# Patient Record
Sex: FEMALE | Race: WHITE | NOT HISPANIC OR LATINO | Employment: PART TIME | URBAN - NONMETROPOLITAN AREA
[De-identification: names, ages, dates, MRNs, and addresses within clinical notes are randomized per-mention and may not be internally consistent; named-entity substitution may affect disease eponyms.]

---

## 2017-01-16 DIAGNOSIS — F90.9 ATTENTION DEFICIT HYPERACTIVITY DISORDER (ADHD), UNSPECIFIED ADHD TYPE: Primary | ICD-10-CM

## 2017-01-16 RX ORDER — DEXTROAMPHETAMINE SACCHARATE, AMPHETAMINE ASPARTATE MONOHYDRATE, DEXTROAMPHETAMINE SULFATE AND AMPHETAMINE SULFATE 6.25; 6.25; 6.25; 6.25 MG/1; MG/1; MG/1; MG/1
CAPSULE, EXTENDED RELEASE ORAL
Qty: 60 CAPSULE | Refills: 0 | Status: SHIPPED | OUTPATIENT
Start: 2017-01-16 | End: 2017-02-15

## 2017-01-16 NOTE — TELEPHONE ENCOUNTER
adderall      Last Written Prescription Date: 12/14/16  Last Fill Quantity: 60,  # refills: 0   Last Office Visit with [facility], [facility] or [facility] prescribing provider: 12/12/16

## 2017-02-15 ENCOUNTER — OFFICE VISIT (OUTPATIENT)
Dept: FAMILY MEDICINE | Facility: OTHER | Age: 22
End: 2017-02-15
Attending: FAMILY MEDICINE
Payer: COMMERCIAL

## 2017-02-15 VITALS
TEMPERATURE: 100.2 F | HEIGHT: 67 IN | BODY MASS INDEX: 22.6 KG/M2 | OXYGEN SATURATION: 99 % | WEIGHT: 144 LBS | RESPIRATION RATE: 16 BRPM | HEART RATE: 91 BPM | DIASTOLIC BLOOD PRESSURE: 58 MMHG | SYSTOLIC BLOOD PRESSURE: 100 MMHG

## 2017-02-15 DIAGNOSIS — Z30.011 ENCOUNTER FOR INITIAL PRESCRIPTION OF CONTRACEPTIVE PILLS: ICD-10-CM

## 2017-02-15 DIAGNOSIS — F90.9 ATTENTION DEFICIT HYPERACTIVITY DISORDER (ADHD), UNSPECIFIED ADHD TYPE: ICD-10-CM

## 2017-02-15 DIAGNOSIS — J06.9 VIRAL UPPER RESPIRATORY TRACT INFECTION: Primary | ICD-10-CM

## 2017-02-15 PROCEDURE — 99212 OFFICE O/P EST SF 10 MIN: CPT

## 2017-02-15 PROCEDURE — 99213 OFFICE O/P EST LOW 20 MIN: CPT | Performed by: FAMILY MEDICINE

## 2017-02-15 RX ORDER — NORGESTIMATE AND ETHINYL ESTRADIOL 7DAYSX3 28
1 KIT ORAL DAILY
Qty: 84 TABLET | Refills: 3 | Status: SHIPPED | OUTPATIENT
Start: 2017-02-15 | End: 2018-03-02

## 2017-02-15 RX ORDER — DEXTROAMPHETAMINE SACCHARATE, AMPHETAMINE ASPARTATE MONOHYDRATE, DEXTROAMPHETAMINE SULFATE AND AMPHETAMINE SULFATE 6.25; 6.25; 6.25; 6.25 MG/1; MG/1; MG/1; MG/1
CAPSULE, EXTENDED RELEASE ORAL
Qty: 60 CAPSULE | Refills: 0 | Status: SHIPPED | OUTPATIENT
Start: 2017-02-15 | End: 2017-03-17

## 2017-02-15 ASSESSMENT — PAIN SCALES - GENERAL: PAINLEVEL: SEVERE PAIN (6)

## 2017-02-15 NOTE — MR AVS SNAPSHOT
"              After Visit Summary   2/15/2017    Nury Gilbert    MRN: [id number]           Patient Information     Date Of Birth          1995        Visit Information        Provider Department      2/15/2017 3:30 PM Vinay Baig MD [facility]        Today's Diagnoses     Viral upper respiratory tract infection    -  1    Attention deficit hyperactivity disorder (ADHD), unspecified ADHD type        Encounter for initial prescription of contraceptive pills          Care Instructions    F/u with ongoing concerns.         Follow-ups after your visit        Who to contact     If you have questions or need follow up information about today's clinic visit or your schedule please contact [facility] directly at [phone].  Normal or non-critical lab and imaging results will be communicated to you by MyChart, letter or phone within 4 business days after the clinic has received the results. If you do not hear from us within 7 days, please contact the clinic through MyChart or phone. If you have a critical or abnormal lab result, we will notify you by phone as soon as possible.  Submit refill requests through [employer] or call your pharmacy and they will forward the refill request to us. Please allow 3 business days for your refill to be completed.          Additional Information About Your Visit        MyChart Information     [employer] lets you send messages to your doctor, view your test results, renew your prescriptions, schedule appointments and more. To sign up, go to [email or web address] . Click on \"Log in\" on the left side of the screen, which will take you to the Welcome page. Then click on \"Sign up Now\" on the right side of the page.     You will be asked to enter the access code listed below, as well as some personal information. Please follow the directions to create your username and password.     Your access code is: [email or web address]  Expires: 3/12/2017  4:08 PM     Your " "access code will  in 90 days. If you need help or a new code, please call your [facility] or [phone].        Care EveryWhere ID     This is your Care EveryWhere ID. This could be used by other organizations to access your [facility] medical records  [id number]        Your Vitals Were     Pulse Temperature Respirations Height Pulse Oximetry BMI (Body Mass Index)    91 100.2  F (37.9  C) (Tympanic) 16 5' 7\" (1.702 m) 99% 22.55 kg/m2       Blood Pressure from Last 3 Encounters:   02/15/17 100/58   16 104/62   10/07/16 104/64    Weight from Last 3 Encounters:   02/15/17 144 lb (65.3 kg)   16 154 lb (69.9 kg)   10/07/16 160 lb (72.6 kg)              Today, you had the following     No orders found for display         Today's Medication Changes          These changes are accurate as of: 2/15/17  5:38 PM.  If you have any questions, ask your nurse or doctor.               Stop taking these medicines if you haven't already. Please contact your care team if you have questions.     ibuprofen 800 MG tablet   Commonly known as:  ADVIL/MOTRIN   Stopped by:  Vinay Baig MD           norelgestromin-ethinyl estradiol 150-35 MCG/24HR patch   Commonly known as:  ORTHO EVRA   Stopped by:  Vinay Baig MD                Where to get your medicines      These medications were sent to [facility] [address] [place], [place] - [address]  [address], formerly [facility] [address]     Phone:  [phone]     norgestim-eth estrad triphasic 0.18/0.215/0.25 MG-35 MCG per tablet         Some of these will need a paper prescription and others can be bought over the counter.  Ask your nurse if you have questions.     Bring a paper prescription for each of these medications     amphetamine-dextroamphetamine 25 MG 24 hr capsule                Primary Care Provider Office Phone # Fax #    AZALIA Gonzalez [phone]       [facility] [place] [address] [place] [place] [address]        Thank you!     Thank you for choosing [facility]  for your care. Our goal is always to provide you with excellent care. Hearing back from our patients is one way we can continue to improve our services. Please take a few minutes to complete the written survey that you may receive in the mail after your visit with us. Thank you!             Your Updated Medication List - Protect others around you: Learn how to safely use, store and throw away your medicines at www.disposemymeds.org.          This list is accurate as of: 2/15/17  5:38 PM.  Always use your most recent med list.                   Brand Name Dispense Instructions for use    amphetamine-dextroamphetamine 25 MG 24 hr capsule    ADDERALL XR    60 capsule    1 tab bid       clindamycin 1 % topical gel    CLINDAMAX    30 g    APPLY THIN FILM TOPICALLY TWICE DAILY TO AFFECTED SKIN AREAS       norgestim-eth estrad triphasic 0.18/0.215/0.25 MG-35 MCG per tablet    TRINESSA (28)    84 tablet    Take 1 tablet by mouth daily

## 2017-02-15 NOTE — NURSING NOTE
"Chief Complaint   Patient presents with     URI     Body aches, Chest heaviness, Runny nose, cough, right ear ache. Started about 4 days ago.        Initial /58  Pulse 91  Temp 100.2  F (37.9  C) (Tympanic)  Resp 16  Ht 5' 7\" (1.702 m)  Wt 144 lb (65.3 kg)  SpO2 99%  BMI 22.55 kg/m2 Estimated body mass index is 22.55 kg/(m^2) as calculated from the following:    Height as of this encounter: 5' 7\" (1.702 m).    Weight as of this encounter: 144 lb (65.3 kg).  Medication Reconciliation: complete   Felicita Fletcher      "

## 2017-02-15 NOTE — PROGRESS NOTES
Nury Gilbert    February 15, 2017    Chief Complaint   Patient presents with     URI     Body aches, Chest heaviness, Runny nose, cough, right ear ache. Started about 4 days ago.        SUBJECTIVE:  Here for URI sx, about 4 days, getting fevers, cough, rhinorrhea.  Some body aches as well.  We discussed and reviewed.      Past Medical History   Diagnosis Date     Anxiety state, unspecified 12/10/2002       Past Surgical History   Procedure Laterality Date     Nasal septal reconstruction/septoplasty  11/2009     turbinate hypertrophy; Chrissy Bonilla     Excision of skin lesion, submucosal reduction of inferior turbinates bilaterally  11/18/2009     deviated nasal septum, left nasal ala skin lesion; Chrissy Bonilla     Appendectomy  04/30/2009     Appendicitis; [employer]     Cystoscopy       Orthopedic surgery  3/13/13 & 2/17/14     arthroscopic procedure right knee     Adenoidectomy  11/2009     adnoid hypertrophy; Chrissy Bonilla     Ent surgery  2015     wisdom teeth removed       Current Outpatient Prescriptions   Medication Sig Dispense Refill     amphetamine-dextroamphetamine (ADDERALL XR) 25 MG 24 hr capsule 1 tab bid 60 capsule 0     norgestim-eth estrad triphasic (TRINESSA, 28,) 0.18/0.215/0.25 MG-35 MCG per tablet Take 1 tablet by mouth daily 84 tablet 3     clindamycin (CLINDAMAX) 1 % gel APPLY THIN FILM TOPICALLY TWICE DAILY TO AFFECTED SKIN AREAS 30 g 5       Allergies   Allergen Reactions     Influenza Virus Vaccine H5n1      Arm swelling, pain, sweating.      Penicillins      Phenergan Dm [Promethazine-Dm] Other (See Comments)     Hallucinations and muscle twitching       Family History   Problem Relation Age of Onset     HEART DISEASE Paternal Grandmother      defect; cause of death     Asthma Maternal Grandmother      Hypertension Maternal Grandmother      Other - See Comments Maternal Grandmother      blood disorder/GERD/sleep apnea     Cancer - colorectal Paternal Grandfather       Other - See Comments Maternal Grandfather      GERD     HEART DISEASE Maternal Grandfather      disease     Hypertension Maternal Grandfather      OSTEOPOROSIS Maternal Grandfather      Other - See Comments Mother      blood disorder/GERD, liver problem     Other - See Comments Maternal Aunt      brain tumor       Social History     Social History     Marital status: Single     Spouse name: N/A     Number of children: N/A     Years of education: N/A     Occupational History     Not on file.     Social History Main Topics     Smoking status: Never Smoker     Smokeless tobacco: Never Used      Comment: passive exposure     Alcohol use No     Drug use: No     Sexual activity: Yes     Other Topics Concern     Caffeine Concern Yes     soda every other day     Parent/Sibling W/ Cabg, Mi Or Angioplasty Before 65f 55m? No     Social History Narrative    No concerns with activity level.    The patient lives with mother and stepfather.    [employer] 10th Grade       5 point ROS negative except as noted above in HPI, including Gen., Resp., CV, GI &  system review.     OBJECTIVE:  B/P: 100/58, T: 100.2, P: 91, R: 16    GENERAL APPEARANCE: Alert, no acute distress  HEENT:  Normal.    CV: regular rate and rhythm, no murmur, rub or gallop  RESP: lungs clear to auscultation bilaterally  ABDOMEN: normal bowel sounds, soft, nontender, no hepatosplenomegaly or other masses  SKIN: no suspicious lesions or rashes to visualized skin  NEURO: Alert, oriented x 3, speech and mentation normal    ASSESSMENT and PLAN:  (J06.9,  B97.89) Viral upper respiratory tract infection  (primary encounter diagnosis)  Comment: discussed.   Plan: advise observation.  Clear lungs, normal oxygen stable presentation.  She understands and will f/u with any ongoing concerns.  Possible flu, but outside tx window so we didn't test.      (F90.9) Attention deficit hyperactivity disorder (ADHD), unspecified ADHD type  Comment: stable.   Plan:  amphetamine-dextroamphetamine (ADDERALL XR) 25         MG 24 hr capsule        Refilled at her request.  No side effects.      (Z30.011) Encounter for initial prescription of contraceptive pills  Comment: stable.   Plan: norgestim-eth estrad triphasic (TRINESSA, 28,)         0.18/0.215/0.25 MG-35 MCG per tablet        Sent the pills.

## 2017-03-17 DIAGNOSIS — F90.9 ATTENTION DEFICIT HYPERACTIVITY DISORDER (ADHD), UNSPECIFIED ADHD TYPE: ICD-10-CM

## 2017-03-17 NOTE — TELEPHONE ENCOUNTER
adderall      Last Written Prescription Date: 2/15/17  Last Fill Quantity: 60,  # refills: 0   Last Office Visit with [facility], [facility] or [facility] prescribing provider: 2/15/17

## 2017-03-20 RX ORDER — DEXTROAMPHETAMINE SACCHARATE, AMPHETAMINE ASPARTATE MONOHYDRATE, DEXTROAMPHETAMINE SULFATE AND AMPHETAMINE SULFATE 6.25; 6.25; 6.25; 6.25 MG/1; MG/1; MG/1; MG/1
CAPSULE, EXTENDED RELEASE ORAL
Qty: 60 CAPSULE | Refills: 0 | Status: SHIPPED | OUTPATIENT
Start: 2017-03-20 | End: 2017-04-28

## 2017-03-28 ENCOUNTER — OFFICE VISIT - GICH (OUTPATIENT)
Dept: FAMILY MEDICINE | Facility: OTHER | Age: 22
End: 2017-03-28

## 2017-03-28 ENCOUNTER — HISTORY (OUTPATIENT)
Dept: FAMILY MEDICINE | Facility: OTHER | Age: 22
End: 2017-03-28

## 2017-03-28 DIAGNOSIS — R10.2 PELVIC AND PERINEAL PAIN: ICD-10-CM

## 2017-03-28 LAB — HCG UR QL: NEGATIVE

## 2017-03-29 ENCOUNTER — HOSPITAL ENCOUNTER (OUTPATIENT)
Dept: RADIOLOGY | Facility: OTHER | Age: 22
End: 2017-03-29
Attending: FAMILY MEDICINE

## 2017-03-29 DIAGNOSIS — R10.2 PELVIC AND PERINEAL PAIN: ICD-10-CM

## 2017-03-31 ENCOUNTER — OFFICE VISIT (OUTPATIENT)
Dept: FAMILY MEDICINE | Facility: OTHER | Age: 22
End: 2017-03-31
Attending: PHYSICIAN ASSISTANT
Payer: COMMERCIAL

## 2017-03-31 VITALS
SYSTOLIC BLOOD PRESSURE: 108 MMHG | HEIGHT: 68 IN | TEMPERATURE: 98.2 F | DIASTOLIC BLOOD PRESSURE: 76 MMHG | HEART RATE: 81 BPM | OXYGEN SATURATION: 100 % | BODY MASS INDEX: 21.67 KG/M2 | WEIGHT: 143 LBS | RESPIRATION RATE: 16 BRPM

## 2017-03-31 DIAGNOSIS — F41.8 DEPRESSION WITH ANXIETY: Primary | ICD-10-CM

## 2017-03-31 DIAGNOSIS — Z79.899 ON STIMULANT MEDICATION: ICD-10-CM

## 2017-03-31 PROCEDURE — 99213 OFFICE O/P EST LOW 20 MIN: CPT | Performed by: PHYSICIAN ASSISTANT

## 2017-03-31 PROCEDURE — 99212 OFFICE O/P EST SF 10 MIN: CPT

## 2017-03-31 RX ORDER — CITALOPRAM HYDROBROMIDE 20 MG/1
20 TABLET ORAL DAILY
Qty: 30 TABLET | Refills: 1 | Status: SHIPPED | OUTPATIENT
Start: 2017-03-31 | End: 2017-04-28 | Stop reason: DRUGHIGH

## 2017-03-31 ASSESSMENT — ANXIETY QUESTIONNAIRES
1. FEELING NERVOUS, ANXIOUS, OR ON EDGE: NEARLY EVERY DAY
7. FEELING AFRAID AS IF SOMETHING AWFUL MIGHT HAPPEN: NEARLY EVERY DAY
IF YOU CHECKED OFF ANY PROBLEMS ON THIS QUESTIONNAIRE, HOW DIFFICULT HAVE THESE PROBLEMS MADE IT FOR YOU TO DO YOUR WORK, TAKE CARE OF THINGS AT HOME, OR GET ALONG WITH OTHER PEOPLE: EXTREMELY DIFFICULT
2. NOT BEING ABLE TO STOP OR CONTROL WORRYING: NEARLY EVERY DAY
6. BECOMING EASILY ANNOYED OR IRRITABLE: NEARLY EVERY DAY
5. BEING SO RESTLESS THAT IT IS HARD TO SIT STILL: NEARLY EVERY DAY
GAD7 TOTAL SCORE: 21
3. WORRYING TOO MUCH ABOUT DIFFERENT THINGS: NEARLY EVERY DAY

## 2017-03-31 ASSESSMENT — PATIENT HEALTH QUESTIONNAIRE - PHQ9: 5. POOR APPETITE OR OVEREATING: NEARLY EVERY DAY

## 2017-03-31 ASSESSMENT — PAIN SCALES - GENERAL: PAINLEVEL: NO PAIN (0)

## 2017-03-31 NOTE — MR AVS SNAPSHOT
"              After Visit Summary   3/31/2017    Nury Gilbert    MRN: [id number]           Patient Information     Date Of Birth          1995        Visit Information        Provider Department      3/31/2017 3:30 PM Naye Villeda PA [facility]        Today's Diagnoses     Depression with anxiety    -  1       Follow-ups after your visit        Who to contact     If you have questions or need follow up information about today's clinic visit or your schedule please contact [facility] directly at [phone].  Normal or non-critical lab and imaging results will be communicated to you by [employer]hart, letter or phone within 4 business days after the clinic has received the results. If you do not hear from us within 7 days, please contact the clinic through [employer] or phone. If you have a critical or abnormal lab result, we will notify you by phone as soon as possible.  Submit refill requests through [employer] or call your pharmacy and they will forward the refill request to us. Please allow 3 business days for your refill to be completed.          Additional Information About Your Visit        MyChart Information     [employer] lets you send messages to your doctor, view your test results, renew your prescriptions, schedule appointments and more. To sign up, go to [email or web address] . Click on \"Log in\" on the left side of the screen, which will take you to the Welcome page. Then click on \"Sign up Now\" on the right side of the page.     You will be asked to enter the access code listed below, as well as some personal information. Please follow the directions to create your username and password.     Your access code is: [email or web address]  Expires: 2017  4:27 PM     Your access code will  in 90 days. If you need help or a new code, please call your [facility] or [phone].        Care EveryWhere ID     This is your Care EveryWhere ID. This could be used by other " "organizations to access your [facility] medical records  [id number]        Your Vitals Were     Pulse Temperature Respirations Height Pulse Oximetry BMI (Body Mass Index)    81 98.2  F (36.8  C) (Tympanic) 16 5' 7.75\" (1.721 m) 100% 21.9 kg/m2       Blood Pressure from Last 3 Encounters:   03/31/17 108/76   02/15/17 100/58   12/12/16 104/62    Weight from Last 3 Encounters:   03/31/17 143 lb (64.9 kg)   02/15/17 144 lb (65.3 kg)   12/12/16 154 lb (69.9 kg)              Today, you had the following     No orders found for display         Today's Medication Changes          These changes are accurate as of: 3/31/17  4:27 PM.  If you have any questions, ask your nurse or doctor.               Start taking these medicines.        Dose/Directions    citalopram 20 MG tablet   Commonly known as:  celeXA   Used for:  Depression with anxiety   Started by:  Naye Villeda PA        Dose:  20 mg   Take 1 tablet (20 mg) by mouth daily   Quantity:  30 tablet   Refills:  1            Where to get your medicines      These medications were sent to [id number] - [place], [place] - [address]  [address] [facility] [address]     Phone:  [phone]     citalopram 20 MG tablet                Primary Care Provider Office Phone # Fax #    AZALIA Gonzalez [phone]       [facility] [place] [address] [facility]  [place] [address]        Thank you!     Thank you for choosing [facility]  for your care. Our goal is always to provide you with excellent care. Hearing back from our patients is one way we can continue to improve our services. Please take a few minutes to complete the written survey that you may receive in the mail after your visit with us. Thank you!             Your Updated Medication List - Protect others around you: Learn how to safely use, store and throw away your medicines at www.disposemymeds.org.          This list is " accurate as of: 3/31/17  4:27 PM.  Always use your most recent med list.                   Brand Name Dispense Instructions for use    amphetamine-dextroamphetamine 25 MG 24 hr capsule    ADDERALL XR    60 capsule    1 tab bid       citalopram 20 MG tablet    celeXA    30 tablet    Take 1 tablet (20 mg) by mouth daily       clindamycin 1 % topical gel    CLINDAMAX    30 g    APPLY THIN FILM TOPICALLY TWICE DAILY TO AFFECTED SKIN AREAS       norgestim-eth estrad triphasic 0.18/0.215/0.25 MG-35 MCG per tablet    TRINESSA (28)    84 tablet    Take 1 tablet by mouth daily

## 2017-03-31 NOTE — PROGRESS NOTES
Subjective:  Nury Gilbert is a 22 year old female with a 2 month history of depression and anxiety    Active diagnoses this visit:  Data Unavailable    Review Of Systems  Psych: As above    PMH, PSH, FH reviewed and unchanged    Current Outpatient Prescriptions   Medication     amphetamine-dextroamphetamine (ADDERALL XR) 25 MG 24 hr capsule     norgestim-eth estrad triphasic (TRINESSA, 28,) 0.18/0.215/0.25 MG-35 MCG per tablet     clindamycin (CLINDAMAX) 1 % gel     No current facility-administered medications for this visit.        Allergies   Allergen Reactions     Influenza Virus Vaccine H5n1      Arm swelling, pain, sweating.      Penicillins      Phenergan Dm [Promethazine-Dm] Other (See Comments)     Hallucinations and muscle twitching         Objective:  B/P: 108/76, T: 98.2, P: 81, R: 16    Physical Exam:  Constitutional: Alert, cooperative, orientated to place and situation  Psych: Mood is predominately euthymic with corresponding affect. Normal tone, rate and volume of speech. Goal directed thought process. Normal thought content. Patient shows good insight and judgement. Denies homicidal or suicidal ideation, intent or plan. PHQ-9 score 17  BRANDON 21     Assessment and Plan:  (F41.8) Depression with anxiety  (primary encounter diagnosis)  Comment: Start with 3 week f/u  Plan: citalopram (CELEXA) 20 MG tablet                Follow up 3 weeks, or if symptoms persist or worsen  A total of 15 minutes is spent with patient with greater than 60% spent on above plan. We discussed risks and benefits of medications and usual side effects to watch for.    Naye Villeda

## 2017-03-31 NOTE — NURSING NOTE
"Chief Complaint   Patient presents with     Anxiety     Pt is in for a FU on anxiety.       Initial /76 (BP Location: Right arm, Patient Position: Chair, Cuff Size: Adult Regular)  Pulse 81  Temp 98.2  F (36.8  C) (Tympanic)  Resp 16  Ht 5' 7.75\" (1.721 m)  Wt 143 lb (64.9 kg)  SpO2 100%  BMI 21.9 kg/m2 Estimated body mass index is 21.9 kg/(m^2) as calculated from the following:    Height as of this encounter: 5' 7.75\" (1.721 m).    Weight as of this encounter: 143 lb (64.9 kg).  Medication Reconciliation: complete   Lorri Zuleta    "

## 2017-04-01 ASSESSMENT — PATIENT HEALTH QUESTIONNAIRE - PHQ9: SUM OF ALL RESPONSES TO PHQ QUESTIONS 1-9: 17

## 2017-04-01 ASSESSMENT — ANXIETY QUESTIONNAIRES: GAD7 TOTAL SCORE: 21

## 2017-04-11 ENCOUNTER — AMBULATORY - GICH (OUTPATIENT)
Dept: SCHEDULING | Facility: OTHER | Age: 22
End: 2017-04-11

## 2017-04-28 ENCOUNTER — OFFICE VISIT (OUTPATIENT)
Dept: FAMILY MEDICINE | Facility: OTHER | Age: 22
End: 2017-04-28
Attending: PHYSICIAN ASSISTANT
Payer: COMMERCIAL

## 2017-04-28 VITALS
HEIGHT: 68 IN | RESPIRATION RATE: 16 BRPM | HEART RATE: 73 BPM | BODY MASS INDEX: 21.73 KG/M2 | SYSTOLIC BLOOD PRESSURE: 88 MMHG | DIASTOLIC BLOOD PRESSURE: 56 MMHG | OXYGEN SATURATION: 99 % | TEMPERATURE: 98.5 F | WEIGHT: 143.4 LBS

## 2017-04-28 DIAGNOSIS — F32.0 MILD SINGLE CURRENT EPISODE OF MAJOR DEPRESSIVE DISORDER (H): Primary | ICD-10-CM

## 2017-04-28 DIAGNOSIS — H10.33 ACUTE BACTERIAL CONJUNCTIVITIS OF BOTH EYES: ICD-10-CM

## 2017-04-28 DIAGNOSIS — F90.9 ATTENTION DEFICIT HYPERACTIVITY DISORDER (ADHD), UNSPECIFIED ADHD TYPE: ICD-10-CM

## 2017-04-28 DIAGNOSIS — H65.01 RIGHT ACUTE SEROUS OTITIS MEDIA, RECURRENCE NOT SPECIFIED: ICD-10-CM

## 2017-04-28 DIAGNOSIS — Z71.89 ACP (ADVANCE CARE PLANNING): Chronic | ICD-10-CM

## 2017-04-28 DIAGNOSIS — F41.1 GAD (GENERALIZED ANXIETY DISORDER): ICD-10-CM

## 2017-04-28 PROCEDURE — 99214 OFFICE O/P EST MOD 30 MIN: CPT | Performed by: PHYSICIAN ASSISTANT

## 2017-04-28 PROCEDURE — 99212 OFFICE O/P EST SF 10 MIN: CPT

## 2017-04-28 RX ORDER — AZITHROMYCIN 250 MG/1
TABLET, FILM COATED ORAL
Qty: 6 TABLET | Refills: 0 | Status: SHIPPED | OUTPATIENT
Start: 2017-04-28 | End: 2018-03-02

## 2017-04-28 RX ORDER — TOBRAMYCIN 3 MG/ML
2 SOLUTION/ DROPS OPHTHALMIC 3 TIMES DAILY
Qty: 3 ML | Refills: 0 | Status: SHIPPED | OUTPATIENT
Start: 2017-04-28 | End: 2017-05-05

## 2017-04-28 RX ORDER — DEXTROAMPHETAMINE SACCHARATE, AMPHETAMINE ASPARTATE MONOHYDRATE, DEXTROAMPHETAMINE SULFATE AND AMPHETAMINE SULFATE 6.25; 6.25; 6.25; 6.25 MG/1; MG/1; MG/1; MG/1
CAPSULE, EXTENDED RELEASE ORAL
Qty: 60 CAPSULE | Refills: 0 | Status: SHIPPED | OUTPATIENT
Start: 2017-04-28 | End: 2017-06-28

## 2017-04-28 RX ORDER — CITALOPRAM HYDROBROMIDE 40 MG/1
40 TABLET ORAL DAILY
Qty: 30 TABLET | Refills: 1 | Status: SHIPPED | OUTPATIENT
Start: 2017-04-28 | End: 2018-03-02

## 2017-04-28 ASSESSMENT — ANXIETY QUESTIONNAIRES
7. FEELING AFRAID AS IF SOMETHING AWFUL MIGHT HAPPEN: MORE THAN HALF THE DAYS
GAD7 TOTAL SCORE: 10
IF YOU CHECKED OFF ANY PROBLEMS ON THIS QUESTIONNAIRE, HOW DIFFICULT HAVE THESE PROBLEMS MADE IT FOR YOU TO DO YOUR WORK, TAKE CARE OF THINGS AT HOME, OR GET ALONG WITH OTHER PEOPLE: SOMEWHAT DIFFICULT
3. WORRYING TOO MUCH ABOUT DIFFERENT THINGS: MORE THAN HALF THE DAYS
5. BEING SO RESTLESS THAT IT IS HARD TO SIT STILL: SEVERAL DAYS
6. BECOMING EASILY ANNOYED OR IRRITABLE: MORE THAN HALF THE DAYS
1. FEELING NERVOUS, ANXIOUS, OR ON EDGE: SEVERAL DAYS
2. NOT BEING ABLE TO STOP OR CONTROL WORRYING: SEVERAL DAYS

## 2017-04-28 ASSESSMENT — PAIN SCALES - GENERAL: PAINLEVEL: MODERATE PAIN (5)

## 2017-04-28 ASSESSMENT — PATIENT HEALTH QUESTIONNAIRE - PHQ9: 5. POOR APPETITE OR OVEREATING: SEVERAL DAYS

## 2017-04-28 NOTE — NURSING NOTE
"Chief Complaint   Patient presents with     URI     Pt has a cough, stuffy nose, ST, SOB, SOB with extertion and lying down for 4 days. Pt has nausea and vomiting at onset, but has resolved. No fever noted. Pt has bilateral eye drainage, eye soreness for 1 day.     Depression     Pt is in for a 3 weeks FU on Depression and Anxiety.       Initial BP (!) 88/56 (BP Location: Right arm, Patient Position: Chair, Cuff Size: Adult Regular)  Pulse 73  Temp 98.5  F (36.9  C) (Tympanic)  Resp 16  Ht 5' 7.75\" (1.721 m)  Wt 143 lb 6.4 oz (65 kg)  SpO2 99%  BMI 21.97 kg/m2 Estimated body mass index is 21.97 kg/(m^2) as calculated from the following:    Height as of this encounter: 5' 7.75\" (1.721 m).    Weight as of this encounter: 143 lb 6.4 oz (65 kg).  Medication Reconciliation: complete   Lorri Zuleta    "

## 2017-04-28 NOTE — MR AVS SNAPSHOT
After Visit Summary   4/28/2017    Nury Gilbert    MRN: [id number]           Patient Information     Date Of Birth          1995        Visit Information        Provider Department      4/28/2017 4:00 PM Naye Villeda PA [facility]        Today's Diagnoses     Mild single current episode of major depressive disorder (H)    -  1    ACP (advance care planning)        BRANDON (generalized anxiety disorder)        Acute bacterial conjunctivitis of both eyes        Right acute serous otitis media, recurrence not specified        Attention deficit hyperactivity disorder (ADHD), unspecified ADHD type           Follow-ups after your visit        Your next 10 appointments already scheduled     May 25, 2017  2:15 PM CDT   (Arrive by 2:00 PM)   SHORT with AZALIA Gonzalez   [facility] ([facility])    [address] [facility] [address]   [phone]              Who to contact     If you have questions or need follow up information about today's clinic visit or your schedule please contact [facility] directly at [phone].  Normal or non-critical lab and imaging results will be communicated to you by MyChart, letter or phone within 4 business days after the clinic has received the results. If you do not hear from us within 7 days, please contact the clinic through [employer] or phone. If you have a critical or abnormal lab result, we will notify you by phone as soon as possible.  Submit refill requests through [employer] or call your pharmacy and they will forward the refill request to us. Please allow 3 business days for your refill to be completed.          Additional Information About Your Visit        MyChart Information     [employer] gives you secure access to your electronic health record. If you see a primary care provider, you can also send messages to your care team and make appointments. If you have questions, please call your primary  "care clinic.  If you do not have a primary care provider, please call [phone] and they will assist you.        Care EveryWhere ID     This is your Care EveryWhere ID. This could be used by other organizations to access your [place] medical records  [id number]        Your Vitals Were     Pulse Temperature Respirations Height Pulse Oximetry BMI (Body Mass Index)    73 98.5  F (36.9  C) (Tympanic) 16 5' 7.75\" (1.721 m) 99% 21.97 kg/m2       Blood Pressure from Last 3 Encounters:   04/28/17 (!) 88/56   03/31/17 108/76   02/15/17 100/58    Weight from Last 3 Encounters:   04/28/17 143 lb 6.4 oz (65 kg)   03/31/17 143 lb (64.9 kg)   02/15/17 144 lb (65.3 kg)              Today, you had the following     No orders found for display         Today's Medication Changes          These changes are accurate as of: 4/28/17  4:38 PM.  If you have any questions, ask your nurse or doctor.               Start taking these medicines.        Dose/Directions    azithromycin 250 MG tablet   Commonly known as:  ZITHROMAX   Used for:  Right acute serous otitis media, recurrence not specified   Started by:  Naye Villeda PA        Two tablets first day, then one tablet daily for four days.   Quantity:  6 tablet   Refills:  0       tobramycin 0.3 % ophthalmic solution   Commonly known as:  TOBREX   Used for:  Acute bacterial conjunctivitis of both eyes   Started by:  Naye Villead PA        Dose:  2 drop   Place 2 drops into both eyes 3 times daily for 7 days   Quantity:  3 mL   Refills:  0         These medicines have changed or have updated prescriptions.        Dose/Directions    citalopram 40 MG tablet   Commonly known as:  celeXA   This may have changed:    - medication strength  - how much to take   Used for:  Mild single current episode of major depressive disorder (H), BRANDON (generalized anxiety disorder)   Changed by:  Naye Villeda PA        Dose:  40 mg   Take 1 tablet (40 mg) by mouth daily   Quantity:  30 " tablet   Refills:  1            Where to get your medicines      These medications were sent to [id number] - [place], [place] - [address]  [address], [facility] [address]     Phone:  [phone]     azithromycin 250 MG tablet    citalopram 40 MG tablet    tobramycin 0.3 % ophthalmic solution         Some of these will need a paper prescription and others can be bought over the counter.  Ask your nurse if you have questions.     Bring a paper prescription for each of these medications     amphetamine-dextroamphetamine 25 MG 24 hr capsule                Primary Care Provider Office Phone # Fax #    AZALIA Gonzalez [phone]       [facility] [place] [address]  [place] [address]        Thank you!     Thank you for choosing [facility]  for your care. Our goal is always to provide you with excellent care. Hearing back from our patients is one way we can continue to improve our services. Please take a few minutes to complete the written survey that you may receive in the mail after your visit with us. Thank you!             Your Updated Medication List - Protect others around you: Learn how to safely use, store and throw away your medicines at www.disposemymeds.org.          This list is accurate as of: 4/28/17  4:38 PM.  Always use your most recent med list.                   Brand Name Dispense Instructions for use    amphetamine-dextroamphetamine 25 MG 24 hr capsule    ADDERALL XR    60 capsule    1 tab bid       azithromycin 250 MG tablet    ZITHROMAX    6 tablet    Two tablets first day, then one tablet daily for four days.       citalopram 40 MG tablet    celeXA    30 tablet    Take 1 tablet (40 mg) by mouth daily       clindamycin 1 % topical gel    CLINDAMAX    30 g    APPLY THIN FILM TOPICALLY TWICE DAILY TO AFFECTED SKIN AREAS       norgestim-eth estrad triphasic 0.18/0.215/0.25 MG-35 MCG per tablet     TRINESSA (28)    84 tablet    Take 1 tablet by mouth daily       tobramycin 0.3 % ophthalmic solution    TOBREX    3 mL    Place 2 drops into both eyes 3 times daily for 7 days

## 2017-04-28 NOTE — PROGRESS NOTES
Chief complaint:   Chief Complaint   Patient presents with     URI     Pt has a cough, stuffy nose, ST, SOB, SOB with extertion and lying down for 4 days. Pt has nausea and vomiting at onset, but has resolved. No fever noted. Pt has bilateral eye drainage, eye soreness for 1 day.     Depression     Pt is in for a 3 weeks FU on Depression and Anxiety.       Subjective: Nury Gilbert is a 22 year old female with a 4 dayhistory of nasal congestion, PND, sore throat, right earache, cough, fever and red eyes with drainage. Denies nausea, vomiting, diarrhea. Next f/u depression/anxiety. Feels mildly improved with Citalopram 20 mg. No side efeects    Past Medical History:   Diagnosis Date     Anxiety state, unspecified 12/10/2002     Past Surgical History:   Procedure Laterality Date     ADENOIDECTOMY  11/2009    adnoid hypertrophy; [facility]     APPENDECTOMY  04/30/2009    Appendicitis; [employer]     CYSTOSCOPY       ENT SURGERY  2015    wisdom teeth removed     excision of skin lesion, submucosal reduction of inferior turbinates bilaterally  11/18/2009    deviated nasal septum, left nasal ala skin lesion; [facility]     nasal septal reconstruction/septoplasty  11/2009    turbinate hypertrophy; [facility]     ORTHOPEDIC SURGERY  3/13/13 & 2/17/14    arthroscopic procedure right knee     Current Outpatient Prescriptions   Medication Sig Dispense Refill     citalopram (CELEXA) 40 MG tablet Take 1 tablet (40 mg) by mouth daily 30 tablet 1     azithromycin (ZITHROMAX) 250 MG tablet Two tablets first day, then one tablet daily for four days. 6 tablet 0     tobramycin (TOBREX) 0.3 % ophthalmic solution Place 2 drops into both eyes 3 times daily for 7 days 3 mL 0     amphetamine-dextroamphetamine (ADDERALL XR) 25 MG 24 hr capsule 1 tab bid 60 capsule 0     norgestim-eth estrad triphasic (TRINESSA, 28,) 0.18/0.215/0.25 MG-35 MCG per tablet Take 1 tablet by mouth daily 84 tablet 3     clindamycin  (CLINDAMAX) 1 % gel APPLY THIN FILM TOPICALLY TWICE DAILY TO AFFECTED SKIN AREAS 30 g 5     [DISCONTINUED] citalopram (CELEXA) 20 MG tablet Take 1 tablet (20 mg) by mouth daily 30 tablet 1      Allergies   Allergen Reactions     Influenza Virus Vaccine H5n1      Arm swelling, pain, sweating.      Penicillins      Phenergan Dm [Promethazine-Dm] Other (See Comments)     Hallucinations and muscle twitching       Family and Social History are reviewed.    Review Of Systems  Skin: Denies rash  Eyes: Denies redness or discharge   Ears/Nose/Throat: as above  Respiratory: as above  Cardiovascular: Denies chest pain or palpitations   Gastrointestinal:Denies nausea, vomiting, diarrhea   Psych: as above    Objective:   B/P: 88/56, T: 98.5, P: 73, R: 16    Physical Exam  General: Alert orientated. NAD  Skin is unremarkable.  HEENT:Posterior pharynx erythematous. EAC's clear. TM's intact. Nasal mucosa edematous, erythematous. TTP maxillary sinuses. Neck supple. No lymphadenopathy. Palpebral conjunctiva injected bilateral  Lungs: Clear to auscultation  Cardiac: RRR  Psych: Mood is predominately euthymic with corresponding affect. Normal rate, tone and volume of speech. Goal directed thought process. Normal thought content. Patient shows good insight and judgement. PHQ-9 score 8      Assessment:   (F32.0) Mild single current episode of major depressive disorder (H)  (primary encounter diagnosis)  Comment: 30 mg daily for 4 days, then 40 mg daily with 1 month f/u  Plan: citalopram (CELEXA) 40 MG tablet            (Z71.89) ACP (advance care planning)      (F41.1) BRANDON (generalized anxiety disorder)  Comment: as above  Plan: citalopram (CELEXA) 40 MG tablet            (H10.33) Acute bacterial conjunctivitis of both eyes  Plan: tobramycin (TOBREX) 0.3 % ophthalmic solution            (H65.01) Right acute serous otitis media, recurrence not specified  Plan: azithromycin (ZITHROMAX) 250 MG tablet            (F90.9) Attention deficit  hyperactivity disorder (ADHD), unspecified ADHD type  Comment: refill  Plan: amphetamine-dextroamphetamine (ADDERALL XR) 25         MG 24 hr capsule              Rest. Increase fluids. Tylenol for fever or discomfort. Return if symptoms persist or worsen.    Naye Villeda PA-C

## 2017-05-02 ASSESSMENT — PATIENT HEALTH QUESTIONNAIRE - PHQ9: SUM OF ALL RESPONSES TO PHQ QUESTIONS 1-9: 8

## 2017-05-02 ASSESSMENT — ANXIETY QUESTIONNAIRES: GAD7 TOTAL SCORE: 10

## 2017-05-19 ENCOUNTER — HISTORY (OUTPATIENT)
Dept: EMERGENCY MEDICINE | Facility: OTHER | Age: 22
End: 2017-05-19

## 2017-05-20 ENCOUNTER — COMMUNICATION - GICH (OUTPATIENT)
Dept: EMERGENCY MEDICINE | Facility: OTHER | Age: 22
End: 2017-05-20

## 2017-05-20 DIAGNOSIS — S61.421A: ICD-10-CM

## 2017-05-23 ENCOUNTER — HISTORY (OUTPATIENT)
Dept: SURGERY | Facility: OTHER | Age: 22
End: 2017-05-23

## 2017-05-23 ENCOUNTER — OFFICE VISIT - GICH (OUTPATIENT)
Dept: SURGERY | Facility: OTHER | Age: 22
End: 2017-05-23

## 2017-05-23 DIAGNOSIS — Z48.817 ENCOUNTER FOR SURGICAL AFTERCARE FOLLOWING SURGERY OF SKIN OR SUBCUTANEOUS TISSUE: ICD-10-CM

## 2017-05-23 DIAGNOSIS — S60.459D: ICD-10-CM

## 2017-06-24 ENCOUNTER — TRANSFERRED RECORDS (OUTPATIENT)
Dept: HEALTH INFORMATION MANAGEMENT | Facility: HOSPITAL | Age: 22
End: 2017-06-24

## 2017-06-24 ENCOUNTER — HISTORY (OUTPATIENT)
Dept: EMERGENCY MEDICINE | Facility: OTHER | Age: 22
End: 2017-06-24

## 2017-06-28 DIAGNOSIS — F90.9 ATTENTION DEFICIT HYPERACTIVITY DISORDER (ADHD), UNSPECIFIED ADHD TYPE: ICD-10-CM

## 2017-06-28 RX ORDER — DEXTROAMPHETAMINE SACCHARATE, AMPHETAMINE ASPARTATE MONOHYDRATE, DEXTROAMPHETAMINE SULFATE AND AMPHETAMINE SULFATE 6.25; 6.25; 6.25; 6.25 MG/1; MG/1; MG/1; MG/1
CAPSULE, EXTENDED RELEASE ORAL
Qty: 60 CAPSULE | Refills: 0 | Status: SHIPPED | OUTPATIENT
Start: 2017-06-28 | End: 2018-03-02

## 2017-07-10 ENCOUNTER — HISTORY (OUTPATIENT)
Dept: EMERGENCY MEDICINE | Facility: OTHER | Age: 22
End: 2017-07-10

## 2017-07-11 ENCOUNTER — TRANSFERRED RECORDS (OUTPATIENT)
Dept: HEALTH INFORMATION MANAGEMENT | Facility: HOSPITAL | Age: 22
End: 2017-07-11

## 2017-07-11 ENCOUNTER — HISTORY (OUTPATIENT)
Dept: SURGERY | Facility: OTHER | Age: 22
End: 2017-07-11

## 2017-07-11 ENCOUNTER — AMBULATORY - GICH (OUTPATIENT)
Dept: SURGERY | Facility: OTHER | Age: 22
End: 2017-07-11

## 2017-07-11 DIAGNOSIS — S60.459D: ICD-10-CM

## 2017-08-01 ENCOUNTER — OFFICE VISIT - GICH (OUTPATIENT)
Dept: SURGERY | Facility: OTHER | Age: 22
End: 2017-08-01

## 2017-08-01 ENCOUNTER — HISTORY (OUTPATIENT)
Dept: SURGERY | Facility: OTHER | Age: 22
End: 2017-08-01

## 2017-08-01 ENCOUNTER — COMMUNICATION - GICH (OUTPATIENT)
Dept: SURGERY | Facility: OTHER | Age: 22
End: 2017-08-01

## 2017-08-01 DIAGNOSIS — S61.239S: ICD-10-CM

## 2017-08-18 ENCOUNTER — HISTORY (OUTPATIENT)
Dept: FAMILY MEDICINE | Facility: OTHER | Age: 22
End: 2017-08-18

## 2017-08-18 ENCOUNTER — OFFICE VISIT - GICH (OUTPATIENT)
Dept: FAMILY MEDICINE | Facility: OTHER | Age: 22
End: 2017-08-18

## 2017-08-18 DIAGNOSIS — L03.031 CELLULITIS OF TOE OF RIGHT FOOT: ICD-10-CM

## 2017-08-22 ENCOUNTER — HISTORY (OUTPATIENT)
Dept: EMERGENCY MEDICINE | Facility: OTHER | Age: 22
End: 2017-08-22

## 2017-08-29 ENCOUNTER — COMMUNICATION - GICH (OUTPATIENT)
Dept: OBGYN | Facility: OTHER | Age: 22
End: 2017-08-29

## 2017-08-29 ENCOUNTER — HOSPITAL ENCOUNTER (OUTPATIENT)
Dept: RADIOLOGY | Facility: OTHER | Age: 22
End: 2017-08-29

## 2017-08-29 ENCOUNTER — AMBULATORY - GICH (OUTPATIENT)
Dept: OBGYN | Facility: OTHER | Age: 22
End: 2017-08-29

## 2017-08-29 DIAGNOSIS — Z33.1 PREGNANT STATE, INCIDENTAL: ICD-10-CM

## 2017-08-29 DIAGNOSIS — Z36.9 ENCOUNTER FOR ANTENATAL SCREENING OF MOTHER: ICD-10-CM

## 2017-09-05 ENCOUNTER — TRANSFERRED RECORDS (OUTPATIENT)
Dept: HEALTH INFORMATION MANAGEMENT | Facility: HOSPITAL | Age: 22
End: 2017-09-05

## 2017-09-19 ENCOUNTER — PRENATAL OFFICE VISIT - GICH (OUTPATIENT)
Dept: OBGYN | Facility: OTHER | Age: 22
End: 2017-09-19

## 2017-09-19 ENCOUNTER — HISTORY (OUTPATIENT)
Dept: OBGYN | Facility: OTHER | Age: 22
End: 2017-09-19

## 2017-09-19 ENCOUNTER — AMBULATORY - GICH (OUTPATIENT)
Dept: LAB | Facility: OTHER | Age: 22
End: 2017-09-19

## 2017-09-19 ENCOUNTER — HOSPITAL ENCOUNTER (OUTPATIENT)
Dept: RADIOLOGY | Facility: OTHER | Age: 22
End: 2017-09-19

## 2017-09-19 ENCOUNTER — AMBULATORY - GICH (OUTPATIENT)
Dept: RADIOLOGY | Facility: OTHER | Age: 22
End: 2017-09-19

## 2017-09-19 DIAGNOSIS — Z12.4 ENCOUNTER FOR SCREENING FOR MALIGNANT NEOPLASM OF CERVIX: ICD-10-CM

## 2017-09-19 DIAGNOSIS — Z34.90 ENCOUNTER FOR SUPERVISION OF NORMAL PREGNANCY: ICD-10-CM

## 2017-09-19 DIAGNOSIS — Z36.9 ENCOUNTER FOR ANTENATAL SCREENING OF MOTHER: ICD-10-CM

## 2017-09-19 DIAGNOSIS — O21.9 VOMITING OF PREGNANCY: ICD-10-CM

## 2017-09-19 LAB
ABORH - HISTORICAL: NORMAL
ABSOLUTE BASOPHILS - HISTORICAL: 0 THOU/CU MM
ABSOLUTE EOSINOPHILS - HISTORICAL: 0.1 THOU/CU MM
ABSOLUTE IMMATURE GRANULOCYTES(METAS,MYELOS,PROS) - HISTORICAL: 0 THOU/CU MM
ABSOLUTE LYMPHOCYTES - HISTORICAL: 1.8 THOU/CU MM (ref 0.9–2.9)
ABSOLUTE MONOCYTES - HISTORICAL: 0.6 THOU/CU MM
ABSOLUTE NEUTROPHILS - HISTORICAL: 5 THOU/CU MM (ref 1.7–7)
ANTIBODY SCREEN - HISTORICAL: NEGATIVE
BASOPHILS # BLD AUTO: 0.4 %
EOSINOPHIL NFR BLD AUTO: 1.9 %
ERYTHROCYTE [DISTWIDTH] IN BLOOD BY AUTOMATED COUNT: 12.1 % (ref 11.5–15.5)
HCT VFR BLD AUTO: 41.7 % (ref 33–51)
HEMOGLOBIN: 14.6 G/DL (ref 12–16)
IMMATURE GRANULOCYTES(METAS,MYELOS,PROS) - HISTORICAL: 0.3 %
LYMPHOCYTES NFR BLD AUTO: 23.2 % (ref 20–44)
MCH RBC QN AUTO: 30.5 PG (ref 26–34)
MCHC RBC AUTO-ENTMCNC: 35 G/DL (ref 32–36)
MCV RBC AUTO: 87 FL (ref 80–100)
MONOCYTES NFR BLD AUTO: 8.4 %
NEUTROPHILS NFR BLD AUTO: 65.8 % (ref 42–72)
PLATELET # BLD AUTO: 303 THOU/CU MM (ref 140–440)
PMV BLD: 9.3 FL (ref 6.5–11)
RED BLOOD COUNT - HISTORICAL: 4.79 MIL/CU MM (ref 4–5.2)
RUBELLA COMMENT - HISTORICAL: NORMAL
SPECIMEN EXPIRATION DATE/TIME - HISTORICAL: NORMAL
SPINAL MUSCLE ATROPHY CAR - HISTORICAL: NORMAL
WHITE BLOOD COUNT - HISTORICAL: 7.5 THOU/CU MM (ref 4.5–11)

## 2017-09-20 LAB
HBSAG CATEGORY - HISTORICAL: NONREACTIVE
HIV-1/HIV-2 ANTIBODY CATEGORY - HISTORICAL: NORMAL

## 2017-09-21 LAB
CULTURE - HISTORICAL: NORMAL
TREPONEMA PALLIDUM - HISTORICAL: NEGATIVE

## 2017-09-22 LAB
CF INTERPRETATION - HISTORICAL: NORMAL
CF RESULT - HISTORICAL: NORMAL

## 2017-09-27 ENCOUNTER — COMMUNICATION - GICH (OUTPATIENT)
Dept: OBGYN | Facility: OTHER | Age: 22
End: 2017-09-27

## 2017-10-06 ENCOUNTER — COMMUNICATION - GICH (OUTPATIENT)
Dept: OBGYN | Facility: OTHER | Age: 22
End: 2017-10-06

## 2017-10-14 ENCOUNTER — HISTORY (OUTPATIENT)
Dept: EMERGENCY MEDICINE | Facility: OTHER | Age: 22
End: 2017-10-14

## 2017-10-15 ENCOUNTER — TRANSFERRED RECORDS (OUTPATIENT)
Dept: HEALTH INFORMATION MANAGEMENT | Facility: HOSPITAL | Age: 22
End: 2017-10-15

## 2017-10-17 ENCOUNTER — PRENATAL OFFICE VISIT - GICH (OUTPATIENT)
Dept: OBGYN | Facility: OTHER | Age: 22
End: 2017-10-17

## 2017-10-17 DIAGNOSIS — Z34.01 ENCOUNTER FOR SUPERVISION OF NORMAL FIRST PREGNANCY IN FIRST TRIMESTER: ICD-10-CM

## 2017-10-17 DIAGNOSIS — F41.9 ANXIETY DISORDER: ICD-10-CM

## 2017-10-17 DIAGNOSIS — R21 RASH AND OTHER NONSPECIFIC SKIN ERUPTION: ICD-10-CM

## 2017-10-17 ASSESSMENT — ANXIETY QUESTIONNAIRES
5. BEING SO RESTLESS THAT IT IS HARD TO SIT STILL: NOT AT ALL
7. FEELING AFRAID AS IF SOMETHING AWFUL MIGHT HAPPEN: MORE THAN HALF THE DAYS
1. FEELING NERVOUS, ANXIOUS, OR ON EDGE: NEARLY EVERY DAY
6. BECOMING EASILY ANNOYED OR IRRITABLE: SEVERAL DAYS
3. WORRYING TOO MUCH ABOUT DIFFERENT THINGS: NEARLY EVERY DAY
GAD7 TOTAL SCORE: 13
2. NOT BEING ABLE TO STOP OR CONTROL WORRYING: NEARLY EVERY DAY
4. TROUBLE RELAXING: SEVERAL DAYS

## 2017-10-17 ASSESSMENT — PATIENT HEALTH QUESTIONNAIRE - PHQ9: SUM OF ALL RESPONSES TO PHQ QUESTIONS 1-9: 12

## 2017-10-28 ENCOUNTER — OFFICE VISIT - GICH (OUTPATIENT)
Dept: FAMILY MEDICINE | Facility: OTHER | Age: 22
End: 2017-10-28

## 2017-10-28 ENCOUNTER — HISTORY (OUTPATIENT)
Dept: FAMILY MEDICINE | Facility: OTHER | Age: 22
End: 2017-10-28

## 2017-10-28 DIAGNOSIS — L20.9 ATOPIC DERMATITIS: ICD-10-CM

## 2017-10-28 DIAGNOSIS — L30.2 CUTANEOUS AUTOSENSITIZATION: ICD-10-CM

## 2017-11-14 ENCOUNTER — PRENATAL OFFICE VISIT - GICH (OUTPATIENT)
Dept: OBGYN | Facility: OTHER | Age: 22
End: 2017-11-14

## 2017-11-14 ENCOUNTER — HISTORY (OUTPATIENT)
Dept: OBGYN | Facility: OTHER | Age: 22
End: 2017-11-14

## 2017-11-14 ENCOUNTER — TRANSFERRED RECORDS (OUTPATIENT)
Dept: HEALTH INFORMATION MANAGEMENT | Facility: HOSPITAL | Age: 22
End: 2017-11-14

## 2017-11-14 DIAGNOSIS — Z34.02 ENCOUNTER FOR SUPERVISION OF NORMAL FIRST PREGNANCY IN SECOND TRIMESTER: ICD-10-CM

## 2017-11-14 LAB — GENZYME QUAD SCREEN - HISTORICAL: NORMAL

## 2017-11-26 ENCOUNTER — HEALTH MAINTENANCE LETTER (OUTPATIENT)
Age: 22
End: 2017-11-26

## 2017-12-12 ENCOUNTER — HOSPITAL ENCOUNTER (OUTPATIENT)
Dept: RADIOLOGY | Facility: OTHER | Age: 22
End: 2017-12-12

## 2017-12-12 ENCOUNTER — PRENATAL OFFICE VISIT - GICH (OUTPATIENT)
Dept: OBGYN | Facility: OTHER | Age: 22
End: 2017-12-12

## 2017-12-12 ENCOUNTER — HISTORY (OUTPATIENT)
Dept: OBGYN | Facility: OTHER | Age: 22
End: 2017-12-12

## 2017-12-12 DIAGNOSIS — Z34.02 ENCOUNTER FOR SUPERVISION OF NORMAL FIRST PREGNANCY IN SECOND TRIMESTER: ICD-10-CM

## 2017-12-27 NOTE — PROGRESS NOTES
Patient Information     Patient Name MRN Sex Nury Henry [id number] Female 1995      Progress Notes by Naye Johns MD at 2017 12:45 PM     Author:  Naye Johns MD Service:  (none) Author Type:  Physician     Filed:  2017  2:50 PM Encounter Date:  2017 Status:  Signed     :  Naye Johns MD (Physician)            INITIAL OB VISIT    HPI  Nury Gilbert is a 22 y.o. female  at 8w0d by 8w0d US not c/w LMP who presents for first OB visit. This pregnancy was unplanned, but welcome. They were not using any contraception. She has a history of ADHD- stopped adderall when she found out she was pregnant. She also has a history of anxiety- weaned off her medications about 6 months ago with her PCP. Reports her mood has been good.     SYMPTOMS SINCE LMP  Fatigue: No  Nausea: Yes- daily. 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  Definite:  No  Menses monthly:  Yes  On contraception at conception:  No    PAST PREGNANCIES  OB History                    Para  Term     AB  Living     1                 SAB   TAB  Ectopic  Multiple   Live Births                                         # Outcome Date  GA  Lbr Marky/2nd  Weight Sex  Delivery Anes PTL Lv   1 Current                                     PAST MEDICAL/SURGICAL HISTORY    Past Medical History:     Diagnosis  Date     ADHD (attention deficit hyperactivity disorder)      Anxiety      Foreign body of finger of right hand 2017       Past Surgical History:      Procedure  Laterality Date     ADENOIDECTOMY       APPENDECTOMY  2006     BLADDER SURGERY  1999     KNEE ARTHROSCOPY Right 2012    2nd as well at age 18       RI REMOVE FOREIGN BODY SIMPLE  2017              Current Outpatient Prescriptions       Medication  Sig Dispense Refill     rizatriptan (MAXALT) 10 mg  "tablet Take 1 tablet by mouth 2 times daily if needed for Migraine. Give at minimum 2hrs apart. Max Dose: 30mg per 24hrs. 20 tablet 0     No current facility-administered medications for this visit.      Medications have been reviewed by me and are current to the best of my knowledge and ability.      Allergies: Influenza virus vaccines; Pcn [penicillins]; and Phenergan [promethazine]    SOCIAL HISTORY  Tobacco:  No  Alcohol:  No  Drugs:  No- last smoked THC 3 weeks ago, before +UPT. No issues abstaining since  Single, lives with her boyfriend Chandrakant. They have been dating for about a year, he is present at today's exam. 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    FamH: negative    GENETIC SCREENING:  Maternal pertinent postives: No  Paternal pertinent positives: No    INFECTION HISTORY  Patient or partner with hx HSV:No  Rash or viral illness since LMP:No  Hepatitis B or C: No  STD (GC, Chlamydia, HPV):No  Varicella vaccine or history: Yes    ROS: see HPI, complete ROS otherwise negative    PHYSICAL EXAM  /70  Pulse 72  Ht 1.778 m (5' 10\")  Wt 67.4 kg (148 lb 9.6 oz)  LMP 2017  BMI 21.32 kg/m2   General: Pleasant WN female, A and O x3, NAD  HEENT : Grossly normal  Neck: Thyroid normal size, with no nodules, no adenopathy  Lungs: Clear, good AE, no rales or rhonchi  Heart: RRR no murmur , NL S1 and S2  Abdomen: Soft NT, ND, no masses, normal BS  Ext: No edema    Pelvic:  EGBUS Normal  Cervix Normal  Uterus nontender, see fundal height  Adnexa, neg for tenderness or mass  Cx closed /Long / High    FHT: early gestation    IMPRESSION   IUP at 8w0d weeks by 8w0d US here for new OB    Risk factors identified: none  High risk pregnancy: No  Repeat C/S planned: No      PLAN:  Discussed genetic testing available: Yes- desires quad (order at next visit), SMA and CF testing  1st trimester US ordered/completed: Yes  Anesthesia consult needed: No  OB/Gyn or MFM referral: No    GC/Chlam screening, Pap smear, " routine labs ordered  Prenatal vitamin daily  Routine 1st trimester anticipatory guidance  Return to office in four weeks for follow up or sooner prn.  Questions answered.    Naye Johns MD  OB/GYN  9/19/2017 1:08 PM

## 2017-12-27 NOTE — PROGRESS NOTES
Patient Information     Patient Name MRN Sex Nury Henry [id number] Female 1995      Progress Notes by Kathy Verdin NP at 2017  2:15 PM     Author:  Kathy Verdin NP Service:  (none) Author Type:  PHYS- Nurse Practitioner     Filed:  2017 11:15 AM Encounter Date:  2017 Status:  Signed     :  Kathy Verdin NP (PHYS- Nurse Practitioner)            HPI:    Nury Gilbert is a 22 y.o. female who presents to clinic today for left great toe concerns. She stubbed her toe a couple weeks ago. She has had peeling of skin, wearing bandage lately. She is having some drainage from her toe. She has pain to toe as well.     Past Medical History:     Diagnosis  Date     Foreign body of finger of right hand 2017     Past Surgical History:      Procedure  Laterality Date     ADENOIDECTOMY       APPENDECTOMY  2006     BLADDER SURGERY  1999     KNEE ARTHROSCOPY Right 2012    2nd as well at age 18       ID REMOVE FOREIGN BODY SIMPLE  2017            Social History     Substance Use Topics       Smoking status: Never Smoker     Smokeless tobacco: Never Used     Alcohol use No     Current Outpatient Prescriptions       Medication  Sig Dispense Refill     citalopram (CELEXA) 20 mg tablet Take 20 mg by mouth once daily.       dextroamphetamine-amphetamine (ADDERALL XR) 25 mg capsule Take 2 capsules by mouth every morning.  0     norgestimate-ethinyl estradiol (ORTHO TRI-CYCLEN) 0.18/0.215/0.25 mg-35 mcg (28) tablet Take 1 tablet by mouth.       ondansetron (ZOFRAN ODT) 4 mg disintegrating tablet Place 1 tablet on the tongue every 8 hours if needed for Nausea/Vomiting. 20 tablet 0     rizatriptan (MAXALT) 10 mg tablet Take 1 tablet by mouth 2 times daily if needed for Migraine. Give at minimum 2hrs apart. Max Dose: 30mg per 24hrs. 20 tablet 0     No current facility-administered medications for this visit.      Medications have been reviewed by me and are current to the best of my  knowledge and ability.    Allergies     Allergen  Reactions     Influenza Virus Vaccines Edema     Pcn [Penicillins] Hives and Shortness Of Breath     Phenergan [Promethazine] Hallucinations and Myalgia       ROS:  Pertinent positives and negatives are noted in HPI.    EXAM:  General appearance: well appearing female, in no acute distress  Dermatological: tissue around right great toe with erythema, warmth, swelling. Tender to touch. When palpated scant yellow drainage expressed around nail bed  Psychological: normal affect, alert and pleasant    ASSESSMENT/PLAN:    ICD-10-CM    1. Paronychia of great toe, right L03.031 trimethoprim-sulfamethoxazole, 160-800 mg, (BACTRIM DS, SEPTRA DS) tablet   2. Cellulitis of toe of right foot L03.031 trimethoprim-sulfamethoxazole, 160-800 mg, (BACTRIM DS, SEPTRA DS) tablet   Tx infection to toe with bactrim. Allergy to PCN. Discussed sx management with abx ointment/bandage, epsom salt soaks. Reviewed need to complete all antibiotics. Discussed typical course of illness, symptomatic treatment and when to return to clinic. Patient in agreement with plan and all questions were answered.     Patient Instructions   Bactrim twice daily for 7 days  Epsom salt soaks  Antibiotic ointment and bandage twice daily  Follow up as needed         Index   Skin Infection Around a Fingernail or Toenail (Paronychia)   ________________________________________________________________________  KEY POINTS    Paronychia is an infection of the skin next to a fingernail or toenail.    Your healthcare provider may prescribe antibiotic or antifungal medicine, or you provider may cut the skin to drain the pus.    Ask your healthcare provider how to take care of yourself at home.  ________________________________________________________________________  What is paronychia?  Paronychia is an infection of the skin next to a fingernail or toenail.  What is the cause?  The cutting or tearing of a hangnail or  cuticle, nail biting, a splinter, or a thorn prick can cause a break in the skin near the nail. Bacteria or a fungus can then get into the skin and cause an infection.  A sudden painful infection is usually caused by bacteria commonly found on the skin. Infection that develops slowly is called chronic. It is usually caused by a fungus. You have a higher risk of having a chronic nail infection if:    Your job involves a lot of exposure to water or chemical solvents. Examples of such jobs are housecleaning, nursing, , and dishwashing.    You bite or tear your nails and cuticles.  These infections are more common and may be harder to treat in people who have diabetes or poor circulation, and in people whose immune systems are weakened by HIV, cancer, or other health problems.  What are the symptoms?  Symptoms may include:    Painful, red, swollen skin around the nail    Pus-filled blisters around the nail  If you have the infection for a long time, your nail may become thick and hard.  How is it diagnosed?  Your healthcare provider will ask about your symptoms and medical history and examine you. You may also have blood tests or a swab of the fluid from the sore to see what is causing the infection.  How is it treated?  For an infection caused by bacteria your healthcare provider may prescribe an antibiotic. For a fungal infection, your provider may prescribe an antifungal cream.  If you have pus-filled blisters, your healthcare provider may numb your finger or toe and then cut the pocket open to drain the pus. If the infection is beneath the nail, your provider may remove a section of the nail. Your provider may pack the wound with gauze to allow it to drain and heal.  Usually it takes about a week for a bacterial infection to heal. You may need to treat a fungal infection for several weeks with antifungal medicine before it heals.  How can I take care of myself?  Follow the full course of treatment  prescribed by your healthcare provider. The best time to take care of an infection around your nails is as soon as it starts to develop.    Wash the infected area with antibacterial soap and water and rinse it thoroughly.    Soak your finger or toe in warm water or put a washcloth that has been soaked with hot water on the infected area.    Put an antibiotic ointment on the area and cover it with a bandage.    Take nonprescription pain medicine, such as acetaminophen, ibuprofen, or naproxen. Read the label and take as directed. Unless recommended by our healthcare provider, you should not take these medicines for more than 10 days.    Nonsteroidal anti-inflammatory medicines (NSAIDs), such as ibuprofen, naproxen, and aspirin, may cause stomach bleeding and other problems. These risks increase with age.    Acetaminophen may cause liver damage or other problems. Unless recommended by your provider, don't take more than 3000 milligrams (mg) in 24 hours. To make sure you don t take too much, check other medicines you take to see if they also contain acetaminophen. Ask your provider if you need to avoid drinking alcohol while taking this medicine.  Ask your healthcare provider:    How long it will take to recover    If there are activities you should avoid and when you can return to your normal activities    How to take care of yourself at home    What symptoms or problems you should watch for and what to do if you have them  Make sure you know when you should come back for a checkup.  How can I help prevent paronychia?    Do not pick at your nails or cut the cuticles.    Don t bite your nails.    If you want to push the cuticles of your nails back, use clean instruments and be careful not to push too hard.    Wear gloves if your work or daily activities put your hands at risk for getting scratched, poked, or irritated.    If you have infections around your nails often, talk to your provider.  Developed by  [employer].  Adult Advisor 2016.3 published by [employer].  Last modified: 2016-06-13  Last reviewed: 2014-07-10  This content is reviewed periodically and is subject to change as new health information becomes available. The information is intended to inform and educate and is not a replacement for medical evaluation, advice, diagnosis or treatment by a healthcare professional.  References   Adult Advisor 2016.3 Index    Copyright   2016 [employer], a division of [employer] All rights reserved.

## 2017-12-28 NOTE — TELEPHONE ENCOUNTER
Patient Information     Patient Name MRN Nury Horn [id number] Female 1995      Telephone Encounter by Sarita Riley RN at 10/6/2017 10:39 AM     Author:  Sarita Riley RN Service:  (none) Author Type:  NURS- Registered Nurse     Filed:  10/6/2017 10:47 AM Encounter Date:  10/6/2017 Status:  Signed     :  Sarita Riley RN (NURS- Registered Nurse)            Patient is calling with concerns over dry patches on skin. According to the approved list of OTC medications provided by [facility], the patient is going to try Aveeno baths and moisturizing those areas of dry skin. If this does not work, she will locally try cortaid 1% cream to the dry patches. Patient will follow up in clinic if these do not provide relief. Patient also reports hip pain in right hip. This pain radiates down leg. Patient informed to look up and try Sciatica stretches, warm bathes, and gently massaging the area. If this does not help, patient will follow up in clinic and discuss chiropractic vs medication.   Sarita Riley RN............. 10/6/2017 10:47 AM

## 2017-12-28 NOTE — TELEPHONE ENCOUNTER
Patient Information     Patient Name MRN Sex Nury Henry [id number] Female 1995      Telephone Encounter by Joaquina Marquez MD at 2017  5:15 PM     Author:  Joaquina Marquez MD Service:  (none) Author Type:  Physician     Filed:  2017  5:15 PM Encounter Date:  2017 Status:  Signed     :  Joaquina Marquez MD (Physician)            SAw patient in office. No antibiotics needed. Joaquina Marquez MD ....................  2017   5:15 PM

## 2017-12-28 NOTE — TELEPHONE ENCOUNTER
Patient Information     Patient Name MRN Nury Horn [id number] Female 1995      Telephone Encounter by Rukhsana Dixon at 2017  4:24 PM     Author:  Rukhsana Dixon Service:  (none) Author Type:  (none)     Filed:  2017  4:24 PM Encounter Date:  2017 Status:  Signed     :  Rukhsana Dixon            Patient would like the ABX's sent to [facility] here in town. She states the area is closed up but she can feel it building up in there again.  Rukhsana Dixon LPN.......................... 2017  4:24 PM

## 2017-12-28 NOTE — PROGRESS NOTES
Patient Information     Patient Name MRN Sex Nury Henry [id number] Female 1995      Progress Notes by Joaquina Marquez MD at 2017  4:20 PM     Author:  Joaquina Marquez MD Service:  (none) Author Type:  Physician     Filed:  2017 12:14 PM Encounter Date:  2017 Status:  Signed     :  Joaquina Marquez MD (Physician)            Patient presents for visit after removal of foreign body from her right 5th finger on  and subsequent piece coming out after opening the incision on . Patient has done well. The lateral incision closed up and just over the last day or so it is more swollen and sore. There is some dark coloring to it. She isn't having redness up her arm. No fever. She is concerned it is infected or bleeding inside.    /60  Resp 20  Wt 69.9 kg (154 lb)  LMP 2017  Breastfeeding? No  BMI 23.42 kg/m2    General: NAD, pleasant and cooperative with exam and interview.  Right 5th finger: healed laceration palmar surface. Lateral incision with fluctuance and tenderness. No surrounding erythema or induration. 18 gauge needle used to unroof the area and 0.3 cm piece of wood was expressed. No sign of ongoing infection. Appropriate pain with palpation.   Psychiatry: awake, alert and oriented. Appropriate affect.    Assessment/Plan:  Continue with local wound care. Patient can return to normal activities. Patient will call with questions or concerns.

## 2017-12-28 NOTE — TELEPHONE ENCOUNTER
Patient Information     Patient Name MRN Nury Horn [id number] Female 1995      Telephone Encounter by Sarita Riley RN at 2017 11:34 AM     Author:  Sarita Riley RN Service:  (none) Author Type:  NURS- Registered Nurse     Filed:  2017 11:38 AM Encounter Date:  2017 Status:  Signed     :  Sarita Riley RN (NURS- Registered Nurse)            Provider unable to access encounter due to computer malfunction. Ultrasound order signed per verbal order from Dr Naye Johns MD. Order read back and verified prior to authorizing. Will be co-signed by Dr Naye Johns MD.  Sarita Riley RN............. 2017 11:35 AM

## 2017-12-28 NOTE — TELEPHONE ENCOUNTER
Patient Information     Patient Name MRN Nury Horn [id number] Female 1995      Telephone Encounter by Ros Berry at 2017  9:59 AM     Author:  Ros Berry Service:  (none) Author Type:  (none)     Filed:  2017 10:01 AM Encounter Date:  2017 Status:  Signed     :  Ros Berry            Patient called to schedule her Initial OB Px with NAYLA on 2017. Patient has some concerns and would like a call from the OB nurse. Patient states that she is experiencing cramping that is making her feel uneasy. She stated that she did take 2 home pregnancy tests this past . Please call her when available.

## 2017-12-28 NOTE — PROGRESS NOTES
Patient Information     Patient Name MRN Sex Nury Henry [id number] Female 1995      Progress Notes by Joaquina Marquez MD at 2017  3:45 PM     Author:  Joaquina Marquez MD Service:  (none) Author Type:  Physician     Filed:  2017  5:07 PM Encounter Date:  2017 Status:  Signed     :  Joaquina Marquez MD (Physician)            Patient with feeling of retained foreign body right 5th finger. Increased swelling and tenderness. No drainage.   /62  Pulse 72  Wt 66.3 kg (146 lb 3.2 oz)  LMP 2017  Breastfeeding? No  BMI 22.23 kg/m2  General: no acute distress  Extremities: right 5th finger with healed incisions. Lateral aspect with dark color beneath skin and tenderness.    Discussed risks and benefits of incision and drainage fluid and possible removal of foreign body. Specifically, I explained the risks of infection, bleeding, bruising and recurrence. The patient expressed understanding and wishes to proceed. Informed consent paperwork was completed.     Procedure:  The patient was placed in a supine position. The right hand was prepped with chloraprep and draped. Local anesthetic, lidocaine with epinephrine, was infiltrated in the skin and subcutaneous tissue in the area of planned incision. The area of fluctuance and possible foreign body was palpated. Skin incision was made sharply to the subcutaneous tissue. There was drainage of a small amount of purulent fluid. The wound cavity was explored and no undrained fluid was noted. I was unable to locate any retained foreign material. Sterile gauze was placed in the cavity. Sterile dressing was applied. The patient tolerated the procedure with no immediately apparent complications.     Patient will soak the wound three times daily. She will follow up in 5-7 days if worsening. Patient will call with concerns or questions prior to follow up.

## 2017-12-28 NOTE — TELEPHONE ENCOUNTER
Patient Information     Patient Name MRN Nury Horn [id number] Female 1995      Telephone Encounter by Sarita Riley RN at 2017 11:37 AM     Author:  Sarita Riley RN Service:  (none) Author Type:  NURS- Registered Nurse     Filed:  2017 11:38 AM Encounter Date:  2017 Status:  Signed     :  Sarita Riley RN (NURS- Registered Nurse)            Patient notified of order for ultrasound. Was happy with this plan. Was then transferred to schedulers to set up same day ultrasound.  Sarita Riley RN............. 2017 11:37 AM

## 2017-12-28 NOTE — PROGRESS NOTES
Patient Information     Patient Name MRN Sex Nury Henry [id number] Female 1995      Progress Notes by Genoveva Moore R.T. (ARRT) at 2017  2:36 PM     Author:  Genoveva Moore R.T. (ARRT) Service:  (none) Author Type:  [employer] - Registered Radiologic Technologist     Filed:  2017  2:36 PM Date of Service:  2017  2:36 PM Status:  Signed     :  Genoveva Moore R.T. (ARRT) ([employer] - Registered Radiologic Technologist)            Falls Risk Criteria:    Age 65 and older or under age 4        Sensory deficits    Poor vision    Use of ambulatory aides    Impaired judgment    Unable to walk independently    Meets High Risk criteria for falls:  no

## 2017-12-28 NOTE — PROGRESS NOTES
Patient Information     Patient Name MRN Sex Nury Henry [id number] Female 1995      Progress Notes by Naye Johns MD at 10/17/2017 12:45 PM     Author:  Naye Johns MD Service:  (none) Author Type:  Physician     Filed:  10/17/2017  1:44 PM Encounter Date:  10/17/2017 Status:  Signed     :  Naye Johns MD (Physician)            [facility]  Return OB Visit    S: Patient reports she has been feeling not great. Her nausea has improved, however, her mood has become much worse. She discontinued celexa several months ago because she didn't think it was helping enough and planned to switch medications, however, she then became pregnant. She now feels like the celexa was helping more than she thought. She also stopped her ADHD meds when she found out she was pregnant and feels her ADHD is much worse. She is having a hard time concentrating- not sure how much is due to ADHD vs anxiety. She also has complaints of a pruritic rash on her arms and hands. It started about 3 weeks ago and is getting worse. She was seen in the ED on 10/14, told to take benadryl and follow up in clinic. She denies any new soaps, detergents, foods, outdoor exposures. The rash is now causing cracking between her fingers and she has developed two new lesions- one on each ankle.  She denies cramping, contractions, vaginal bleeding, leaking fluid.    O: LMP 2017  Gen: Well-appearing, NAD  Skin: multiple circular to oval erythematous lesions on bilateral forearms, medial aspect of each ankle. Knuckles all erythematous with fissuring on sides of some fingers.     FHR: 155    A/P:  Nury Gilbert is a 22 y.o.  at 12w0d by 8w0d US not c/w LMP, here for return OB visit.  - Anxiety, ADHD: Lengthy discussion regarding the importance of good mental health during pregnancy. At this time, she would like to resume celexa (category C) and also plans to start Yoga. Discussed that  restarting adderall in pregnancy is not ideal but can be done if this does not improve her mental health.   - Rash: appears like an allergic reaction. Will start a medrol pack. Encouraged using fragrance free lotions and soaps as this can worsen irritation  PNC:   - Prenatal labs reviewed, Rh positive, Rubella immune  - Genetics: negative CF and SMA screening. Will ord'd quad screen at next visit  - Imaging: dating US at 8w0d. Plan for anatomy scan at 20 wks  - Immunizations: not a candidate for influenza vaccine due to allergy.  RTC 4 wks    Naye Johns MD  OB/GYN  10/17/2017 12:53 PM

## 2017-12-28 NOTE — TELEPHONE ENCOUNTER
"Patient Information     Patient Name MRN Nury Horn [id number] Female 1995      Telephone Encounter by Sarita Riley RN at 2017 10:27 AM     Author:  Sarita Riley RN Service:  (none) Author Type:  NURS- Registered Nurse     Filed:  2017 10:35 AM Encounter Date:  2017 Status:  Signed     :  Sarita Riley RN (NURS- Registered Nurse)            Patient is currently pregnant with an unknown LMP. Patient is thinking mid-, approximately 6/15/2017. Patient is scheduled for a new OB appointment with Dr Naye Johns MD on 2017. Patient is calling with reports of cramping without vaginal bleeding. States that the cramping has been present since 2017. Reports current symptoms of pregnancy - nausea, sore/tender breasts, breast enlargement, and bloated feeling.    Patient is wondering if she can have an order for an ultrasound to determine viability and dating scheduled, also to evaluate current cramping. Patient states she is not overly concerned about the cramping as there is no vaginal bleeding however would like reassurance.    Please review triage note and pended order for early OB ultrasound.      Answer Assessment - Initial Assessment Questions  1. LOCATION: \"Where does it hurt?\"       \"where a menstrual cramp would be\"  2. RADIATION: \"Does the pain shoot anywhere else?\" (e.g., chest, back, shoulder)      Towards back  3. ONSET: \"When did the pain begin?\" (e.g., minutes, hours or days ago)       Since Saturday  4. ONSET: \"Gradual or sudden onset?\"      Sudden onset  5. PATTERN \"Does the pain come and go, or has it been constant since it started?\"       Comes and goes  6. SEVERITY: \"How bad is the pain?\" \"What does it keep you from doing?\"  (e.g., Scale 1-10; mild, moderate, or severe)    - MILD (1-3): doesn't interfere with normal activities, abdomen soft and not tender to touch     - MODERATE (4-7): interferes with normal " "activities or awakens from sleep, tender to touch     - SEVERE (8-10): excruciating pain, doubled over, unable to do any normal activities      Rates pain at 6/10, normal menstrual cramping is 3-4/10.  7. RECURRENT SYMPTOM: \"Have you ever had this type of abdominal pain before?\" If so, ask: \"When was the last time?\" and \"What happened that time?\"       Similar to period cramps, more severe per patient  8. CAUSE: \"What do you think is causing the abdominal pain?\"      Unknown  9. RELIEVING/AGGRAVATING FACTORS: \"What makes it better or worse?\" (e.g., antacids, bowel movement, movement)      Resting helps the pain. Nothing aggravates pain  10. OTHER SYMPTOMS: \"Has there been any vaginal bleeding, fever, vomiting, diarrhea, or urine problems?\"        Denies vaginal bleeding. Reports sore and enlarged breasts.  11. OANH: \"What date are you expecting to deliver?\"        Unsure, possibly early April    Protocols used: ADULT PREGNANCY - ABDOMINAL PAIN LESS THAN 20 WEEKS EGA-A-      Sarita Riley RN............. 8/29/2017 10:34 AM         "

## 2017-12-28 NOTE — PROGRESS NOTES
Patient Information     Patient Name MRN Sex Nury Henry [id number] Female 1995      Progress Notes by Syl Ingram at 2017 11:30 AM     Author:  Syl Ingram Service:  (none) Author Type:  Other Clinical Staff     Filed:  2017 11:46 AM Date of Service:  2017 11:30 AM Status:  Signed     :  Syl Ingram (Other Clinical Staff)            Falls Risk Criteria:    Age 65 and older or under age 4        Sensory deficits    Poor vision    Use of ambulatory aides    Impaired judgment    Unable to walk independently    Meets High Risk criteria for falls:  No

## 2017-12-28 NOTE — PROGRESS NOTES
Patient Information     Patient Name MRN Sex Nury Henry [id number] Female 1995      Progress Notes by Cassie Belle NP at 10/28/2017  3:15 PM     Author:  Cassie Belle NP Service:  (none) Author Type:  PHYS- Nurse Practitioner     Filed:  10/28/2017  7:31 PM Encounter Date:  10/28/2017 Status:  Signed     :  Cassie Belle NP (PHYS- Nurse Practitioner)            Nursing Notes:   Noemi Torres  10/28/2017  3:42 PM  Signed  Patient presents to the clinic for hives to abd. First noticed rash on Wednesday and has been getting worse.   Noemi Torres LPN............................ 10/28/2017 3:25 PM    SUBJECTIVE:    Nury Gilbert is a 22 y.o. female who presents for rash that has been off and on. Medrol did help only for it to return     Rash   This is a new problem. Episode onset: 3 days. The affected locations include the left arm, right arm, right hand and left hand. The rash is characterized by redness, scaling, itchiness and burning. She was exposed to nothing. Pertinent negatives include no cough, diarrhea, facial edema, fatigue, fever, joint pain, rhinorrhea, shortness of breath, sore throat or vomiting. (She is 13 4/7 weeks pregnant. She just had the rash and it went away with Medrol. Rash started again Wednesday. ) Past treatments include moisturizer, cold compress and oral steroids. The treatment provided moderate relief. There is no history of allergies, asthma, eczema or varicella.       Current Outpatient Prescriptions on File Prior to Visit       Medication  Sig Dispense Refill     citalopram (CELEXA) 20 mg tablet Take 1 tablet by mouth once daily. 30 tablet 0     doxylamine (UNISOM, DOXYLAMINE,) 25 mg tablet Take 1 tablet by mouth at bedtime if needed for Sleep. 30 tablet 1     methylPREDNISolone (MEDROL, VIRAL,) 4 mg tablet Take by mouth as instructed per packaging. 1 Package 0     pyridoxine, vitamin B6, (VITAMIN B-6) 25 mg tablet Take 1 tablet by  "mouth 3 times daily. 90 tablet 1     rizatriptan (MAXALT) 10 mg tablet Take 1 tablet by mouth 2 times daily if needed for Migraine. Give at minimum 2hrs apart. Max Dose: 30mg per 24hrs. 20 tablet 0     No current facility-administered medications on file prior to visit.        REVIEW OF SYSTEMS:  Review of Systems   Constitutional: Negative for fatigue and fever.   HENT: Negative for rhinorrhea and sore throat.    Respiratory: Negative for cough and shortness of breath.    Gastrointestinal: Negative for diarrhea and vomiting.   Musculoskeletal: Negative for joint pain.   Skin: Positive for rash.       OBJECTIVE:  Pulse 76  Temp 98.3  F (36.8  C) (Tympanic)  Resp 18  Ht 1.727 m (5' 8\")  Wt 70.3 kg (155 lb)  LMP 07/13/2017  Breastfeeding? No  BMI 23.57 kg/m2    EXAM:   Physical Exam   Constitutional: She is well-developed, well-nourished, and in no distress.   HENT:   Head: Normocephalic and atraumatic.   Eyes: Conjunctivae are normal.   Neck: Neck supple.   Cardiovascular: Normal rate.    Pulmonary/Chest: Effort normal. No respiratory distress.   Skin: Skin is warm and dry. Rash noted.   Hand and wrists, have red patches that are slightly raised, dry flaking skin. No drainage, no weeping. All rash patches are dry.   Abd has small pin point red papules.    Psychiatric: Mood and affect normal.   Nursing note and vitals reviewed.      ASSESSMENT/PLAN:    ICD-10-CM    1. Atopic dermatitis, unspecified type L20.9 triamcinolone 0.5% (ARISTOCORT) 0.5 % cream      loratadine (CLARITIN) 10 mg tablet   2. Id reaction L30.2 loratadine (CLARITIN) 10 mg tablet        Plan:  Home cares discussed. Kenalog discussed. She is pregnant, just got off a steroid. Did not feel it was safe to use another one right away. Likely a contact irritant as to why its on her hands and wrist. F/U with PCP if needed.         PEDRO PABLO BUCK NP ....................  10/28/2017   7:31 PM             "

## 2017-12-28 NOTE — PROGRESS NOTES
"Patient Information     Patient Name MRN Nury Horn [id number] Female 1995      Progress Notes by Naye Johns MD at 2017 12:45 PM     Author:  Naye Johns MD Service:  (none) Author Type:  Physician     Filed:  2017  3:05 PM Encounter Date:  2017 Status:  Signed     :  Naye Johns MD (Physician)            [facility]  Return OB Visit    S: Patient reports she has been feeling better. She has more energy. Still has occasional morning emesis. Occasional cramping/stretching sensation. She denies vaginal bleeding, leaking fluid. Mood has been good, doing more meditation and yoga.    O: BP 90/60  Pulse 76  Ht 1.727 m (5' 8\")  Wt 68.8 kg (151 lb 9.6 oz)  LMP 2017  BMI 23.05 kg/m2  Gen: Well-appearing, NAD    FHR: 140    A/P:  Nury Gilbert is a 22 y.o.  at 16w0d by 8w0d US not c/w LMP, here for return OB visit.  - Anxiety, ADHD: stable, on celexa  PNC:   - Prenatal labs reviewed, Rh positive, Rubella immune  - Genetics: negative CF and SMA screening. Quad ord'd today  - Imaging: dating US at 8w0d. Anatomy US ord'd- to be done in 4 weeks  - Immunizations: lengthy discussion regarding influenza vaccine- patient reports her allergy was intense swelling at the injection site several years ago after influenza vaccine. Did not have a rash, respiratory distress, or anaphylaxis. The patient has been reading about the flu vaccine in pregnancy and desires to have this done today. Discussed warning signs and reasons to return for care.  RTC 4 wks    Naye Johns MD  OB/GYN  2017 3:01 PM            "

## 2017-12-28 NOTE — PATIENT INSTRUCTIONS
Patient Information     Patient Name MRN Sex Nury Henry [id number] Female 1995      Patient Instructions by Cassie Belle NP at 10/28/2017  3:15 PM     Author:  Cassie Belle NP Service:  (none) Author Type:  PHYS- Nurse Practitioner     Filed:  10/28/2017  3:55 PM Encounter Date:  10/28/2017 Status:  Signed     :  Cassie Belle NP (PHYS- Nurse Practitioner)            Take medications as described.     Cool compresses to rash     Benadryl 25-50 mg every 6 hours as needed for rash    Claritin 10 mg daily.    Kenalog as prescribed     Lotion to the hands as often as you can.     Keep hydrated, 6-8 glasses of water a day.    Turn shower temp down     Launder bedding, clothes, towels, wash clothes; anything that you might have contacted with the oils or plant parts, or bugs

## 2017-12-29 NOTE — PATIENT INSTRUCTIONS
Patient Information     Patient Name MRN Nury Horn [id number] Female 1995      Patient Instructions by Kathy Verdin NP at 2017  2:44 PM     Author:  Kathy Verdin NP  Service:  (none) Author Type:  PHYS- Nurse Practitioner     Filed:  2017  2:44 PM  Encounter Date:  2017 Status:  Addendum     :  Kathy Verdin NP (PHYS- Nurse Practitioner)        Related Notes: Original Note by Kathy Verdin NP (PHYS- Nurse Practitioner) filed at 2017  2:44 PM            Bactrim twice daily for 7 days  Epsom salt soaks  Antibiotic ointment and bandage twice daily  Follow up as needed         Index   Skin Infection Around a Fingernail or Toenail (Paronychia)   ________________________________________________________________________  KEY POINTS    Paronychia is an infection of the skin next to a fingernail or toenail.    Your healthcare provider may prescribe antibiotic or antifungal medicine, or you provider may cut the skin to drain the pus.    Ask your healthcare provider how to take care of yourself at home.  ________________________________________________________________________  What is paronychia?  Paronychia is an infection of the skin next to a fingernail or toenail.  What is the cause?  The cutting or tearing of a hangnail or cuticle, nail biting, a splinter, or a thorn prick can cause a break in the skin near the nail. Bacteria or a fungus can then get into the skin and cause an infection.  A sudden painful infection is usually caused by bacteria commonly found on the skin. Infection that develops slowly is called chronic. It is usually caused by a fungus. You have a higher risk of having a chronic nail infection if:    Your job involves a lot of exposure to water or chemical solvents. Examples of such jobs are housecleaning, nursing, , and dishwashing.    You bite or tear your nails and cuticles.  These infections are more common and may be harder to treat  in people who have diabetes or poor circulation, and in people whose immune systems are weakened by HIV, cancer, or other health problems.  What are the symptoms?  Symptoms may include:    Painful, red, swollen skin around the nail    Pus-filled blisters around the nail  If you have the infection for a long time, your nail may become thick and hard.  How is it diagnosed?  Your healthcare provider will ask about your symptoms and medical history and examine you. You may also have blood tests or a swab of the fluid from the sore to see what is causing the infection.  How is it treated?  For an infection caused by bacteria your healthcare provider may prescribe an antibiotic. For a fungal infection, your provider may prescribe an antifungal cream.  If you have pus-filled blisters, your healthcare provider may numb your finger or toe and then cut the pocket open to drain the pus. If the infection is beneath the nail, your provider may remove a section of the nail. Your provider may pack the wound with gauze to allow it to drain and heal.  Usually it takes about a week for a bacterial infection to heal. You may need to treat a fungal infection for several weeks with antifungal medicine before it heals.  How can I take care of myself?  Follow the full course of treatment prescribed by your healthcare provider. The best time to take care of an infection around your nails is as soon as it starts to develop.    Wash the infected area with antibacterial soap and water and rinse it thoroughly.    Soak your finger or toe in warm water or put a washcloth that has been soaked with hot water on the infected area.    Put an antibiotic ointment on the area and cover it with a bandage.    Take nonprescription pain medicine, such as acetaminophen, ibuprofen, or naproxen. Read the label and take as directed. Unless recommended by our healthcare provider, you should not take these medicines for more than 10 days.    Nonsteroidal  anti-inflammatory medicines (NSAIDs), such as ibuprofen, naproxen, and aspirin, may cause stomach bleeding and other problems. These risks increase with age.    Acetaminophen may cause liver damage or other problems. Unless recommended by your provider, don't take more than 3000 milligrams (mg) in 24 hours. To make sure you don t take too much, check other medicines you take to see if they also contain acetaminophen. Ask your provider if you need to avoid drinking alcohol while taking this medicine.  Ask your healthcare provider:    How long it will take to recover    If there are activities you should avoid and when you can return to your normal activities    How to take care of yourself at home    What symptoms or problems you should watch for and what to do if you have them  Make sure you know when you should come back for a checkup.  How can I help prevent paronychia?    Do not pick at your nails or cut the cuticles.    Don t bite your nails.    If you want to push the cuticles of your nails back, use clean instruments and be careful not to push too hard.    Wear gloves if your work or daily activities put your hands at risk for getting scratched, poked, or irritated.    If you have infections around your nails often, talk to your provider.  Developed by [employer].  Adult Advisor 2016.3 published by [employer].  Last modified: 2016-06-13  Last reviewed: 2014-07-10  This content is reviewed periodically and is subject to change as new health information becomes available. The information is intended to inform and educate and is not a replacement for medical evaluation, advice, diagnosis or treatment by a healthcare professional.  References   Adult Advisor 2016.3 Index    Copyright   2016 [employer], a division of [employer] All rights reserved.

## 2017-12-30 NOTE — NURSING NOTE
Patient Information     Patient Name MRN Sex Nury Henry [id number] Female 1995      Nursing Note by Merritt Hutchison LPN at 2017 12:45 PM     Author:  Merritt Hutchison LPN Service:  (none) Author Type:  NURS- Licensed Practical Nurse     Filed:  2017 12:53 PM Encounter Date:  2017 Status:  Signed     :  Merritt Hutchison LPN (NURS- Licensed Practical Nurse)            Patient presents to the clinic for her OB visit. She is 16w0d. Two baby steps given.  Merritt Hutchison LPN ..............2017 12:52 PM

## 2017-12-30 NOTE — NURSING NOTE
Patient Information     Patient Name MRN Sex Nury Henry [id number] Female 1995      Nursing Note by Rukhsana Dixon at 2017  3:45 PM     Author:  Rukhsana Dixon Service:  (none) Author Type:  (none)     Filed:  2017  3:48 PM Encounter Date:  2017 Status:  Signed     :  Rukhsana Dixon            Universal Protocol    A. Pre-procedure verification complete yes  1-relevant information / documentation available, reviewed and properly matched to the patient; 2-consent accurate and complete, 3-equipment and supplies available    B. Site marking complete No  Site marked if not in continuous attendance with patient    C. TIME OUT completed yes  Time Out was conducted just prior to starting procedure to verify the eight required elements: 1-patient identity, 2-consent accurate and complete, 3-position, 4-correct side/site marked (if applicable), 5-procedure, 6-relevant images / results properly labeled and displayed (if applicable), 7-antibiotics / irrigation fluids (if applicable), 8-safety precautions.  Rukhsana Dixon LPN.......................... 2017  3:48 PM

## 2017-12-30 NOTE — NURSING NOTE
Patient Information     Patient Name MRN Nury Horn [id number] Female 1995      Nursing Note by Rukhsana Dixon at 2017  3:45 PM     Author:  Rukhsana Dixon Service:  (none) Author Type:  (none)     Filed:  2017  3:16 PM Encounter Date:  2017 Status:  Signed     :  Rukhsana Dixon            Patient is here today for a follow up on her finger. She was seen yesterday and started on ABX's.  Rukhsana Dixon LPN.......................... 2017  3:13 PM

## 2017-12-30 NOTE — NURSING NOTE
Patient Information     Patient Name MRN Sex Nury Henry [id number] Female 1995      Nursing Note by Noemi Torres at 10/28/2017  3:15 PM     Author:  Noemi Torres Service:  (none) Author Type:  NURS- Student Practical Nurse     Filed:  10/28/2017  3:42 PM Encounter Date:  10/28/2017 Status:  Signed     :  Noemi Torres (NURS- Student Practical Nurse)            Patient presents to the clinic for hives to abd. First noticed rash on Wednesday and has been getting worse.   Noemi Torres LPN............................ 10/28/2017 3:25 PM

## 2017-12-30 NOTE — NURSING NOTE
Patient Information     Patient Name MRN Nury Horn [id number] Female 1995      Nursing Note by Sarita Riley RN at 10/17/2017 12:45 PM     Author:  Sarita Riley RN Service:  (none) Author Type:  NURS- Registered Nurse     Filed:  10/17/2017  1:01 PM Encounter Date:  10/17/2017 Status:  Signed     :  Sarita Riley RN (NURS- Registered Nurse)            Patient is here for routine OB care - has questions regarding influenza vaccination and rash on left arm/hand.  Sarita Riley RN............. 10/17/2017 12:52 PM

## 2017-12-30 NOTE — NURSING NOTE
Patient Information     Patient Name MRN Nury Horn [id number] Female 1995      Nursing Note by Rukhsana Dixon at 2017  4:20 PM     Author:  Rukhsana Dixon Service:  (none) Author Type:  (none)     Filed:  2017  4:54 PM Encounter Date:  2017 Status:  Signed     :  Rukhsana Dixon            Patient is concerned about her finger.  Rukhsana Dixon LPN.......................... 2017  4:52 PM

## 2018-01-04 NOTE — NURSING NOTE
Patient Information     Patient Name MRN Sex Nury Henry [id number] Female 1995      Nursing Note by Sola Santoro at 3/28/2017  2:15 PM     Author:  Sola Santoro Service:  (none) Author Type:  (none)     Filed:  3/28/2017  2:27 PM Encounter Date:  3/28/2017 Status:  Signed     :  Sola Santoro            found a lump on her lower Rt abdomen area, is on her period, real heavy  Sola Santoro ....................  3/28/2017   2:22 PM

## 2018-01-04 NOTE — PROGRESS NOTES
Patient Information     Patient Name MRN Sex Nury Henry [id number] Female 1995      [facility] at 3/29/2017  5:38 PM     Author:  Genoveva Moore R.T. ([facility]RT) Service:  (none) Author Type:  RadTech - Registered Radiologic Technologist     Filed:  3/29/2017  5:38 PM Date of Service:  3/29/2017  5:38 PM Status:  Signed     :  Genoveva Moore R.T. (ARRT) ([employer] - Registered Radiologic Technologist)            Falls Risk Criteria:    Age 65 and older or under age 4        Sensory deficits    Poor vision    Use of ambulatory aides    Impaired judgment    Unable to walk independently    Meets High Risk criteria for falls:  no

## 2018-01-04 NOTE — PROGRESS NOTES
Patient Information     Patient Name MRN Sex Nury Henry [id number] Female 1995      Progress Notes by Marcus Morrell MD at 3/28/2017  2:15 PM     Author:  Marcus Morrell MD Service:  (none) Author Type:  Physician     Filed:  3/29/2017  8:13 AM Encounter Date:  3/28/2017 Status:  Signed     :  Marcus Morrell MD (Physician)            SUBJECTIVE:    Nury Gilbert is a 22 y.o. female who presents for right lower quadrant swelling    HPI    The first time she noted this she had spotting and fullness in the right lower quadrant.  This resolved and she started normal menstrual flow.  With this she then went on to have rather heavy menstrual flow.  Currently is at about 1 tampon per hour.  Is not this heavy typically.  Menses monthly, on ORAL CONTRACEPTIVES for the last 5 months.  Severe cramping with Nexplanon as well as an IUD which was take out last August.  Had an ultrasound done in [place] then.  Family history of endometriosis and ovarian cysts.      S/P appendectomy 10 years ago.  Symptoms are just inferior to her scar.    Allergies     Allergen  Reactions     Pcn [Penicillins] Hives and Shortness Of Breath     Phenergan [Promethazine] Hallucinations and Myalgia   ,   Current Outpatient Prescriptions on File Prior to Visit       Medication  Sig Dispense Refill     ASPIRIN/ACETAMINOPHEN/CAFFEINE (EXCEDRIN MIGRAINE ORAL) Take 2 Tabs by mouth. Indications: TENSION-TYPE HEADACHE       dextroamphetamine-amphetamine (ADDERALL XR) 25 mg capsule Take 2 capsules by mouth every morning.  0     No current facility-administered medications on file prior to visit.    , No past medical history on file. and No past surgical history on file.    REVIEW OF SYSTEMS:  Review of Systems   Constitutional: Negative for chills and fever.   Genitourinary: Negative for dysuria, frequency, hematuria and urgency.   Endo/Heme/Allergies: Bruises/bleeds easily.       OBJECTIVE:  /60  Resp 14  Wt 63.7 kg  (140 lb 6.4 oz)    EXAM:   Physical Exam   Constitutional: She is well-developed, well-nourished, and in no distress. No distress.   Abdominal: Soft. She exhibits no distension. There is no tenderness. There is no rebound and no guarding.   No palpable hernia defect nor tenderness over appy scar   Genitourinary: Uterus normal, cervix normal, right adnexa normal and left adnexa normal. Uterus is not deviated, not enlarged, not fixed and not tender.   Cervix is not fixed. Cervix exhibits no motion tenderness, no lesion and no tenderness. Right adnexum displays no deviation, no mass and no tenderness. Left adnexum displays no deviation, no mass and no tenderness.   Genitourinary Comments: Moderate menstrual flow.     Skin: She is not diaphoretic.       ASSESSMENT/PLAN:    ICD-10-CM    1. Pelvic pain in female R10.2 PREGNANCY URINE      US PELVIS COMPLETE TA AND TV        Plan:  This could be simply a heavy period, vs a cyst that has ruptured or endometriosis.  I do not appreciate a surgical hernia.  Will proceed with the pregnancy test to rule out sab or ectopic pregnancy and then pelvic ultrasound.  Records release for notes from [facility] and follow up in 1-2 weeks if not significantly better.    Marcus Morrell MD ....................  3/28/2017   2:55 PM

## 2018-01-05 ENCOUNTER — COMMUNICATION - GICH (OUTPATIENT)
Dept: OBGYN | Facility: OTHER | Age: 23
End: 2018-01-05

## 2018-01-05 NOTE — PROGRESS NOTES
"Patient Information     Patient Name MRN Sex Nury Henry [id number] Female 1995      Progress Notes by Joaquina Marquez MD at 2017  2:30 PM     Author:  Joaquina Marquez MD Service:  (none) Author Type:  Physician     Filed:  2017  3:41 PM Encounter Date:  2017 Status:  Signed     :  Joaquina Marquez MD (Physician)            Patient presents for post procedural visit after removal of foreign body from her right 5th finger on . Patient has done well. No problems with incisions. There has been some yellow clear drainage. She picked another piece of stick out of the lateral incision. She isn't having redness up her arm. No fever. She has been taking some toradol for pain but is getting some epigastric pain so has switched to acetaminophen.     /68  Pulse 68  Ht 1.734 m (5' 8.25\")  Wt 65.9 kg (145 lb 3.2 oz)  LMP 2017  BMI 21.92 kg/m2    General: NAD, pleasant and cooperative with exam and interview.  Right 5th finger: healing laceration/incision. No sign of infection. Appropriate pain with palpation. No fluctuance or erythema.  Psychiatry: awake, alert and oriented. Appropriate affect.    Assessment/Plan:  Discussed surgery. Continue with wound care. Patient can return to normal activities. Patient will call with questions or concerns.          "

## 2018-01-05 NOTE — NURSING NOTE
Patient Information     Patient Name MRN Nury Horn [id number] Female 1995      Nursing Note by Oswaldo Del Rio at 2017  2:30 PM     Author:  Oswaldo Del Rio Service:  (none) Author Type:  (none)     Filed:  2017  3:41 PM Encounter Date:  2017 Status:  Signed     :  Oswaldo Del Rio            Patient is here to follow up related to ER visit for hand injury.  Oswaldo Del Rio LPN............................ 2017 2:43 PM

## 2018-01-09 ENCOUNTER — HISTORY (OUTPATIENT)
Dept: OBGYN | Facility: OTHER | Age: 23
End: 2018-01-09

## 2018-01-09 ENCOUNTER — PRENATAL OFFICE VISIT - GICH (OUTPATIENT)
Dept: OBGYN | Facility: OTHER | Age: 23
End: 2018-01-09

## 2018-01-09 DIAGNOSIS — Z34.02 ENCOUNTER FOR SUPERVISION OF NORMAL FIRST PREGNANCY IN SECOND TRIMESTER: ICD-10-CM

## 2018-01-09 ASSESSMENT — ANXIETY QUESTIONNAIRES
4. TROUBLE RELAXING: MORE THAN HALF THE DAYS
1. FEELING NERVOUS, ANXIOUS, OR ON EDGE: NEARLY EVERY DAY
GAD7 TOTAL SCORE: 13
3. WORRYING TOO MUCH ABOUT DIFFERENT THINGS: SEVERAL DAYS
7. FEELING AFRAID AS IF SOMETHING AWFUL MIGHT HAPPEN: NEARLY EVERY DAY
6. BECOMING EASILY ANNOYED OR IRRITABLE: SEVERAL DAYS
2. NOT BEING ABLE TO STOP OR CONTROL WORRYING: SEVERAL DAYS
5. BEING SO RESTLESS THAT IT IS HARD TO SIT STILL: MORE THAN HALF THE DAYS

## 2018-01-09 ASSESSMENT — PATIENT HEALTH QUESTIONNAIRE - PHQ9: SUM OF ALL RESPONSES TO PHQ QUESTIONS 1-9: 13

## 2018-01-23 ENCOUNTER — HOSPITAL ENCOUNTER (OUTPATIENT)
Dept: OBGYN | Facility: OTHER | Age: 23
Discharge: HOME | End: 2018-01-23
Attending: FAMILY MEDICINE | Admitting: FAMILY MEDICINE

## 2018-01-23 ENCOUNTER — HISTORY (OUTPATIENT)
Dept: OBGYN | Facility: OTHER | Age: 23
End: 2018-01-23

## 2018-01-25 VITALS
HEIGHT: 68 IN | HEART RATE: 68 BPM | WEIGHT: 145.2 LBS | WEIGHT: 151.6 LBS | HEART RATE: 72 BPM | DIASTOLIC BLOOD PRESSURE: 68 MMHG | WEIGHT: 146.2 LBS | WEIGHT: 155 LBS | HEART RATE: 76 BPM | HEIGHT: 68 IN | RESPIRATION RATE: 18 BRPM | HEART RATE: 76 BPM | BODY MASS INDEX: 22.39 KG/M2 | DIASTOLIC BLOOD PRESSURE: 62 MMHG | TEMPERATURE: 98.3 F | HEIGHT: 68 IN | SYSTOLIC BLOOD PRESSURE: 100 MMHG | SYSTOLIC BLOOD PRESSURE: 118 MMHG | BODY MASS INDEX: 22.97 KG/M2 | BODY MASS INDEX: 22.01 KG/M2 | SYSTOLIC BLOOD PRESSURE: 90 MMHG | DIASTOLIC BLOOD PRESSURE: 60 MMHG | BODY MASS INDEX: 23.49 KG/M2

## 2018-01-25 VITALS
WEIGHT: 148.6 LBS | RESPIRATION RATE: 14 BRPM | HEART RATE: 72 BPM | HEIGHT: 68 IN | BODY MASS INDEX: 21.99 KG/M2 | BODY MASS INDEX: 21.27 KG/M2 | SYSTOLIC BLOOD PRESSURE: 108 MMHG | BODY MASS INDEX: 23.4 KG/M2 | HEIGHT: 70 IN | DIASTOLIC BLOOD PRESSURE: 60 MMHG | WEIGHT: 140.4 LBS | DIASTOLIC BLOOD PRESSURE: 70 MMHG | HEART RATE: 76 BPM | WEIGHT: 154.4 LBS | RESPIRATION RATE: 18 BRPM | SYSTOLIC BLOOD PRESSURE: 100 MMHG | SYSTOLIC BLOOD PRESSURE: 106 MMHG | TEMPERATURE: 98.5 F | DIASTOLIC BLOOD PRESSURE: 64 MMHG

## 2018-01-25 VITALS
DIASTOLIC BLOOD PRESSURE: 60 MMHG | RESPIRATION RATE: 20 BRPM | DIASTOLIC BLOOD PRESSURE: 62 MMHG | SYSTOLIC BLOOD PRESSURE: 100 MMHG | SYSTOLIC BLOOD PRESSURE: 118 MMHG | WEIGHT: 154 LBS | HEIGHT: 68 IN | BODY MASS INDEX: 23.59 KG/M2 | WEIGHT: 150.4 LBS | BODY MASS INDEX: 22.79 KG/M2 | HEART RATE: 88 BPM

## 2018-01-27 ASSESSMENT — PATIENT HEALTH QUESTIONNAIRE - PHQ9: SUM OF ALL RESPONSES TO PHQ QUESTIONS 1-9: 12

## 2018-01-27 ASSESSMENT — ANXIETY QUESTIONNAIRES: GAD7 TOTAL SCORE: 13

## 2018-01-29 ENCOUNTER — OFFICE VISIT - GICH (OUTPATIENT)
Dept: FAMILY MEDICINE | Facility: OTHER | Age: 23
End: 2018-01-29

## 2018-01-29 ENCOUNTER — COMMUNICATION - GICH (OUTPATIENT)
Dept: OBGYN | Facility: OTHER | Age: 23
End: 2018-01-29

## 2018-01-29 ENCOUNTER — HISTORY (OUTPATIENT)
Dept: FAMILY MEDICINE | Facility: OTHER | Age: 23
End: 2018-01-29

## 2018-01-29 DIAGNOSIS — M25.551 PAIN IN RIGHT HIP: ICD-10-CM

## 2018-01-29 DIAGNOSIS — M25.552 PAIN IN LEFT HIP: ICD-10-CM

## 2018-01-30 ENCOUNTER — DOCUMENTATION ONLY (OUTPATIENT)
Dept: FAMILY MEDICINE | Facility: OTHER | Age: 23
End: 2018-01-30

## 2018-01-30 RX ORDER — CITALOPRAM HYDROBROMIDE 20 MG/1
20 TABLET ORAL DAILY
COMMUNITY
Start: 2017-10-17 | End: 2018-05-10

## 2018-01-30 RX ORDER — LORATADINE 10 MG/1
10 TABLET ORAL DAILY
COMMUNITY
Start: 2017-10-28 | End: 2018-09-29

## 2018-01-30 RX ORDER — RIZATRIPTAN BENZOATE 10 MG/1
10 TABLET ORAL 2 TIMES DAILY PRN
COMMUNITY
Start: 2017-06-24 | End: 2018-06-13

## 2018-02-01 ENCOUNTER — COMMUNICATION - GICH (OUTPATIENT)
Dept: OBGYN | Facility: OTHER | Age: 23
End: 2018-02-01

## 2018-02-01 DIAGNOSIS — Z34.02 ENCOUNTER FOR SUPERVISION OF NORMAL FIRST PREGNANCY IN SECOND TRIMESTER: ICD-10-CM

## 2018-02-06 ENCOUNTER — HISTORY (OUTPATIENT)
Dept: OBGYN | Facility: OTHER | Age: 23
End: 2018-02-06

## 2018-02-06 ENCOUNTER — AMBULATORY - GICH (OUTPATIENT)
Dept: LAB | Facility: OTHER | Age: 23
End: 2018-02-06

## 2018-02-06 ENCOUNTER — PRENATAL OFFICE VISIT - GICH (OUTPATIENT)
Dept: OBGYN | Facility: OTHER | Age: 23
End: 2018-02-06

## 2018-02-06 DIAGNOSIS — Z34.03 ENCOUNTER FOR SUPERVISION OF NORMAL FIRST PREGNANCY IN THIRD TRIMESTER: ICD-10-CM

## 2018-02-06 DIAGNOSIS — Z34.02 ENCOUNTER FOR SUPERVISION OF NORMAL FIRST PREGNANCY IN SECOND TRIMESTER: ICD-10-CM

## 2018-02-06 LAB
ABSOLUTE BASOPHILS - HISTORICAL: 0 THOU/CU MM
ABSOLUTE EOSINOPHILS - HISTORICAL: 0.1 THOU/CU MM
ABSOLUTE IMMATURE GRANULOCYTES(METAS,MYELOS,PROS) - HISTORICAL: 0.1 THOU/CU MM
ABSOLUTE LYMPHOCYTES - HISTORICAL: 1.6 THOU/CU MM (ref 0.9–2.9)
ABSOLUTE MONOCYTES - HISTORICAL: 0.8 THOU/CU MM
ABSOLUTE NEUTROPHILS - HISTORICAL: 7.7 THOU/CU MM (ref 1.7–7)
BASOPHILS # BLD AUTO: 0.4 %
EOSINOPHIL NFR BLD AUTO: 1.2 %
ERYTHROCYTE [DISTWIDTH] IN BLOOD BY AUTOMATED COUNT: 12.6 % (ref 11.5–15.5)
GLU GEST SCREEN 1HR 50G: 74 MG/DL (ref 65–139)
HCT VFR BLD AUTO: 37.4 % (ref 33–51)
HEMOGLOBIN: 12.7 G/DL (ref 12–16)
IMMATURE GRANULOCYTES(METAS,MYELOS,PROS) - HISTORICAL: 1.2 %
LYMPHOCYTES NFR BLD AUTO: 15.7 % (ref 20–44)
MCH RBC QN AUTO: 31.2 PG (ref 26–34)
MCHC RBC AUTO-ENTMCNC: 34 G/DL (ref 32–36)
MCV RBC AUTO: 92 FL (ref 80–100)
MONOCYTES NFR BLD AUTO: 7.9 %
NEUTROPHILS NFR BLD AUTO: 73.6 % (ref 42–72)
PLATELET # BLD AUTO: 249 THOU/CU MM (ref 140–440)
PMV BLD: 9.7 FL (ref 6.5–11)
RED BLOOD COUNT - HISTORICAL: 4.07 MIL/CU MM (ref 4–5.2)
WHITE BLOOD COUNT - HISTORICAL: 10.4 THOU/CU MM (ref 4.5–11)

## 2018-02-06 ASSESSMENT — PATIENT HEALTH QUESTIONNAIRE - PHQ9: SUM OF ALL RESPONSES TO PHQ QUESTIONS 1-9: 9

## 2018-02-08 LAB — TREPONEMA PALLIDUM - HISTORICAL: NEGATIVE

## 2018-02-09 VITALS
WEIGHT: 157.4 LBS | SYSTOLIC BLOOD PRESSURE: 90 MMHG | HEART RATE: 74 BPM | DIASTOLIC BLOOD PRESSURE: 58 MMHG | BODY MASS INDEX: 23.93 KG/M2

## 2018-02-09 VITALS
WEIGHT: 177 LBS | SYSTOLIC BLOOD PRESSURE: 102 MMHG | DIASTOLIC BLOOD PRESSURE: 64 MMHG | HEART RATE: 76 BPM | BODY MASS INDEX: 26.91 KG/M2

## 2018-02-09 VITALS
WEIGHT: 169.25 LBS | DIASTOLIC BLOOD PRESSURE: 50 MMHG | HEART RATE: 92 BPM | SYSTOLIC BLOOD PRESSURE: 98 MMHG | BODY MASS INDEX: 25.73 KG/M2

## 2018-02-09 VITALS
WEIGHT: 177 LBS | BODY MASS INDEX: 26.91 KG/M2 | TEMPERATURE: 97.8 F | DIASTOLIC BLOOD PRESSURE: 64 MMHG | SYSTOLIC BLOOD PRESSURE: 116 MMHG | HEART RATE: 76 BPM

## 2018-02-11 ASSESSMENT — ANXIETY QUESTIONNAIRES: GAD7 TOTAL SCORE: 13

## 2018-02-11 ASSESSMENT — PATIENT HEALTH QUESTIONNAIRE - PHQ9
SUM OF ALL RESPONSES TO PHQ QUESTIONS 1-9: 9
SUM OF ALL RESPONSES TO PHQ QUESTIONS 1-9: 13

## 2018-02-12 NOTE — PROGRESS NOTES
Patient Information     Patient Name MRN Sex Nury Henry [id number] Female 1995      Progress Notes by Genoveva Moore R.T. ([facility]) at 2017  2:03 PM     Author:  [facility] Service:  (none) Author Type:  RadTech - Registered Radiologic Technologist     Filed:  2017  2:03 PM Date of Service:  2017  2:03 PM Status:  Signed     :  Genoveva Moore R.T. (ARRT) ([employer] - Registered Radiologic Technologist)            Falls Risk Criteria:    Age 65 and older or under age 4        Sensory deficits    Poor vision    Use of ambulatory aides    Impaired judgment    Unable to walk independently    Meets High Risk criteria for falls:  no

## 2018-02-12 NOTE — NURSING NOTE
Patient Information     Patient Name MRN Sex Nury Henry [id number] Female 1995      Nursing Note by Shandra Chaudhry at 2018  1:15 PM     Author:  Shandra Chaudhry Service:  (none) Author Type:  (none)     Filed:  2018  1:39 PM Encounter Date:  2018 Status:  Signed     :  Shandra Chaudhry            1 Babystep coupon given.  Shandra Chaudhry LPN  2018  1:35 PM

## 2018-02-12 NOTE — PROGRESS NOTES
Patient Information     Patient Name MRN Sex Nury Henry [id number] Female 1995      Progress Notes by Naye Johns MD at 2017  1:30 PM     Author:  Naye Johns MD Service:  (none) Author Type:  Physician     Filed:  2017  1:53 PM Encounter Date:  2017 Status:  Signed     :  Naye Johns MD (Physician)            [facility]  Return OB Visit    S: Patient reports she has been feeling well. She denies cramping, contractions, vaginal bleeding, leaking fluid. May be has felt fetal movement 1-2 times.     O: BP 90/58 (Cuff Site: Right Arm, Position: Sitting, Cuff Size: Adult Regular)  Pulse 74  Wt 71.4 kg (157 lb 6.4 oz)  LMP 2017  BMI 23.93 kg/m2  Gen: Well-appearing, NAD    Fundal Height:  U-1  FHR: 150    A/P:  Nury Gilbert is a 22 y.o.  at 20w0d by 8w0d US not c/w LMP, here for return OB visit.  - Anxiety, ADHD: stable, on celexa    PNC:   - Prenatal labs reviewed, Rh positive, Rubella immune  - Genetics: negative CF and SMA screening. Normal quad  - Imaging: dating US at 8w0d. Anatomy US 2017, results pending  - Immunizations: s/p flu 17- was seen in ED for possible reaction that evening, no issues currently.  RTC 4 wks    Naye Jonhs MD  OB/GYN  2017 1:51 PM

## 2018-02-12 NOTE — PROGRESS NOTES
Patient Information     Patient Name MRN Sex Nury Henry [id number] Female 1995      Progress Notes by Naye Johns MD at 2018  1:15 PM     Author:  Naye Johns MD Service:  (none) Author Type:  Physician     Filed:  2018  2:13 PM Encounter Date:  2018 Status:  Signed     :  Naye Johns MD (Physician)            [facility]  Return OB Visit    S: Patient reports she has been feeling pretty good. She has hip pain, especially with activity. Not using a maternity belt. She deneis cramping, contractions, vaginal bleeding, leaking fluid. She reports normal fetal movement. She has no other complaints. Mood has been relatively stable. Has ups and downs but mostly ups.     O: BP 98/50 (Cuff Site: Right Arm, Position: Sitting, Cuff Size: Adult Regular)  Pulse 92  Wt 76.8 kg (169 lb 4 oz)  LMP 2017  BMI 25.73 kg/m2  Gen: Well-appearing, NAD    Fundal Height:  25  FHR: 140    A/P:  Nury Gilbert is a 22 y.o.  at 24w0d by 8w0d US not c/w LMP, here for return OB visit.  - Anxiety, ADHD: stable, on celexa  - Hip pain: maternity belt     PNC:   - Prenatal labs reviewed, Rh positive, Rubella immune  - Genetics: negative CF and SMA screening. Normal quad  - Imaging: dating US at 8w0d. Anatomy US 2017, isolated EIF but otherwise normal  - Immunizations: s/p flu 17  RTC 4 wks    Naye Johns MD  OB/GYN  2018 1:30 PM

## 2018-02-12 NOTE — NURSING NOTE
Patient Information     Patient Name MRN Sex Nury Henry [id number] Female 1995      Nursing Note by Noni Verdin at 2017  1:30 PM     Author:  Noni Verdin Service:  (none) Author Type:  (none)     Filed:  2017  1:53 PM Encounter Date:  2017 Status:  Signed     :  Noni Verdin            Pt presents for prenatal care.  Noni Verdin

## 2018-02-12 NOTE — TELEPHONE ENCOUNTER
"Patient Information     Patient Name MRN Nury Horn [id number] Female 1995      Telephone Encounter by Sarita Riley RN at 2018  8:32 AM     Author:  Sarita Riley RN Service:  (none) Author Type:  NURS- Registered Nurse     Filed:  2018  8:43 AM Encounter Date:  2018 Status:  Signed     :  Sarita Riley RN (NURS- Registered Nurse)            Patient is calling with bilateral hip pain. Patient feels the pain is possibly bone pain vs muscular. Patient has appointment with a prenatal chiropractor today and just wanted to make sure that this was safe. Patient informed that this is perfectly ok and she should follow up in clinic if pain persists and is not relieved by chiropractor. Patient agrees with this.    Reason for Disposition    [1] MILD pain (e.g., does not interfere with normal activities) AND [2] present > 7 days    Answer Assessment - Initial Assessment Questions  1. LOCATION and RADIATION: \"Where is the pain located?\"       Bilateral hip pain  2. QUALITY: \"What does the pain feel like?\"  (e.g., sharp, dull, aching, burning)      Dull and aching  3. SEVERITY: \"How bad is the pain?\" \"What does it keep you from doing?\"   (Scale 1-10; or mild, moderate, severe)    -  MILD (1-3): doesn't interfere with normal activities     -  MODERATE (4-7): interferes with normal activities (e.g., work or school) or awakens from sleep, limping     -  SEVERE (8-10): excruciating pain, unable to do any normal activities, unable to walk      Rates 5/10 at rest, 7/10 with walking  4. ONSET: \"When did the pain start?\" \"Does it come and go, or is it there all the time?\"      Two days ago - comes and goes  5. WORK OR EXERCISE: \"Has there been any recent work or exercise that involved this part of the body?\"       No  6. CAUSE: \"What do you think is causing the hip pain?\"       Unknown  7. AGGRAVATING FACTORS: \"What makes the hip pain worse?\" (e.g., walking, climbing " "stairs, running)      Walking, movement  8. OTHER SYMPTOMS: \"Do you have any other symptoms?\" (e.g., back pain, pain shooting down leg,  fever, rash)      Pain in butt cheek    Protocols used: ADULT HIP PAIN-A-    Sarita Riley RN............. 1/5/2018 8:43 AM         "

## 2018-02-13 NOTE — TELEPHONE ENCOUNTER
Patient Information     Patient Name MRN Nury Horn [id number] Female 1995      Telephone Encounter by Felicita Granados at 2018 11:15 AM     Author:  Felicita Granados Service:  (none) Author Type:  (none)     Filed:  2018 11:16 AM Encounter Date:  2018 Status:  Signed     :  Felicita Granados            Patient has a lab appointment 2018.  Please place order, thank you.

## 2018-02-13 NOTE — PROGRESS NOTES
Patient Information     Patient Name MRN Sex Nury Henry [id number] Female 1995      Progress Notes by Helen Toribio RN at 2018  4:30 PM     Author:  Helen Toribio RN Service:  (none) Author Type:  NURS- Registered Nurse     Filed:  2018  4:34 PM Date of Service:  2018  4:30 PM Status:  Signed     :  Helen Toribio RN (NURS- Registered Nurse)            Denies pain, no vaginal bleeding, no contractions noted, baby moving. Discharged to home. Will follow up with Dr. Johns in 2 weeks. Discharge instructions given to patient she verbalizes understanding.

## 2018-02-13 NOTE — NURSING NOTE
Patient Information     Patient Name MRN Nury Horn [id number] Female 1995      Nursing Note by Tamar Thompson at 2018  2:30 PM     Author:  Tamar Thompson Service:  (none) Author Type:  (none)     Filed:  2018  2:48 PM Encounter Date:  2018 Status:  Signed     :  Tamar Thompson            Patient presents to the clinic for bilateral hip pain following a fall on 18.  Tamar Thompson LPN........................2018  2:39 PM

## 2018-02-13 NOTE — PROGRESS NOTES
Patient Information     Patient Name MRN Sex Nury Henry [id number] Female 1995      Progress Notes by Helen Toribio RN at 2018 12:00 PM     Author:  Helen Toribio RN Service:  (none) Author Type:  NURS- Registered Nurse     Filed:  2018 12:53 PM Date of Service:  2018 12:00 PM Status:  Signed     :  Helen Toribio RN (NURS- Registered Nurse)            22 year old  at 26 weeks to [facility] from the ER.  Patient settled in room 406. EFM/EUM applied. Will notify Dr. Johns and continue to monitor.

## 2018-02-13 NOTE — PROGRESS NOTES
Patient Information     Patient Name MRN Sex Nury Henry [id number] Female 1995      Progress Notes by Naye Johns MD at 2018  2:15 PM     Author:  Naye Johns MD Service:  (none) Author Type:  Physician     Filed:  2018  2:56 PM Encounter Date:  2018 Status:  Signed     :  Naye Johns MD (Physician)            [facility]  Return OB Visit    S: Patient reports she has been feeling well. Has about one BH contraction per day. She denies vaginal bleeding, leaking fluid. She reports normal fetal movement. She has no other complaints.    O: /64 (Cuff Site: Right Arm, Position: Sitting, Cuff Size: Adult Large)  Pulse 76  Wt 80.3 kg (177 lb)  LMP 2017  BMI 26.91 kg/m2  Gen: Well-appearing, NAD    Fundal Height:  28  FHR: 130    A/P:  Nury Gilbert is a 22 y.o.  at 28w0d by 8w0d US not c/w LMP, here for return OB visit.  - Anxiety, ADHD: stable, on celexa  - Hip pain: maternity belt      PNC:   - Prenatal labs reviewed, Rh positive, Rubella immune. GCT 2018   - Genetics: negative CF and SMA screening. Normal quad  - Imaging: dating US at 8w0d. Anatomy US 2017, isolated EIF but otherwise normal  - Immunizations: s/p flu 17. Tdap 2018   RTC 4 wks    Naye Johns MD  OB/GYN  2018 2:27 PM

## 2018-02-13 NOTE — PATIENT INSTRUCTIONS
Patient Information     Patient Name MRN Sex Nury Henry [id number] Female 1995      Patient Instructions by Felicita Calhoun PA-C at 2018  2:30 PM     Author:  Felicita Calhoun PA-C Service:  (none) Author Type:  PHYS- Physician Assistant     Filed:  2018  3:04 PM Encounter Date:  2018 Status:  Signed     :  Felicita Calhoun PA-C (PHYS- Physician Assistant)            Encouraged to take tylenol for relief up to 4 times per day.  Encouraged rest and elevation.  Encouraged to use ice or heat 15 minutes at a time several times per day to decrease pain. Return to clinic in 1-2 weeks as necessary for persistent pain. Return to clinic with any change or worsening of symptoms.  Referred to chiropractor.
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
## 2018-02-13 NOTE — TELEPHONE ENCOUNTER
"Patient Information     Patient Name MRN Nury Horn [id number] Female 1995      Telephone Encounter by Sarita Riley RN at 2018  8:53 AM     Author:  Sarita Riley RN Service:  (none) Author Type:  NURS- Registered Nurse     Filed:  2018  9:01 AM Encounter Date:  2018 Status:  Signed     :  Sarita Riley RN (NURS- Registered Nurse)            Patient is calling as she fell last Tuesday. Was seen in the ER and deemed \"ok\". Has had severe hip pain since fall, progressing over the weekend. Normal baby movement. Denies bleeding or leaking of fluid. Per Dr Colt Gillis MD, FACOG patient should be seen by general family practice to evaluate for possible hip injury. Patient given appointment with Felicita lFoyd today.  Sarita Riley RN............. 2018 9:01 AM         "

## 2018-03-02 DIAGNOSIS — F41.1 GAD (GENERALIZED ANXIETY DISORDER): ICD-10-CM

## 2018-03-02 DIAGNOSIS — F32.0 MILD SINGLE CURRENT EPISODE OF MAJOR DEPRESSIVE DISORDER (H): ICD-10-CM

## 2018-03-02 RX ORDER — PNV NO.95/FERROUS FUM/FOLIC AC 28MG-0.8MG
1 TABLET ORAL DAILY
COMMUNITY
End: 2021-10-21

## 2018-03-02 RX ORDER — CITALOPRAM HYDROBROMIDE 40 MG/1
20 TABLET ORAL DAILY
Qty: 30 TABLET | Refills: 1 | COMMUNITY
Start: 2018-03-02 | End: 2018-05-10

## 2018-03-06 ENCOUNTER — PRENATAL OFFICE VISIT (OUTPATIENT)
Dept: OBGYN | Facility: OTHER | Age: 23
End: 2018-03-06
Attending: OBSTETRICS & GYNECOLOGY
Payer: COMMERCIAL

## 2018-03-06 VITALS
DIASTOLIC BLOOD PRESSURE: 72 MMHG | SYSTOLIC BLOOD PRESSURE: 108 MMHG | BODY MASS INDEX: 27.68 KG/M2 | HEART RATE: 92 BPM | WEIGHT: 182.06 LBS

## 2018-03-06 DIAGNOSIS — G43.719 INTRACTABLE CHRONIC MIGRAINE WITHOUT AURA AND WITHOUT STATUS MIGRAINOSUS: ICD-10-CM

## 2018-03-06 DIAGNOSIS — Z34.03 ENCOUNTER FOR SUPERVISION OF NORMAL FIRST PREGNANCY IN THIRD TRIMESTER: Primary | ICD-10-CM

## 2018-03-06 PROCEDURE — 99207 ZZC OB VISIT-NO CHARGE - GICH ONLY: CPT | Performed by: OBSTETRICS & GYNECOLOGY

## 2018-03-06 PROCEDURE — G0463 HOSPITAL OUTPT CLINIC VISIT: HCPCS

## 2018-03-06 RX ORDER — METOCLOPRAMIDE 10 MG/1
10 TABLET ORAL
Qty: 20 TABLET | Refills: 1 | Status: SHIPPED | OUTPATIENT
Start: 2018-03-06 | End: 2018-06-13

## 2018-03-06 ASSESSMENT — PAIN SCALES - GENERAL: PAINLEVEL: MODERATE PAIN (4)

## 2018-03-06 NOTE — MR AVS SNAPSHOT
After Visit Summary   3/6/2018    Nury Gilbert    MRN: [id number]           Patient Information     Date Of Birth          1995        Visit Information        Provider Department      3/6/2018 2:45 PM Naye Johns MD [facility]        Today's Diagnoses     Encounter for supervision of normal first pregnancy in third trimester    -  1    Intractable chronic migraine without aura and without status migrainosus           Follow-ups after your visit        Your next 10 appointments already scheduled     Mar 20, 2018  2:45 PM CDT   ESTABLISHED PRENATAL with Naye Johns MD   [facility]    [address]  [facility] [address]   [phone]            Apr 03, 2018  2:45 PM CDT   ESTABLISHED PRENATAL with Naye Johns MD   [facility]    [address]  [facility] [address]   [phone]            Apr 11, 2018  2:45 PM CDT   ESTABLISHED PRENATAL with Naye Johns MD   [facility]    [address]  [facility] [address]   [phone]            Apr 17, 2018  2:45 PM CDT   ESTABLISHED PRENATAL with Naye Johns MD   [facility]    [address]  [facility] [address]   [phone]            Apr 24, 2018  2:45 PM CDT   ESTABLISHED PRENATAL with Naye Johns MD   [facility]    [address]  [facility] [address]   [phone]            May 01, 2018  2:45 PM CDT   ESTABLISHED PRENATAL with Naye Johns MD   [facility] ([facility])    [address]  [facility] [address]   [phone]              Who to contact     If you have questions or need  follow up information about today's clinic visit or your schedule please contact [facility] directly at [phone].  Normal or non-critical lab and imaging results will be communicated to you by DocuSign[employer], letter or phone within 4 business days after the clinic has received the results. If you do not hear from us within 7 days, please contact the clinic through Un[employer] or phone. If you have a critical or abnormal lab result, we will notify you by phone as soon as possible.  Submit refill requests through [employer] or call your pharmacy and they will forward the refill request to us. Please allow 3 business days for your refill to be completed.          Additional Information About Your Visit        [employer] Information     [employer] gives you secure access to your electronic health record. If you see a primary care provider, you can also send messages to your care team and make appointments. If you have questions, please call your primary care clinic.  If you do not have a primary care provider, please call [phone] and they will assist you.        Care EveryWhere ID     This is your Care EveryWhere ID. This could be used by other organizations to access your [facility] medical records  [id number]        Your Vitals Were     Pulse BMI (Body Mass Index)                92 27.68 kg/m2           Blood Pressure from Last 3 Encounters:   03/06/18 108/72   02/06/18 102/64   01/29/18 116/64    Weight from Last 3 Encounters:   03/06/18 82.6 kg (182 lb 1 oz)   02/06/18 80.3 kg (177 lb)   01/29/18 80.3 kg (177 lb)              Today, you had the following     No orders found for display         Today's Medication Changes          These changes are accurate as of 3/6/18  3:03 PM.  If you have any questions, ask your nurse or doctor.               Start taking these medicines.        Dose/Directions    metoclopramide 10 MG tablet   Commonly known as:  REGLAN   Used for:  Intractable chronic migraine without  aura and without status migrainosus   Started by:  Naye Johns MD        Dose:  10 mg   Take 1 tablet (10 mg) by mouth once as needed   Quantity:  20 tablet   Refills:  1            Where to get your medicines      These medications were sent to [id number] - [place], [place] - [address]  [address], [place] [address]     Phone:  [phone]     metoclopramide 10 MG tablet                Primary Care Provider Office Phone # Fax #    Naye Villeda, [phone]       [facility] [place] [address]  [place] [address]        Equal Access to Services     [facility] CenterSALUD : Hadii aad ku hadasho Soomaali, waaxda luqadaha, qaybta kaalmada adeegyada, benigno guerrain haygion ralph yusuf . So [facility] [phone].    ATENCIÓN: Si habla español, tiene a lopez disposición servicios gratuitos de asistencia lingüística. Llame al [phone].    We comply with applicable federal civil rights laws and [place] laws. We do not discriminate on the basis of race, color, national origin, age, disability, sex, sexual orientation, or gender identity.            Thank you!     Thank you for choosing [facility]  for your care. Our goal is always to provide you with excellent care. Hearing back from our patients is one way we can continue to improve our services. Please take a few minutes to complete the written survey that you may receive in the mail after your visit with us. Thank you!             Your Updated Medication List - Protect others around you: Learn how to safely use, store and throw away your medicines at www.disposemymeds.org.          This list is accurate as of 3/6/18  3:03 PM.  Always use your most recent med list.                   Brand Name Dispense Instructions for use Diagnosis    * citalopram 20 MG tablet    celeXA     Take 20 mg by mouth daily        * citalopram 40 MG tablet    celeXA    30 tablet     Take 0.5 tablets (20 mg) by mouth daily    Mild single current episode of major depressive disorder (H), BRANDON (generalized anxiety disorder)       doxylamine 25 MG Tabs tablet    UNISOM     Take 25 mg by mouth nightly as needed        loratadine 10 MG tablet    CLARITIN     Take 10 mg by mouth daily        metoclopramide 10 MG tablet    REGLAN    20 tablet    Take 1 tablet (10 mg) by mouth once as needed    Intractable chronic migraine without aura and without status migrainosus       Prenatal Vitamins 28-0.8 MG Tabs           rizatriptan 10 MG tablet    MAXALT     Take 10 mg by mouth 2 times daily as needed        * Notice:  This list has 2 medication(s) that are the same as other medications prescribed for you. Read the directions carefully, and ask your doctor or other care provider to review them with you.

## 2018-03-06 NOTE — PROGRESS NOTES
Return OB Visit    S: Patient has been overall doing okay. Has an off and on headache over the last 1.5 weeks, tylenol doesn't help much. Sometimes starts at work when she can't just lay down. Few random cramps, no regular pattern. No VB, or LOF. +FM    O: /72 (BP Location: Right arm, Patient Position: Sitting, Cuff Size: Adult Regular)  Pulse 92  Wt 82.6 kg (182 lb 1 oz)  BMI 27.68 kg/m2  Gen: Well-appearing, NAD  See OB Flowsheet    A/P:  Nury Gilbert is a 23 year old  at 32w0d by 8w0d US not c/w LMP, here for return OB visit.  - Anxiety, ADHD: stable, on celexa  - Hip pain: maternity belt      PNC:   - Prenatal labs reviewed, Rh positive, Rubella immune. GCT 74  - Genetics: negative CF and SMA screening. Normal quad  - Imaging: dating US at 8w0d. Anatomy US 2017, isolated EIF but otherwise normal  - Immunizations: s/p flu 17. Tdap 2018   RTC 2 wks    Naye Johns MD  OB/GYN  3/6/2018 2:58 PM

## 2018-03-06 NOTE — NURSING NOTE
Patient presents today for her prenatal check-up. She is currently 32w0d.  Shandra Chaudhry LPN  3/6/2018  2:43 PM

## 2018-03-07 ASSESSMENT — PATIENT HEALTH QUESTIONNAIRE - PHQ9: SUM OF ALL RESPONSES TO PHQ QUESTIONS 1-9: 8

## 2018-03-20 ENCOUNTER — PRENATAL OFFICE VISIT (OUTPATIENT)
Dept: OBGYN | Facility: OTHER | Age: 23
End: 2018-03-20
Attending: OBSTETRICS & GYNECOLOGY
Payer: COMMERCIAL

## 2018-03-20 VITALS
DIASTOLIC BLOOD PRESSURE: 74 MMHG | HEART RATE: 92 BPM | WEIGHT: 187 LBS | BODY MASS INDEX: 28.43 KG/M2 | SYSTOLIC BLOOD PRESSURE: 108 MMHG

## 2018-03-20 DIAGNOSIS — Z34.03 ENCOUNTER FOR SUPERVISION OF NORMAL FIRST PREGNANCY IN THIRD TRIMESTER: Primary | ICD-10-CM

## 2018-03-20 PROCEDURE — 99207 ZZC OB VISIT-NO CHARGE - GICH ONLY: CPT | Performed by: OBSTETRICS & GYNECOLOGY

## 2018-03-20 PROCEDURE — G0463 HOSPITAL OUTPT CLINIC VISIT: HCPCS

## 2018-03-20 ASSESSMENT — PAIN SCALES - GENERAL: PAINLEVEL: NO PAIN (0)

## 2018-03-20 NOTE — NURSING NOTE
Patient presents today for her prenatal visit. She is currently 34w0d.  Shandra Chaudhry LPN  3/20/2018  2:46 PM

## 2018-03-20 NOTE — PROGRESS NOTES
Return OB Visit    S: Patient has been feeling okay. Had gastroenteritis one day last week but improved now. No ctx, VB or LOF. +FM    O: /74 (BP Location: Right arm, Patient Position: Sitting, Cuff Size: Adult Regular)  Pulse 92  Wt 84.8 kg (187 lb)  BMI 28.43 kg/m2  Gen: Well-appearing, NAD  See OB Flowsheet    A/P:  Nury Gilbert is a 23 year old  at 34w0d by 8w0d US not c/w LMP, here for return OB visit.  - Anxiety, ADHD: stable, on celexa  - Hip pain: maternity belt      PNC:   - Prenatal labs reviewed, Rh positive, Rubella immune. GCT 74  - Genetics: negative CF and SMA screening. Normal quad  - Imaging: dating US at 8w0d. Anatomy US 2017, isolated EIF but otherwise normal  - Immunizations: s/p flu 17. Tdap 2018   RTC 2 wks    Naye Johns MD  OB/GYN  3/20/2018 2:48 PM

## 2018-03-20 NOTE — MR AVS SNAPSHOT
After Visit Summary   3/20/2018    Nury Gilbert    MRN: [id number]           Patient Information     Date Of Birth          1995        Visit Information        Provider Department      3/20/2018 2:45 PM Naye Johns MD [facility]        Today's Diagnoses     Encounter for supervision of normal first pregnancy in third trimester    -  1       Follow-ups after your visit        Your next 10 appointments already scheduled     Apr 03, 2018  2:45 PM CDT   EST[facility]ATAL with [facility]   [facility]    [address]  [place] [address]   [phone]            Apr 11, 2018  2:45 PM CDT   ESTABLISHED PRENATAL with Naye Johns MD   [facility]    [address]  [place] [address]   [phone]            Apr 17, 2018  2:45 PM CDT   ESTABLISHED PRENATAL with Naye Johns MD   [facility]    [address]  [place] [address]   [phone]            Apr 24, 2018  2:45 PM CDT   ESTABLISHED PRENATAL with Naye Johns MD   [facility]    [address]  [place] [address]   [phone]            May 01, 2018  2:45 PM CDT   ESTABLISHED PRENATAL with Naye Johns MD   [facility]    [address]  [place] [address]   [phone]              Who to contact     If you have questions or need follow up information about today's clinic visit or your schedule please contact [facility] directly at [phone].  Normal or non-critical lab and imaging results will be communicated to you by MyChart, letter or phone within 4 business days after the clinic has received the  results. If you do not hear from us within 7 days, please contact the clinic through [employer] or phone. If you have a critical or abnormal lab result, we will notify you by phone as soon as possible.  Submit refill requests through [employer] or call your pharmacy and they will forward the refill request to us. Please allow 3 business days for your refill to be completed.          Additional Information About Your Visit        [employer] Information     [employer] gives you secure access to your electronic health record. If you see a primary care provider, you can also send messages to your care team and make appointments. If you have questions, please call your primary care clinic.  If you do not have a primary care provider, please call [phone] and they will assist you.        Care EveryWhere ID     This is your Care EveryWhere ID. This could be used by other organizations to access your [facility] medical records  [id number]        Your Vitals Were     Pulse BMI (Body Mass Index)                92 28.43 kg/m2           Blood Pressure from Last 3 Encounters:   03/20/18 108/74   03/06/18 108/72   02/06/18 102/64    Weight from Last 3 Encounters:   03/20/18 84.8 kg (187 lb)   03/06/18 82.6 kg (182 lb 1 oz)   02/06/18 80.3 kg (177 lb)              Today, you had the following     No orders found for display       Primary Care Provider Office Phone # Fax #    NayeAZALIA Sales [phone]       [facility] [place] [address]  [place] [address]        Equal Access to Services     FRED ECHAVARRIA : Hadii aad ku hadasho Soomaali, waaxda luqadaha, qaybta kaalmada adeegyada, waxay idiin haygion ralph yusuf . So [facility] [phone].    ATENCIÓN: Si habla español, tiene a lopez disposición servicios gratuitos de asistencia lingüística. Llame al [phone].    We comply with applicable federal civil rights laws and [place] laws. We do not discriminate on the basis of race, color, national origin,  age, disability, sex, sexual orientation, or gender identity.            Thank you!     Thank you for choosing [facility]  for your care. Our goal is always to provide you with excellent care. Hearing back from our patients is one way we can continue to improve our services. Please take a few minutes to complete the written survey that you may receive in the mail after your visit with us. Thank you!             Your Updated Medication List - Protect others around you: Learn how to safely use, store and throw away your medicines at www.disposemymeds.org.          This list is accurate as of 3/20/18  2:58 PM.  Always use your most recent med list.                   Brand Name Dispense Instructions for use Diagnosis    * citalopram 20 MG tablet    celeXA     Take 20 mg by mouth daily        * citalopram 40 MG tablet    celeXA    30 tablet    Take 0.5 tablets (20 mg) by mouth daily    Mild single current episode of major depressive disorder (H), BRANDON (generalized anxiety disorder)       doxylamine 25 MG Tabs tablet    UNISOM     Take 25 mg by mouth nightly as needed        loratadine 10 MG tablet    CLARITIN     Take 10 mg by mouth daily        metoclopramide 10 MG tablet    REGLAN    20 tablet    Take 1 tablet (10 mg) by mouth once as needed    Intractable chronic migraine without aura and without status migrainosus       Prenatal Vitamins 28-0.8 MG Tabs           rizatriptan 10 MG tablet    MAXALT     Take 10 mg by mouth 2 times daily as needed        * Notice:  This list has 2 medication(s) that are the same as other medications prescribed for you. Read the directions carefully, and ask your doctor or other care provider to review them with you.

## 2018-03-21 ASSESSMENT — PATIENT HEALTH QUESTIONNAIRE - PHQ9: SUM OF ALL RESPONSES TO PHQ QUESTIONS 1-9: 10

## 2018-04-03 ENCOUNTER — PRENATAL OFFICE VISIT (OUTPATIENT)
Dept: OBGYN | Facility: OTHER | Age: 23
End: 2018-04-03
Attending: OBSTETRICS & GYNECOLOGY
Payer: COMMERCIAL

## 2018-04-03 VITALS
HEART RATE: 84 BPM | SYSTOLIC BLOOD PRESSURE: 112 MMHG | DIASTOLIC BLOOD PRESSURE: 80 MMHG | WEIGHT: 187.2 LBS | BODY MASS INDEX: 28.37 KG/M2 | HEIGHT: 68 IN

## 2018-04-03 DIAGNOSIS — Z34.03 ENCOUNTER FOR SUPERVISION OF NORMAL FIRST PREGNANCY IN THIRD TRIMESTER: Primary | ICD-10-CM

## 2018-04-03 PROCEDURE — 99207 ZZC OB VISIT-NO CHARGE - GICH ONLY: CPT | Performed by: OBSTETRICS & GYNECOLOGY

## 2018-04-03 PROCEDURE — 87081 CULTURE SCREEN ONLY: CPT | Performed by: OBSTETRICS & GYNECOLOGY

## 2018-04-03 PROCEDURE — G0463 HOSPITAL OUTPT CLINIC VISIT: HCPCS

## 2018-04-03 ASSESSMENT — PAIN SCALES - GENERAL: PAINLEVEL: NO PAIN (0)

## 2018-04-03 ASSESSMENT — ANXIETY QUESTIONNAIRES
2. NOT BEING ABLE TO STOP OR CONTROL WORRYING: NOT AT ALL
1. FEELING NERVOUS, ANXIOUS, OR ON EDGE: NOT AT ALL

## 2018-04-03 NOTE — PROGRESS NOTES
"Return OB Visit    S: Patient has been feeling pretty well. Thinks she is losing her mucous plus- had some blood streaked discharge last night. No regular ctx. No LOF. +FM    O: /80 (BP Location: Right arm, Patient Position: Sitting, Cuff Size: Adult Large)  Pulse 84  Ht 1.721 m (5' 7.75\")  Wt 84.9 kg (187 lb 3.2 oz)  BMI 28.67 kg/m2  Gen: Well-appearing, NAD  See OB Flowsheet    A/P:  Nury Gilbert is a 23 year old  at 36w0d by 8w0d US not c/w LMP, here for return OB visit.  - Anxiety, ADHD: stable, on celexa  - Hip pain: maternity belt      PNC:   - Prenatal labs reviewed, Rh positive, Rubella immune. GCT 74. GBS 4/3/2018   - Genetics: negative CF and SMA screening. Normal quad  - Imaging: dating US at 8w0d. Anatomy US 2017, isolated EIF but otherwise normal  - Immunizations: s/p flu 17. Tdap 2018   RTC weekly until delivery    Naye Johns MD  OB/GYN  4/3/2018 3:08 PM      "

## 2018-04-03 NOTE — MR AVS SNAPSHOT
After Visit Summary   4/3/2018    Nury Gilbert    MRN: [id number]           Patient Information     Date Of Birth          1995        Visit Information        Provider Department      4/3/2018 2:45 PM Naye Johns MD [facility]        Today's Diagnoses     Encounter for supervision of normal first pregnancy in third trimester    -  1       Follow-ups after your visit        Your next 10 appointments already scheduled     Apr 11, 2018  2:45 PM CDT   [facility]    [address]  [place] [address]   [phone]            Apr 17, 2018  2:45 PM CDT   ESTABL[facility] PR[facility]ATAL [facility]    [address]  [place] [address]   [phone]            Apr 24, 2018  2:45 PM CDT   ESTABL[facility] PR[facility]ATAL [facility]    [address]  [place] [address]   [phone]            May 01, 2018  2:45 PM CDT   ESTABL[facility] PR[facility]ATAL [facility]    [address]  [place] [address]   [phone]              Who to contact     If you have questions or need follow up information about today's clinic visit or your schedule please contact [facility] directly at [phone].  Normal or non-critical lab and imaging results will be communicated to you by MyChart, letter or phone within 4 business days after the clinic has received the results. If you do not hear from us within 7 days, please contact the clinic through MyChart or phone. If you have a critical or abnormal lab result, we will notify you by phone as soon as possible.  Submit refill requests  "through everbill or call your pharmacy and they will forward the refill request to us. Please allow 3 business days for your refill to be completed.          Additional Information About Your Visit        Chattyhart Information     everbill gives you secure access to your electronic health record. If you see a primary care provider, you can also send messages to your care team and make appointments. If you have questions, please call your primary care clinic.  If you do not have a primary care provider, please call [phone] and they will assist you.        Care EveryWhere ID     This is your Care EveryWhere ID. This could be used by other organizations to access your [facility] medical records  [id number]        Your Vitals Were     Pulse Height BMI (Body Mass Index)             84 1.721 m (5' 7.75\") 28.67 kg/m2          Blood Pressure from Last 3 Encounters:   04/03/18 112/80   03/20/18 108/74   03/06/18 108/72    Weight from Last 3 Encounters:   04/03/18 84.9 kg (187 lb 3.2 oz)   03/20/18 84.8 kg (187 lb)   03/06/18 82.6 kg (182 lb 1 oz)              We Performed the Following     Beta strep group B r/o culture        Primary Care Provider Office Phone # Fax #    AZALIA Gonzalez [phone]       [facility] [place] [address]  [place] [address]        Equal Access to Services     [facility]: Hadii aad ku hadasho Soomaali, waaxda luqadaha, qaybta kaalmada adeegyada, benigno yusuf . So [facility] [place] [facility] [phone].    ATENCIÓN: Si habla español, tiene a lopez disposición servicios gratuitos de asistencia lingüística. Llame al [phone].    We comply with applicable federal civil rights laws and [place] laws. We do not discriminate on the basis of race, color, national origin, age, disability, sex, sexual orientation, or gender identity.            Thank you!     Thank you for choosing [facility] AND [facility]  for your care. Our goal is always to provide you " with excellent care. Hearing back from our patients is one way we can continue to improve our services. Please take a few minutes to complete the written survey that you may receive in the mail after your visit with us. Thank you!             Your Updated Medication List - Protect others around you: Learn how to safely use, store and throw away your medicines at www.disposemymeds.org.          This list is accurate as of 4/3/18  3:09 PM.  Always use your most recent med list.                   Brand Name Dispense Instructions for use Diagnosis    * citalopram 20 MG tablet    celeXA     Take 20 mg by mouth daily        * citalopram 40 MG tablet    celeXA    30 tablet    Take 0.5 tablets (20 mg) by mouth daily    Mild single current episode of major depressive disorder (H), BRANDON (generalized anxiety disorder)       doxylamine 25 MG Tabs tablet    UNISOM     Take 25 mg by mouth nightly as needed        loratadine 10 MG tablet    CLARITIN     Take 10 mg by mouth daily        metoclopramide 10 MG tablet    REGLAN    20 tablet    Take 1 tablet (10 mg) by mouth once as needed    Intractable chronic migraine without aura and without status migrainosus       Prenatal Vitamins 28-0.8 MG Tabs           rizatriptan 10 MG tablet    MAXALT     Take 10 mg by mouth 2 times daily as needed        * Notice:  This list has 2 medication(s) that are the same as other medications prescribed for you. Read the directions carefully, and ask your doctor or other care provider to review them with you.

## 2018-04-03 NOTE — NURSING NOTE
Patient presents to the clinic for her OB visit. She is 36w0d.  Merritt Hutchison ..............4/3/2018 2:50 PM

## 2018-04-05 LAB
BACTERIA SPEC CULT: NORMAL
SPECIMEN SOURCE: NORMAL

## 2018-04-11 ENCOUNTER — PRENATAL OFFICE VISIT (OUTPATIENT)
Dept: OBGYN | Facility: OTHER | Age: 23
End: 2018-04-11
Attending: OBSTETRICS & GYNECOLOGY
Payer: COMMERCIAL

## 2018-04-11 VITALS
WEIGHT: 189.56 LBS | SYSTOLIC BLOOD PRESSURE: 106 MMHG | DIASTOLIC BLOOD PRESSURE: 76 MMHG | BODY MASS INDEX: 29.04 KG/M2 | HEART RATE: 100 BPM

## 2018-04-11 DIAGNOSIS — Z34.03 ENCOUNTER FOR SUPERVISION OF NORMAL FIRST PREGNANCY IN THIRD TRIMESTER: Primary | ICD-10-CM

## 2018-04-11 PROCEDURE — 99207 ZZC OB VISIT-NO CHARGE - GICH ONLY: CPT | Performed by: OBSTETRICS & GYNECOLOGY

## 2018-04-11 PROCEDURE — G0463 HOSPITAL OUTPT CLINIC VISIT: HCPCS

## 2018-04-11 RX ORDER — BREAST PUMP
EACH MISCELLANEOUS
Qty: 1 EACH | Refills: 0 | Status: SHIPPED | OUTPATIENT
Start: 2018-04-11 | End: 2018-06-13

## 2018-04-11 ASSESSMENT — ANXIETY QUESTIONNAIRES
2. NOT BEING ABLE TO STOP OR CONTROL WORRYING: NOT AT ALL
1. FEELING NERVOUS, ANXIOUS, OR ON EDGE: SEVERAL DAYS

## 2018-04-11 ASSESSMENT — PAIN SCALES - GENERAL: PAINLEVEL: NO PAIN (0)

## 2018-04-11 NOTE — PROGRESS NOTES
Return OB Visit    S: Patient reports she is doing okay. Couple ctx/day. No VB or LOF. +FM.     O: /76 (BP Location: Right arm, Patient Position: Sitting, Cuff Size: Adult Large)  Pulse 100  Wt 86 kg (189 lb 9 oz)  BMI 29.04 kg/m2  Gen: Well-appearing, NAD  See OB Flowsheet    A/P:  Nury Gilbert is a 23 year old  at 37w1d by 8w0d US not c/w LMP, here for return OB visit.  - Anxiety, ADHD: stable, on celexa  - Hip pain: maternity belt  - Plans breastfeeding, pump Rx 2018       PNC:   - Prenatal labs reviewed, Rh positive, Rubella immune. GCT 74. GBS negative  - Genetics: negative CF and SMA screening. Normal quad  - Imaging: dating US at 8w0d. Anatomy US 2017, isolated EIF but otherwise normal  - Immunizations: s/p flu 17. Tdap 2018   RTC weekly until delivery- need to discuss contraception, pain management at next visit    Naye Johns MD  OB/GYN  2018 3:05 PM

## 2018-04-11 NOTE — MR AVS SNAPSHOT
After Visit Summary   4/11/2018    Nury Gilbert    MRN: [id number]           Patient Information     Date Of Birth          1995        Visit Information        Provider Department      4/11/2018 2:45 PM Naye Johns MD [facility]        Today's Diagnoses     Encounter for supervision of normal first pregnancy in third trimester    -  1       Follow-ups after your visit        Your next 10 appointments already scheduled     Apr 17, 2018  2:45 PM CDT   ESTABLISHED PRENATAL with Naye Johns MD   [facility] ([facility]    [address]  [facility] [address]   [phone]            Apr 24, 2018  2:45 PM CDT   ESTABLISHED PRENATAL with Naye Johns MD   [facility] ([facility])    [address]  [facility] [address]   [phone]            May 01, 2018  2:45 PM CDT   ESTABLISHED PRENATAL with Naye Johns MD   [facility] ([facility])    [address]  [facility] [address]   [phone]              Who to contact     If you have questions or need follow up information about today's clinic visit or your schedule please contact [facility] directly at [phone].  Normal or non-critical lab and imaging results will be communicated to you by MyChart, letter or phone within 4 business days after the clinic has received the results. If you do not hear from us within 7 days, please contact the clinic through MyChart or phone. If you have a critical or abnormal lab result, we will notify you by phone as soon as possible.  Submit refill requests through MobiliBuy or call your pharmacy and they will forward the refill request to us. Please allow 3 business days for your refill to be completed.          Additional Information About Your Visit        MyChart Information      [email or web address] gives you secure access to your electronic health record. If you see a primary care provider, you can also send messages to your care team and make appointments. If you have questions, please call your primary care clinic.  If you do not have a primary care provider, please call [phone] and they will assist you.        Care EveryWhere ID     This is your Care EveryWhere ID. This could be used by other organizations to access your [facility] medical records  [id number]        Your Vitals Were     Pulse BMI (Body Mass Index)                100 29.04 kg/m2           Blood Pressure from Last 3 Encounters:   04/11/18 106/76   04/03/18 112/80   03/20/18 108/74    Weight from Last 3 Encounters:   04/11/18 86 kg (189 lb 9 oz)   04/03/18 84.9 kg (187 lb 3.2 oz)   03/20/18 84.8 kg (187 lb)              Today, you had the following     No orders found for display         Today's Medication Changes          These changes are accurate as of 4/11/18  3:07 PM.  If you have any questions, ask your nurse or doctor.               Start taking these medicines.        Dose/Directions    breast pump Misc   Used for:  Encounter for supervision of normal first pregnancy in third trimester   Started by:  Naye Johns MD        Reason for need: breastfeeding. Length of need: 1 year   Quantity:  1 each   Refills:  0            Where to get your medicines      Some of these will need a paper prescription and others can be bought over the counter.  Ask your nurse if you have questions.     Bring a paper prescription for each of these medications     breast pump Misc                Primary Care Provider Office Phone # Fax #    AZALIA Gonzalez [phone]       [facility] [place] [address]  [place] [address]        Equal Access to Services     KELLIE ECHAVARRIA AH: Marv Bains, wajoseda lumeng, qaybta kaalbenigno deleon. So [facility]  [phone].    ATENCIÓN: Si anne-marie reardon, tiene a lopez disposición servicios gratuitos de asistencia lingüística. Ganesh jones [phone].    We comply with applicable federal civil rights laws and [place] laws. We do not discriminate on the basis of race, color, national origin, age, disability, sex, sexual orientation, or gender identity.            Thank you!     Thank you for choosing [facility] AND [facility]  for your care. Our goal is always to provide you with excellent care. Hearing back from our patients is one way we can continue to improve our services. Please take a few minutes to complete the written survey that you may receive in the mail after your visit with us. Thank you!             Your Updated Medication List - Protect others around you: Learn how to safely use, store and throw away your medicines at www.disposemymeds.org.          This list is accurate as of 4/11/18  3:07 PM.  Always use your most recent med list.                   Brand Name Dispense Instructions for use Diagnosis    breast pump Misc     1 each    Reason for need: breastfeeding. Length of need: 1 year    Encounter for supervision of normal first pregnancy in third trimester       * citalopram 20 MG tablet    celeXA     Take 20 mg by mouth daily        * citalopram 40 MG tablet    celeXA    30 tablet    Take 0.5 tablets (20 mg) by mouth daily    Mild single current episode of major depressive disorder (H), BRANDON (generalized anxiety disorder)       doxylamine 25 MG Tabs tablet    UNISOM     Take 25 mg by mouth nightly as needed        loratadine 10 MG tablet    CLARITIN     Take 10 mg by mouth daily        metoclopramide 10 MG tablet    REGLAN    20 tablet    Take 1 tablet (10 mg) by mouth once as needed    Intractable chronic migraine without aura and without status migrainosus       Prenatal Vitamins 28-0.8 MG Tabs           rizatriptan 10 MG tablet    MAXALT     Take 10 mg by mouth 2 times daily as needed        *  Notice:  This list has 2 medication(s) that are the same as other medications prescribed for you. Read the directions carefully, and ask your doctor or other care provider to review them with you.

## 2018-04-11 NOTE — NURSING NOTE
Patient presents today for her prenatal check-up. She is currently 37w1d.  Shandra Chaudhry LPN  4/11/2018  2:51 PM

## 2018-04-12 ASSESSMENT — PATIENT HEALTH QUESTIONNAIRE - PHQ9: SUM OF ALL RESPONSES TO PHQ QUESTIONS 1-9: 11

## 2018-04-17 ENCOUNTER — PRENATAL OFFICE VISIT (OUTPATIENT)
Dept: OBGYN | Facility: OTHER | Age: 23
End: 2018-04-17
Attending: OBSTETRICS & GYNECOLOGY
Payer: COMMERCIAL

## 2018-04-17 VITALS
DIASTOLIC BLOOD PRESSURE: 68 MMHG | SYSTOLIC BLOOD PRESSURE: 100 MMHG | BODY MASS INDEX: 29.32 KG/M2 | WEIGHT: 191.44 LBS | HEART RATE: 88 BPM

## 2018-04-17 DIAGNOSIS — Z34.03 ENCOUNTER FOR SUPERVISION OF NORMAL FIRST PREGNANCY IN THIRD TRIMESTER: Primary | ICD-10-CM

## 2018-04-17 PROCEDURE — G0463 HOSPITAL OUTPT CLINIC VISIT: HCPCS

## 2018-04-17 PROCEDURE — 99207 ZZC OB VISIT-NO CHARGE - GICH ONLY: CPT | Performed by: OBSTETRICS & GYNECOLOGY

## 2018-04-17 ASSESSMENT — ANXIETY QUESTIONNAIRES
1. FEELING NERVOUS, ANXIOUS, OR ON EDGE: NOT AT ALL
2. NOT BEING ABLE TO STOP OR CONTROL WORRYING: NOT AT ALL

## 2018-04-17 ASSESSMENT — PAIN SCALES - GENERAL: PAINLEVEL: NO PAIN (0)

## 2018-04-17 NOTE — NURSING NOTE
Patient presents today for her prenatal visit. She is currently 38w0d.  Shandra Chaudhry LPN  4/17/2018  2:49 PM

## 2018-04-17 NOTE — PROGRESS NOTES
Return OB Visit    S: Patient reports she is doing okay. More pelvic pressure. Rare ctx. No VB or LOF. +FM    O: /68 (BP Location: Right arm, Patient Position: Sitting, Cuff Size: Adult Large)  Pulse 88  Wt 86.8 kg (191 lb 7 oz)  BMI 29.32 kg/m2  Gen: Well-appearing, NAD  See OB Flowsheet    A/P:  Nury Gilbert is a 23 year old  at 38w0d by 8w0d US not c/w LMP, here for return OB visit.  - Anxiety, ADHD: stable, on celexa  - Hip pain: maternity belt  - Plans breastfeeding, pump Rx 2018   - Plans epidural  - Considering Depo vs Mirena      PNC:   - Prenatal labs reviewed, Rh positive, Rubella immune. GCT 74. GBS negative  - Genetics: negative CF and SMA screening. Normal quad  - Imaging: dating US at 8w0d. Anatomy US 2017, isolated EIF but otherwise normal  - Immunizations: s/p flu 17. Tdap 2018   RTC weekly until delivery    Naye Johns MD  OB/GYN  2018 3:07 PM

## 2018-04-17 NOTE — MR AVS SNAPSHOT
After Visit Summary   4/17/2018    Nury Gilbert    MRN: [id number]           Patient Information     Date Of Birth          1995        Visit Information        Provider Department      4/17/2018 2:45 PM Naye Johns MD [facility]        Today's Diagnoses     Encounter for supervision of normal first pregnancy in third trimester    -  1       Follow-ups after your visit        Your next 10 appointments already scheduled     Apr 24, 2018  2:45 PM CDT   ESTABLISHED PRENATAL with Naye Johns MD   [facility]    [address]  [place] [place] [facility] [address]   [phone]            May 01, 2018  2:45 PM CDT   ESTABLISHED PRENATAL with Naye Johns MD   [facility]    [address]  [facility] [address] [phone]              Who to contact     If you have questions or need follow up information about today's clinic visit or your schedule please contact [facility] directly at [phone].  Normal or non-critical lab and imaging results will be communicated to you by Freed Foodshart, letter or phone within 4 business days after the clinic has received the results. If you do not hear from us within 7 days, please contact the clinic through Cleave Biosciencest or phone. If you have a critical or abnormal lab result, we will notify you by phone as soon as possible.  Submit refill requests through CybEye or call your pharmacy and they will forward the refill request to us. Please allow 3 business days for your refill to be completed.          Additional Information About Your Visit        Freed Foodshart Information     CybEye gives you secure access to your electronic health record. If you see a primary care provider, you can also send messages to your care team and make appointments. If you have questions, please call your primary  [facility] clinic.  If you do not have a primary care provider, please call [phone] and they will assist you.        Care EveryWhere ID     This is your Care EveryWhere ID. This could be used by other organizations to access your [facility] medical records  [id number]        Your Vitals Were     Pulse BMI (Body Mass Index)                88 29.32 kg/m2           Blood Pressure from Last 3 Encounters:   04/17/18 100/68   04/11/18 106/76   04/03/18 112/80    Weight from Last 3 Encounters:   04/17/18 86.8 kg (191 lb 7 oz)   04/11/18 86 kg (189 lb 9 oz)   04/03/18 84.9 kg (187 lb 3.2 oz)              Today, you had the following     No orders found for display       Primary Care Provider Office Phone # Fax #    AZALIA Gonzalez [phone]       [facility] [place] [address]  [place] [address]        Equal Access to Services     [facility]: Hadii aad ku hadasho Soomaali, waaxda luqadaha, qaybta kaalmada adeegyada, waxay idiin hayaan adeeg kharash la'deborah . So [facility] [phone].    ATENCIÓN: Si habla español, tiene a lopez disposición servicios gratuitos de asistencia lingüística. Llame al [phone].    We comply with applicable federal civil rights laws and [place] laws. We do not discriminate on the basis of race, color, national origin, age, disability, sex, sexual orientation, or gender identity.            Thank you!     Thank you for choosing [facility] AND [facility]  for your care. Our goal is always to provide you with excellent care. Hearing back from our patients is one way we can continue to improve our services. Please take a few minutes to complete the written survey that you may receive in the mail after your visit with us. Thank you!             Your Updated Medication List - Protect others around you: Learn how to safely use, store and throw away your medicines at www.disposemymeds.org.          This list is accurate as of 4/17/18  3:09 PM.  Always use your most  recent med list.                   Brand Name Dispense Instructions for use Diagnosis    breast pump Misc     1 each    Reason for need: breastfeeding. Length of need: 1 year    Encounter for supervision of normal first pregnancy in third trimester       * citalopram 20 MG tablet    celeXA     Take 20 mg by mouth daily        * citalopram 40 MG tablet    celeXA    30 tablet    Take 0.5 tablets (20 mg) by mouth daily    Mild single current episode of major depressive disorder (H), BRANDON (generalized anxiety disorder)       doxylamine 25 MG Tabs tablet    UNISOM     Take 25 mg by mouth nightly as needed        loratadine 10 MG tablet    CLARITIN     Take 10 mg by mouth daily        metoclopramide 10 MG tablet    REGLAN    20 tablet    Take 1 tablet (10 mg) by mouth once as needed    Intractable chronic migraine without aura and without status migrainosus       Prenatal Vitamins 28-0.8 MG Tabs           rizatriptan 10 MG tablet    MAXALT     Take 10 mg by mouth 2 times daily as needed        * Notice:  This list has 2 medication(s) that are the same as other medications prescribed for you. Read the directions carefully, and ask your doctor or other care provider to review them with you.

## 2018-04-18 ASSESSMENT — PATIENT HEALTH QUESTIONNAIRE - PHQ9: SUM OF ALL RESPONSES TO PHQ QUESTIONS 1-9: 0

## 2018-04-24 ENCOUNTER — PRENATAL OFFICE VISIT (OUTPATIENT)
Dept: OBGYN | Facility: OTHER | Age: 23
End: 2018-04-24
Attending: OBSTETRICS & GYNECOLOGY
Payer: COMMERCIAL

## 2018-04-24 VITALS — DIASTOLIC BLOOD PRESSURE: 70 MMHG | SYSTOLIC BLOOD PRESSURE: 112 MMHG | HEART RATE: 100 BPM

## 2018-04-24 DIAGNOSIS — Z34.03 ENCOUNTER FOR SUPERVISION OF NORMAL FIRST PREGNANCY IN THIRD TRIMESTER: Primary | ICD-10-CM

## 2018-04-24 PROCEDURE — 99207 ZZC OB VISIT-NO CHARGE - GICH ONLY: CPT | Performed by: OBSTETRICS & GYNECOLOGY

## 2018-04-24 PROCEDURE — G0463 HOSPITAL OUTPT CLINIC VISIT: HCPCS

## 2018-04-24 ASSESSMENT — ANXIETY QUESTIONNAIRES
6. BECOMING EASILY ANNOYED OR IRRITABLE: NEARLY EVERY DAY
2. NOT BEING ABLE TO STOP OR CONTROL WORRYING: SEVERAL DAYS
IF YOU CHECKED OFF ANY PROBLEMS ON THIS QUESTIONNAIRE, HOW DIFFICULT HAVE THESE PROBLEMS MADE IT FOR YOU TO DO YOUR WORK, TAKE CARE OF THINGS AT HOME, OR GET ALONG WITH OTHER PEOPLE: SOMEWHAT DIFFICULT
3. WORRYING TOO MUCH ABOUT DIFFERENT THINGS: SEVERAL DAYS
GAD7 TOTAL SCORE: 10
1. FEELING NERVOUS, ANXIOUS, OR ON EDGE: MORE THAN HALF THE DAYS
7. FEELING AFRAID AS IF SOMETHING AWFUL MIGHT HAPPEN: MORE THAN HALF THE DAYS
5. BEING SO RESTLESS THAT IT IS HARD TO SIT STILL: NOT AT ALL

## 2018-04-24 ASSESSMENT — PAIN SCALES - GENERAL: PAINLEVEL: NO PAIN (0)

## 2018-04-24 ASSESSMENT — PATIENT HEALTH QUESTIONNAIRE - PHQ9: 5. POOR APPETITE OR OVEREATING: SEVERAL DAYS

## 2018-04-24 NOTE — NURSING NOTE
Patient presents today for her prenatal visit. She is currently 39w0d.  Shandra Chaudhry LPN  4/24/2018  2:59 PM

## 2018-04-24 NOTE — PROGRESS NOTES
Return OB Visit    S: Patient has been feeling well. Some cramping. No regular ctx, VB or LOF. +FM.    O: /70 (BP Location: Right arm, Patient Position: Sitting, Cuff Size: Adult Regular)  Pulse 100  Gen: Well-appearing, NAD  Cvx: 1.5/30/-2, membranes stripped  See OB Flowsheet    A/P:  Nury Gilbert is a 23 year old  at 39w0d by 8w0d US not c/w LMP, here for return OB visit.  - Anxiety, ADHD: stable, on celexa  - Hip pain: maternity belt  - Plans breastfeeding, pump Rx 2018   - Plans epidural  - Considering Depo vs Mirena      PNC:   - Prenatal labs reviewed, Rh positive, Rubella immune. GCT 74. GBS negative  - Genetics: negative CF and SMA screening. Normal quad  - Imaging: dating US at 8w0d. Anatomy US 2017, isolated EIF but otherwise normal  - Immunizations: s/p flu 17. Tdap 2018   RTC weekly until delivery    Naye Johns MD  OB/GYN  2018 3:05 PM

## 2018-04-24 NOTE — MR AVS SNAPSHOT
After Visit Summary   4/24/2018    Nury Gilbert    MRN: [id number]           Patient Information     Date Of Birth          1995        Visit Information        Provider Department      4/24/2018 2:45 PM Naye Johns MD [facility]        Today's Diagnoses     Encounter for supervision of normal first pregnancy in third trimester    -  1       Follow-ups after your visit        Your next 10 appointments already scheduled     May 01, 2018  2:45 PM CDT   ESTABLISHED PRENATAL with Naey Johns MD   [facility] ([facility])    [address]  [place] [address] [phone]              Who to contact     If you have questions or need follow up information about today's clinic visit or your schedule please contact [facility] directly at [phone].  Normal or non-critical lab and imaging results will be communicated to you by [employer], letter or phone within 4 business days after the clinic has received the results. If you do not hear from us within 7 days, please contact the clinic through [employer] or phone. If you have a critical or abnormal lab result, we will notify you by phone as soon as possible.  Submit refill requests through Telly or call your pharmacy and they will forward the refill request to us. Please allow 3 business days for your refill to be completed.          Additional Information About Your Visit        MyChart Information     Telly gives you secure access to your electronic health record. If you see a primary care provider, you can also send messages to your care team and make appointments. If you have questions, please call your primary care clinic.  If you do not have a primary care provider, please call [phone] and they will assist you.        Care EveryWhere ID     This is your Care EveryWhere ID. This could be used by other organizations to access  your [facility] medical records  [id number]        Your Vitals Were     Pulse                   100            Blood Pressure from Last 3 Encounters:   04/24/18 112/70   04/17/18 100/68   04/11/18 106/76    Weight from Last 3 Encounters:   04/17/18 86.8 kg (191 lb 7 oz)   04/11/18 86 kg (189 lb 9 oz)   04/03/18 84.9 kg (187 lb 3.2 oz)              Today, you had the following     No orders found for display       Primary Care Provider Office Phone # Fax #    Naye AZALIA Kelly [phone]       [facility] [place] [address]  [place] [address]        Equal Access to Services     KELLIE ECHAVARRIA : Hadii chelsey ayala hadasho Sonorbertoali, waaxda luqadaha, qaybta kaalmada adeegyada, benigno morocho. So [facility] [phone].    ATENCIÓN: Si habla español, tiene a lopez disposición servicios gratuitos de asistencia lingüística. Llame al [phone].    We comply with applicable federal civil rights laws and [place] laws. We do not discriminate on the basis of race, color, national origin, age, disability, sex, sexual orientation, or gender identity.            Thank you!     Thank you for choosing [facility] AND [facility]  for your care. Our goal is always to provide you with excellent care. Hearing back from our patients is one way we can continue to improve our services. Please take a few minutes to complete the written survey that you may receive in the mail after your visit with us. Thank you!             Your Updated Medication List - Protect others around you: Learn how to safely use, store and throw away your medicines at www.disposemymeds.org.          This list is accurate as of 4/24/18  3:16 PM.  Always use your most recent med list.                   Brand Name Dispense Instructions for use Diagnosis    breast pump Misc     1 each    Reason for need: breastfeeding. Length of need: 1 year    Encounter for supervision of normal first pregnancy in third trimester        * citalopram 20 MG tablet    celeXA     Take 20 mg by mouth daily        * citalopram 40 MG tablet    celeXA    30 tablet    Take 0.5 tablets (20 mg) by mouth daily    Mild single current episode of major depressive disorder (H), BRANDON (generalized anxiety disorder)       doxylamine 25 MG Tabs tablet    UNISOM     Take 25 mg by mouth nightly as needed        loratadine 10 MG tablet    CLARITIN     Take 10 mg by mouth daily        metoclopramide 10 MG tablet    REGLAN    20 tablet    Take 1 tablet (10 mg) by mouth once as needed    Intractable chronic migraine without aura and without status migrainosus       Prenatal Vitamins 28-0.8 MG Tabs           rizatriptan 10 MG tablet    MAXALT     Take 10 mg by mouth 2 times daily as needed        * Notice:  This list has 2 medication(s) that are the same as other medications prescribed for you. Read the directions carefully, and ask your doctor or other care provider to review them with you.

## 2018-04-25 ASSESSMENT — ANXIETY QUESTIONNAIRES: GAD7 TOTAL SCORE: 10

## 2018-04-25 ASSESSMENT — PATIENT HEALTH QUESTIONNAIRE - PHQ9: SUM OF ALL RESPONSES TO PHQ QUESTIONS 1-9: 0

## 2018-05-01 ENCOUNTER — PRENATAL OFFICE VISIT (OUTPATIENT)
Dept: OBGYN | Facility: OTHER | Age: 23
End: 2018-05-01
Attending: OBSTETRICS & GYNECOLOGY
Payer: COMMERCIAL

## 2018-05-01 VITALS
BODY MASS INDEX: 30.05 KG/M2 | WEIGHT: 196.19 LBS | HEART RATE: 88 BPM | DIASTOLIC BLOOD PRESSURE: 70 MMHG | SYSTOLIC BLOOD PRESSURE: 124 MMHG

## 2018-05-01 DIAGNOSIS — Z34.03 ENCOUNTER FOR SUPERVISION OF NORMAL FIRST PREGNANCY IN THIRD TRIMESTER: Primary | ICD-10-CM

## 2018-05-01 PROCEDURE — G0463 HOSPITAL OUTPT CLINIC VISIT: HCPCS

## 2018-05-01 PROCEDURE — 99207 ZZC OB VISIT-NO CHARGE - GICH ONLY: CPT | Performed by: OBSTETRICS & GYNECOLOGY

## 2018-05-01 RX ORDER — CITRIC ACID/SODIUM CITRATE 334-500MG
30 SOLUTION, ORAL ORAL
Status: CANCELLED | OUTPATIENT
Start: 2018-05-01

## 2018-05-01 RX ORDER — SODIUM CHLORIDE, SODIUM LACTATE, POTASSIUM CHLORIDE, CALCIUM CHLORIDE 600; 310; 30; 20 MG/100ML; MG/100ML; MG/100ML; MG/100ML
INJECTION, SOLUTION INTRAVENOUS CONTINUOUS
Status: CANCELLED | OUTPATIENT
Start: 2018-05-01

## 2018-05-01 RX ORDER — CLINDAMYCIN PHOSPHATE 900 MG/50ML
900 INJECTION, SOLUTION INTRAVENOUS
Status: CANCELLED | OUTPATIENT
Start: 2018-05-01

## 2018-05-01 ASSESSMENT — PAIN SCALES - GENERAL: PAINLEVEL: NO PAIN (0)

## 2018-05-01 NOTE — PROGRESS NOTES
Return OB Visit    S: Patient reports she is doing okay, just tired. Some ctx, nothing regular. Some mucous discharge, no VB or LOF. +FM.     O: /70 (BP Location: Right arm, Patient Position: Sitting, Cuff Size: Adult Regular)  Pulse 88  Wt 89 kg (196 lb 3 oz)  BMI 30.05 kg/m2  Gen: Well-appearing, NAD  See OB Flowsheet    Bedside US: breech presentation    A/P:  Nury Gilbert is a 23 year old  at 40w0d by 8w0d US not c/w LMP, here for return OB visit.  - Anxiety, ADHD: stable, on celexa  - Hip pain: maternity belt  - Plans breastfeeding, pump Rx 2018   - Plans epidural  - Considering Depo vs Mirena    Breech presentation: Discussed options, including  section or attempted ECV with IOL if successful vs  section if unsuccessful. She would like attempted ECV. Discussed the risks and benefits of both procedures, including risk of fetal intolerance to ECV, risk of abruption, PROM, and unsuccessful procedure. Discussed risk of bleeding, infection, and injury to surrounding organs with  section. She agreed to proceed and surgical consent signed today.       PNC:   - Prenatal labs reviewed, Rh positive, Rubella immune. GCT 74. GBS negative  - Genetics: negative CF and SMA screening. Normal quad  - Imaging: dating US at 8w0d. Anatomy US 2017, isolated EIF but otherwise normal  - Immunizations: s/p flu 17. Tdap 2018   Attempted ECV under spinal anesthesia tomorrow with plan for  section if unsuccessful    Naye Johns MD  OB/GYN  2018 2:58 PM

## 2018-05-01 NOTE — MR AVS SNAPSHOT
After Visit Summary   5/1/2018    Nury Gilbert    MRN: [id number]           Patient Information     Date Of Birth          1995        Visit Information        Provider Department      5/1/2018 2:45 PM Naye Johns MD [facility]        Today's Diagnoses     Encounter for supervision of normal first pregnancy in third trimester    -  1    Breech presentation, single or unspecified fetus           Follow-ups after your visit        Your next 10 appointments already scheduled     May 02, 2018   Procedure with Naye Johns MD   [facility]    [address]  [place] [id number] [phone]              Who to contact     If you have questions or need follow up information about today's clinic visit or your schedule please contact [facility] directly at [phone].  Normal or non-critical lab and imaging results will be communicated to you by [employer], letter or phone within 4 business days after the clinic has received the results. If you do not hear from us within 7 days, please contact the clinic through [employer] or phone. If you have a critical or abnormal lab result, we will notify you by phone as soon as possible.  Submit refill requests through [employer] or call your pharmacy and they will forward the refill request to us. Please allow 3 business days for your refill to be completed.          Additional Information About Your Visit        MyChart Information     [employer] gives you secure access to your electronic health record. If you see a primary care provider, you can also send messages to your care team and make appointments. If you have questions, please call your primary care clinic.  If you do not have a primary care provider, please call [phone] and they will assist you.        Care EveryWhere ID     This is your Care EveryWhere ID. This could be used by  other organizations to access your [place] medical records  [id number]        Your Vitals Were     Pulse BMI (Body Mass Index)                88 30.05 kg/m2           Blood Pressure from Last 3 Encounters:   05/01/18 124/70   04/24/18 112/70   04/17/18 100/68    Weight from Last 3 Encounters:   05/01/18 89 kg (196 lb 3 oz)   04/17/18 86.8 kg (191 lb 7 oz)   04/11/18 86 kg (189 lb 9 oz)              Today, you had the following     No orders found for display       Primary Care Provider Office Phone # Fax #    AZALIA Gonzalez [phone]       [facility] [place] [address]  [place] [address]        Equal Access to Services     KELLIE ECHAVARRIA : Hadii chelsey ayala hadasho Soomaali, waaxda luqadaha, qaybta kaalmada adeegyada, benigno yusuf . So [facility] [phone].    ATENCIÓN: Si habla español, tiene a lopez disposición servicios gratuitos de asistencia lingüística. Llame al [phone].    We comply with applicable federal civil rights laws and [place] laws. We do not discriminate on the basis of race, color, national origin, age, disability, sex, sexual orientation, or gender identity.            Thank you!     Thank you for choosing [facility] AND [facility]  for your care. Our goal is always to provide you with excellent care. Hearing back from our patients is one way we can continue to improve our services. Please take a few minutes to complete the written survey that you may receive in the mail after your visit with us. Thank you!             Your Updated Medication List - Protect others around you: Learn how to safely use, store and throw away your medicines at www.disposemymeds.org.          This list is accurate as of 5/1/18  5:14 PM.  Always use your most recent med list.                   Brand Name Dispense Instructions for use Diagnosis    breast pump Misc     1 each    Reason for need: breastfeeding. Length of need: 1 year    Encounter for  supervision of normal first pregnancy in third trimester       * citalopram 20 MG tablet    celeXA     Take 20 mg by mouth daily        * citalopram 40 MG tablet    celeXA    30 tablet    Take 0.5 tablets (20 mg) by mouth daily    Mild single current episode of major depressive disorder (H), BRANDON (generalized anxiety disorder)       doxylamine 25 MG Tabs tablet    UNISOM     Take 25 mg by mouth nightly as needed        loratadine 10 MG tablet    CLARITIN     Take 10 mg by mouth daily        metoclopramide 10 MG tablet    REGLAN    20 tablet    Take 1 tablet (10 mg) by mouth once as needed    Intractable chronic migraine without aura and without status migrainosus       Prenatal Vitamins 28-0.8 MG Tabs           rizatriptan 10 MG tablet    MAXALT     Take 10 mg by mouth 2 times daily as needed        * Notice:  This list has 2 medication(s) that are the same as other medications prescribed for you. Read the directions carefully, and ask your doctor or other care provider to review them with you.

## 2018-05-01 NOTE — NURSING NOTE
Patient presents today for her prenatal check. She is currently 40w.  Shandra Chaudhry LPN  5/1/2018  2:59 PM

## 2018-05-02 ENCOUNTER — SURGERY (OUTPATIENT)
Age: 23
End: 2018-05-02

## 2018-05-02 ENCOUNTER — ANESTHESIA EVENT (OUTPATIENT)
Dept: SURGERY | Facility: OTHER | Age: 23
End: 2018-05-02
Payer: COMMERCIAL

## 2018-05-02 ENCOUNTER — ANESTHESIA (OUTPATIENT)
Dept: SURGERY | Facility: OTHER | Age: 23
End: 2018-05-02
Payer: COMMERCIAL

## 2018-05-02 ENCOUNTER — HOSPITAL ENCOUNTER (INPATIENT)
Facility: OTHER | Age: 23
LOS: 2 days | Discharge: HOME | End: 2018-05-04
Attending: OBSTETRICS & GYNECOLOGY | Admitting: OBSTETRICS & GYNECOLOGY
Payer: COMMERCIAL

## 2018-05-02 DIAGNOSIS — Z98.891 S/P CESAREAN SECTION: Primary | ICD-10-CM

## 2018-05-02 LAB
ABO + RH BLD: NORMAL
ABO + RH BLD: NORMAL
BLD GP AB SCN SERPL QL: NORMAL
BLOOD BANK CMNT PATIENT-IMP: NORMAL
HGB BLD-MCNC: 12.3 G/DL (ref 11.7–15.7)
SPECIMEN EXP DATE BLD: NORMAL

## 2018-05-02 PROCEDURE — 86900 BLOOD TYPING SEROLOGIC ABO: CPT | Performed by: OBSTETRICS & GYNECOLOGY

## 2018-05-02 PROCEDURE — 86780 TREPONEMA PALLIDUM: CPT | Performed by: OBSTETRICS & GYNECOLOGY

## 2018-05-02 PROCEDURE — 86850 RBC ANTIBODY SCREEN: CPT | Performed by: OBSTETRICS & GYNECOLOGY

## 2018-05-02 PROCEDURE — 59510 CESAREAN DELIVERY: CPT | Performed by: NURSE ANESTHETIST, CERTIFIED REGISTERED

## 2018-05-02 PROCEDURE — 25000128 H RX IP 250 OP 636: Performed by: ANESTHESIOLOGY

## 2018-05-02 PROCEDURE — 36415 COLL VENOUS BLD VENIPUNCTURE: CPT | Performed by: OBSTETRICS & GYNECOLOGY

## 2018-05-02 PROCEDURE — 25000128 H RX IP 250 OP 636: Performed by: OBSTETRICS & GYNECOLOGY

## 2018-05-02 PROCEDURE — 59510 CESAREAN DELIVERY: CPT | Performed by: ANESTHESIOLOGY

## 2018-05-02 PROCEDURE — 71000014 ZZH RECOVERY PHASE 1 LEVEL 2 FIRST HR: Performed by: OBSTETRICS & GYNECOLOGY

## 2018-05-02 PROCEDURE — 25000128 H RX IP 250 OP 636: Performed by: NURSE ANESTHETIST, CERTIFIED REGISTERED

## 2018-05-02 PROCEDURE — 37000008 ZZH ANESTHESIA TECHNICAL FEE, 1ST 30 MIN: Performed by: OBSTETRICS & GYNECOLOGY

## 2018-05-02 PROCEDURE — 86901 BLOOD TYPING SEROLOGIC RH(D): CPT | Performed by: OBSTETRICS & GYNECOLOGY

## 2018-05-02 PROCEDURE — 27210995 ZZH RX 272: Performed by: OBSTETRICS & GYNECOLOGY

## 2018-05-02 PROCEDURE — 99140 ANES COMP EMERGENCY COND: CPT | Performed by: NURSE ANESTHETIST, CERTIFIED REGISTERED

## 2018-05-02 PROCEDURE — 10S0XZZ REPOSITION PRODUCTS OF CONCEPTION, EXTERNAL APPROACH: ICD-10-PCS | Performed by: OBSTETRICS & GYNECOLOGY

## 2018-05-02 PROCEDURE — 12000027 ZZH R&B OB

## 2018-05-02 PROCEDURE — 36000058 ZZH SURGERY LEVEL 3 EA 15 ADDTL MIN: Performed by: OBSTETRICS & GYNECOLOGY

## 2018-05-02 PROCEDURE — 27210794 ZZH OR GENERAL SUPPLY STERILE: Performed by: OBSTETRICS & GYNECOLOGY

## 2018-05-02 PROCEDURE — 59412 ANTEPARTUM MANIPULATION: CPT | Performed by: OBSTETRICS & GYNECOLOGY

## 2018-05-02 PROCEDURE — 85018 HEMOGLOBIN: CPT | Performed by: OBSTETRICS & GYNECOLOGY

## 2018-05-02 PROCEDURE — 59510 CESAREAN DELIVERY: CPT | Performed by: OBSTETRICS & GYNECOLOGY

## 2018-05-02 PROCEDURE — 37000009 ZZH ANESTHESIA TECHNICAL FEE, EACH ADDTL 15 MIN: Performed by: OBSTETRICS & GYNECOLOGY

## 2018-05-02 PROCEDURE — 36000056 ZZH SURGERY LEVEL 3 1ST 30 MIN: Performed by: OBSTETRICS & GYNECOLOGY

## 2018-05-02 PROCEDURE — 25000128 H RX IP 250 OP 636

## 2018-05-02 PROCEDURE — 40000275 ZZH STATISTIC RCP TIME EA 10 MIN

## 2018-05-02 PROCEDURE — 25000132 ZZH RX MED GY IP 250 OP 250 PS 637: Performed by: OBSTETRICS & GYNECOLOGY

## 2018-05-02 PROCEDURE — 25000125 ZZHC RX 250: Performed by: NURSE ANESTHETIST, CERTIFIED REGISTERED

## 2018-05-02 RX ORDER — ONDANSETRON 2 MG/ML
INJECTION INTRAMUSCULAR; INTRAVENOUS PRN
Status: DISCONTINUED | OUTPATIENT
Start: 2018-05-02 | End: 2018-05-02

## 2018-05-02 RX ORDER — DEXAMETHASONE SODIUM PHOSPHATE 4 MG/ML
4 INJECTION, SOLUTION INTRA-ARTICULAR; INTRALESIONAL; INTRAMUSCULAR; INTRAVENOUS; SOFT TISSUE
Status: DISCONTINUED | OUTPATIENT
Start: 2018-05-02 | End: 2018-05-02 | Stop reason: HOSPADM

## 2018-05-02 RX ORDER — OXYTOCIN 10 [USP'U]/ML
INJECTION, SOLUTION INTRAMUSCULAR; INTRAVENOUS PRN
Status: DISCONTINUED | OUTPATIENT
Start: 2018-05-02 | End: 2018-05-02

## 2018-05-02 RX ORDER — ONDANSETRON 2 MG/ML
4 INJECTION INTRAMUSCULAR; INTRAVENOUS EVERY 30 MIN PRN
Status: DISCONTINUED | OUTPATIENT
Start: 2018-05-02 | End: 2018-05-02 | Stop reason: HOSPADM

## 2018-05-02 RX ORDER — SIMETHICONE 80 MG
80 TABLET,CHEWABLE ORAL 4 TIMES DAILY PRN
Status: DISCONTINUED | OUTPATIENT
Start: 2018-05-02 | End: 2018-05-04 | Stop reason: HOSPADM

## 2018-05-02 RX ORDER — MORPHINE SULFATE 1 MG/ML
INJECTION, SOLUTION EPIDURAL; INTRATHECAL; INTRAVENOUS PRN
Status: DISCONTINUED | OUTPATIENT
Start: 2018-05-02 | End: 2018-05-02

## 2018-05-02 RX ORDER — GENTAMICIN SULFATE 100 MG/100ML
INJECTION, SOLUTION INTRAVENOUS PRN
Status: DISCONTINUED | OUTPATIENT
Start: 2018-05-02 | End: 2018-05-02

## 2018-05-02 RX ORDER — MAGNESIUM HYDROXIDE 1200 MG/15ML
LIQUID ORAL PRN
Status: DISCONTINUED | OUTPATIENT
Start: 2018-05-02 | End: 2018-05-02 | Stop reason: HOSPADM

## 2018-05-02 RX ORDER — DEXTROSE, SODIUM CHLORIDE, SODIUM LACTATE, POTASSIUM CHLORIDE, AND CALCIUM CHLORIDE 5; .6; .31; .03; .02 G/100ML; G/100ML; G/100ML; G/100ML; G/100ML
INJECTION, SOLUTION INTRAVENOUS CONTINUOUS
Status: DISCONTINUED | OUTPATIENT
Start: 2018-05-02 | End: 2018-05-03 | Stop reason: CLARIF

## 2018-05-02 RX ORDER — NALOXONE HYDROCHLORIDE 0.4 MG/ML
.1-.4 INJECTION, SOLUTION INTRAMUSCULAR; INTRAVENOUS; SUBCUTANEOUS
Status: DISCONTINUED | OUTPATIENT
Start: 2018-05-02 | End: 2018-05-03 | Stop reason: CLARIF

## 2018-05-02 RX ORDER — SODIUM CHLORIDE, SODIUM LACTATE, POTASSIUM CHLORIDE, CALCIUM CHLORIDE 600; 310; 30; 20 MG/100ML; MG/100ML; MG/100ML; MG/100ML
INJECTION, SOLUTION INTRAVENOUS CONTINUOUS PRN
Status: DISCONTINUED | OUTPATIENT
Start: 2018-05-02 | End: 2018-05-02

## 2018-05-02 RX ORDER — AMOXICILLIN 250 MG
2 CAPSULE ORAL 2 TIMES DAILY PRN
Status: DISCONTINUED | OUTPATIENT
Start: 2018-05-02 | End: 2018-05-04 | Stop reason: HOSPADM

## 2018-05-02 RX ORDER — CLINDAMYCIN PHOSPHATE 900 MG/50ML
INJECTION, SOLUTION INTRAVENOUS PRN
Status: DISCONTINUED | OUTPATIENT
Start: 2018-05-02 | End: 2018-05-02

## 2018-05-02 RX ORDER — DIPHENHYDRAMINE HYDROCHLORIDE 50 MG/ML
25 INJECTION INTRAMUSCULAR; INTRAVENOUS EVERY 6 HOURS PRN
Status: DISCONTINUED | OUTPATIENT
Start: 2018-05-02 | End: 2018-05-03 | Stop reason: CLARIF

## 2018-05-02 RX ORDER — PROPOFOL 10 MG/ML
INJECTION, EMULSION INTRAVENOUS PRN
Status: DISCONTINUED | OUTPATIENT
Start: 2018-05-02 | End: 2018-05-02

## 2018-05-02 RX ORDER — OXYCODONE HYDROCHLORIDE 5 MG/1
5 TABLET ORAL EVERY 4 HOURS PRN
Status: DISCONTINUED | OUTPATIENT
Start: 2018-05-02 | End: 2018-05-04 | Stop reason: HOSPADM

## 2018-05-02 RX ORDER — HYDROMORPHONE HYDROCHLORIDE 1 MG/ML
.3-.5 INJECTION, SOLUTION INTRAMUSCULAR; INTRAVENOUS; SUBCUTANEOUS EVERY 5 MIN PRN
Status: DISCONTINUED | OUTPATIENT
Start: 2018-05-02 | End: 2018-05-02 | Stop reason: HOSPADM

## 2018-05-02 RX ORDER — BISACODYL 10 MG
10 SUPPOSITORY, RECTAL RECTAL DAILY PRN
Status: DISCONTINUED | OUTPATIENT
Start: 2018-05-04 | End: 2018-05-04 | Stop reason: HOSPADM

## 2018-05-02 RX ORDER — LIDOCAINE 40 MG/G
CREAM TOPICAL
Status: DISCONTINUED | OUTPATIENT
Start: 2018-05-02 | End: 2018-05-04 | Stop reason: HOSPADM

## 2018-05-02 RX ORDER — OXYTOCIN 10 [USP'U]/ML
10 INJECTION, SOLUTION INTRAMUSCULAR; INTRAVENOUS
Status: DISCONTINUED | OUTPATIENT
Start: 2018-05-02 | End: 2018-05-04 | Stop reason: HOSPADM

## 2018-05-02 RX ORDER — CITRIC ACID/SODIUM CITRATE 334-500MG
30 SOLUTION, ORAL ORAL
Status: DISCONTINUED | OUTPATIENT
Start: 2018-05-02 | End: 2018-05-02 | Stop reason: HOSPADM

## 2018-05-02 RX ORDER — NALBUPHINE HYDROCHLORIDE 20 MG/ML
2.5-5 INJECTION, SOLUTION INTRAMUSCULAR; INTRAVENOUS; SUBCUTANEOUS EVERY 6 HOURS PRN
Status: DISCONTINUED | OUTPATIENT
Start: 2018-05-02 | End: 2018-05-02 | Stop reason: HOSPADM

## 2018-05-02 RX ORDER — FENTANYL CITRATE 50 UG/ML
INJECTION, SOLUTION INTRAMUSCULAR; INTRAVENOUS PRN
Status: DISCONTINUED | OUTPATIENT
Start: 2018-05-02 | End: 2018-05-02

## 2018-05-02 RX ORDER — CLINDAMYCIN PHOSPHATE 900 MG/50ML
900 INJECTION, SOLUTION INTRAVENOUS
Status: DISCONTINUED | OUTPATIENT
Start: 2018-05-02 | End: 2018-05-02 | Stop reason: HOSPADM

## 2018-05-02 RX ORDER — NALOXONE HYDROCHLORIDE 0.4 MG/ML
.1-.4 INJECTION, SOLUTION INTRAMUSCULAR; INTRAVENOUS; SUBCUTANEOUS
Status: DISCONTINUED | OUTPATIENT
Start: 2018-05-02 | End: 2018-05-02 | Stop reason: HOSPADM

## 2018-05-02 RX ORDER — OXYCODONE AND ACETAMINOPHEN 5; 325 MG/1; MG/1
1-2 TABLET ORAL EVERY 4 HOURS PRN
Status: DISCONTINUED | OUTPATIENT
Start: 2018-05-02 | End: 2018-05-04 | Stop reason: HOSPADM

## 2018-05-02 RX ORDER — PHENYLEPHRINE HCL IN 0.9% NACL 1 MG/10 ML
100 SYRINGE (ML) INTRAVENOUS EVERY 5 MIN PRN
Status: DISCONTINUED | OUTPATIENT
Start: 2018-05-02 | End: 2018-05-02 | Stop reason: HOSPADM

## 2018-05-02 RX ORDER — ONDANSETRON 4 MG/1
4 TABLET, ORALLY DISINTEGRATING ORAL EVERY 30 MIN PRN
Status: DISCONTINUED | OUTPATIENT
Start: 2018-05-02 | End: 2018-05-02 | Stop reason: HOSPADM

## 2018-05-02 RX ORDER — HYDROCORTISONE 2.5 %
CREAM (GRAM) TOPICAL 3 TIMES DAILY PRN
Status: DISCONTINUED | OUTPATIENT
Start: 2018-05-02 | End: 2018-05-04 | Stop reason: HOSPADM

## 2018-05-02 RX ORDER — MISOPROSTOL 100 UG/1
400 TABLET ORAL
Status: DISCONTINUED | OUTPATIENT
Start: 2018-05-02 | End: 2018-05-04 | Stop reason: HOSPADM

## 2018-05-02 RX ORDER — FENTANYL CITRATE 50 UG/ML
25-50 INJECTION, SOLUTION INTRAMUSCULAR; INTRAVENOUS EVERY 5 MIN PRN
Status: DISCONTINUED | OUTPATIENT
Start: 2018-05-02 | End: 2018-05-02 | Stop reason: HOSPADM

## 2018-05-02 RX ORDER — ALBUTEROL SULFATE 0.83 MG/ML
2.5 SOLUTION RESPIRATORY (INHALATION) EVERY 4 HOURS PRN
Status: DISCONTINUED | OUTPATIENT
Start: 2018-05-02 | End: 2018-05-02 | Stop reason: HOSPADM

## 2018-05-02 RX ORDER — ONDANSETRON 2 MG/ML
4 INJECTION INTRAMUSCULAR; INTRAVENOUS EVERY 6 HOURS PRN
Status: DISCONTINUED | OUTPATIENT
Start: 2018-05-02 | End: 2018-05-03 | Stop reason: CLARIF

## 2018-05-02 RX ORDER — LIDOCAINE 40 MG/G
CREAM TOPICAL
Status: DISCONTINUED | OUTPATIENT
Start: 2018-05-02 | End: 2018-05-02 | Stop reason: HOSPADM

## 2018-05-02 RX ORDER — SODIUM CHLORIDE, SODIUM LACTATE, POTASSIUM CHLORIDE, CALCIUM CHLORIDE 600; 310; 30; 20 MG/100ML; MG/100ML; MG/100ML; MG/100ML
INJECTION, SOLUTION INTRAVENOUS CONTINUOUS
Status: DISCONTINUED | OUTPATIENT
Start: 2018-05-02 | End: 2018-05-02 | Stop reason: HOSPADM

## 2018-05-02 RX ORDER — LANOLIN 100 %
OINTMENT (GRAM) TOPICAL
Status: DISCONTINUED | OUTPATIENT
Start: 2018-05-02 | End: 2018-05-04 | Stop reason: HOSPADM

## 2018-05-02 RX ORDER — KETOROLAC TROMETHAMINE 30 MG/ML
30 INJECTION, SOLUTION INTRAMUSCULAR; INTRAVENOUS EVERY 6 HOURS
Status: COMPLETED | OUTPATIENT
Start: 2018-05-02 | End: 2018-05-03

## 2018-05-02 RX ORDER — AMOXICILLIN 250 MG
1 CAPSULE ORAL 2 TIMES DAILY PRN
Status: DISCONTINUED | OUTPATIENT
Start: 2018-05-02 | End: 2018-05-04 | Stop reason: HOSPADM

## 2018-05-02 RX ORDER — KETOROLAC TROMETHAMINE 30 MG/ML
INJECTION, SOLUTION INTRAMUSCULAR; INTRAVENOUS PRN
Status: DISCONTINUED | OUTPATIENT
Start: 2018-05-02 | End: 2018-05-02

## 2018-05-02 RX ORDER — BUPIVACAINE HYDROCHLORIDE 7.5 MG/ML
INJECTION, SOLUTION INTRASPINAL PRN
Status: DISCONTINUED | OUTPATIENT
Start: 2018-05-02 | End: 2018-05-02

## 2018-05-02 RX ORDER — DIPHENHYDRAMINE HCL 25 MG
25 CAPSULE ORAL EVERY 6 HOURS PRN
Status: DISCONTINUED | OUTPATIENT
Start: 2018-05-02 | End: 2018-05-04 | Stop reason: HOSPADM

## 2018-05-02 RX ADMIN — SODIUM CHLORIDE, POTASSIUM CHLORIDE, SODIUM LACTATE AND CALCIUM CHLORIDE: 600; 310; 30; 20 INJECTION, SOLUTION INTRAVENOUS at 07:47

## 2018-05-02 RX ADMIN — SODIUM CHLORIDE, POTASSIUM CHLORIDE, SODIUM LACTATE AND CALCIUM CHLORIDE: 600; 310; 30; 20 INJECTION, SOLUTION INTRAVENOUS at 07:51

## 2018-05-02 RX ADMIN — SODIUM CHLORIDE, POTASSIUM CHLORIDE, SODIUM LACTATE AND CALCIUM CHLORIDE: 600; 310; 30; 20 INJECTION, SOLUTION INTRAVENOUS at 08:48

## 2018-05-02 RX ADMIN — KETOROLAC TROMETHAMINE 30 MG: 30 INJECTION, SOLUTION INTRAMUSCULAR at 15:24

## 2018-05-02 RX ADMIN — SODIUM CHLORIDE, SODIUM LACTATE, POTASSIUM CHLORIDE, AND CALCIUM CHLORIDE 1000 ML: 600; 310; 30; 20 INJECTION, SOLUTION INTRAVENOUS at 07:16

## 2018-05-02 RX ADMIN — SODIUM CHLORIDE 700 ML: 900 IRRIGANT IRRIGATION at 08:34

## 2018-05-02 RX ADMIN — PROPOFOL 50 MG: 10 INJECTION, EMULSION INTRAVENOUS at 08:35

## 2018-05-02 RX ADMIN — KETOROLAC TROMETHAMINE 30 MG: 30 INJECTION, SOLUTION INTRAMUSCULAR at 21:04

## 2018-05-02 RX ADMIN — OXYCODONE HYDROCHLORIDE AND ACETAMINOPHEN 1 TABLET: 5; 325 TABLET ORAL at 17:59

## 2018-05-02 RX ADMIN — HYDROMORPHONE HYDROCHLORIDE 2 MG: 1 INJECTION, SOLUTION INTRAMUSCULAR; INTRAVENOUS; SUBCUTANEOUS at 08:28

## 2018-05-02 RX ADMIN — FENTANYL CITRATE 50 MCG: 50 INJECTION, SOLUTION INTRAMUSCULAR; INTRAVENOUS at 09:41

## 2018-05-02 RX ADMIN — BUPIVACAINE HYDROCHLORIDE IN DEXTROSE 1.7 ML: 7.5 INJECTION, SOLUTION SUBARACHNOID at 07:56

## 2018-05-02 RX ADMIN — KETOROLAC TROMETHAMINE 30 MG: 30 INJECTION, SOLUTION INTRAMUSCULAR at 08:59

## 2018-05-02 RX ADMIN — MORPHINE SULFATE 0.1 MG: 1 INJECTION, SOLUTION EPIDURAL; INTRATHECAL; INTRAVENOUS at 07:56

## 2018-05-02 RX ADMIN — OXYTOCIN 3 UNITS: 10 INJECTION, SOLUTION INTRAMUSCULAR; INTRAVENOUS at 08:25

## 2018-05-02 RX ADMIN — GENTAMICIN SULFATE 140 MG: 100 INJECTION, SOLUTION INTRAVENOUS at 08:11

## 2018-05-02 RX ADMIN — CLINDAMYCIN PHOSPHATE 900 MG: 18 INJECTION, SOLUTION INTRAVENOUS at 07:55

## 2018-05-02 RX ADMIN — PHENYLEPHRINE HYDROCHLORIDE 1 MCG/KG/MIN: 10 INJECTION, SOLUTION INTRAMUSCULAR; INTRAVENOUS; SUBCUTANEOUS at 07:55

## 2018-05-02 RX ADMIN — OXYCODONE HYDROCHLORIDE AND ACETAMINOPHEN 2 TABLET: 5; 325 TABLET ORAL at 11:32

## 2018-05-02 RX ADMIN — SODIUM CHLORIDE, SODIUM LACTATE, POTASSIUM CHLORIDE, CALCIUM CHLORIDE, AND DEXTROSE MONOHYDRATE 125 ML/HR: 600; 310; 30; 20; 5 INJECTION, SOLUTION INTRAVENOUS at 15:24

## 2018-05-02 RX ADMIN — ONDANSETRON 4 MG: 2 INJECTION INTRAMUSCULAR; INTRAVENOUS at 07:56

## 2018-05-02 RX ADMIN — FENTANYL CITRATE 100 MCG: 50 INJECTION, SOLUTION INTRAMUSCULAR; INTRAVENOUS at 08:25

## 2018-05-02 ASSESSMENT — ACTIVITIES OF DAILY LIVING (ADL)
TOILETING: 0-->INDEPENDENT
SWALLOWING: 0 - SWALLOWS FOODS/LIQUIDS WITHOUT DIFFICULTY
COMMUNICATION: 0 - UNDERSTANDS/COMMUNICATES WITHOUT DIFFICULTY
BATHING: 0-->INDEPENDENT
AMBULATION: 0-->INDEPENDENT
FALL_HISTORY_WITHIN_LAST_SIX_MONTHS: NO
TOILETING: 0 - INDEPENDENT
AMBULATION: 0 - INDEPENDENT
SWALLOWING: 0-->SWALLOWS FOODS/LIQUIDS WITHOUT DIFFICULTY
DRESS: 0-->INDEPENDENT
EATING: 0 - INDEPENDENT
TRANSFERRING: 0 - INDEPENDENT
RETIRED_EATING: 0-->INDEPENDENT
BATHING: 0 - INDEPENDENT
RETIRED_COMMUNICATION: 0-->UNDERSTANDS/COMMUNICATES WITHOUT DIFFICULTY
DRESS: 0 - INDEPENDENT
COGNITION: 0 - NO COGNITION ISSUES REPORTED
TRANSFERRING: 0-->INDEPENDENT

## 2018-05-02 NOTE — ANESTHESIA PREPROCEDURE EVALUATION
Anesthesia Evaluation     . Pt has had prior anesthetic. Type: General           ROS/MED HX    ENT/Pulmonary:  - neg pulmonary ROS     Neurologic:  - neg neurologic ROS     Cardiovascular:  - neg cardiovascular ROS       METS/Exercise Tolerance:     Hematologic:  - neg hematologic  ROS       Musculoskeletal:  - neg musculoskeletal ROS       GI/Hepatic:  - neg GI/hepatic ROS       Renal/Genitourinary:  - ROS Renal section negative       Endo:  - neg endo ROS       Psychiatric:  - neg psychiatric ROS       Infectious Disease:  - neg infectious disease ROS       Malignancy:      - no malignancy   Other:    (+) No chance of pregnancy C-spine cleared: N/A, no H/O Chronic Pain,no other significant disability                    Physical Exam  Normal systems: cardiovascular, pulmonary and dental    Airway   Mallampati: II  TM distance: >3 FB  Neck ROM: full    Dental     Cardiovascular       Pulmonary                     Anesthesia Plan      History & Physical Review      ASA Status:  2 emergent.    NPO Status:  > 6 hours    Plan for Spinal   PONV prophylaxis:  Ondansetron (or other 5HT-3)       Postoperative Care  Postoperative pain management:  IV analgesics and Neuraxial analgesia.      Consents  Anesthetic plan, risks, benefits and alternatives discussed with: .  Use of blood products discussed: No .   .                         .

## 2018-05-02 NOTE — PROGRESS NOTES
Patient is awake, alert and oriented. SO Chandrakant at bedside and supportive. Baby in mothers arms and parents very happy with baby girl. Skin to skin and breast feeding have been established.

## 2018-05-02 NOTE — BRIEF OP NOTE
[facility] [place]   Brief Operative Note   Name: Nury Gilbert  MRN:[id number]  : 1995  Date of Surgery: 2018    Pre-operative Diagnosis:   1. IUP at 40w1d  2. Complete breech presentation   Post-operative Diagnosis: Same s/p attempted ECV,  section  Procedure(s): External cephalic version, primary low transverse  section via Pfannenstiel incision with double layer uterine closure    Surgeon: Naye Johns MD     Anesthesia: spinal  EBL: 800 mL     Specimens: none  Findings:   female infant, complete breech, Apgars 8/9. Tight triple nuchal cord.  Normal uterus, bilateral fallopian tubes and ovaries. .  Complications: None.    Naye Johns MD  2018, 8:55 AM

## 2018-05-02 NOTE — PROGRESS NOTES
23 yr old female admitted to Labor and Delivery at 0532 for External Version with potential for a . If successful she will be induced as she is 41 . NST done and reactive. IV placed and admission completed. Patient was clipped and saged wiped. LR bolus began. ROSA Stallings is here and supportive. Consents have been signed. Dr. Johns here at 0700. See admission charting for further information.

## 2018-05-02 NOTE — H&P
[facility] Labor and Delivery History and Physical    Nury Gilbert MRN# [id number]   Age: 23 year old YOB: 1995     Date of Admission:  2018    Primary care provider: Naye Villeda           Chief Complaint:   Nury Gilbert is a 23 year old  at 40w1d by 8w0d US admitted for attempted external cephalic version under spinal with possible  section for breech presentation. She has no complaints today. Didn't feel any big movements overnight          Pregnancy history:     OBSTETRIC HISTORY:    Obstetric History       T0      L0     SAB0   TAB0   Ectopic0   Multiple0   Live Births0       # Outcome Date GA Lbr Marky/2nd Weight Sex Delivery Anes PTL Lv   1 Current                   EDC: Estimated Date of Delivery: May 1, 2018    Prenatal Labs: Lab Results   Component Value Date    CHPCRT  10/03/2016     Negative   Negative for C. trachomatis rRNA by transcription mediated amplification.   A negative result by transcription mediated amplification does not preclude the   presence of C. trachomatis infection because results are dependent on proper   and adequate collection, absence of inhibitors, and sufficient rRNA to be   detected.      GCPCRT  10/03/2016     Negative   Negative for N. gonorrhoeae rRNA by transcription mediated amplification.   A negative result by transcription mediated amplification does not preclude the   presence of N. gonorrhoeae infection because results are dependent on proper   and adequate collection, absence of inhibitors, and sufficient rRNA to be   detected.      HGB 12.3 2018       GBS Status:   No results found for: GBS    Active Problem List  Patient Active Problem List   Diagnosis     ACP (advance care planning)     Attention deficit disorder     On stimulant medication - contract signed 3/31/17     Migraines     Difficulty sleeping       Medication Prior to Admission  Prescriptions Prior to  Admission   Medication Sig Dispense Refill Last Dose     citalopram (CELEXA) 20 MG tablet Take 20 mg by mouth daily   Past Week at Unknown time     doxylamine (UNISOM) 25 MG TABS tablet Take 25 mg by mouth nightly as needed   5/1/2018 at Unknown time     Prenatal Vit-Fe Fumarate-FA (PRENATAL VITAMINS) 28-0.8 MG TABS    5/1/2018 at Unknown time     citalopram (CELEXA) 40 MG tablet Take 0.5 tablets (20 mg) by mouth daily 30 tablet 1      loratadine (CLARITIN) 10 MG tablet Take 10 mg by mouth daily   More than a month at Unknown time     metoclopramide (REGLAN) 10 MG tablet Take 1 tablet (10 mg) by mouth once as needed 20 tablet 1 More than a month at Unknown time     Misc. Devices (BREAST PUMP) MISC Reason for need: breastfeeding. Length of need: 1 year 1 each 0      rizatriptan (MAXALT) 10 MG tablet Take 10 mg by mouth 2 times daily as needed   More than a month at Unknown time   .        Maternal Past Medical History:     Past Medical History:   Diagnosis Date     Anxiety disorder     No Comments Provided     Attention-deficit hyperactivity disorder     No Comments Provided     Superficial foreign body of finger     5/19/2017     Past Surgical History:   Procedure Laterality Date     ADENOIDECTOMY      No Comments Provided     APPENDECTOMY OPEN      2006     ARTHROSCOPY KNEE      03/2012,2nd as well at age 18     OTHER SURGICAL HISTORY      01/1999,GYE271,BLADDER SURGERY     OTHER SURGICAL HISTORY      5/19/2017,66833.0,CO REMOVE FOREIGN BODY SIMPLE                       Family History:   History reviewed. No pertinent family history.           Social History:     Social History   Substance Use Topics     Smoking status: Never Smoker     Smokeless tobacco: Never Used     Alcohol use No            Review of Systems:   The Review of Systems is negative other than noted in the HPI          Physical Exam:   Patient Vitals for the past 8 hrs:   BP Temp Temp src Pulse Resp Height Weight   05/02/18 0545 124/70 98.5  F  "(36.9  C) Oral 86 18 1.727 m (5' 8\") 88.5 kg (195 lb)     Gen: resting in bed, NAD  CV: RRR  Resp: CTAB  Abd: gravid, soft, nontender  Ext: nontender    Cervix: deferred  Membranes: intact  EFW: 8 lbs  Presentation:complete breech- head maternal LUQ, spine maternal right    Fetal Heart Rate Tracin, mod variability, + accels, no decels  Tocometer: 2 ctx in 10 min        Assessment:   Nury Gilbert is a 23 year old  at 40w1d admitted with breech presentation. Planning for attempted ECV, proceed with  section if unsuccessful vs induction of labor if successful. Reviewed the risks and benefits, patient agrees to proceed.          Plan:   Admit for delivery  Prepare for  section- NPO, preop abx  Spinal anesthesia  Rh positive, RI, GBS neg    Naye Johns MD        "

## 2018-05-02 NOTE — OR NURSING
PACU Transfer Note    Nury Gilbert was transferred to [facility] via bed.  Equipment used for transport:  None.  Care transferred from PACU nurse Cat Kraus RN to Lupe Jessica RN at this time.  Hand off report given      PACU Respiratory Event Documentation     1) Episodes of Apnea greater than or equal to 10 seconds: no    2) Bradypnea - less than 8 breaths per minute: no    3) Pain score on 0 to 10 scale: 4    4) Pain-sedation mismatch (yes or no): no    5) Repeated 02 desaturation less than 90% (yes or no): no    Anesthesia notified? (yes or no): no    Any of the above events occuring repeatedly in separate 30 minute intervals may be considered recurrent PACU respiratory events.    Patient stable and meets phase 1 discharge criteria for transport from PACU.

## 2018-05-02 NOTE — PROGRESS NOTES
Incentive Spirometry education completed.  Pt goal 3000 mls.  Pt achieved 2250 mls.  Pt instructed to perform 10/hr while awake with at least one deep breath and cough per hour until able to perform baseline activity.  RT will follow and re-assess as need.      Radha Pina, RT on 5/2/2018 at 5:36 PM

## 2018-05-02 NOTE — ANESTHESIA POSTPROCEDURE EVALUATION
Patient: Nury Gilbert    Procedure(s):  External Cephalic Version, Possible  Section - Wound Class: II-Clean Contaminated   - Wound Class: II-Clean Contaminated    Diagnosis:breech presentation  Diagnosis Additional Information: No value filed.    Anesthesia Type:  Spinal    Note:  Anesthesia Post Evaluation    Patient location during evaluation: PACU  Patient participation: Able to fully participate in evaluation  Level of consciousness: awake and alert  Pain management: adequate  Airway patency: patent  Cardiovascular status: acceptable  Respiratory status: acceptable  Hydration status: acceptable  PONV: none     Anesthetic complications: None          Last vitals:  Vitals:    18 0945 18 0950 18 0955   BP: 105/65 114/65    Pulse:      Resp: 11 15 16   Temp:  97.5  F (36.4  C)    SpO2: 91% 93% 94%         Electronically Signed By: Santo Ragsdale DO  May 2, 2018  11:41 AM

## 2018-05-02 NOTE — OP NOTE
[facility]  Obstetrics Service Operative Report    Surgery Date:  May 2, 2018  Surgeon(s): Naye Johns     Preoperative Diagnoses:  1.  Intrauterine pregnancy at 40w1d  2.  Breech presentation    Postoperative diagnoses: s/p below procedure    Procedure performed:  External cephalic version, unsuccessful. Primary low segment transverse  section via Pfannenstiel incision with double layer uterine closure    Anesthesia:  Spinal   Est Blood Loss (mL): 800 mL  Specimens: None  Complications: None      Operative findings: Single viable female infant at 0824 hours on 2018. Apgars of 8 and 9 at one and five minutes.  Birth weight (GM): pending.  Fetal presentation: complete breech.  Position: right sacrum posterior  Amniotic fluid: clear.  Placenta intact with 3 vessel cord.   Normal appearing uterus, fallopian tubes, ovaries.  No intraabdominal adhesions.  No abdominal wall adhesions      Indication: Nury Gilbert is a 23 year old, , who was admitted at 40w1d by 8w0d ultrasound for breech presentation.  She desired attempted external cephalic version, and if unsuccessful, plan for  section. The risks, benefits, and alternatives of both procedures were explained and the patient agreed to proceed.     Procedure details:  After obtaining informed consent, the patient was taken to the operating room. Spinal anesthesia was placed without difficulty. She was placed in the dorsal supine position with a leftward tilt. Ultrasound was performed confirming complete breech presentation with spine to maternal right, head in maternal LUQ. Two attempts were made to perform a clockwise (forward) roll of the fetus with elevation of the breech. The head did not move past the level of the umbilicus in the LUQ. The patient requested no further attempts at the procedure and desired  section.    Due to failed version, decision was made to proceed with  section and she was prepped and draped  in the usual sterile fashion. She received gentamicin and clindamycin prior to the skin incision.  Following test of adequate spinal anesthesia, the abdomen was entered through a transverse skin incision. The skin incision was made sharply and carried through the subcutaneous tissue to the fascia.  Fascia was incised in the midline and extended laterally with the Morris scissors.  The superior margin of the fascial incision was grasped with Kocher clamps and dissected from the underlying muscle sharp and blunt dissecton, which was then repeated at the lower margin of the fascial incision.  The muscle was  in the midline.  The peritoneum was entered bluntly and the opening extended by digital dissection with care to avoid the bladder. An Ho-O retractor was placed.  The lower segment of the uterus was opened sharply in a transverse fashion and extended with digital pressure. The infant's feet were at the level of the hysterotomy and delivered. The infant was then rotated to sacrum anterior and delivered to the level of the scapula. The arms were delivered bilaterally. At this time, a triple nuchal cord was noted. Attempts to deliver the fetal head were unsuccessful due to the tight cord acting as a tether. The cord was double clamped and cut and one loop of cord reduced, thus allowing for delivery of the infant's head.  The infant was then taken to Dr Frey for evaluation. The placenta wasmanually extracted.  The uterus was exteriorized from the abdomen and cleared of all clots and debris.  The uterus was massaged and was noted to be firm.  Oxytocin was given through the running IV.  Uterine tone was firm. The hysterotomy was repaired with 0-vicryl suture in a running locked fashion. A 2nd layer of 0-monocryl was used to imbricate the incision and good hemostasis was achieved.  The posterior cul-de-sac was suctioned and the uterus was returned to the abdomen.  The bilateral pericolic gutters were suctioned.   The hysterotomy was again inspected and found to have no active sites of bleeding. The krupa-o retractor was removed.   The abdominal wall was examined and found to have no active sites of bleeding.  The fascia was closed with a running suture of 0-vicryl.  Subcutaneous tissue was irrigated. Areas that were oozing were controlled with cautery. The subcutaneous tissue was less than 3cm in depth but was reapproximated with interrupted sutures of 3-0 plain gut. The skin was closed with 4-0 vicryl. The patient tolerated the procedure well and was taken to the recovery room in stable condition. All sponge, needle and instrument counts were correct x2.     Dr Shruthi Frey was requested to be present for the procedure for potential need for  resuscitation with a breech infant and attempted external cephalic version.    Naye Johns  Ob/Gyn   2018 8:57 AM

## 2018-05-02 NOTE — IP AVS SNAPSHOT
[facility]    [address]    [place] [address]    Phone:  [phone]    Fax:  [phone]                                       After Visit Summary   5/2/2018    Nury Gilbert    MRN: [id number]           After Visit Summary Signature Page     I have received my discharge instructions, and my questions have been answered. I have discussed any challenges I see with this plan with the nurse or doctor.    ..........................................................................................................................................  Patient/Patient Representative Signature      ..........................................................................................................................................  Patient Representative Print Name and Relationship to Patient    ..................................................               ................................................  Date                                            Time    ..........................................................................................................................................  Reviewed by Signature/Title    ...................................................              ..............................................  Date                                                            Time

## 2018-05-02 NOTE — ANESTHESIA CARE TRANSFER NOTE
Patient: Nury Gilbert    Procedure(s):  External Cephalic Version, Possible  Section - Wound Class: II-Clean Contaminated   - Wound Class: II-Clean Contaminated    Diagnosis: breech presentation  Diagnosis Additional Information: No value filed.    Anesthesia Type:   Spinal     Note:    Patient transferred to:PACU  Handoff Report: Identifed the Patient, Identified the Reponsible Provider, Reviewed the pertinent medical history, Discussed the surgical course, Reviewed Intra-OP anesthesia mangement and issues during anesthesia, Set expectations for post-procedure period and Allowed opportunity for questions and acknowledgement of understanding      Vitals: (Last set prior to Anesthesia Care Transfer)    CRNA VITALS  2018 0829 - 2018 0909      2018             NIBP: 98/54    Pulse: 75    SpO2: 98 %    EKG: NSR                Electronically Signed By: SALLY Brasher CRNA  May 2, 2018  9:09 AM

## 2018-05-03 LAB
HGB BLD-MCNC: 11.2 G/DL (ref 11.7–15.7)
T PALLIDUM AB SER QL: NONREACTIVE

## 2018-05-03 PROCEDURE — 36415 COLL VENOUS BLD VENIPUNCTURE: CPT | Performed by: OBSTETRICS & GYNECOLOGY

## 2018-05-03 PROCEDURE — 25000132 ZZH RX MED GY IP 250 OP 250 PS 637: Performed by: ANESTHESIOLOGY

## 2018-05-03 PROCEDURE — 12000027 ZZH R&B OB

## 2018-05-03 PROCEDURE — 85018 HEMOGLOBIN: CPT | Performed by: OBSTETRICS & GYNECOLOGY

## 2018-05-03 PROCEDURE — 25000132 ZZH RX MED GY IP 250 OP 250 PS 637: Performed by: OBSTETRICS & GYNECOLOGY

## 2018-05-03 PROCEDURE — 25000128 H RX IP 250 OP 636: Performed by: OBSTETRICS & GYNECOLOGY

## 2018-05-03 PROCEDURE — 40000275 ZZH STATISTIC RCP TIME EA 10 MIN

## 2018-05-03 PROCEDURE — 99024 POSTOP FOLLOW-UP VISIT: CPT | Performed by: OBSTETRICS & GYNECOLOGY

## 2018-05-03 RX ORDER — IBUPROFEN 600 MG/1
600 TABLET, FILM COATED ORAL EVERY 6 HOURS PRN
Status: DISCONTINUED | OUTPATIENT
Start: 2018-05-03 | End: 2018-05-04 | Stop reason: HOSPADM

## 2018-05-03 RX ADMIN — OXYCODONE HYDROCHLORIDE 5 MG: 5 TABLET ORAL at 04:31

## 2018-05-03 RX ADMIN — OXYCODONE HYDROCHLORIDE AND ACETAMINOPHEN 1 TABLET: 5; 325 TABLET ORAL at 18:33

## 2018-05-03 RX ADMIN — OXYCODONE HYDROCHLORIDE AND ACETAMINOPHEN 2 TABLET: 5; 325 TABLET ORAL at 00:50

## 2018-05-03 RX ADMIN — OXYCODONE HYDROCHLORIDE AND ACETAMINOPHEN 1 TABLET: 5; 325 TABLET ORAL at 12:30

## 2018-05-03 RX ADMIN — KETOROLAC TROMETHAMINE 30 MG: 30 INJECTION, SOLUTION INTRAMUSCULAR at 09:10

## 2018-05-03 RX ADMIN — Medication 2 TABLET: at 09:10

## 2018-05-03 RX ADMIN — OXYCODONE HYDROCHLORIDE 5 MG: 5 TABLET ORAL at 20:46

## 2018-05-03 RX ADMIN — IBUPROFEN 600 MG: 600 TABLET, FILM COATED ORAL at 21:54

## 2018-05-03 RX ADMIN — OXYCODONE HYDROCHLORIDE AND ACETAMINOPHEN 2 TABLET: 5; 325 TABLET ORAL at 07:17

## 2018-05-03 RX ADMIN — KETOROLAC TROMETHAMINE 30 MG: 30 INJECTION, SOLUTION INTRAMUSCULAR at 02:47

## 2018-05-03 RX ADMIN — SIMETHICONE CHEW TAB 80 MG 80 MG: 80 TABLET ORAL at 09:10

## 2018-05-03 RX ADMIN — IBUPROFEN 600 MG: 600 TABLET, FILM COATED ORAL at 15:42

## 2018-05-03 RX ADMIN — SIMETHICONE CHEW TAB 80 MG 80 MG: 80 TABLET ORAL at 12:30

## 2018-05-03 ASSESSMENT — PAIN DESCRIPTION - DESCRIPTORS
DESCRIPTORS: CRAMPING
DESCRIPTORS: CRAMPING

## 2018-05-03 NOTE — PROGRESS NOTES
Patient ambulatory without difficulty, denies paraesthesia. Pain adequately controlled with ketoralac, oxycodone, and acetaminophen. Pichardo out, patient DTV. Patient receptive to education, attentive toward infant. VSS.

## 2018-05-03 NOTE — PROGRESS NOTES
OB/GYN Postpartum Note      S:  Patient is feeling okay this morning. Just sore around incision, pain meds help. Lochia light. Tolerating regular diet. Voided since catheter removal.     O:   Vitals:    18 1524 18 2104 18 0050 18 0431   BP: 129/61 116/81 105/64 108/74   Pulse:       Resp:  14 14   Temp: 98  F (36.7  C) 98.6  F (37  C) 98  F (36.7  C) 98.2  F (36.8  C)   TempSrc: Temporal Oral Oral Oral   SpO2:  98% 97% 98%   Weight:       Height:         General: resting in bed, in NAD  Resp: nonlabored  Abdomen: soft, nontender, nondistended  Fundus firm at umbilicus-2  Incision: covered in dressing without shadowing  Extremities: 1+ edema in BLE    Hemoglobin   Date Value Ref Range Status   2018 11.2 (L) 11.7 - 15.7 g/dL Final   ]    A: Ms. Nury Gilbert is a 23 year old  POD #1 s/p PLTCS for breech, failed ECV    P:  Heme 12.3 >  > 11.2  GI: tolerating regular diet  Feeding: breast  Contraception: Depo vs Mirena  Rubella Immune  Rh positive  Disposition: anticipate d/c in 1-2 days    Naye Johns MD  OB/GYN  5/3/2018 7:33 AM

## 2018-05-03 NOTE — PLAN OF CARE
Problem: Pain, Acute (Adult)  Goal: Acceptable Pain Control/Comfort Level  Patient will demonstrate the desired outcomes by discharge/transition of care.   Outcome: Improving  Pt using rest, Percocet, and Ibuprofen/Toradol to manage pain. Pt able to ambulate in room and care for infant. Pain levels 3-7/10.     Problem: Breastfeeding (Adult,Obstetrics,Pediatric)  Intervention: Promote Positive Maternal Experience  Pt reports infant continuing to struggle at the breast. Pt had just finished pumping and was going to feed infant colostrum. Encouraged pt to call the next time she . Encouraged pt staff was here to help her and she was not alone. Pt agreed to call.

## 2018-05-04 VITALS
TEMPERATURE: 98 F | HEART RATE: 90 BPM | OXYGEN SATURATION: 98 % | DIASTOLIC BLOOD PRESSURE: 70 MMHG | WEIGHT: 195 LBS | BODY MASS INDEX: 29.55 KG/M2 | RESPIRATION RATE: 14 BRPM | SYSTOLIC BLOOD PRESSURE: 115 MMHG | HEIGHT: 68 IN

## 2018-05-04 PROCEDURE — 99024 POSTOP FOLLOW-UP VISIT: CPT | Performed by: OBSTETRICS & GYNECOLOGY

## 2018-05-04 PROCEDURE — 40000275 ZZH STATISTIC RCP TIME EA 10 MIN

## 2018-05-04 PROCEDURE — 25000132 ZZH RX MED GY IP 250 OP 250 PS 637: Performed by: OBSTETRICS & GYNECOLOGY

## 2018-05-04 RX ORDER — IBUPROFEN 600 MG/1
600 TABLET, FILM COATED ORAL EVERY 6 HOURS PRN
Qty: 40 TABLET | Refills: 0 | Status: SHIPPED | OUTPATIENT
Start: 2018-05-04 | End: 2019-05-17

## 2018-05-04 RX ORDER — POLYETHYLENE GLYCOL 3350 17 G/17G
1 POWDER, FOR SOLUTION ORAL DAILY
Qty: 510 G | Refills: 1 | Status: SHIPPED | OUTPATIENT
Start: 2018-05-04 | End: 2018-06-13

## 2018-05-04 RX ORDER — OXYCODONE AND ACETAMINOPHEN 5; 325 MG/1; MG/1
1-2 TABLET ORAL EVERY 4 HOURS PRN
Qty: 30 TABLET | Refills: 0 | Status: SHIPPED | OUTPATIENT
Start: 2018-05-04 | End: 2018-06-13

## 2018-05-04 RX ADMIN — Medication 1 TABLET: at 01:18

## 2018-05-04 RX ADMIN — SIMETHICONE CHEW TAB 80 MG 80 MG: 80 TABLET ORAL at 00:36

## 2018-05-04 RX ADMIN — Medication: at 06:20

## 2018-05-04 RX ADMIN — OXYCODONE HYDROCHLORIDE AND ACETAMINOPHEN 2 TABLET: 5; 325 TABLET ORAL at 06:11

## 2018-05-04 RX ADMIN — OXYCODONE HYDROCHLORIDE AND ACETAMINOPHEN 1 TABLET: 5; 325 TABLET ORAL at 12:39

## 2018-05-04 RX ADMIN — IBUPROFEN 600 MG: 600 TABLET, FILM COATED ORAL at 09:48

## 2018-05-04 RX ADMIN — OXYCODONE HYDROCHLORIDE AND ACETAMINOPHEN 2 TABLET: 5; 325 TABLET ORAL at 00:36

## 2018-05-04 RX ADMIN — IBUPROFEN 600 MG: 600 TABLET, FILM COATED ORAL at 03:43

## 2018-05-04 NOTE — PLAN OF CARE
Problem: Patient Care Overview  Goal: Plan of Care/Patient Progress Review  Outcome: Adequate for Discharge Date Met: 05/04/18  [facility] DISCHARGE NOTE    Patient discharged to home at 3:35 PM via ambulation. Accompanied by spouse and staff. Discharge instructions reviewed with patient and spouse, opportunity offered to ask questions. Prescriptions sent with patient to fill. All belongings sent with patient.

## 2018-05-04 NOTE — PLAN OF CARE
Problem: Pain, Acute (Adult)  Goal: Acceptable Pain Control/Comfort Level  Patient will demonstrate the desired outcomes by discharge/transition of care.   Pt has been resting, Percocet, Ibuprofen ice and heat packs to manage pain. Pain levels 6/10 and decrease to 4/10 with prn medication and ice/heat.  Pt able to ambulate in room,  in murphy x 1 tonight and care for infant thru night.        Problem: Breastfeeding (Adult,Obstetrics,Pediatric)  Goal: Signs and Symptoms of Listed Potential Problems Will be Absent, Minimized or Managed (Breastfeeding)  Signs and symptoms of listed potential problems will be absent, minimized or managed by discharge/transition of care (reference Breastfeeding (Adult,Obstetrics,Pediatric) CPG).   Mom has been able to breast feed several times thru out this shift.  Staff has been helping baby to latch on at time times.  Mom did struggle a little with positioning baby in the beginning of this shift, but then was able to get baby to latch with out staff help.  Encouraged mom to call for help as staff is here to help her out, mom verbalized understanding.

## 2018-05-04 NOTE — DISCHARGE SUMMARY
DELIVERY DISCHARGE SUMMARY    Admit date: 2018  Discharge date: 2018     Admit Dx:   - 23 year old y/o  at 40w1d   - breech presentation    Discharge Dx:  - Same as above, s/p Primary low transverse  section      Procedures:  - Primary low transverse  section with dobule layer closure via Pfannenstiel incision  - Spinal analgesia  - Attempted external cephalic version    Admit HPI:  Ms. Nury Gilbert is a 23 year old  at 40w1d who was admitted on 2018 for breech presentation.  Please see her admit H&P for full details of her PMH, PSH, Meds, Allergies and exam on admit.    Hospital course:  After discussion of risk and and benefits and signing informed consent the patient was taken to the operating room for attempted ECV with plan for  section if unsuccessful.    EBL from the delivery was 800. Please see her  Section Operative Note for full details regarding her delivery.    Her postoperative course was uncomplicated. On POD#2, she was meeting all of her postpartum goals and deemed stable for discharge. She was voiding without difficulty, tolerating a regular diet without nausea and vomiting, her pain was well controlled on oral pain medicines and her lochia was appropriate. Her hemoglobin prior to delivery was 12.3 and after delivery was 11.2. Her Rh status was positive and Rhogam was not indicated.     Discharge Medications:  Current Discharge Medication List      START taking these medications    Details   ibuprofen (ADVIL/MOTRIN) 600 MG tablet Take 1 tablet (600 mg) by mouth every 6 hours as needed for moderate pain  Qty: 40 tablet, Refills: 0    Associated Diagnoses: S/P  section      oxyCODONE-acetaminophen (PERCOCET) 5-325 MG per tablet Take 1-2 tablets by mouth every 4 hours as needed for other (pain control or improvement in physical function. Hold dose for analgesic side effects.)  Qty: 30 tablet, Refills: 0    Associated Diagnoses:  S/P  section      polyethylene glycol (MIRALAX) powder Take 17 g (1 capful) by mouth daily  Qty: 510 g, Refills: 1    Associated Diagnoses: S/P  section         CONTINUE these medications which have NOT CHANGED    Details   !! citalopram (CELEXA) 20 MG tablet Take 20 mg by mouth daily      doxylamine (UNISOM) 25 MG TABS tablet Take 25 mg by mouth nightly as needed      Prenatal Vit-Fe Fumarate-FA (PRENATAL VITAMINS) 28-0.8 MG TABS       !! citalopram (CELEXA) 40 MG tablet Take 0.5 tablets (20 mg) by mouth daily  Qty: 30 tablet, Refills: 1    Associated Diagnoses: Mild single current episode of major depressive disorder (H); BRANDON (generalized anxiety disorder)      loratadine (CLARITIN) 10 MG tablet Take 10 mg by mouth daily      metoclopramide (REGLAN) 10 MG tablet Take 1 tablet (10 mg) by mouth once as needed  Qty: 20 tablet, Refills: 1    Associated Diagnoses: Intractable chronic migraine without aura and without status migrainosus      Misc. Devices (BREAST PUMP) MISC Reason for need: breastfeeding. Length of need: 1 year  Qty: 1 each, Refills: 0    Associated Diagnoses: Encounter for supervision of normal first pregnancy in third trimester      rizatriptan (MAXALT) 10 MG tablet Take 10 mg by mouth 2 times daily as needed       !! - Potential duplicate medications found. Please discuss with provider.            Discharge/Disposition:  Nury Gilbert was discharged to home in stable condition with the following instructions/medications:  1) Call for temperature > 100.4, bright red vaginal bleeding >1 pad an hour x 2 hours, foul smelling vaginal discharge, pain not controlled by usual oral pain meds, persistent nausea and vomiting not controlled on medications, drainage or redness from incision site  2) She desired Mirena or Depo for contraception.  3) For feeding she decided to breasetfeed.  4) She was instructed to follow-up with Dr Johns in 6 weeks for a routine postpartum visit and in 2 weeks  for an incision check.  5) Discharge activity:  No heavy lifting >15 lbs or strenuous activity for 6 weeks, pelvic rest for 6 weeks, no driving or operating machinery while on narcotics.      Naye Johns MD  OBGYN   5/4/2018 8:01 AM

## 2018-05-04 NOTE — PROGRESS NOTES
OB/GYN Postpartum Note      S:  Patient is feeling well this morning.  Lochia light.  Eating and drinking without nausea.  Ambulating without difficulty.  Pain is well controlled.  Breastfeeding without questions or concerns.  Would like to go home today    O:   Vitals:    18 0431 18 0910 18 1530 18 0030   BP: 108/74 108/68 113/70 117/67   BP Location:   Right arm    Cuff Size:    Adult Regular   Pulse:  90     Resp: 14 16 16 16   Temp: 98.2  F (36.8  C) 98.1  F (36.7  C) 98  F (36.7  C) 98  F (36.7  C)   TempSrc: Oral Oral Oral Oral   SpO2: 98% 98% 96% 98%   Weight:       Height:         General: resting in bed, in NAD  Resp: nonlabored  Abdomen: soft, nontender, nondistended  Fundus firm at umbilicus-2  Incision: c/d/i  Extremities: 1+ edema in BLE    Hemoglobin   Date Value Ref Range Status   2018 11.2 (L) 11.7 - 15.7 g/dL Final   ]    A: Ms. Nury Gilbert is a 23 year old  PPD #2 s/p PLTCS for breech, failed ECV    P:  Heme 12.3 >  > 11.2  GI: tolerating regular diet  Feeding: breast  Contraception: Depo vs Mirena  Rubella Immune  Rh positive  Disposition: d/c today    Naye Johns MD  OB/GYN  2018 8:01 AM

## 2018-05-07 ENCOUNTER — HOSPITAL ENCOUNTER (OUTPATIENT)
Dept: OBGYN | Facility: OTHER | Age: 23
End: 2018-05-07
Attending: OBSTETRICS & GYNECOLOGY
Payer: COMMERCIAL

## 2018-05-07 ENCOUNTER — LACTATION ENCOUNTER (OUTPATIENT)
Age: 23
End: 2018-05-07

## 2018-05-07 PROCEDURE — S9443 LACTATION CLASS: HCPCS | Performed by: REGISTERED NURSE

## 2018-05-07 NOTE — LACTATION NOTE
This note was copied from a baby's chart.  Outpatient Lactation Visit    Kristal Gilbert  [id number]    Consultation Date: May 7, 2018     Reason for Lactation Referral: Initial Lactation Consult    Baby's : 2018    Baby's Current Age: 5 day old  Baby's Gestational Age: Gestational Age: 40w1d    Primary Care Provider: Shruthi Frey    Presenting Problem (concerns as stated by parent): none    MATERNAL HISTORY   History of Breast Surgery: no  Breast Changes During Pregnancy: no  Breast Feeding History: primigravida  Maternal Meds: daily prenatal vitamin  Pregnancy Complications: none  Anesthesia during labor: spinal    MATERNAL ASSESSMENT    Breast Size: average, symmetrical, soft after feeding and filling prior to feeding  Nipple Appearance - Left: slightly cracked, with signs of healing, education on further healing techniques provided  Nipple Appearance - Right: slightly cracked, with signs of healing, education on further healing techniques provided  Nipple Erectility - Left: erect with stimulation  Nipple Erectility - Right: erect with stimulation  Areolas Compressibility: soft  Nipple Size: average  Special Equipment Used: none  Day mother reports milk came in:  Day 3    INFANT ASSESSMENT    Oral Anatomy  Mouth: normal  Palate: normal  Jaw: normal  Tongue: normal  Frenulum: normal   Digital Suck Exam: root    FEEDING   Feeding Time: aggressively for 25 minutes  Position:  cradle  Effort to Latch: awake and alert, latched easily  Duration of Breast Feeding: Right Breast: 0; Left Breast: 25 minutes  Results: excellent breast feed    Volume of Intake:    Birth Weight: 8 lb 4.5 oz    Hospital discharge weight: 7 lb 11.9 oz    Today's Weight 8 lb 1.2 oz    Total Intake: 1.6 oz  Output: 3-4 soil diapers in last 24 hours, 3-4 wet diapers in last 24 hours    LATCH Score:   Latch: 2 - Good Latch  Audible Swallowin - Spontaneous & frequent  Type of Nipple: (Breast/Nipple) 2 - Everted  Comfort: 2 - Soft,  Nontender  Hold: 2 - No Assist   Total LATCH Score:  10    FEEDING PLAN    Home Feeding Plan: Continue to feed on demand when  elicits feeding cues with deep latch.  Babe should be eating 8-12 times in a 24 hour period.  Exclusivity explained and encouraged in the early weeks to establish breastfeeding and order in milk supply.  Rooming-in encouraged with explanation of the benefits.  Continue to apply expressed breast milk and Lanolin cream to nipples after feedings for healing and comfort.  Postpartum breastfeeding assessment completed and education provided.  Items included in the education are:     proper positioning and latch    effectiveness of feeding    manual expression    handling and storing breastmilk    maintenance of breastfeeding for the first 6 months    sign/symptoms of infant feeding issues requiring referral to qualified health care provider    LACTATION COMMENTS   Deep latch explained for proper positioning of breast in infant's mouth, maximizing milk transfer and comfort.  Reassurance and encouragement provided in regard to mom's concerns about milk supply.  Follow-up support information provided.  Parents plan to keep [place] Well-Child Check with Dr. Frey as scheduled for 2 week well child check.      Face-to-face Time: 60 minutes with assessment and education.    Joaquina Lopez  2018  10:48 AM

## 2018-05-08 DIAGNOSIS — F32.0 MILD SINGLE CURRENT EPISODE OF MAJOR DEPRESSIVE DISORDER (H): ICD-10-CM

## 2018-05-08 DIAGNOSIS — F41.1 GAD (GENERALIZED ANXIETY DISORDER): ICD-10-CM

## 2018-05-10 RX ORDER — CITALOPRAM HYDROBROMIDE 20 MG/1
20 TABLET ORAL DAILY
Qty: 90 TABLET | Refills: 0 | Status: SHIPPED | OUTPATIENT
Start: 2018-05-10 | End: 2019-04-18

## 2018-05-10 RX ORDER — CITALOPRAM HYDROBROMIDE 20 MG/1
TABLET ORAL
Qty: 30 TABLET | Refills: 0 | OUTPATIENT
Start: 2018-05-10

## 2018-05-10 NOTE — TELEPHONE ENCOUNTER
Limited refill of citalopram as patient is now postpartum and will soon resume care with her primary physician.    Michelle Ovalle RN...................5/10/2018 8:07 AM

## 2018-05-23 ENCOUNTER — OFFICE VISIT (OUTPATIENT)
Dept: OBGYN | Facility: OTHER | Age: 23
End: 2018-05-23
Attending: OBSTETRICS & GYNECOLOGY
Payer: COMMERCIAL

## 2018-05-23 VITALS
HEART RATE: 72 BPM | SYSTOLIC BLOOD PRESSURE: 108 MMHG | WEIGHT: 174.38 LBS | DIASTOLIC BLOOD PRESSURE: 68 MMHG | TEMPERATURE: 98.5 F | BODY MASS INDEX: 26.51 KG/M2

## 2018-05-23 DIAGNOSIS — Z98.891 S/P CESAREAN SECTION: Primary | ICD-10-CM

## 2018-05-23 DIAGNOSIS — G47.9 DIFFICULTY SLEEPING: ICD-10-CM

## 2018-05-23 PROCEDURE — 99024 POSTOP FOLLOW-UP VISIT: CPT | Performed by: OBSTETRICS & GYNECOLOGY

## 2018-05-23 PROCEDURE — G0463 HOSPITAL OUTPT CLINIC VISIT: HCPCS

## 2018-05-23 ASSESSMENT — PAIN SCALES - GENERAL: PAINLEVEL: MILD PAIN (3)

## 2018-05-23 NOTE — LETTER
2018       RE: Nury Gilbert  [address]  [facility] [address]     Dear Colleague,    Thank you for referring your patient, Nury Gilbert, to the [facility] at [facility]. Please see a copy of my visit note below.    Postoperative Visit  2018    S: Nury Gilbert is a 23 year old  here for post-operative visit following PLTCS on 18 for breech. She reports feeling well overall. Pain has improved. Bowels back to normal. Urinating without difficulty. Sleeping as much as she can. Mood has been improving- was tough for the first week but better since. Breastfeeding is going well.     O:   /68 (BP Location: Right arm, Patient Position: Sitting, Cuff Size: Adult Large)  Pulse 72  Temp 98.5  F (36.9  C) (Oral)  Wt 79.1 kg (174 lb 6 oz)  Breastfeeding? Yes  BMI 26.51 kg/m2  Gen:  Well-appearing, NAD  Abd: soft, nondistended, nontender. Incision healing well, no signs of infection. Induration superior to right corner of incision but nontender    A/P:   Ms. Nury Gilbert is a 23 year old  three weeks s/p PLTCS. Doing well, no concerns  RTC 3 weeks for PP visit    Naye Johns MD  OB/GYN  2018 4:10 PM

## 2018-05-23 NOTE — NURSING NOTE
Patient presents today as a 2 week post-op following a .  Shandra hCaudhry LPN  2018  4:08 PM

## 2018-05-23 NOTE — PROGRESS NOTES
Postoperative Visit  2018    S: Nury Gilbert is a 23 year old  here for post-operative visit following PLTCS on 18 for breech. She reports feeling well overall. Pain has improved. Bowels back to normal. Urinating without difficulty. Sleeping as much as she can. Mood has been improving- was tough for the first week but better since. Breastfeeding is going well.     O:   /68 (BP Location: Right arm, Patient Position: Sitting, Cuff Size: Adult Large)  Pulse 72  Temp 98.5  F (36.9  C) (Oral)  Wt 79.1 kg (174 lb 6 oz)  Breastfeeding? Yes  BMI 26.51 kg/m2  Gen:  Well-appearing, NAD  Abd: soft, nondistended, nontender. Incision healing well, no signs of infection. Induration superior to right corner of incision but nontender    A/P:   Ms. Nury Gilbert is a 23 year old  three weeks s/p PLTCS. Doing well, no concerns  RTC 3 weeks for PP visit    Naye Johns MD  OB/GYN  2018 4:10 PM

## 2018-05-23 NOTE — MR AVS SNAPSHOT
After Visit Summary   2018    Nury Gilbert    MRN: [id number]           Patient Information     Date Of Birth          1995        Visit Information        Provider Department      2018 4:00 PM Naye Johns MD [facility]        Today's Diagnoses     S/P  section    -  1    Difficulty sleeping           Follow-ups after your visit        Your next 10 appointments already scheduled     2018 11:15 AM CDT   Post Partum with Naye Johns MD   [facility] ([facility])    [address]  [place] [address] [phone]              Who to contact     If you have questions or need follow up information about today's clinic visit or your schedule please contact [facility] directly at [phone].  Normal or non-critical lab and imaging results will be communicated to you by Ribbithart, letter or phone within 4 business days after the clinic has received the results. If you do not hear from us within 7 days, please contact the clinic through Ribbithart or phone. If you have a critical or abnormal lab result, we will notify you by phone as soon as possible.  Submit refill requests through Conferensum or call your pharmacy and they will forward the refill request to us. Please allow 3 business days for your refill to be completed.          Additional Information About Your Visit        MyChart Information     Conferensum gives you secure access to your electronic health record. If you see a primary care provider, you can also send messages to your care team and make appointments. If you have questions, please call your primary care clinic.  If you do not have a primary care provider, please call [phone] and they will assist you.        Care EveryWhere ID     This is your Care EveryWhere ID. This could be used by other organizations to access your [facility]  records  [id number]        Your Vitals Were     Pulse Temperature Breastfeeding? BMI (Body Mass Index)          72 98.5  F (36.9  C) (Oral) Yes 26.51 kg/m2         Blood Pressure from Last 3 Encounters:   05/23/18 108/68   05/04/18 115/70   05/01/18 124/70    Weight from Last 3 Encounters:   05/23/18 79.1 kg (174 lb 6 oz)   05/02/18 88.5 kg (195 lb)   05/01/18 89 kg (196 lb 3 oz)              Today, you had the following     No orders found for display       Primary Care Provider Office Phone # Fax #    Naye GAEL Villeda, [phone]       [facility] [place] [address]  [place] [address]        Equal Access to Services     KELLIE ECHAVARRIA : Hadii chelsey ayala hadasho Soomaali, waaxda luqadaha, qaybta kaalmada adeegyada, benigno caceres haydeborah yusuf . So [facility] [phone].    ATENCIÓN: Si habla español, tiene a lopez disposición servicios gratuitos de asistencia lingüística. Llame al [phone].    We comply with applicable federal civil rights laws and [place] laws. We do not discriminate on the basis of race, color, national origin, age, disability, sex, sexual orientation, or gender identity.            Thank you!     Thank you for choosing [facility]  for your care. Our goal is always to provide you with excellent care. Hearing back from our patients is one way we can continue to improve our services. Please take a few minutes to complete the written survey that you may receive in the mail after your visit with us. Thank you!             Your Updated Medication List - Protect others around you: Learn how to safely use, store and throw away your medicines at www.disposemymeds.org.          This list is accurate as of 5/23/18  4:46 PM.  Always use your most recent med list.                   Brand Name Dispense Instructions for use Diagnosis    breast pump Misc     1 each    Reason for need: breastfeeding. Length of need: 1 year    Encounter for supervision of  normal first pregnancy in third trimester       citalopram 20 MG tablet    celeXA    90 tablet    Take 1 tablet (20 mg) by mouth daily    Mild single current episode of major depressive disorder (H), BRANDON (generalized anxiety disorder)       doxylamine 25 MG Tabs tablet    UNISOM     Take 25 mg by mouth nightly as needed        ibuprofen 600 MG tablet    ADVIL/MOTRIN    40 tablet    Take 1 tablet (600 mg) by mouth every 6 hours as needed for moderate pain    S/P  section       loratadine 10 MG tablet    CLARITIN     Take 10 mg by mouth daily        metoclopramide 10 MG tablet    REGLAN    20 tablet    Take 1 tablet (10 mg) by mouth once as needed    Intractable chronic migraine without aura and without status migrainosus       oxyCODONE-acetaminophen 5-325 MG per tablet    PERCOCET    30 tablet    Take 1-2 tablets by mouth every 4 hours as needed for other (pain control or improvement in physical function. Hold dose for analgesic side effects.)    S/P  section       polyethylene glycol powder    MIRALAX    510 g    Take 17 g (1 capful) by mouth daily    S/P  section       Prenatal Vitamins 28-0.8 MG Tabs           rizatriptan 10 MG tablet    MAXALT     Take 10 mg by mouth 2 times daily as needed

## 2018-06-13 ENCOUNTER — PRENATAL OFFICE VISIT (OUTPATIENT)
Dept: OBGYN | Facility: OTHER | Age: 23
End: 2018-06-13
Attending: OBSTETRICS & GYNECOLOGY
Payer: COMMERCIAL

## 2018-06-13 VITALS
BODY MASS INDEX: 26.43 KG/M2 | HEIGHT: 69 IN | WEIGHT: 178.44 LBS | DIASTOLIC BLOOD PRESSURE: 68 MMHG | SYSTOLIC BLOOD PRESSURE: 100 MMHG | HEART RATE: 60 BPM

## 2018-06-13 DIAGNOSIS — Z30.013 ENCOUNTER FOR INITIAL PRESCRIPTION OF INJECTABLE CONTRACEPTIVE: ICD-10-CM

## 2018-06-13 LAB — HCG UR QL: NEGATIVE

## 2018-06-13 PROCEDURE — 99207 ZZC POST PARTUM EXAM: CPT | Performed by: OBSTETRICS & GYNECOLOGY

## 2018-06-13 PROCEDURE — 25000128 H RX IP 250 OP 636: Performed by: OBSTETRICS & GYNECOLOGY

## 2018-06-13 PROCEDURE — 96372 THER/PROPH/DIAG INJ SC/IM: CPT

## 2018-06-13 PROCEDURE — 81025 URINE PREGNANCY TEST: CPT | Performed by: OBSTETRICS & GYNECOLOGY

## 2018-06-13 PROCEDURE — G0463 HOSPITAL OUTPT CLINIC VISIT: HCPCS

## 2018-06-13 RX ORDER — MEDROXYPROGESTERONE ACETATE 150 MG/ML
150 INJECTION, SUSPENSION INTRAMUSCULAR
Status: DISCONTINUED | OUTPATIENT
Start: 2018-06-13 | End: 2019-03-18

## 2018-06-13 RX ADMIN — MEDROXYPROGESTERONE ACETATE 150 MG: 150 INJECTION, SUSPENSION INTRAMUSCULAR at 11:51

## 2018-06-13 ASSESSMENT — ANXIETY QUESTIONNAIRES
1. FEELING NERVOUS, ANXIOUS, OR ON EDGE: SEVERAL DAYS
2. NOT BEING ABLE TO STOP OR CONTROL WORRYING: SEVERAL DAYS
7. FEELING AFRAID AS IF SOMETHING AWFUL MIGHT HAPPEN: MORE THAN HALF THE DAYS
5. BEING SO RESTLESS THAT IT IS HARD TO SIT STILL: NEARLY EVERY DAY
3. WORRYING TOO MUCH ABOUT DIFFERENT THINGS: MORE THAN HALF THE DAYS
IF YOU CHECKED OFF ANY PROBLEMS ON THIS QUESTIONNAIRE, HOW DIFFICULT HAVE THESE PROBLEMS MADE IT FOR YOU TO DO YOUR WORK, TAKE CARE OF THINGS AT HOME, OR GET ALONG WITH OTHER PEOPLE: VERY DIFFICULT
GAD7 TOTAL SCORE: 11
6. BECOMING EASILY ANNOYED OR IRRITABLE: SEVERAL DAYS

## 2018-06-13 ASSESSMENT — PAIN SCALES - GENERAL: PAINLEVEL: MILD PAIN (2)

## 2018-06-13 ASSESSMENT — PATIENT HEALTH QUESTIONNAIRE - PHQ9: 5. POOR APPETITE OR OVEREATING: SEVERAL DAYS

## 2018-06-13 NOTE — NURSING NOTE
Patient presents today for her 6 week postpartum care.  Shandra Chaudhry LPN  6/13/2018  11:23 AM

## 2018-06-13 NOTE — LETTER
[facility]  [address]  [place] [address]          June 13, 2018    RE:  Nury Gilbert                                                                                                                                                       [address]  [facility] [address]            To whom it may concern:    Nury Gilbert is able to return to work without restrictions.       Sincerely,        Naye Johns MD

## 2018-06-13 NOTE — PROGRESS NOTES
"6 week Postpartum Visit Note    S:  Ms. Nury Gilbert is a 23 year old  here for her 6-week postpartum checkup.   - Had a PLTCS on 18 for breech.  - Infant gender:  girl, weight 8 pounds 4.5 oz.  - Feeding Method:  .  Complications reported with feeding:  none, infant thriving .    - Bleeding:  None.  Duration:  4-5 weeks.  Menses resumed:  No  - Bowel/Urinary problems:  No   - Mood: good  - Sleep: good    - Contraception Planned:  Depo-Provera  - She  has not had intercourse since delivery..    - Current tobacco use:  No  - Hx of Abuse:  No  ================================================================  ROS: 10 point ROS neg other than the symptoms noted above in the HPI.     O:  /68 (BP Location: Right arm, Patient Position: Sitting, Cuff Size: Adult Large)  Pulse 60  Ht 1.753 m (5' 9\")  Wt 80.9 kg (178 lb 7 oz)  Breastfeeding? Yes  BMI 26.35 kg/m2  Gen: Well-appearing, NAD  Psych:  Appropriate mood and affect  Breast: Declined, no concerns  Abd:  Benign, Soft, flat, non-tender, No masses, organomegaly and Diastasis less than 1-2 FB  Inc:   well healed   Pelvic: deferred    A/P:  Ms. Nury Gilbert is a 23 year old  here for 6 week postpartum visit after PLTCS for breech. Doing well.  - Contraception: Depo  - Feeding: breast  - Follow-up: annually    Naye Johns MD  OB/GYN  2018 12:33 PM     "

## 2018-06-14 ASSESSMENT — ANXIETY QUESTIONNAIRES: GAD7 TOTAL SCORE: 11

## 2018-07-23 NOTE — PROGRESS NOTES
Patient Information     Patient Name  Nury Gilbert MRN  [id number] Sex  Female   1995      Letter by Naye Johns MD at      Author:  Naye Johns MD Service:  (none) Author Type:  (none)    Filed:   Encounter Date:  2017 Status:  (Other)           Nury Gilbert  [address] [place] [address]          2017    Dear Ms. Gilbert:    Following are the tests completed during your last clinic visit.  The results of these tests are normal and require no further attention. Next pap smear due in 3 years.    Cystic Fibrosis Screen  Pap smear    If you have any further questions or problems contact my office at  [phone]    Thank you,    Sarita CHAPA, RN  Registered Nurse for the OB/Gyn providers of [facility]    Dr Naye Johns MD

## 2018-09-06 ENCOUNTER — ALLIED HEALTH/NURSE VISIT (OUTPATIENT)
Dept: FAMILY MEDICINE | Facility: OTHER | Age: 23
End: 2018-09-06
Attending: PHYSICIAN ASSISTANT
Payer: COMMERCIAL

## 2018-09-06 DIAGNOSIS — Z30.42 ENCOUNTER FOR DEPO-PROVERA CONTRACEPTION: Primary | ICD-10-CM

## 2018-09-06 PROCEDURE — 25000128 H RX IP 250 OP 636: Performed by: OBSTETRICS & GYNECOLOGY

## 2018-09-06 PROCEDURE — 96372 THER/PROPH/DIAG INJ SC/IM: CPT

## 2018-09-06 RX ORDER — MEDROXYPROGESTERONE ACETATE 150 MG/ML
150 INJECTION, SUSPENSION INTRAMUSCULAR CONTINUOUS PRN
Status: ACTIVE | OUTPATIENT
Start: 2018-09-06 | End: 2019-06-13

## 2018-09-06 RX ADMIN — MEDROXYPROGESTERONE ACETATE 150 MG: 150 INJECTION, SUSPENSION INTRAMUSCULAR at 16:25

## 2018-09-06 NOTE — MR AVS SNAPSHOT
After Visit Summary   9/6/2018    Nury Gilbert    MRN: [id number]           Patient Information     Date Of Birth          1995        Visit Information        Provider Department      9/6/2018 4:15 PM  INJECTION NURSE [facility]        Today's Diagnoses     Encounter for Depo-Provera contraception    -  1       Follow-ups after your visit        Your next 10 appointments already scheduled     Nov 27, 2018  1:00 PM CST   Nurse Only with  INJECTION NURSE   [facility] ([facility])    [address]  [place] [address] [phone]              Who to contact     If you have questions or need follow up information about today's clinic visit or your schedule please contact [facility] directly at [phone].  Normal or non-critical lab and imaging results will be communicated to you by Leondra musichart, letter or phone within 4 business days after the clinic has received the results. If you do not hear from us within 7 days, please contact the clinic through Leondra musichart or phone. If you have a critical or abnormal lab result, we will notify you by phone as soon as possible.  Submit refill requests through [employer] or call your pharmacy and they will forward the refill request to us. Please allow 3 business days for your refill to be completed.          Additional Information About Your Visit        MyChart Information     [employer] gives you secure access to your electronic health record. If you see a primary care provider, you can also send messages to your care team and make appointments. If you have questions, please call your primary care clinic.  If you do not have a primary care provider, please call [phone] and they will assist you.        Care EveryWhere ID     This is your Care EveryWhere ID. This could be used by other organizations to access your [place] medical records  [id number]          Blood Pressure from Last 3 Encounters:   06/13/18 100/68   05/23/18 108/68   05/04/18 115/70    Weight from Last 3 Encounters:   06/13/18 178 lb 7 oz (80.9 kg)   05/23/18 174 lb 6 oz (79.1 kg)   05/02/18 195 lb (88.5 kg)              Today, you had the following     No orders found for display       Primary Care Provider Office Phone # Fax #    Naye AZALIA Kelly [phone]       [facility] [place] [address]  [place] [address]        Equal Access to Services     [facility]: Hadii aad ku hadasho Soomaali, waaxda luqadaha, qaybta kaalmada adeegyada, benigno guerrain hayaan adeeg stephani yusuf . So [facility] [phone].    ATENCIÓN: Si habla español, tiene a lopez disposición servicios gratuitos de asistencia lingüística. Llame al [phone].    We comply with applicable federal civil rights laws and [place] laws. We do not discriminate on the basis of race, color, national origin, age, disability, sex, sexual orientation, or gender identity.            Thank you!     Thank you for choosing [facility]  for your care. Our goal is always to provide you with excellent care. Hearing back from our patients is one way we can continue to improve our services. Please take a few minutes to complete the written survey that you may receive in the mail after your visit with us. Thank you!             Your Updated Medication List - Protect others around you: Learn how to safely use, store and throw away your medicines at www.disposemymeds.org.          This list is accurate as of 9/6/18  4:26 PM.  Always use your most recent med list.                   Brand Name Dispense Instructions for use Diagnosis    citalopram 20 MG tablet    celeXA    90 tablet    Take 1 tablet (20 mg) by mouth daily    Mild single current episode of major depressive disorder (H), BRANDON (generalized anxiety disorder)       doxylamine 25 MG Tabs tablet    UNISOM     Take 25 mg by mouth nightly as needed         ibuprofen 600 MG tablet    ADVIL/MOTRIN    40 tablet    Take 1 tablet (600 mg) by mouth every 6 hours as needed for moderate pain    S/P  section       loratadine 10 MG tablet    CLARITIN     Take 10 mg by mouth daily        Prenatal Vitamins 28-0.8 MG Tabs

## 2018-09-06 NOTE — PROGRESS NOTES
Patientrequests ongoing injections of Depo-Provera  Date of last DMPA:    Adverse reaction to last shot?  Spotting   Heavy bleeding or more than 14 days bleeding sincelast shot?  no  Wants to continue contraception for another 3 months, knowing that fertility may not return for up to 18 months?  yes  Recent migraine headaches?  Yes history of but are increasing on the shot   Breast problems since last shot?  no  Problems with excessive hair loss, acne or facial hair?  No     Mariann Gray ....................  9/6/2018   4:17 PM

## 2018-09-29 ENCOUNTER — HOSPITAL ENCOUNTER (EMERGENCY)
Facility: OTHER | Age: 23
Discharge: HOME | End: 2018-09-30
Attending: FAMILY MEDICINE | Admitting: FAMILY MEDICINE
Payer: COMMERCIAL

## 2018-09-29 VITALS
SYSTOLIC BLOOD PRESSURE: 115 MMHG | BODY MASS INDEX: 25.01 KG/M2 | RESPIRATION RATE: 16 BRPM | OXYGEN SATURATION: 97 % | DIASTOLIC BLOOD PRESSURE: 67 MMHG | WEIGHT: 165 LBS | HEIGHT: 68 IN

## 2018-09-29 DIAGNOSIS — J02.9 ACUTE VIRAL PHARYNGITIS: ICD-10-CM

## 2018-09-29 LAB
DEPRECATED S PYO AG THROAT QL EIA: NORMAL
SPECIMEN SOURCE: NORMAL

## 2018-09-29 PROCEDURE — 99283 EMERGENCY DEPT VISIT LOW MDM: CPT | Mod: Z6 | Performed by: FAMILY MEDICINE

## 2018-09-29 PROCEDURE — 87880 STREP A ASSAY W/OPTIC: CPT | Performed by: FAMILY MEDICINE

## 2018-09-29 PROCEDURE — 99283 EMERGENCY DEPT VISIT LOW MDM: CPT | Performed by: FAMILY MEDICINE

## 2018-09-29 NOTE — ED AVS SNAPSHOT
[facility]    [address]    [place] [address]    Phone:  [phone]    Fax:  [phone]                                       Nury Gilbert   MRN: [id number]    Department:  [facility]   Date of Visit:  9/29/2018           After Visit Summary Signature Page     I have received my discharge instructions, and my questions have been answered. I have discussed any challenges I see with this plan with the nurse or doctor.    ..........................................................................................................................................  Patient/Patient Representative Signature      ..........................................................................................................................................  Patient Representative Print Name and Relationship to Patient    ..................................................               ................................................  Date                                   Time    ..........................................................................................................................................  Reviewed by Signature/Title    ...................................................              ..............................................  Date                                               Time          22EPIC Rev 08/18

## 2018-09-29 NOTE — ED AVS SNAPSHOT
[facility]    [address]    [place] [address]    Phone:  [phone]    Fax:  [phone]                                       Nury Gilbert   MRN: [id number]    Department:  [facility]   Date of Visit:  9/29/2018           Patient Information     Date Of Birth          1995        Your diagnoses for this visit were:     Acute viral pharyngitis        You were seen by Krzysztof Verdin MD.      Follow-up Information     Schedule an appointment as soon as possible for a visit with Naye Villeda PA.    Specialty:  Family Practice    Why:  As needed, If symptoms worsen    Contact information:    [facility]  [address]  [place] [address] [phone]          Discharge Instructions       Dear Ms. Gilbert,  It was nice to meet you and Kristal.  Your strep screen was negative and unfortunately this means you likely have a viral illness causing your symptoms (it sounds like you can blame your :)  We do a culture of negative screening tests to make sure we aren't missing strep and will call you if it is positive but your other symptoms are most typical with viral illnesses.    Keep drinking plenty of fluids to avoid dehydration.  Use 650mg of tylenol and 400mg of ibuprofen 4 times a day as needed for aches/pains and fever.    Use Honey and plain robitussin or robitussin DM (or generic equivalent) for a cough if it develops.    You will likely be sick but trending better over the next 2-4 weeks.  Follow up as needed for worsening symptoms.    Dr. Andrew Verdin        Self-Care for Sore Throats    Sore throats happen for many reasons, such as colds, allergies, and infections caused by viruses or bacteria. In any case, your throat becomes red and sore. Your goal for self-care is to reduce your discomfort while giving your throat a chance to heal.  Moisten and soothe your throat  Tips include the following:    Try a sip  of water first thing after waking up.    Keep your throat moist by drinking 6 or more glasses of clear liquids every day.    Run a cool-air humidifier in your room overnight.    Avoid cigarette smoke.     Suck on throat lozenges, cough drops, hard candy, ice chips, or frozen fruit-juice bars. Use the sugar-free versions if your diet or medical condition requires them.  Gargle to ease irritation  Gargling every hour or 2 can ease irritation. Try gargling with 1 of these solutions:    1/4 teaspoon of salt in 1/2 cup of warm water    An over-the-counter anesthetic gargle  Use medicine for more relief  Over-the-counter medicine can reduce sore throat symptoms. Ask your pharmacist if you have questions about which medicine to use:    Ease pain with anesthetic sprays. Aspirin or an aspirin substitute also helps. Remember, never give aspirin to anyone 18 or younger, or if you are already taking blood thinners.     For sore throats caused by allergies, try antihistamines to block the allergic reaction.    Remember: unless a sore throat is caused by a bacterial infection, antibiotics won t help you.  Prevent future sore throats  Prevention tips include the following:    Stop smoking or reduce contact with secondhand smoke. Smoke irritates the tender throat lining.    Limit contact with pets and with allergy-causing substances, such as pollen and mold.    When you re around someone with a sore throat or cold, wash your hands often to keep viruses or bacteria from spreading.    Don t strain your vocal cords.  Call your healthcare provider  Contact your healthcare provider if you have:    A temperature over 101 F (38.3 C)    White spots on the throat    Great difficulty swallowing    Trouble breathing    A skin rash    Recent exposure to someone else with strep bacteria    Severe hoarseness and swollen glands in the neck or jaw   Date Last Reviewed: 8/1/2016 2000-2017 [employer]. [address] [facility],  [place] [address]. All rights reserved. This information is not intended as a substitute for professional medical care. Always follow your healthcare professional's instructions.              Viral Pharyngitis (Sore Throat)    You or your child have pharyngitis (sore throat). This infection is caused by a virus. It can cause throat pain that is worse when swallowing, aching all over, headache, and fever. The infection may be spread by coughing, kissing, or touching others after touching your mouth or nose. Antibiotic medicines do not work against viruses. They are not used for treating this illness.  Home care    If symptoms are severe, you or your child should rest at home. Return to work or school when you or your child feel well enough.     You or your child should drink plenty of fluids to prevent dehydration.    Use throat lozenges or numbing throat sprays to help reduce pain. Gargling with warm salt water will also help reduce throat pain. Dissolve 1/2 teaspoon of salt in 1 glass of warm water. Children can sip on juice or a popsicle. Children 5 years and older can also suck on a lollipop or hard candy.    Don t eat salty or spicy foods or give them to your child. These can be irritating to the throat.  Medicines for a child: You can give your child acetaminophen for fever, fussiness, or discomfort. In babies over 6 months of age, you may use ibuprofen instead of acetaminophen. If your child has chronic liver or kidney disease or ever had a stomach ulcer or GI bleeding, talk with your child s healthcare provider before giving these medicines. Aspirin should never be used by any child under 18 years of age who has a fever. It may cause severe liver damage.  Medicines for an adult: You may use acetaminophen or ibuprofen to control pain or fever, unless another medicine was prescribed for this. If you have chronic liver or kidney disease or ever had a stomach ulcer or GI bleeding, talk with your healthcare  provider before using these medicines.  Follow-up care  Follow up with a healthcare provider or our staff if you or your child are not getting better over the next week.  When to seek medical advice  Call your healthcare provider right away if any of these occur:    Fever as directed by your healthcare provider.  For children, seek care if:  ? Your child is of any age and has repeated fevers above 104 F (40 C).  ? Your child is younger than 2 years of age and has a fever of 100.4 F (38 C) for more than 1 day.  ? Your child is 2 years old or older and has a fever of 100.4 F (38 C) for more than 3 days.    New or worsening ear pain, sinus pain, or headache    Painful lumps in the back of neck    Stiff neck    Lymph nodes are getting larger    Can t swallow liquids, a lot of drooling, or can t open mouth wide due to throat pain    Signs of dehydration, such as very dark urine or no urine, sunken eyes, dizziness    Trouble breathing or noisy breathing    Muffled voice    New rash    Other symptoms are getting worse  Date Last Reviewed: 10/1/2017    4622-9675 [employer]. [address] [place] [address]. All rights reserved. This information is not intended as a substitute for professional medical care. Always follow your healthcare professional's instructions.          Your next 10 appointments already scheduled     Nov 27, 2018  1:00 PM CST   Nurse Only with [facility] INJECTION NURSE   [facility] ([facility])    [address]  [place] [id number] [phone]              24 Hour Appointment Hotline     To schedule an appointment at [facility], please call [phone]. If you don't have a family doctor or clinic, we will help you find one. [facility] are conveniently located to serve the needs of you and your family.           Review of your medicines      Our records show that you are taking the medicines listed below. If these are  incorrect, please call your family doctor or clinic.        Dose / Directions Last dose taken    citalopram 20 MG tablet   Commonly known as:  celeXA   Dose:  20 mg   Quantity:  90 tablet        Take 1 tablet (20 mg) by mouth daily   Refills:  0        ibuprofen 600 MG tablet   Commonly known as:  ADVIL/MOTRIN   Dose:  600 mg   Quantity:  40 tablet        Take 1 tablet (600 mg) by mouth every 6 hours as needed for moderate pain   Refills:  0        Prenatal Vitamins 28-0.8 MG Tabs        Refills:  0                Procedures and tests performed during your visit     Rapid strep screen      Orders Needing Specimen Collection     None      Pending Results     No orders found from 9/27/2018 to 9/30/2018.            Pending Culture Results     No orders found from 9/27/2018 to 9/30/2018.            Pending Results Instructions     If you had any lab results that were not finalized at the time of your Discharge, you can call the ED Lab Result RN at [phone]. You will be contacted by this team for any positive Lab results or changes in treatment. The nurses are available 7 days a week from 10A to 6:30P.  You can leave a message 24 hours per day and they will return your call.        Thank you for choosing [facility]       Thank you for choosing [facility] for your care. Our goal is always to provide you with excellent care. Hearing back from our patients is one way we can continue to improve our services. Please take a few minutes to complete the written survey that you may receive in the mail after you visit with us. Thank you!        [employer] Information     [employer] gives you secure access to your electronic health record. If you see a primary care provider, you can also send messages to your care team and make appointments. If you have questions, please call your primary care clinic.  If you do not have a primary care provider, please call [phone] and they will assist you.        Care EveryWhere ID     This is your  Care EveryWhere ID. This could be used by other organizations to access your [facility] medical records  [id number]        Equal Access to Services     KELLIE ECHAVARRIA : Marv Bains, tina matias, kerri curran, benigno morocho. So [facility] [phone].    ATENCIÓN: Si habla español, tiene a lopez disposición servicios gratuitos de asistencia lingüística. Llame al [phone].    We comply with applicable federal civil rights laws and [place] laws. We do not discriminate on the basis of race, color, national origin, age, disability, sex, sexual orientation, or gender identity.            After Visit Summary       This is your record. Keep this with you and show to your community pharmacist(s) and doctor(s) at your next visit.

## 2018-09-30 ASSESSMENT — ENCOUNTER SYMPTOMS
ABDOMINAL PAIN: 0
EYES NEGATIVE: 1
GASTROINTESTINAL NEGATIVE: 1
CONSTIPATION: 0
SORE THROAT: 1
MUSCULOSKELETAL NEGATIVE: 1
RESPIRATORY NEGATIVE: 1
COUGH: 0
NAUSEA: 0
FATIGUE: 1
DYSURIA: 0
FEVER: 0
VOMITING: 0
CARDIOVASCULAR NEGATIVE: 1

## 2018-09-30 NOTE — ED PROVIDER NOTES
History     Chief Complaint   Patient presents with     Pharyngitis     The history is provided by the patient.     Nury Gilbert is a 23 year old female who is 4 months status post  delivery with onset of sore throat and mild congestion this past Wednesday.  Her  was sick with similar illness 1-1/2-2 weeks ago.  Her 4-month-old daughter became sick with similar illness the day after her.  No fever.  Here to rule out strep.      Problem List:    Patient Active Problem List    Diagnosis Date Noted     S/P  section 2018     Priority: Medium     On stimulant medication - contract signed 3/31/17 2017     Priority: Medium     Scheduled Medication Use Agreement Signed 3/31/17 with Naye Villeda, [facility]armacy -  [facility], [place]  Ph.  [phone]  Needs to be seen every 3 months for refills per Agreement.         ACP (advance care planning) 2016     Priority: Medium     Advance Care Planning 2016: ACP Review of Chart / Resources Provided:  Reviewed chart for advance care plan.  Nury Gilbert has no plan or code status on file. Discussed available resources and provided with information. Confirmed code status reflects current choices pending further ACP discussions.  Confirmed/documented legally designated decision makers.  Added by Amy Mullen    Advance Care Planning 2017: ACP Review of Chart / Resources Provided:  Reviewed chart for advance care plan.  Nury Gilbert has no plan or code status on file. Discussed available resources and provided with information.   Added by Lorri Zuleta               Attention deficit disorder 2011     Priority: Medium     Migraines 2011     Priority: Medium     Overview:   IMO Update 10/11  Overview:   IMO Update 10/11       Difficulty sleeping 2011     Priority: Medium        Past Medical History:    Past Medical History:   Diagnosis Date     Anxiety disorder      Attention-deficit  "hyperactivity disorder      Superficial foreign body of finger        Past Surgical History:    Past Surgical History:   Procedure Laterality Date     ADENOIDECTOMY      No Comments Provided     APPENDECTOMY OPEN      2006     ARTHROSCOPY KNEE      2012,2nd as well at age 18      SECTION N/A 2018    Procedure:  SECTION;  External version attemted prior to Section;  Surgeon: Naye Johns MD;  Location: [facility] OR     OTHER SURGICAL HISTORY      1999,WGV977,BLADDER SURGERY     OTHER SURGICAL HISTORY      2017,13463.0,MD REMOVE FOREIGN BODY SIMPLE       Family History:    No family history on file.    Social History:  Marital Status:  Single [1]  Social History   Substance Use Topics     Smoking status: Never Smoker     Smokeless tobacco: Never Used     Alcohol use 0.0 oz/week      Comment: occasionally        Medications:      citalopram (CELEXA) 20 MG tablet   Prenatal Vit-Fe Fumarate-FA (PRENATAL VITAMINS) 28-0.8 MG TABS   ibuprofen (ADVIL/MOTRIN) 600 MG tablet         Review of Systems   Constitutional: Positive for fatigue. Negative for fever.   HENT: Positive for congestion and sore throat.    Eyes: Negative.    Respiratory: Negative.  Negative for cough.    Cardiovascular: Negative.    Gastrointestinal: Negative.  Negative for abdominal pain, constipation, nausea and vomiting.   Genitourinary: Negative.  Negative for dysuria.   Musculoskeletal: Negative.        Physical Exam   BP: 115/67  Heart Rate: 90  Resp: 16  Height: 172.7 cm (5' 8\")  Weight: 74.8 kg (165 lb)  SpO2: 97 %      Physical Exam   Constitutional: She appears well-developed and well-nourished. She appears distressed.   Pleasant 23-year-old female with a sore throat but no acute distress.  Speaking and swallowing normally.  Mild nasal/sinus congestion.   HENT:   Head: Normocephalic and atraumatic.   Right Ear: External ear normal.   Left Ear: External ear normal.   Thin nasal discharge and very mild redness of " pharynx and tonsillar fossas without any exudates.   Eyes: Conjunctivae and EOM are normal. Pupils are equal, round, and reactive to light. Right eye exhibits no discharge. Left eye exhibits no discharge. No scleral icterus.   Neck: Normal range of motion. Neck supple. No tracheal deviation present. No thyromegaly present.   Cardiovascular: Normal rate, regular rhythm and normal heart sounds.    No murmur heard.  Pulmonary/Chest: Effort normal and breath sounds normal. No respiratory distress.   Abdominal: Soft. Bowel sounds are normal. She exhibits no distension. There is no tenderness.   Musculoskeletal: Normal range of motion. She exhibits no edema.   Lymphadenopathy:     She has no cervical adenopathy.   Neurological: She is alert. She exhibits normal muscle tone.   Skin: Skin is warm and dry. No rash noted. She is not diaphoretic.   Psychiatric: She has a normal mood and affect.   Nursing note and vitals reviewed.      ED Course     ED Course     Procedures               Critical Care time:  none               Results for orders placed or performed during the hospital encounter of 09/29/18 (from the past 24 hour(s))   Rapid strep screen   Result Value Ref Range    Specimen Description Throat     Rapid Strep A Screen       Negative presumptive for Group A Beta Streptococcus       Medications - No data to display    Assessments & Plan (with Medical Decision Making)   23-year-old female with 4 days of sore throat and mild nasal congestion consistent with viral pharyngitis and URI.  Discussed symptom cares and signs and symptoms for follow-up.  Rectus good handwashing to prevent spread.    I have reviewed the nursing notes.    I have reviewed the findings, diagnosis, plan and need for follow up with the patient.       Discharge Medication List as of 9/29/2018 11:54 PM          Final diagnoses:   Acute viral pharyngitis       9/29/2018   [facility]     Krzysztof Verdin MD  09/30/18  0025

## 2018-09-30 NOTE — DISCHARGE INSTRUCTIONS
Dear Ms. Gilbert,  It was nice to meet you and Kristal.  Your strep screen was negative and unfortunately this means you likely have a viral illness causing your symptoms (it sounds like you can blame your :)  We do a culture of negative screening tests to make sure we aren't missing strep and will call you if it is positive but your other symptoms are most typical with viral illnesses.    Keep drinking plenty of fluids to avoid dehydration.  Use 650mg of tylenol and 400mg of ibuprofen 4 times a day as needed for aches/pains and fever.    Use Honey and plain robitussin or robitussin DM (or generic equivalent) for a cough if it develops.    You will likely be sick but trending better over the next 2-4 weeks.  Follow up as needed for worsening symptoms.    Dr. Andrew Verdin        Self-Care for Sore Throats    Sore throats happen for many reasons, such as colds, allergies, and infections caused by viruses or bacteria. In any case, your throat becomes red and sore. Your goal for self-care is to reduce your discomfort while giving your throat a chance to heal.  Moisten and soothe your throat  Tips include the following:    Try a sip of water first thing after waking up.    Keep your throat moist by drinking 6 or more glasses of clear liquids every day.    Run a cool-air humidifier in your room overnight.    Avoid cigarette smoke.     Suck on throat lozenges, cough drops, hard candy, ice chips, or frozen fruit-juice bars. Use the sugar-free versions if your diet or medical condition requires them.  Gargle to ease irritation  Gargling every hour or 2 can ease irritation. Try gargling with 1 of these solutions:    1/4 teaspoon of salt in 1/2 cup of warm water    An over-the-counter anesthetic gargle  Use medicine for more relief  Over-the-counter medicine can reduce sore throat symptoms. Ask your pharmacist if you have questions about which medicine to use:    Ease pain with anesthetic sprays. Aspirin or an aspirin  substitute also helps. Remember, never give aspirin to anyone 18 or younger, or if you are already taking blood thinners.     For sore throats caused by allergies, try antihistamines to block the allergic reaction.    Remember: unless a sore throat is caused by a bacterial infection, antibiotics won t help you.  Prevent future sore throats  Prevention tips include the following:    Stop smoking or reduce contact with secondhand smoke. Smoke irritates the tender throat lining.    Limit contact with pets and with allergy-causing substances, such as pollen and mold.    When you re around someone with a sore throat or cold, wash your hands often to keep viruses or bacteria from spreading.    Don t strain your vocal cords.  Call your healthcare provider  Contact your healthcare provider if you have:    A temperature over 101 F (38.3 C)    White spots on the throat    Great difficulty swallowing    Trouble breathing    A skin rash    Recent exposure to someone else with strep bacteria    Severe hoarseness and swollen glands in the neck or jaw   Date Last Reviewed: 8/1/2016 2000-2017 [employer]. [address] [place] [address]. All rights reserved. This information is not intended as a substitute for professional medical care. Always follow your healthcare professional's instructions.              Viral Pharyngitis (Sore Throat)    You or your child have pharyngitis (sore throat). This infection is caused by a virus. It can cause throat pain that is worse when swallowing, aching all over, headache, and fever. The infection may be spread by coughing, kissing, or touching others after touching your mouth or nose. Antibiotic medicines do not work against viruses. They are not used for treating this illness.  Home care    If symptoms are severe, you or your child should rest at home. Return to work or school when you or your child feel well enough.     You or your child should drink plenty of fluids  to prevent dehydration.    Use throat lozenges or numbing throat sprays to help reduce pain. Gargling with warm salt water will also help reduce throat pain. Dissolve 1/2 teaspoon of salt in 1 glass of warm water. Children can sip on juice or a popsicle. Children 5 years and older can also suck on a lollipop or hard candy.    Don t eat salty or spicy foods or give them to your child. These can be irritating to the throat.  Medicines for a child: You can give your child acetaminophen for fever, fussiness, or discomfort. In babies over 6 months of age, you may use ibuprofen instead of acetaminophen. If your child has chronic liver or kidney disease or ever had a stomach ulcer or GI bleeding, talk with your child s healthcare provider before giving these medicines. Aspirin should never be used by any child under 18 years of age who has a fever. It may cause severe liver damage.  Medicines for an adult: You may use acetaminophen or ibuprofen to control pain or fever, unless another medicine was prescribed for this. If you have chronic liver or kidney disease or ever had a stomach ulcer or GI bleeding, talk with your healthcare provider before using these medicines.  Follow-up care  Follow up with a healthcare provider or our staff if you or your child are not getting better over the next week.  When to seek medical advice  Call your healthcare provider right away if any of these occur:    Fever as directed by your healthcare provider.  For children, seek care if:  ? Your child is of any age and has repeated fevers above 104 F (40 C).  ? Your child is younger than 2 years of age and has a fever of 100.4 F (38 C) for more than 1 day.  ? Your child is 2 years old or older and has a fever of 100.4 F (38 C) for more than 3 days.    New or worsening ear pain, sinus pain, or headache    Painful lumps in the back of neck    Stiff neck    Lymph nodes are getting larger    Can t swallow liquids, a lot of drooling, or can t open  mouth wide due to throat pain    Signs of dehydration, such as very dark urine or no urine, sunken eyes, dizziness    Trouble breathing or noisy breathing    Muffled voice    New rash    Other symptoms are getting worse  Date Last Reviewed: 10/1/2017    [id number] [employer]. [address] [place] [address]. All rights reserved. This information is not intended as a substitute for professional medical care. Always follow your healthcare professional's instructions.

## 2018-09-30 NOTE — ED TRIAGE NOTES
"ED Nursing Triage Note (General)   ________________________________    Nury Gilbert is a 23 year old Female that presents to triage private car  With history of  Sore throat now painful enough that she is having difficulty swallowing water reported by patient.  Some head congestion yesterday.   Significant symptoms had onset 4 day(s) ago.  /67  Resp 16  Ht 1.727 m (5' 8\")  Wt 74.8 kg (165 lb)  LMP 07/29/2018  SpO2 97%  Breastfeeding? Yes  BMI 25.09 kg/m2t  Patient appears alert , in moderate distress., and cooperative behavior.    GCS Total = 15  Airway: intact  Breathing noted as Normal.  Circulation Normal  Skin normal  Action taken:  To room 908        PRE HOSPITAL PRIOR LIVING SITUATION Spouse and Children  "

## 2018-10-04 ENCOUNTER — OFFICE VISIT (OUTPATIENT)
Dept: FAMILY MEDICINE | Facility: OTHER | Age: 23
End: 2018-10-04
Attending: NURSE PRACTITIONER
Payer: COMMERCIAL

## 2018-10-04 VITALS
HEART RATE: 68 BPM | SYSTOLIC BLOOD PRESSURE: 104 MMHG | WEIGHT: 175.31 LBS | BODY MASS INDEX: 26.66 KG/M2 | TEMPERATURE: 99 F | DIASTOLIC BLOOD PRESSURE: 62 MMHG

## 2018-10-04 DIAGNOSIS — L30.9 ECZEMA, UNSPECIFIED TYPE: Primary | ICD-10-CM

## 2018-10-04 PROCEDURE — 99213 OFFICE O/P EST LOW 20 MIN: CPT | Performed by: NURSE PRACTITIONER

## 2018-10-04 PROCEDURE — G0463 HOSPITAL OUTPT CLINIC VISIT: HCPCS

## 2018-10-04 RX ORDER — DEXTROAMPHETAMINE SACCHARATE, AMPHETAMINE ASPARTATE, DEXTROAMPHETAMINE SULFATE AND AMPHETAMINE SULFATE 5; 5; 5; 5 MG/1; MG/1; MG/1; MG/1
TABLET ORAL
Refills: 0 | COMMUNITY
Start: 2018-09-21 | End: 2020-03-31

## 2018-10-04 RX ORDER — FLUOCINONIDE 0.5 MG/G
OINTMENT TOPICAL
Qty: 30 G | Refills: 0 | Status: SHIPPED | OUTPATIENT
Start: 2018-10-04 | End: 2019-01-28

## 2018-10-04 NOTE — PROGRESS NOTES
Nursing Notes:   Gabby Mcnally CMA  10/4/2018 12:05 PM  Unsigned  Patient presents to the clinic for itchy rash on right foot that started 10 days ago. She has used anti fungal cream for treatment.  Gabby FORDE CMA...10/4/2018 12:04 PM      SUBJECTIVE:   Nury Gilbert is a 23 year old female who presents to clinic today for the following health issues:    Rash      Duration: 10+ days    Description  Location: RT foot  Itching: moderate    Intensity:  moderate    Accompanying signs and symptoms: No fevers, chills, it is not painful    History (similar episodes/previous evaluation): no HX of rashes    Precipitating or alleviating factors:  New exposures:  None  Recent travel: no      Therapies tried and outcome: hydrocortisone cream -  not effective      Problem list and histories reviewed & adjusted, as indicated.  Additional history: as documented    Current Outpatient Prescriptions   Medication Sig Dispense Refill     amphetamine-dextroamphetamine (ADDERALL) 20 MG per tablet TK ONE T PO BID  0     citalopram (CELEXA) 20 MG tablet Take 1 tablet (20 mg) by mouth daily 90 tablet 0     ibuprofen (ADVIL/MOTRIN) 600 MG tablet Take 1 tablet (600 mg) by mouth every 6 hours as needed for moderate pain 40 tablet 0     Prenatal Vit-Fe Fumarate-FA (PRENATAL VITAMINS) 28-0.8 MG TABS        Allergies   Allergen Reactions     Influenza Vaccines Swelling     Influenza Virus Vaccine H5n1      Arm swelling, pain, sweating.      Penicillins      Phenergan Dm [Promethazine-Dm] Other (See Comments)     Hallucinations and muscle twitching         ROS:  Notable findings in the HPI.       OBJECTIVE:     /62 (BP Location: Right arm, Patient Position: Sitting, Cuff Size: Adult Regular)  Pulse 68  Temp 99  F (37.2  C) (Tympanic)  Wt 175 lb 5 oz (79.5 kg)  Breastfeeding? Yes  BMI 26.66 kg/m2  Body mass index is 26.66 kg/(m^2).  GENERAL: healthy, alert and no distress  EYES: Eyes grossly normal to inspection  RESP: without  increased work of breathing.   SKIN: Examination of the rash reveals: Eczema: Several (3) dry, slightly raised, red patches with mild flaking on the anterior surface of the foot.       Diagnostic Test Results:  none     ASSESSMENT/PLAN:     1. Eczema, unspecified type  - fluocinonide (LIDEX) 0.05 % ointment; Apply sparingly to affected area twice daily for up to14 days.  Do not apply to face.  Dispense: 30 g; Refill: 0      PLAN:    Rash:  moisturizer  Reassurance was given to the patient  Rx    Followup:    If not improving or if condition worsens, follow up with your Primary Care Provider    I explained my diagnostic considerations and recommendations to the patient, who voiced understanding and agreement with the treatment plan. All questions were answered. We discussed potential side effects of any prescribed or recommended therapies, as well as expectations for response to treatments. She was advised to contact our office if there is no improvement or worsening of conditions or symptoms.  If s/s worsen or persist, patient will either come back or follow up with PCP.    Disclaimer:  This note consists of words and symbols derived from keyboarding, dictation, or using voice recognition software. As a result, there may be errors in the script that have gone undetected. Please consider this when interpreting information found in this note.      Cassie Belle NP, 10/4/2018 12:31 PM

## 2018-10-04 NOTE — MR AVS SNAPSHOT
After Visit Summary   10/4/2018    Nury Gilbert    MRN: [id number]           Patient Information     Date Of Birth          1995        Visit Information        Provider Department      10/4/2018 12:30 PM Cassie Belle NP [facility]        Today's Diagnoses     Eczema, unspecified type    -  1       Follow-ups after your visit        Follow-up notes from your care team     Return if symptoms worsen or fail to improve.      Your next 10 appointments already scheduled     Nov 27, 2018  1:00 PM CST   Nurse Only with [facility] INJECTION NURSE   [facility] ([facility]    [address]  [place] [address] [phone]              Who to contact     If you have questions or need follow up information about today's clinic visit or your schedule please contact [facility] directly at [phone].  Normal or non-critical lab and imaging results will be communicated to you by [employer], letter or phone within 4 business days after the clinic has received the results. If you do not hear from us within 7 days, please contact the clinic through [employer] or phone. If you have a critical or abnormal lab result, we will notify you by phone as soon as possible.  Submit refill requests through [employer] or call your pharmacy and they will forward the refill request to us. Please allow 3 business days for your refill to be completed.          Additional Information About Your Visit        MyChart Information     [employer] gives you secure access to your electronic health record. If you see a primary care provider, you can also send messages to your care team and make appointments. If you have questions, please call your primary care clinic.  If you do not have a primary care provider, please call [phone] and they will assist you.        Care EveryWhere ID     This is your Care EveryWhere ID. This could be used by  other organizations to access your [place] medical records  [id number]        Your Vitals Were     Pulse Temperature Breastfeeding? BMI (Body Mass Index)          68 99  F (37.2  C) (Tympanic) Yes 26.66 kg/m2         Blood Pressure from Last 3 Encounters:   10/04/18 104/62   09/29/18 115/67   06/13/18 100/68    Weight from Last 3 Encounters:   10/04/18 175 lb 5 oz (79.5 kg)   09/29/18 165 lb (74.8 kg)   06/13/18 178 lb 7 oz (80.9 kg)              Today, you had the following     No orders found for display         Today's Medication Changes          These changes are accurate as of 10/4/18  2:41 PM.  If you have any questions, ask your nurse or doctor.               Start taking these medicines.        Dose/Directions    fluocinonide 0.05 % ointment   Commonly known as:  LIDEX   Used for:  Eczema, unspecified type   Started by:  Cassie Belle NP        Apply sparingly to affected area twice daily for up to14 days.  Do not apply to face.   Quantity:  30 g   Refills:  0            Where to get your medicines      These medications were sent to [id number] - [place], [place] - [address]  [address], [facility] [address]     Phone:  [phone]     fluocinonide 0.05 % ointment                Primary Care Provider Office Phone # Fax #    Naye AZALIA Kelly [phone]       [facility] [place] [address]  [place] [address]        Equal Access to Services     FRED ECHAVARRIA AH: Hadii chelsey ku hadasho Soomaali, waaxda luqadaha, qaybta kaalmada adeegyada, benigno morocho. So [facility] [phone].    ATENCIÓN: Si habla español, tiene a lopez disposición servicios gratuitos de asistencia lingüística. Llame al [phone].    We comply with applicable federal civil rights laws and [place] laws. We do not discriminate on the basis of race, color, national origin, age, disability, sex, sexual orientation, or gender  identity.            Thank you!     Thank you for choosing [facility] AND [facility]  for your care. Our goal is always to provide you with excellent care. Hearing back from our patients is one way we can continue to improve our services. Please take a few minutes to complete the written survey that you may receive in the mail after your visit with us. Thank you!             Your Updated Medication List - Protect others around you: Learn how to safely use, store and throw away your medicines at www.disposemymeds.org.          This list is accurate as of 10/4/18  2:41 PM.  Always use your most recent med list.                   Brand Name Dispense Instructions for use Diagnosis    amphetamine-dextroamphetamine 20 MG per tablet    ADDERALL     TK ONE T PO BID        citalopram 20 MG tablet    celeXA    90 tablet    Take 1 tablet (20 mg) by mouth daily    Mild single current episode of major depressive disorder (H), BRANDON (generalized anxiety disorder)       fluocinonide 0.05 % ointment    LIDEX    30 g    Apply sparingly to affected area twice daily for up to14 days.  Do not apply to face.    Eczema, unspecified type       ibuprofen 600 MG tablet    ADVIL/MOTRIN    40 tablet    Take 1 tablet (600 mg) by mouth every 6 hours as needed for moderate pain    S/P  section       Prenatal Vitamins 28-0.8 MG Tabs

## 2018-10-04 NOTE — NURSING NOTE
Patient presents to the clinic for itchy rash on right foot that started 10 days ago. She has used anti fungal cream for treatment.  Gabby FORDE CMA...10/4/2018 12:04 PM

## 2018-10-26 ENCOUNTER — HOSPITAL ENCOUNTER (EMERGENCY)
Facility: OTHER | Age: 23
Discharge: HOME | End: 2018-10-27
Attending: STUDENT IN AN ORGANIZED HEALTH CARE EDUCATION/TRAINING PROGRAM | Admitting: STUDENT IN AN ORGANIZED HEALTH CARE EDUCATION/TRAINING PROGRAM
Payer: COMMERCIAL

## 2018-10-26 VITALS
DIASTOLIC BLOOD PRESSURE: 76 MMHG | OXYGEN SATURATION: 96 % | HEART RATE: 89 BPM | SYSTOLIC BLOOD PRESSURE: 109 MMHG | HEIGHT: 69 IN | TEMPERATURE: 97.9 F | BODY MASS INDEX: 25.18 KG/M2 | WEIGHT: 170 LBS

## 2018-10-26 DIAGNOSIS — N64.4 BREAST PAIN, RIGHT: ICD-10-CM

## 2018-10-26 DIAGNOSIS — N64.89 OBSTRUCTED MAMMARY DUCT: Primary | ICD-10-CM

## 2018-10-26 PROCEDURE — 99283 EMERGENCY DEPT VISIT LOW MDM: CPT | Mod: Z6 | Performed by: STUDENT IN AN ORGANIZED HEALTH CARE EDUCATION/TRAINING PROGRAM

## 2018-10-26 PROCEDURE — 25000132 ZZH RX MED GY IP 250 OP 250 PS 637: Performed by: STUDENT IN AN ORGANIZED HEALTH CARE EDUCATION/TRAINING PROGRAM

## 2018-10-26 PROCEDURE — 99283 EMERGENCY DEPT VISIT LOW MDM: CPT | Performed by: STUDENT IN AN ORGANIZED HEALTH CARE EDUCATION/TRAINING PROGRAM

## 2018-10-26 RX ORDER — CEPHALEXIN 500 MG/1
500 CAPSULE ORAL 2 TIMES DAILY
Qty: 14 CAPSULE | Refills: 0 | Status: SHIPPED | OUTPATIENT
Start: 2018-10-26 | End: 2019-03-18

## 2018-10-26 RX ADMIN — IBUPROFEN 600 MG: 200 TABLET, FILM COATED ORAL at 23:43

## 2018-10-26 ASSESSMENT — ENCOUNTER SYMPTOMS
FEVER: 0
CHILLS: 0

## 2018-10-26 NOTE — ED AVS SNAPSHOT
[facility]    [address]    [place] [address]    Phone:  [phone]    Fax:  [phone]                                       Nury Gilbert   MRN: [id number]    Department:  [facility]   Date of Visit:  10/26/2018           After Visit Summary Signature Page     I have received my discharge instructions, and my questions have been answered. I have discussed any challenges I see with this plan with the nurse or doctor.    ..........................................................................................................................................  Patient/Patient Representative Signature      ..........................................................................................................................................  Patient Representative Print Name and Relationship to Patient    ..................................................               ................................................  Date                                   Time    ..........................................................................................................................................  Reviewed by Signature/Title    ...................................................              ..............................................  Date                                               Time          22EPIC Rev 08/18

## 2018-10-26 NOTE — ED AVS SNAPSHOT
[facility]    [address]    [facility] [place] [address]    Phone:  [phone]    Fax:  [phone]                                       Nury Gilbert   MRN: [id number]    Department:  [facility]   Date of Visit:  10/26/2018           Patient Information     Date Of Birth          1995        Your diagnoses for this visit were:     Obstructed mammary duct     Breast pain, right        You were seen by Meme Maki MD.      Follow-up Information     Follow up with Naye Villeda PA In 3 days.    Specialty:  Family Practice    Contact information:    [facility] [place]  [address]  [facility] [address] [phone]          Follow up with [facility].    Specialty:  EMERGENCY MEDICINE    Why:  If symptoms worsen    Contact information:    [address]  [place] [address] [phone]        Discharge Instructions         You where seen today for right breast pain which is due to obstruction of your milk duct. Please follow the below instructions:     Optimize feeding technique - The most effective manner to open the blockage and drain the area of concern is by emptying of the breast with frequent feeds. The properly position your baby when feeding.  Change positions to ensure complete drainage of the entire affected breast. Pumping or hand expression after the feeding may improve the drainage.        Do not stop breastfeeding since this could lead to engorgement and worsen the problem.       Other measures that may facilitate drainage include warm compresses or showers, and manual massage. In the shower, the mother can massage the breast from the affected area toward the nipple to try to remove the blockage. I recommend that the massage begins beyond the mass and toward the nipple at first to clear the duct and then to progress toward the mass.        Use pain medicine such as ibuprofen and  acetaminophen.      Fill and start taking prescribed antibiotics if you develop fever, redness of the skin of the breast or worsening pain.  Then return to the emergency room for evaluation.      Step-by-Step  Breastfeeding Holds    Date Last Reviewed: 12/1/2017 2000-2017 [employer] [address] [place] [address]. All rights reserved. This information is not intended as a substitute for professional medical care. Always follow your healthcare professional's instructions.          Your next 10 appointments already scheduled     Nov 27, 2018  1:00 PM CST   Nurse Only with  INJECTION NURSE   [facility] ([facility])    [address]  [place] [address] [phone]              24 Hour Appointment Hotline     To schedule an appointment at [facility], please call [phone]. If you don't have a family doctor or clinic, we will help you find one. [place] clinics are conveniently located to serve the needs of you and your family.           Review of your medicines      START taking        Dose / Directions Last dose taken    cephALEXin 500 MG capsule   Commonly known as:  KEFLEX   Dose:  500 mg   Quantity:  14 capsule        Take 1 capsule (500 mg) by mouth 2 times daily for 7 days   Refills:  0          Our records show that you are taking the medicines listed below. If these are incorrect, please call your family doctor or clinic.        Dose / Directions Last dose taken    amphetamine-dextroamphetamine 20 MG per tablet   Commonly known as:  ADDERALL        TK ONE T PO BID   Refills:  0        citalopram 20 MG tablet   Commonly known as:  celeXA   Dose:  20 mg   Quantity:  90 tablet        Take 1 tablet (20 mg) by mouth daily   Refills:  0        fluocinonide 0.05 % ointment   Commonly known as:  LIDEX   Quantity:  30 g        Apply sparingly to affected area twice daily for up to14 days.  Do not apply to face.   Refills:  0         ibuprofen 600 MG tablet   Commonly known as:  ADVIL/MOTRIN   Dose:  600 mg   Quantity:  40 tablet        Take 1 tablet (600 mg) by mouth every 6 hours as needed for moderate pain   Refills:  0        Prenatal Vitamins 28-0.8 MG Tabs        Refills:  0                Prescriptions were sent or printed at these locations (1 Prescription)                   Other Prescriptions                Printed at Department/Unit printer (1 of 1)         cephALEXin (KEFLEX) 500 MG capsule                Orders Needing Specimen Collection     None      Pending Results     No orders found for last 3 day(s).            Pending Culture Results     No orders found for last 3 day(s).            Pending Results Instructions     If you had any lab results that were not finalized at the time of your Discharge, you can call the ED Lab Result RN at [phone]. You will be contacted by this team for any positive Lab results or changes in treatment. The nurses are available 7 days a week from 10A to 6:30P.  You can leave a message 24 hours per day and they will return your call.        Thank you for choosing [facility]       Thank you for choosing [facility] for your care. Our goal is always to provide you with excellent care. Hearing back from our patients is one way we can continue to improve our services. Please take a few minutes to complete the written survey that you may receive in the mail after you visit with us. Thank you!        [employer] Information     [employer] gives you secure access to your electronic health record. If you see a primary care provider, you can also send messages to your care team and make appointments. If you have questions, please call your primary care clinic.  If you do not have a primary care provider, please call [phone] and they will assist you.        Care EveryWhere ID     This is your Care EveryWhere ID. This could be used by other organizations to access your [place] medical  records  [id number]        Equal Access to Services     KELLIE ECHAVARRIA : Marv Bains, tina matias, benigno conner. So [facility] [phone].    ATENCIÓN: Si habla español, tiene a lopez disposición servicios gratuitos de asistencia lingüística. Llame al [phone].    We comply with applicable federal civil rights laws and [place] laws. We do not discriminate on the basis of race, color, national origin, age, disability, sex, sexual orientation, or gender identity.            After Visit Summary       This is your record. Keep this with you and show to your community pharmacist(s) and doctor(s) at your next visit.

## 2018-10-27 NOTE — ED PROVIDER NOTES
History     Chief Complaint   Patient presents with     Breast Pain     HPI  Nury Gilbert is a 23 year old female who who is almost 6 months postpartum presenting with's right breast pain with associated decreased milk outflow.  She denies any fever.  Her symptoms started yesterday and has progressively gotten worse.  She has tried putting baby to breast however decreased milk production from the right breast.  Also tried taking a warm bath but this did not help.  She did not try taking any pain medication before presenting to the emergency room.  Reports that she is allergic to penicillin and react with hives but has never tried Keflex.    Problem List:    Patient Active Problem List    Diagnosis Date Noted     S/P  section 2018     Priority: Medium     On stimulant medication - contract signed 3/31/17 2017     Priority: Medium     Scheduled Medication Use Agreement Signed 3/31/17 with Naye Villeda, [facility]  Pharmacy -  [facility], [place]  Ph.  [phone]  Needs to be seen every 3 months for refills per Agreement.         ACP (advance care planning) 2016     Priority: Medium     Advance Care Planning 2016: ACP Review of Chart / Resources Provided:  Reviewed chart for advance care plan.  Nury Gilbert has no plan or code status on file. Discussed available resources and provided with information. Confirmed code status reflects current choices pending further ACP discussions.  Confirmed/documented legally designated decision makers.  Added by Amy Mullen    Advance Care Planning 2017: ACP Review of Chart / Resources Provided:  Reviewed chart for advance care plan.  Nury Gilbert has no plan or code status on file. Discussed available resources and provided with information.   Added by Lorri Zuleta               Attention deficit disorder 2011     Priority: Medium     Migraines 2011     Priority: Medium     Overview:   IMO Update  "10/11  Overview:   IMO Update 10/11       Difficulty sleeping 2011     Priority: Medium        Past Medical History:    Past Medical History:   Diagnosis Date     Anxiety disorder      Attention-deficit hyperactivity disorder      Superficial foreign body of finger        Past Surgical History:    Past Surgical History:   Procedure Laterality Date     ADENOIDECTOMY      No Comments Provided     APPENDECTOMY OPEN      2006     ARTHROSCOPY KNEE      2012,2nd as well at age 18      SECTION N/A 2018    Procedure:  SECTION;  External version attemted prior to Section;  Surgeon: Naye Johns MD;  Location: [facility] OR     OTHER SURGICAL HISTORY      1999,RSC342,BLADDER SURGERY     OTHER SURGICAL HISTORY      2017,71297.0,ME REMOVE FOREIGN BODY SIMPLE       Family History:    No family history on file.    Social History:  Marital Status:  Single [1]  Social History   Substance Use Topics     Smoking status: Never Smoker     Smokeless tobacco: Never Used     Alcohol use 0.0 oz/week      Comment: occasionally        Medications:      amphetamine-dextroamphetamine (ADDERALL) 20 MG per tablet   citalopram (CELEXA) 20 MG tablet   fluocinonide (LIDEX) 0.05 % ointment   ibuprofen (ADVIL/MOTRIN) 600 MG tablet   Prenatal Vit-Fe Fumarate-FA (PRENATAL VITAMINS) 28-0.8 MG TABS         Review of Systems   Constitutional: Negative for chills and fever.   Endocrine:        Right breast pain.       Physical Exam   BP: 109/76  Pulse: 89  Temp: 97.9  F (36.6  C)  Height: 175.3 cm (5' 9\")  Weight: 77.1 kg (170 lb)  SpO2: 99 %      Physical Exam   Constitutional: She appears well-developed and well-nourished. She appears distressed.   HENT:   Head: Normocephalic and atraumatic.   Pulmonary/Chest:   The right breast appears engorged compared to the left.  There is tenderness to palpation of the upper medial aspect of the right breast.  There is no differential warmth.  There is no erythema of the skin of " the breasts.  Evidence of limited veins in both breasts.  There is no axillary lymphadenopathy.  Expression of milk with minimal outflow.       ED Course     ED Course     Procedures    Physical examination is consistent with engorged right breast with possible obstruction of 1 of the mammary ducts located in the right upper medial breasts.  Heating pad has been applied and patient will be giving a dose of ibuprofen 600 mg.  An attempt to express milk from the breasts produced minimal secretion.    I called and spoke to the obstetrician on call Dr. Sutton who recommends that patient should continue to put her baby to breast and when doing so should gently milk the breasts particularly the upper medial portion that appears to be obstructed.  She should also repeat this maneuver when she pumps her breast.  She may take an NSAID for pain and inflammation.  She should be given a prescription for Keflex with instructions to fill prescription if she notices any signs of infection or development of fever.    I have also directed patient to continue to apply warm compress to the breast before breast-feeding and between breast-feeding she may apply cold compress as that is will most times help release the obstruction of the mammary duct that is currently obstructed.  I have educated patient on the signs of underlying infection and when to start taking antibiotics.    At this time I have a low index of suspicion for any infective process given that the breasts appears only engorged but not warm, no erythematous skin changes and patient has no fever.    Critical Care time:  none    No results found for this or any previous visit (from the past 24 hour(s)).    Medications   ibuprofen (ADVIL/MOTRIN) tablet 600 mg (600 mg Oral Given 10/26/18 2964)       Assessments & Plan (with Medical Decision Making)   Patient is a 23-year-old female who is 6 months postpartum presenting with right breast pain and engorgement consistent  with's mammary duct obstruction.  She has been instructed on techniques on how to dislodge the obstructive mammary ducts.  She may use ibuprofen as needed for pain.  She may alternate between warm and cold compress while she continues to put baby to breast and milk the breasts to enhance dislodgment of the obstructed ducts.  You are advised to not stop breast-feeding as this may make your condition worse.  A prescription for Keflex will be given to patient's to feel and starts if she notices any signs of infection.    I have reviewed the nursing notes.    I have reviewed the findings, diagnosis, plan and need for follow up with the patient.    New Prescriptions    CEPHALEXIN (KEFLEX) 500 MG CAPSULE    Take 1 capsule (500 mg) by mouth 2 times daily for 7 days       Final diagnoses:   Obstructed mammary duct   Breast pain, right     Meme Maki MD  Emergency Medicine Physician  10/27/2018  12:13 AM    10/26/2018   [facility]     Meme Maki MD  10/27/18 0013

## 2018-10-27 NOTE — ED TRIAGE NOTES
"Pt presents ambulatory to ED with reports of right breast paint rated at 8/10 that started yesterday in the am. Pt currently breastfeeding/pumping. Pt states her right breast feels very full and she says she is getting less milk out, than the left side. Pt not having fevers at home. /76  Pulse 89  Temp 97.9  F (36.6  C) (Tympanic)  Ht 1.753 m (5' 9\")  Wt 77.1 kg (170 lb)  SpO2 99%  Breastfeeding? Yes  BMI 25.1 kg/m2    "

## 2018-10-27 NOTE — ED NOTES
Pt reports that her pain has gone down but her right breast feels more full. Pt encouraged to use both sides, pt states she does try.

## 2018-10-27 NOTE — ED NOTES
Discharge instructions and prescriptions given to pt. Pt verbalized understanding of directions and need to follow up if no improvement.

## 2018-10-27 NOTE — DISCHARGE INSTRUCTIONS
You where seen today for right breast pain which is due to obstruction of your milk duct. Please follow the below instructions:     Optimize feeding technique - The most effective manner to open the blockage and drain the area of concern is by emptying of the breast with frequent feeds. The properly position your baby when feeding.  Change positions to ensure complete drainage of the entire affected breast. Pumping or hand expression after the feeding may improve the drainage.        Do not stop breastfeeding since this could lead to engorgement and worsen the problem.       Other measures that may facilitate drainage include warm compresses or showers, and manual massage. In the shower, the mother can massage the breast from the affected area toward the nipple to try to remove the blockage. I recommend that the massage begins beyond the mass and toward the nipple at first to clear the duct and then to progress toward the mass.        Use pain medicine such as ibuprofen and acetaminophen.      Fill and start taking prescribed antibiotics if you develop fever, redness of the skin of the breast or worsening pain.  Then return to the emergency room for evaluation.      Step-by-Step  Breastfeeding Holds    Date Last Reviewed: 12/1/2017 2000-2017 [employer]. [address] [place] [address], [place] [address]. All rights reserved. This information is not intended as a substitute for professional medical care. Always follow your healthcare professional's instructions.

## 2018-11-19 ENCOUNTER — ALLIED HEALTH/NURSE VISIT (OUTPATIENT)
Dept: FAMILY MEDICINE | Facility: OTHER | Age: 23
End: 2018-11-19
Payer: COMMERCIAL

## 2018-11-19 DIAGNOSIS — Z23 NEED FOR PROPHYLACTIC VACCINATION AND INOCULATION AGAINST INFLUENZA: Primary | ICD-10-CM

## 2018-11-19 PROCEDURE — 90686 IIV4 VACC NO PRSV 0.5 ML IM: CPT

## 2018-11-19 PROCEDURE — 90471 IMMUNIZATION ADMIN: CPT

## 2018-11-19 NOTE — PROGRESS NOTES
Injectable Influenza Immunization Documentation    1.  Is the person to be vaccinated sick today?   No    2. Does the person to be vaccinated have an allergy to a component   of the vaccine?  Yes  Egg Allergy Algorithm Link    3. Has the person to be vaccinated ever had a serious reaction   to influenza vaccine in the past?  Yes    4. Has the person to be vaccinated ever had Guillain-Barré syndrome?   No    Form completed by Adela Duarte LPN on 11/19/2018 at 2:36 PM     Spoke with Shruthi Frey MD and she verified with pharmacy that this was ok to give.

## 2018-11-19 NOTE — MR AVS SNAPSHOT
After Visit Summary   11/19/2018    Nury Gilbert    MRN: [id number]           Patient Information     Date Of Birth          1995        Visit Information        Provider Department      11/19/2018 2:30 PM  [facility]        Today's Diagnoses     Need for prophylactic vaccination and inoculation against influenza    -  1       Follow-ups after your visit        Your next 10 appointments already scheduled     Nov 27, 2018  1:00 PM CST   Nurse Only with  INJECTION NURSE   [facility]    [address]  [place] [address] [phone]              Who to contact     If you have questions or need follow up information about today's clinic visit or your schedule please contact [facility] directly at [phone].  Normal or non-critical lab and imaging results will be communicated to you by Appterahart, letter or phone within 4 business days after the clinic has received the results. If you do not hear from us within 7 days, please contact the clinic through Appterahart or phone. If you have a critical or abnormal lab result, we will notify you by phone as soon as possible.  Submit refill requests through Goodwall or call your pharmacy and they will forward the refill request to us. Please allow 3 business days for your refill to be completed.          Additional Information About Your Visit        MyChart Information     Goodwall gives you secure access to your electronic health record. If you see a primary care provider, you can also send messages to your care team and make appointments. If you have questions, please call your primary care clinic.  If you do not have a primary care provider, please call [phone] and they will assist you.        Care EveryWhere ID     This is your Care EveryWhere ID. This could be used by other organizations to access your [facility]  records  [id number]         Blood Pressure from Last 3 Encounters:   10/26/18 109/76   10/04/18 104/62   09/29/18 115/67    Weight from Last 3 Encounters:   10/26/18 170 lb (77.1 kg)   10/04/18 175 lb 5 oz (79.5 kg)   09/29/18 165 lb (74.8 kg)              We Performed the Following     HC FLU VAC PRESRV FREE QUAD SPLIT VIR 3+YRS IM     Vaccine Administration, Initial [[id number]]        Primary Care Provider Office Phone # Fax #    Naye MAYO Ronal, [phone]       [facility] [place] [address]  [place] [address]        Equal Access to Services     FRED ECHAVARRIA : Hadii aad ku hadasho Soomaali, waaxda luqadaha, qaybta kaalmada adeegyada, benigno morocho. So [facility] [phone].    ATENCIÓN: Si habla español, tiene a lopez disposición servicios gratuitos de asistencia lingüística. Llame al [phone].    We comply with applicable federal civil rights laws and [place] laws. We do not discriminate on the basis of race, color, national origin, age, disability, sex, sexual orientation, or gender identity.            Thank you!     Thank you for choosing [facility]  for your care. Our goal is always to provide you with excellent care. Hearing back from our patients is one way we can continue to improve our services. Please take a few minutes to complete the written survey that you may receive in the mail after your visit with us. Thank you!             Your Updated Medication List - Protect others around you: Learn how to safely use, store and throw away your medicines at www.disposemymeds.org.          This list is accurate as of 11/19/18  2:40 PM.  Always use your most recent med list.                   Brand Name Dispense Instructions for use Diagnosis    amphetamine-dextroamphetamine 20 MG per tablet    ADDERALL     TK ONE T PO BID        citalopram 20 MG tablet    celeXA    90 tablet    Take 1 tablet (20 mg) by mouth daily    Mild single  current episode of major depressive disorder (H), BRANDON (generalized anxiety disorder)       fluocinonide 0.05 % ointment    LIDEX    30 g    Apply sparingly to affected area twice daily for up to14 days.  Do not apply to face.    Eczema, unspecified type       ibuprofen 600 MG tablet    ADVIL/MOTRIN    40 tablet    Take 1 tablet (600 mg) by mouth every 6 hours as needed for moderate pain    S/P  section       Prenatal Vitamins 28-0.8 MG Tabs

## 2018-11-27 ENCOUNTER — ALLIED HEALTH/NURSE VISIT (OUTPATIENT)
Dept: FAMILY MEDICINE | Facility: OTHER | Age: 23
End: 2018-11-27
Attending: FAMILY MEDICINE
Payer: COMMERCIAL

## 2018-11-27 DIAGNOSIS — Z30.42 DEPO-PROVERA CONTRACEPTIVE STATUS: Primary | ICD-10-CM

## 2018-11-27 PROCEDURE — 96372 THER/PROPH/DIAG INJ SC/IM: CPT

## 2018-11-27 PROCEDURE — 25000128 H RX IP 250 OP 636: Performed by: OBSTETRICS & GYNECOLOGY

## 2018-11-27 RX ADMIN — MEDROXYPROGESTERONE ACETATE 150 MG: 150 INJECTION, SUSPENSION INTRAMUSCULAR at 13:12

## 2018-11-27 NOTE — MR AVS SNAPSHOT
After Visit Summary   11/27/2018    Nury Gilbert    MRN: [id number]           Patient Information     Date Of Birth          1995        Visit Information        Provider Department      11/27/2018 1:00 PM  INJECTION NURSE [facility]        Today's Diagnoses     Depo-Provera contraceptive status    -  1       Follow-ups after your visit        Who to contact     If you have questions or need follow up information about today's clinic visit or your schedule please contact [facility] directly at [phone].  Normal or non-critical lab and imaging results will be communicated to you by Xercise4lesshart, letter or phone within 4 business days after the clinic has received the results. If you do not hear from us within 7 days, please contact the clinic through THREAT STREAM or phone. If you have a critical or abnormal lab result, we will notify you by phone as soon as possible.  Submit refill requests through THREAT STREAM or call your pharmacy and they will forward the refill request to us. Please allow 3 business days for your refill to be completed.          Additional Information About Your Visit        MyChart Information     THREAT STREAM gives you secure access to your electronic health record. If you see a primary care provider, you can also send messages to your care team and make appointments. If you have questions, please call your primary care clinic.  If you do not have a primary care provider, please call [phone] and they will assist you.        Care EveryWhere ID     This is your Care EveryWhere ID. This could be used by other organizations to access your [facility] medical records  [id number]         Blood Pressure from Last 3 Encounters:   10/26/18 109/76   10/04/18 104/62   09/29/18 115/67    Weight from Last 3 Encounters:   10/26/18 170 lb (77.1 kg)   10/04/18 175 lb 5 oz (79.5 kg)   09/29/18 165 lb (74.8 kg)              Today, you had the following      No orders found for display       Primary Care Provider Office Phone # Fax #    Naye MAYO AZALIA Villeda [phone]       [facility] [place] [address]  [place] [address]        Equal Access to Services     KELLIE ECHAVARRIA : Hadii aad ku hadmargaritoo Soomaali, waaxda luqadaha, qaybta kaalmada adeegyada, waxcesar guerrain felipan adetracy styles laoliverkiet morocho. So [facility] [phone].    ATENCIÓN: Si habla español, tiene a lopez disposición servicios gratuitos de asistencia lingüística. Llame al [phone].    We comply with applicable federal civil rights laws and [place] laws. We do not discriminate on the basis of race, color, national origin, age, disability, sex, sexual orientation, or gender identity.            Thank you!     Thank you for choosing [facility]  for your care. Our goal is always to provide you with excellent care. Hearing back from our patients is one way we can continue to improve our services. Please take a few minutes to complete the written survey that you may receive in the mail after your visit with us. Thank you!             Your Updated Medication List - Protect others around you: Learn how to safely use, store and throw away your medicines at www.disposemymeds.org.          This list is accurate as of 11/27/18  1:21 PM.  Always use your most recent med list.                   Brand Name Dispense Instructions for use Diagnosis    amphetamine-dextroamphetamine 20 MG tablet    ADDERALL     TK ONE T PO BID        citalopram 20 MG tablet    celeXA    90 tablet    Take 1 tablet (20 mg) by mouth daily    Mild single current episode of major depressive disorder (H), BRANDON (generalized anxiety disorder)       fluocinonide 0.05 % ointment    LIDEX    30 g    Apply sparingly to affected area twice daily for up to14 days.  Do not apply to face.    Eczema, unspecified type       ibuprofen 600 MG tablet    ADVIL/MOTRIN    40 tablet    Take 1 tablet (600 mg) by mouth every 6 hours  as needed for moderate pain    S/P  section       Prenatal Vitamins 28-0.8 MG Tabs

## 2018-11-27 NOTE — PROGRESS NOTES
Patientrequests ongoing injections of Depo-Provera  Date of last DMPA:    Adverse reaction to last shot?  no  Heavy bleeding or more than 14 days bleeding sincelast shot?  no  Wants to continue contraception for another 3 months, knowing that fertility may not return for up to 18 months?  yes  Recent migraine headaches?  no  Breast problems since last shot?  no  Problems with excessive hair loss, acne or facial hair?  No     Mariann Gray ....................  11/27/2018   1:11 PM

## 2018-11-28 RX ORDER — HYDROCODONE BITARTRATE AND ACETAMINOPHEN 7.5; 325 MG/1; MG/1
1 TABLET ORAL
Refills: 0 | COMMUNITY
Start: 2018-11-12 | End: 2019-01-21

## 2019-01-21 ENCOUNTER — OFFICE VISIT (OUTPATIENT)
Dept: FAMILY MEDICINE | Facility: OTHER | Age: 24
End: 2019-01-21
Attending: NURSE PRACTITIONER
Payer: COMMERCIAL

## 2019-01-21 VITALS
SYSTOLIC BLOOD PRESSURE: 104 MMHG | TEMPERATURE: 98.5 F | HEART RATE: 60 BPM | RESPIRATION RATE: 14 BRPM | HEIGHT: 69 IN | BODY MASS INDEX: 26.35 KG/M2 | DIASTOLIC BLOOD PRESSURE: 52 MMHG | WEIGHT: 177.9 LBS

## 2019-01-21 DIAGNOSIS — L30.9 ECZEMA, UNSPECIFIED TYPE: Primary | ICD-10-CM

## 2019-01-21 PROCEDURE — 99213 OFFICE O/P EST LOW 20 MIN: CPT | Performed by: NURSE PRACTITIONER

## 2019-01-21 PROCEDURE — G0463 HOSPITAL OUTPT CLINIC VISIT: HCPCS

## 2019-01-21 RX ORDER — TRIAMCINOLONE ACETONIDE 1 MG/G
OINTMENT TOPICAL 2 TIMES DAILY PRN
Qty: 80 G | Refills: 0 | Status: SHIPPED | OUTPATIENT
Start: 2019-01-21 | End: 2019-03-26

## 2019-01-21 ASSESSMENT — PAIN SCALES - GENERAL: PAINLEVEL: SEVERE PAIN (7)

## 2019-01-21 ASSESSMENT — MIFFLIN-ST. JEOR: SCORE: 1631.33

## 2019-01-21 NOTE — NURSING NOTE
Patient in clinic for rash x 2 week that started on top of R foot, spread to back of calf, now on arm/hand.  Tx with hydrocortisone, antibiotic ointment, and tylenol.  Shebly Johnson LPN....................  1/21/2019   6:03 PM    Chief Complaint   Patient presents with     Derm Problem       Medication Reconciliation: complete    Shelby Johnson LPN

## 2019-01-22 NOTE — PATIENT INSTRUCTIONS
Triamcinolone ointment twice daily as needed to affected areas    Use Aquaphor, Vaseoline, or Coconut oil twice daily for moisturizer     Triple antibiotic ointment twice daily as needed for antibiotic coverage    Follow up if worsening        Patient Education     Managing Atopic Dermatitis (Eczema)     After bathing, gently pat your skin dry (don t rub). Apply moisturizer while your skin is still damp.   To manage your symptoms and help reduce the severity and frequency, try these self-care tips:  Caring for your skin    Use a gentle, fragrance-free cleanser (or nonsoap cleanser) for bathing. Rinse well. Pat skin dry.    Take warm, not hot, baths or showers. Try to limit them to no more that 10 to 15 minutes.     Use moisturizer liberally right after you bathe, while your skin is still damp.    Avoid scratching because it will cause more damage to your skin.     Topical, over-the-counter hydrocortisone cream may help control mild symptoms.   Controlling your environment    Avoid extreme heat or cold.    Avoid very humid or very dry air.    If your home or office air is very dry, use a humidifier.    Avoid allergens, such as dust, that may be present in bedding, carpets, plush toys, or rugs.    Know that pet hair and dander can cause flare-ups.  Seeking medical treatment  Another way to keep symptoms under control is to seek medical treatment. Talk with your healthcare provider about the type of treatment that may work best for you. Your provider may prescribe treatments such as the following:    Topical treatments to put on the skin daily    Medicines taken by mouth (oral medicines), such as antihistamines, antibiotics, or corticosteroids    In severe cases shots (injections) may be needed to control the symptoms. You may even need antibiotics if skin infections occur.  Treatments don t work the same way for every person. So if your symptoms continue or get worse, ask your healthcare provider about other  treatments.  Making lifestyle choices    Manage the stress in your life.    Wear loose-fitting cotton clothing that does not bind or rub your skin.    Avoid contact with wool or other scratchy fabrics.    Use fragrance-free products.  Getting good results  Now that you know more about atopic dermatitis, the next step is up to you. Follow your healthcare provider s treatment plan and your self-care routine. This will help bring atopic dermatitis under control. If your symptoms persist, be sure to let your health care provider know.   Date Last Reviewed: 2/1/2017 2000-2018 [employer]. [address] [place] [address]. All rights reserved. This information is not intended as a substitute for professional medical care. Always follow your healthcare professional's instructions.

## 2019-01-22 NOTE — PROGRESS NOTES
HPI:    Nury Gilbert is a 23 year old female  who presents to clinic today for rash.    Rash on right foot for the past 2 to 3 weeks.  Redness, dryness and cracking on right fingers and hand for the past month.  Works as a  and is constantly getting her hands wet in bleach water.  States she had eczema on her right foot during pregnancy.  No fevers.  No new soaps or detergents.      Tried treating rash with hydrocortisone cream, antibiotic cream and tylenol.  Tried unscented lotion, aloe, coconut oil, vaseoline, and eczema lotion.      Past Medical History:   Diagnosis Date     Anxiety disorder     No Comments Provided     Attention-deficit hyperactivity disorder     No Comments Provided     Superficial foreign body of finger     2017     Past Surgical History:   Procedure Laterality Date     ADENOIDECTOMY      No Comments Provided     APPENDECTOMY OPEN           ARTHROSCOPY KNEE      2012,2nd as well at age 18      SECTION N/A 2018    Procedure:  SECTION;  External version attemted prior to Section;  Surgeon: Naye Johns MD;  Location:  OR     OTHER SURGICAL HISTORY      1999,CZS895,BLADDER SURGERY     OTHER SURGICAL HISTORY      2017,73521.0,CO REMOVE FOREIGN BODY SIMPLE     Social History     Tobacco Use     Smoking status: Never Smoker     Smokeless tobacco: Never Used   Substance Use Topics     Alcohol use: Yes     Alcohol/week: 0.0 oz     Comment: occasionally     Current Outpatient Medications   Medication Sig Dispense Refill     amphetamine-dextroamphetamine (ADDERALL) 20 MG per tablet TK ONE T PO BID  0     citalopram (CELEXA) 20 MG tablet Take 1 tablet (20 mg) by mouth daily 90 tablet 0     fluocinonide (LIDEX) 0.05 % ointment Apply sparingly to affected area twice daily for up to14 days.  Do not apply to face. 30 g 0     ibuprofen (ADVIL/MOTRIN) 600 MG tablet Take 1 tablet (600 mg) by mouth every 6 hours as needed for moderate pain 40 tablet 0  "    Prenatal Vit-Fe Fumarate-FA (PRENATAL VITAMINS) 28-0.8 MG TABS        Allergies   Allergen Reactions     Influenza Vaccines Swelling     Influenza Virus Vaccine H5n1      Arm swelling, pain, sweating.      Penicillins      Phenergan Dm [Promethazine-Dm] Other (See Comments)     Hallucinations and muscle twitching         Past medical history, past surgical history, current medications and allergies reviewed and accurate to the best of my knowledge.        ROS:  Refer to HPI    /52 (BP Location: Right arm, Patient Position: Sitting, Cuff Size: Adult Regular)   Pulse 60   Temp 98.5  F (36.9  C) (Tympanic)   Resp 14   Ht 1.753 m (5' 9\")   Wt 80.7 kg (177 lb 14.4 oz)   Breastfeeding? Yes   BMI 26.27 kg/m      EXAM:  General Appearance: Well appearing adult female, appropriate appearance for age. No acute distress  Respiratory:  Normal effort.  No cough appreciated.  Musculoskeletal:  Normal gait.  Equal movement of bilateral upper extremities.  Equal movement of bilateral lower extremities.    Dermatological: Right dorsal foot with mild erythematous excoriated plaque.  Right fingers with dry excoriated skin with fissures and cracking.  Right dorsal hand with dry excoriated skin.  No papules, no pustules or vesicles, no drainage or crusting, no bleeding.  Psychological: normal affect, alert and pleasant        ASSESSMENT/PLAN:  1. Eczema, unspecified type    - triamcinolone (KENALOG) 0.1 % external ointment; Apply topically 2 times daily as needed for irritation  Dispense: 80 g; Refill: 0    Skin moisturizer BID to TID - Vasoline, Aquaphor or Coconut oil, etc    OTC antibiotic cream BID PRN    Discussed warning signs/symptoms indicative of need to f/u    Follow up if symptoms persist or worsen or concerns        "

## 2019-01-28 ENCOUNTER — OFFICE VISIT (OUTPATIENT)
Dept: FAMILY MEDICINE | Facility: OTHER | Age: 24
End: 2019-01-28
Attending: PHYSICIAN ASSISTANT
Payer: COMMERCIAL

## 2019-01-28 VITALS
TEMPERATURE: 98.7 F | HEART RATE: 82 BPM | WEIGHT: 174 LBS | SYSTOLIC BLOOD PRESSURE: 100 MMHG | RESPIRATION RATE: 18 BRPM | BODY MASS INDEX: 25.7 KG/M2 | DIASTOLIC BLOOD PRESSURE: 60 MMHG

## 2019-01-28 DIAGNOSIS — L60.0 INGROWN TOENAIL OF LEFT FOOT: Primary | ICD-10-CM

## 2019-01-28 DIAGNOSIS — L60.0 INGROWING TOENAIL OF RIGHT FOOT: ICD-10-CM

## 2019-01-28 PROCEDURE — 99213 OFFICE O/P EST LOW 20 MIN: CPT | Performed by: PHYSICIAN ASSISTANT

## 2019-01-28 PROCEDURE — G0463 HOSPITAL OUTPT CLINIC VISIT: HCPCS

## 2019-01-28 ASSESSMENT — PATIENT HEALTH QUESTIONNAIRE - PHQ9: SUM OF ALL RESPONSES TO PHQ QUESTIONS 1-9: 0

## 2019-01-28 NOTE — PROGRESS NOTES
Nursing Notes:   Archana Elena LPN  2019  2:35 PM  Signed  Chief Complaint   Patient presents with     Ingrown Toenail         Medication Reconciliation: complete    Archana Elena LPN      HPI:    Nury Gilbert is a 23 year old female who presents for ingrown toenail.  She has had concerns with her great toenails in the past.  At a younger age she had an ingrown toenail and had to have part of the toenail removed.  She then got something heavy dropped on her left great toenail.  It did not grow back correctly and a podiatrist ended up having to kill the root a year later for treatment.  She has now noticed recently that part of the nail is growing back.  Over the last week it is starting to grow into the skin on the distal part of the toe.  Wondering about getting the toenail removed.    Right great toenail growing down and in.  Mildly tender.  No pus or drainage.  No fevers or chills.    Past Medical History:   Diagnosis Date     Anxiety disorder     No Comments Provided     Attention-deficit hyperactivity disorder     No Comments Provided     Superficial foreign body of finger     2017       Past Surgical History:   Procedure Laterality Date     ADENOIDECTOMY      No Comments Provided     APPENDECTOMY OPEN           ARTHROSCOPY KNEE      2012,2nd as well at age 18      SECTION N/A 2018    Procedure:  SECTION;  External version attemted prior to Section;  Surgeon: Naye Johns MD;  Location:  OR     OTHER SURGICAL HISTORY      1999,[id number],BLADDER SURGERY     OTHER SURGICAL HISTORY      2017,55125.0,[place] REMOVE FOREIGN BODY SIMPLE       History reviewed. No pertinent family history.    Social History     Socioeconomic History     Marital status: Single     Spouse name: Not on file     Number of children: Not on file     Years of education: Not on file     Highest education level: Not on file   Social Needs     Financial resource strain:  Not on file     Food insecurity - worry: Not on file     Food insecurity - inability: Not on file     Transportation needs - medical: Not on file     Transportation needs - non-medical: Not on file   Occupational History     Not on file   Tobacco Use     Smoking status: Never Smoker     Smokeless tobacco: Never Used   Substance and Sexual Activity     Alcohol use: Yes     Alcohol/week: 0.0 oz     Comment: occasionally     Drug use: No     Comment: Drug use: No     Sexual activity: Yes     Partners: Male     Birth control/protection: Injection   Other Topics Concern     Not on file   Social History Narrative     Not on file       Current Outpatient Medications   Medication Sig Dispense Refill     amphetamine-dextroamphetamine (ADDERALL) 20 MG per tablet TK ONE T PO BID  0     citalopram (CELEXA) 20 MG tablet Take 1 tablet (20 mg) by mouth daily 90 tablet 0     ibuprofen (ADVIL/MOTRIN) 600 MG tablet Take 1 tablet (600 mg) by mouth every 6 hours as needed for moderate pain 40 tablet 0     Prenatal Vit-Fe Fumarate-FA (PRENATAL VITAMINS) 28-0.8 MG TABS        triamcinolone (KENALOG) 0.1 % external ointment Apply topically 2 times daily as needed for irritation 80 g 0       Allergies   Allergen Reactions     Influenza Vaccines Swelling     Influenza Virus Vaccine H5n1      Arm swelling, pain, sweating.      Penicillins      Phenergan Dm [Promethazine-Dm] Other (See Comments)     Hallucinations and muscle twitching       REVIEW OF SYSTEMS:  Refer to HPI.    EXAM:   Vitals:    /60 (BP Location: Right arm, Patient Position: Sitting, Cuff Size: Adult Regular)   Pulse 82   Temp 98.7  F (37.1  C)   Resp 18   Wt 78.9 kg (174 lb)   Breastfeeding? Yes   BMI 25.70 kg/m      General Appearance: Pleasant, alert, appropriate appearance for age. No acute distress  Chest/Respiratory Exam: Normal chest wall and respirations. Clear to auscultation.  Cardiovascular Exam: Regular rate and rhythm. S1, S2, no murmur, click,  gallop, or rubs.  Skin: Patient has a small piece of toenail growing on the left large toe that is starting to push into the distal toe.  Tender along the distal portion of the toe.  No pus, drainage, warmth, erythema.  Patient has mild pain with palpation of the right medial and lateral distal toenail borders.  No surrounding swelling, erythema, pus, drainage, warmth.  Psychiatric Exam: Alert and oriented - appropriate affect.      PHQ Depression Screen  PHQ-9 SCORE 4/17/2018 4/24/2018 1/28/2019   PHQ-9 Total Score - - -   PHQ-9 Total Score 0 0 0       ASSESSMENT AND PLAN:    1. Ingrown toenail of left foot    2. Ingrowing toenail of right foot          Discussed options at length.  At this time patient would like referral to podiatry to discuss permanent left great toenail removal.  Patient also discussed the right ingrown toenail without infection.    Encouraged to soak toe in warm water with antibacterial soap 10-15 minutes at a time a few times per day.  Please return to clinic if symptoms change/worsen. Can take tylenol as needed for pain. Encouraged to not cut toenails too short and to cut them straight across.    Patient Instructions   Encouraged to soak toe in warm water with antibacterial soap 10-15 minutes at a time a few times per day.  . Please return to clinic if symptoms change/worsen. Can take tylenol as needed for pain. Encouraged to not cut toenails too short and to cut them straight across.      Patient Education     Ingrown Toenail, Not Infected (Home Treatment)  An ingrown toenail occurs when the nail grows sideways into the skin next to the nail. This can cause pain, especially when wearing tight shoes. It can also lead to an infection with redness, swelling, and pus drainage. Most people respond to the treatments described here. But sometimes surgery is needed. The big toe is most often affected.   The most common cause of an ingrown toenail is trimming your nails wrong. Most people trim the  nails too close to the skin and try to round the nail too tightly around the shape of the toe. When you do this, the nail can grow into the skin of your toe. It is safer to trim the nail ending in a straight line rather than a curve.  Home care  The following guidelines will help you care for your toenail at home:    Soak the painful toe in warm water 3 to 4 times each day, for 10 to 20 minutes each time. Adding Epsom salt may be recommended by your healthcare provider. Wash the entire foot with an antibacterial soap. Then keep it dry.    If there is redness or swelling around the toenail, apply an antibiotic ointment 3 times a day.    Insert a small piece of rolled-up cotton under the corner of the nail. This helps the nail to grow outward, away from the cuticle.    Wear shoes that don t put pressure on the toes, such as a sandal or open shoe. Closed shoes should be big enough in the toes so that there is no pressure on the painful toe.    You may use acetaminophen or ibuprofen for pain, unless another pain medicine was prescribed. Talk with your healthcare provider before using these medicines if you have chronic liver or kidney disease. Also tell your provider if you have ever had a stomach ulcer or GI (gastrointestinal) bleeding.  Prevention  The following tips will help you prevent ingrown toenails:    Avoid pointed, tight, or narrow shoes.    Trim toenails once a month so they don t grow too long. Cut the nail straight across.  Follow-up care  Follow up with your healthcare provider, or as advised.  When to seek medical advice  Call your healthcare provider right away if any of these occur:    Increasing redness, pain, or swelling of the toe    Tender red streaks in the skin leading toward the ankle    Pus or fluid drainage from the toe    Fever of 100.4 F (38 C) or higher, or as directed by your provider  Date Last Reviewed: 4/1/2017 2000-2018 [employer]. [address], [place]  [address]. All rights reserved. This information is not intended as a substitute for professional medical care. Always follow your healthcare professional's instructions.              Felicita Calhoun PA-C..................1/28/2019 2:35 PM

## 2019-01-28 NOTE — NURSING NOTE
Chief Complaint   Patient presents with     Ingrown Toenail         Medication Reconciliation: complete    Archana Elena, LPN

## 2019-01-28 NOTE — PATIENT INSTRUCTIONS
Encouraged to soak toe in warm water with antibacterial soap 10-15 minutes at a time a few times per day.  . Please return to clinic if symptoms change/worsen. Can take tylenol as needed for pain. Encouraged to not cut toenails too short and to cut them straight across.      Patient Education     Ingrown Toenail, Not Infected (Home Treatment)  An ingrown toenail occurs when the nail grows sideways into the skin next to the nail. This can cause pain, especially when wearing tight shoes. It can also lead to an infection with redness, swelling, and pus drainage. Most people respond to the treatments described here. But sometimes surgery is needed. The big toe is most often affected.   The most common cause of an ingrown toenail is trimming your nails wrong. Most people trim the nails too close to the skin and try to round the nail too tightly around the shape of the toe. When you do this, the nail can grow into the skin of your toe. It is safer to trim the nail ending in a straight line rather than a curve.  Home care  The following guidelines will help you care for your toenail at home:    Soak the painful toe in warm water 3 to 4 times each day, for 10 to 20 minutes each time. Adding Epsom salt may be recommended by your healthcare provider. Wash the entire foot with an antibacterial soap. Then keep it dry.    If there is redness or swelling around the toenail, apply an antibiotic ointment 3 times a day.    Insert a small piece of rolled-up cotton under the corner of the nail. This helps the nail to grow outward, away from the cuticle.    Wear shoes that don t put pressure on the toes, such as a sandal or open shoe. Closed shoes should be big enough in the toes so that there is no pressure on the painful toe.    You may use acetaminophen or ibuprofen for pain, unless another pain medicine was prescribed. Talk with your healthcare provider before using these medicines if you have chronic liver or kidney disease. Also  tell your provider if you have ever had a stomach ulcer or GI (gastrointestinal) bleeding.  Prevention  The following tips will help you prevent ingrown toenails:    Avoid pointed, tight, or narrow shoes.    Trim toenails once a month so they don t grow too long. Cut the nail straight across.  Follow-up care  Follow up with your healthcare provider, or as advised.  When to seek medical advice  Call your healthcare provider right away if any of these occur:    Increasing redness, pain, or swelling of the toe    Tender red streaks in the skin leading toward the ankle    Pus or fluid drainage from the toe    Fever of 100.4 F (38 C) or higher, or as directed by your provider  Date Last Reviewed: 4/1/2017 2000-2018 [employer]. [address] [place] [address], [place] [address]. All rights reserved. This information is not intended as a substitute for professional medical care. Always follow your healthcare professional's instructions.

## 2019-02-04 ENCOUNTER — TELEPHONE (OUTPATIENT)
Dept: FAMILY MEDICINE | Facility: OTHER | Age: 24
End: 2019-02-04

## 2019-02-04 DIAGNOSIS — L60.0 INGROWN TOENAIL: Primary | ICD-10-CM

## 2019-02-04 NOTE — TELEPHONE ENCOUNTER
Isabella from [facility] called and would like patient to be seen by Dr. Cabrera at [facility] in [place]. [facility] is out of network.  Ros Paulson on 2/4/2019 at 11:08 AM

## 2019-02-18 ENCOUNTER — ALLIED HEALTH/NURSE VISIT (OUTPATIENT)
Dept: FAMILY MEDICINE | Facility: OTHER | Age: 24
End: 2019-02-18
Payer: COMMERCIAL

## 2019-02-18 DIAGNOSIS — Z30.42 ENCOUNTER FOR DEPO-PROVERA CONTRACEPTION: Primary | ICD-10-CM

## 2019-02-18 PROCEDURE — 96372 THER/PROPH/DIAG INJ SC/IM: CPT

## 2019-02-18 PROCEDURE — 25000128 H RX IP 250 OP 636: Performed by: OBSTETRICS & GYNECOLOGY

## 2019-02-18 RX ADMIN — MEDROXYPROGESTERONE ACETATE 150 MG: 150 INJECTION, SUSPENSION INTRAMUSCULAR at 13:10

## 2019-02-18 NOTE — PROGRESS NOTES
Patientrequests ongoing injections of Depo-Provera  Date of last DMPA:  No period since child birth but still cramping alternates 3 x week or every other week. Told by provider to continue on and let body adjust per pt report.   Adverse reaction to last shot?  Yes see above  Heavy bleeding or more than 14 days bleeding sincelast shot?  no  Wants to continue contraception for another 3 months, knowing that fertility may not return for up to 18 months?  yes  Recent migraine headaches?  no  Breast problems since last shot?  no  Problems with excessive hair loss, acne or facial hair?  No  Verified name and date of birth . New injections instructed to wait 15 min post injection in the lobby and to report any symptoms of a reaction to nursing .      Mariann Gray ....................  2/18/2019   1:10 PM

## 2019-02-19 ENCOUNTER — OFFICE VISIT (OUTPATIENT)
Dept: PODIATRY | Facility: OTHER | Age: 24
End: 2019-02-19
Attending: PODIATRIST
Payer: COMMERCIAL

## 2019-02-19 VITALS
TEMPERATURE: 98 F | WEIGHT: 170 LBS | BODY MASS INDEX: 24.34 KG/M2 | DIASTOLIC BLOOD PRESSURE: 66 MMHG | HEART RATE: 69 BPM | SYSTOLIC BLOOD PRESSURE: 114 MMHG | HEIGHT: 70 IN

## 2019-02-19 DIAGNOSIS — L30.1 VESICULAR ECZEMA OF HANDS AND FEET: ICD-10-CM

## 2019-02-19 DIAGNOSIS — L60.0 INGROWN TOENAIL: Primary | ICD-10-CM

## 2019-02-19 PROCEDURE — G0463 HOSPITAL OUTPT CLINIC VISIT: HCPCS

## 2019-02-19 PROCEDURE — 11750 EXCISION NAIL&NAIL MATRIX: CPT | Mod: T5 | Performed by: PODIATRIST

## 2019-02-19 PROCEDURE — 11750 EXCISION NAIL&NAIL MATRIX: CPT | Mod: TA | Performed by: PODIATRIST

## 2019-02-19 PROCEDURE — G0463 HOSPITAL OUTPT CLINIC VISIT: HCPCS | Mod: 25

## 2019-02-19 PROCEDURE — 99203 OFFICE O/P NEW LOW 30 MIN: CPT | Mod: 25 | Performed by: PODIATRIST

## 2019-02-19 PROCEDURE — 11750 EXCISION NAIL&NAIL MATRIX: CPT | Mod: T5

## 2019-02-19 RX ORDER — LORAZEPAM 0.5 MG/1
TABLET ORAL
Refills: 2 | COMMUNITY
Start: 2019-02-08 | End: 2020-03-31

## 2019-02-19 ASSESSMENT — PAIN SCALES - GENERAL: PAINLEVEL: NO PAIN (0)

## 2019-02-19 ASSESSMENT — MIFFLIN-ST. JEOR: SCORE: 1606.36

## 2019-02-19 NOTE — PROGRESS NOTES
"Chief complaint: Patient presents with:  Ingrown Toenail: left great toe        History of Present Illness: This 24 year old female is seen at the request of Felicita Calhoun for evaluation and suggestions of management of an ingrown toenail of the LEFT foot. When she was a Senior in high school, the patient dropped a heavy object on her LEFT big toe. This caused a deformity of her toenail and she had to have the nail removed. The nail has been removed 3 or 4 times and it has been permanent removed twice already. The nail spicule remnant is painful and she is wondering how she can keep it from growing back.    Secondly, the patient's RIGHT hallux medial border is painful and it has been infected before. The nail border has also been removed twice, but it continues to grow back ingrown. She is looking for treatment options for this toe as well.     Last, patient has an itchy rash over her RIGHT dorsal foot. She has a triamcinolone cream that she has been using for a long time, but she still has the itchy rash. She has a similar rash on her RIGHT hand. No further pedal complaints today.     /66 (BP Location: Left arm, Patient Position: Sitting, Cuff Size: Adult Regular)   Pulse 69   Temp 98  F (36.7  C) (Tympanic)   Ht 1.778 m (5' 10\")   Wt 77.1 kg (170 lb)   BMI 24.39 kg/m      Patient Active Problem List   Diagnosis     ACP (advance care planning)     Attention deficit disorder     On stimulant medication - contract signed 3/31/17     Migraines     Difficulty sleeping     S/P  section       Past Surgical History:   Procedure Laterality Date     ADENOIDECTOMY      No Comments Provided     APPENDECTOMY OPEN           ARTHROSCOPY KNEE      2012,2nd as well at age 18      SECTION N/A 2018    Procedure:  SECTION;  External version attemted prior to Section;  Surgeon: Naye Johns MD;  Location:  OR     OTHER SURGICAL HISTORY      1999,[id number],BLADDER SURGERY     OTHER " SURGICAL HISTORY      5/19/2017,33016.0,[place] REMOVE FOREIGN BODY SIMPLE       Current Outpatient Medications   Medication     amphetamine-dextroamphetamine (ADDERALL) 20 MG per tablet     ibuprofen (ADVIL/MOTRIN) 600 MG tablet     Prenatal Vit-Fe Fumarate-FA (PRENATAL VITAMINS) 28-0.8 MG TABS     triamcinolone (KENALOG) 0.1 % external ointment     citalopram (CELEXA) 20 MG tablet     LORazepam (ATIVAN) 0.5 MG tablet     Current Facility-Administered Medications   Medication     medroxyPROGESTERone (DEPO-PROVERA) injection 150 mg     medroxyPROGESTERone (DEPO-PROVERA) injection 150 mg          Allergies   Allergen Reactions     Influenza Vaccines Swelling     Influenza Virus Vaccine H5n1      Arm swelling, pain, sweating.      Penicillins      Phenergan Dm [Promethazine-Dm] Other (See Comments)     Hallucinations and muscle twitching       History reviewed. No pertinent family history.    Social History     Socioeconomic History     Marital status: Single     Spouse name: Not on file     Number of children: Not on file     Years of education: Not on file     Highest education level: Not on file   Social Needs     Financial resource strain: Not on file     Food insecurity - worry: Not on file     Food insecurity - inability: Not on file     Transportation needs - medical: Not on file     Transportation needs - non-medical: Not on file   Occupational History     Not on file   Tobacco Use     Smoking status: Never Smoker     Smokeless tobacco: Never Used   Substance and Sexual Activity     Alcohol use: Yes     Alcohol/week: 0.0 oz     Comment: occasionally     Drug use: No     Comment: Drug use: No     Sexual activity: Yes     Partners: Male     Birth control/protection: Injection   Other Topics Concern     Not on file   Social History Narrative     Not on file       ROS: 10 point ROS neg other than the symptoms noted above in the HPI.  EXAM  Constitutional: healthy, alert and no distress    Psychiatric: mentation appears  normal and affect normal/bright    VASCULAR:  -Dorsalis pedis pulse +2/4 b/l  -Posterior tibial pulse +2/4 b/l  -Capillary refill time < 3 seconds to b/l hallux  -Hair growth Present to b/l anterior legs and ankles  NEURO:  -Light touch sensation intact to b/l plantar forefoot  DERM:  -Incurvation to the medial border of the RIGHT hallux  ---Mild erythema and minimal  edema to the nail border  ---No openings of the skin or serous drainage  ---No severe erythema, no ascending erythema, no calor, no purulence, no malodor, no other SOI.  -Incurvation to the distal nail border of the LEFT hallux nail spicule centrally located on the nail bed  ---Distal skin of LEFT hallux curves over the nail  ---Mild erythema and minimal  edema to the distal nail border  ---No openings of the skin or serous drainage  ---No severe erythema, no ascending erythema, no calor, no purulence, no malodor, no other SOI.    -RIGHT dorsal foot with diffuse, circular, raised, erythematous, scaly rash that extends across the dorsal midfoot  -Skin temperature, texture and turgor WNL b/l with the exception of the above rash  MSK:  -Pain on palpation to LEFT distal hallux and RIGHT medial hallux nail border  -Muscle strength of ankles +5/5 for dorsiflexion, plantarflexion, ABDUction and ADDuction b/l    ============================================================    ASSESSMENT:  (L60.0) Ingrown toenail  (primary encounter diagnosis) x 2  ---LEFT hallux toenail spicule  ---RIGHT hallux medial border    (L30.1) Vesicular eczema of hands and feet        PLAN:  -Patient evaluated and examined. Treatment options discussed with no educational barriers noted.  --Matrixectomy x 2: Rright hallux medial border and LEFT hallux toenail spicule: Written and verbal consent obtained after reviewing risks and benefits of the procedure including post-procedure infection, loss of limb and loss of life if a toenail infection is not promptly treated. Patient in agreement  "with this plan and she wishes to proceed with the procedure today. A time-out was performed to identify the correct patient, limb, digit and procedure.  An alcohol prep pad was applied to  to the base of the both hallux digits. Each hallux was injected with 9 mL of 1:1 of 2% Lidocaine plain and 0.5% marcaine plain. Adequate local anesthesia was obtained. A ring tournicot was applied to the LEFT  hallux and a chloroprep was applied to the hallux. A freer was used to loosen the nail from the underlying nail bed. An English Anvil and a hemostat were then used to remove the nail spicule. A total of three applications of phenol were applied for 30 seconds per application. The hallux was rinsed thoroughly with alcohol. The tournicot was removed from the toe and there was a prompt hyperemic response to the hallux. The wound was then dressed with an Silvadene, gauze and 1\" coban. The exact same procedure was then performed on the medial border of the RIGHT hallux.    The patient was educated on after procedure care including daily epsom salt soaks starting tomorrow followed by dressing of the toe with an antibiotic cream and a bandaid until the wound site on the toe stops draining (2-3 weeks). Provided education on how to look for signs of infection and the patient was instructed to return to the clinic or hospital immediately if there are any signs of infection since a serious infection can lead to loss of limb or loss of life. Patient is in agreement with this plan.  -Advised patient to apply Eucerin cream to her foot daily over the eczema locations of her RIGHT dorsal foot. She should avoid Triamcinolone cream more than 14 consecutive days in a row.  -Patient in agreement with the above treatment plan and all of patient's questions were answered.      RTC two weeks toe evaluate matrixectomy site        Soraida Redmond DPM  "

## 2019-02-19 NOTE — NURSING NOTE
"Chief Complaint   Patient presents with     Ingrown Toenail     left great toe       Initial There were no vitals taken for this visit. Estimated body mass index is 25.7 kg/m  as calculated from the following:    Height as of 1/21/19: 1.753 m (5' 9\").    Weight as of 1/28/19: 78.9 kg (174 lb).  Medication Reconciliation: complete    Ana Cristina Monroy LPN  "

## 2019-02-19 NOTE — PATIENT INSTRUCTIONS
-Apply Eucerin cream to Eczema of the foot    ------------------------------------------------------------------    Nail procedure care:  -Start epsom salt soaks tomorrow. Soak the foot 1-2 times a day for 20 minutes.  -Apply an antibiotic cream, gauze and a bandaid over the toe. Keep the toe covered at all times until it is completely healed.  -You may develop a black scab over the nail bed--let this fall off on its own and don't pick at it.  -The toe may drain for 2-3 weeks. It is normal for it to have a clear drainage.    Watching for signs of infection:  If the toe has a thick, white pus coming from the procedure site or if the the toe becomes red, swollen, painful, or you begin to feel sick (fever/chills/nausea/vomitting), return to the podiatry clinic immediately or to the emergency room is after hours.

## 2019-03-05 ENCOUNTER — OFFICE VISIT (OUTPATIENT)
Dept: PODIATRY | Facility: OTHER | Age: 24
End: 2019-03-05
Attending: PODIATRIST
Payer: COMMERCIAL

## 2019-03-05 VITALS
OXYGEN SATURATION: 98 % | DIASTOLIC BLOOD PRESSURE: 60 MMHG | SYSTOLIC BLOOD PRESSURE: 90 MMHG | TEMPERATURE: 97.9 F | HEART RATE: 69 BPM

## 2019-03-05 DIAGNOSIS — L30.1 VESICULAR ECZEMA OF HANDS AND FEET: ICD-10-CM

## 2019-03-05 DIAGNOSIS — L60.0 INGROWN TOENAIL: Primary | ICD-10-CM

## 2019-03-05 DIAGNOSIS — B07.0 PLANTAR VERRUCA: ICD-10-CM

## 2019-03-05 PROCEDURE — G0463 HOSPITAL OUTPT CLINIC VISIT: HCPCS | Mod: 25

## 2019-03-05 PROCEDURE — G0463 HOSPITAL OUTPT CLINIC VISIT: HCPCS

## 2019-03-05 PROCEDURE — 99213 OFFICE O/P EST LOW 20 MIN: CPT | Mod: 25 | Performed by: PODIATRIST

## 2019-03-05 PROCEDURE — 17110 DESTRUCTION B9 LES UP TO 14: CPT | Performed by: PODIATRIST

## 2019-03-05 ASSESSMENT — PAIN SCALES - GENERAL: PAINLEVEL: NO PAIN (0)

## 2019-03-05 NOTE — NURSING NOTE
"Chief Complaint   Patient presents with     Toenail     recheck of toenail removal       Initial There were no vitals taken for this visit. Estimated body mass index is 24.39 kg/m  as calculated from the following:    Height as of 2/19/19: 1.778 m (5' 10\").    Weight as of 2/19/19: 77.1 kg (170 lb).  Medication Reconciliation: complete    Ana Cristina Monroy LPN  "

## 2019-03-05 NOTE — PROGRESS NOTES
Chief complaint: Patient presents with:  Toenail: recheck of toenail removal      History of Present Illness: This 24 year old female is seen for follow-up evaluation of RIGHT medial hallux and LEFT toenail spicule matrixectomy. She has been doing daily epsom salt soaks and dressing the toe with antibiotic cream, gauze and a bandage without issues. She has minimal pain along the bilateral toes.     Secondly, patient has been using Eucerin cream over there RIGHT dorsal foot rash and she has noticed improvement with less itchiness of the lestions. She had previously used triamcinolone cream for long-term use.     Lastly, patient has a painful callus on the bottom of her LEFT foot. She is wondering what is causing it and if it can be treated. The lesion has been present for about a year. No further pedal complaints today.     History of ingrown nail:  When patient was a Senior in high school, she dropped a heavy object on her LEFT big toe. This caused a deformity of her toenail and she had to have the nail removed. The nail has been removed 3 or 4 times and it has been permanent removed twice already.   The RIGHT hallux medial border had also been painful and it has been infected before. The RIGHT nail border has also been removed twice prior to having it done on 2019, but it continued to grow back ingrown. She was looking for treatment options for this toe as well.        BP 90/60 (BP Location: Left arm, Patient Position: Sitting, Cuff Size: Adult Regular)   Pulse 69   Temp 97.9  F (36.6  C) (Tympanic)   SpO2 98%     Patient Active Problem List   Diagnosis     ACP (advance care planning)     Attention deficit disorder     On stimulant medication - contract signed 3/31/17     Migraines     Difficulty sleeping     S/P  section       Past Surgical History:   Procedure Laterality Date     ADENOIDECTOMY      No Comments Provided     APPENDECTOMY OPEN      2006     ARTHROSCOPY KNEE      2012,2nd as well  at age 18      SECTION N/A 2018    Procedure:  SECTION;  External version attemted prior to Section;  Surgeon: Naye Johns MD;  Location: [facility] OR     OTHER SURGICAL HISTORY      1999,CWN325,BLADDER SURGERY     OTHER SURGICAL HISTORY      2017,60509.0,PA REMOVE FOREIGN BODY SIMPLE       Current Outpatient Medications   Medication     amphetamine-dextroamphetamine (ADDERALL) 20 MG per tablet     citalopram (CELEXA) 20 MG tablet     ibuprofen (ADVIL/MOTRIN) 600 MG tablet     LORazepam (ATIVAN) 0.5 MG tablet     Prenatal Vit-Fe Fumarate-FA (PRENATAL VITAMINS) 28-0.8 MG TABS     triamcinolone (KENALOG) 0.1 % external ointment     Current Facility-Administered Medications   Medication     medroxyPROGESTERone (DEPO-PROVERA) injection 150 mg     medroxyPROGESTERone (DEPO-PROVERA) injection 150 mg          Allergies   Allergen Reactions     Influenza Vaccines Swelling     Influenza Virus Vaccine H5n1      Arm swelling, pain, sweating.      Penicillins      Phenergan Dm [Promethazine-Dm] Other (See Comments)     Hallucinations and muscle twitching       History reviewed. No pertinent family history.    Social History     Socioeconomic History     Marital status: Single     Spouse name: Not on file     Number of children: Not on file     Years of education: Not on file     Highest education level: Not on file   Occupational History     Not on file   Social Needs     Financial resource strain: Not on file     Food insecurity:     Worry: Not on file     Inability: Not on file     Transportation needs:     Medical: Not on file     Non-medical: Not on file   Tobacco Use     Smoking status: Never Smoker     Smokeless tobacco: Never Used   Substance and Sexual Activity     Alcohol use: Yes     Alcohol/week: 0.0 oz     Comment: occasionally     Drug use: No     Comment: Drug use: No     Sexual activity: Yes     Partners: Male     Birth control/protection: Injection   Lifestyle     Physical activity:      Days per week: Not on file     Minutes per session: Not on file     Stress: Not on file   Relationships     Social connections:     Talks on phone: Not on file     Gets together: Not on file     Attends Anabaptism service: Not on file     Active member of club or organization: Not on file     Attends meetings of clubs or organizations: Not on file     Relationship status: Not on file     Intimate partner violence:     Fear of current or ex partner: Not on file     Emotionally abused: Not on file     Physically abused: Not on file     Forced sexual activity: Not on file   Other Topics Concern     Parent/sibling w/ CABG, MI or angioplasty before 65F 55M? Not Asked   Social History Narrative     Not on file       ROS: 10 point ROS neg other than the symptoms noted above in the HPI.  EXAM  Constitutional: healthy, alert and no distress     Psychiatric: mentation appears normal and affect normal/bright     VASCULAR:  -Dorsalis pedis pulse +2/4 b/l  -Posterior tibial pulse +2/4 b/l  -Capillary refill time < 3 seconds to b/l hallux  -Hair growth Present to b/l anterior legs and ankles    NEURO:  -Light touch sensation intact to b/l plantar forefoot    DERM:  -RIGHT hallux medial border and LEFT toenail spicule removed  ---Minimal serous drainage  ---Minimal erythema and edema to nail removal sites    -Small hyperkeratotic lesion to plantar LEFT foot with small block spots within the hyperkeratotic lesion (lesion estimated to meausre 1.0cm x 0.5cm x +0.1cm).  ---Punctate bleeding upon debridement of lesion      -RIGHT dorsal foot with diffuse, circular, raised, erythematous, scaly rash that extends across the dorsal midfoot  -Skin temperature, texture and turgor WNL b/l with the exception of the above rash    MSK:  -No pain on palpation to LEFT distal hallux and RIGHT medial hallux nail border  -Muscle strength of ankles +5/5 for dorsiflexion, plantarflexion, ABDUction and ADDuction  b/l     ============================================================     ASSESSMENT:  (L60.0) Ingrown toenail  (primary encounter diagnosis) x 2  ---LEFT hallux toenail spicule  ---RIGHT hallux medial border     (L30.1) Vesicular eczema of hands and feet    (B07.0) Plantar verruca      PLAN:  -Patient evaluated and examined. Treatment options discussed with no educational barriers noted.  -Continue to apply Eucerin cream to her foot daily over the eczema locations of her RIGHT dorsal foot. She should avoid Triamcinolone cream more than 14 consecutive days in a row.  -Nail procedure sites are healing uneventfully. Patient to follow-up if she has a regrowth of the ingrown toenail. Will consider surgical removal of the matrix if ingrown borders regrow.   -Destruction of benign lesion to LEFT plantar foot x 1  ---Debrided lesion to punctate bleeding  ---Applied silver nitrate x 3 applications to tolerance  -Will consider ordering Imiquimod next week if lesion has not improved.  -Patient in agreement with the above treatment plan and all of patient's questions were answered.        RTC one week for liquid nitrogen application        Soraida Redmond DPM

## 2019-03-17 ENCOUNTER — HOSPITAL ENCOUNTER (EMERGENCY)
Facility: OTHER | Age: 24
Discharge: HOME | End: 2019-03-18
Attending: FAMILY MEDICINE | Admitting: FAMILY MEDICINE
Payer: COMMERCIAL

## 2019-03-17 ENCOUNTER — APPOINTMENT (OUTPATIENT)
Dept: CT IMAGING | Facility: OTHER | Age: 24
End: 2019-03-17
Attending: FAMILY MEDICINE
Payer: COMMERCIAL

## 2019-03-17 DIAGNOSIS — R10.11 ABDOMINAL PAIN, RIGHT UPPER QUADRANT: ICD-10-CM

## 2019-03-17 LAB
ALBUMIN SERPL-MCNC: 4.8 G/DL (ref 3.5–5.7)
ALBUMIN UR-MCNC: ABNORMAL MG/DL
ALP SERPL-CCNC: 62 U/L (ref 34–104)
ALT SERPL W P-5'-P-CCNC: 5 U/L (ref 7–52)
AMYLASE SERPL-CCNC: 29 U/L (ref 29–103)
ANION GAP SERPL CALCULATED.3IONS-SCNC: 9 MMOL/L (ref 3–14)
APPEARANCE UR: CLEAR
APTT PPP: 33 SEC (ref 26–39)
AST SERPL W P-5'-P-CCNC: 14 U/L (ref 13–39)
BASOPHILS # BLD AUTO: 0.1 10E9/L (ref 0–0.2)
BASOPHILS NFR BLD AUTO: 0.8 %
BILIRUB SERPL-MCNC: 0.4 MG/DL (ref 0.3–1)
BILIRUB UR QL STRIP: NEGATIVE
BUN SERPL-MCNC: 15 MG/DL (ref 7–25)
CALCIUM SERPL-MCNC: 9.5 MG/DL (ref 8.6–10.3)
CHLORIDE SERPL-SCNC: 105 MMOL/L (ref 98–107)
CO2 SERPL-SCNC: 23 MMOL/L (ref 21–31)
COLOR UR AUTO: YELLOW
CREAT SERPL-MCNC: 0.79 MG/DL (ref 0.6–1.2)
DIFFERENTIAL METHOD BLD: NORMAL
EOSINOPHIL # BLD AUTO: 0.3 10E9/L (ref 0–0.7)
EOSINOPHIL NFR BLD AUTO: 3.2 %
ERYTHROCYTE [DISTWIDTH] IN BLOOD BY AUTOMATED COUNT: 12 % (ref 10–15)
GFR SERPL CREATININE-BSD FRML MDRD: >90 ML/MIN/{1.73_M2}
GLUCOSE SERPL-MCNC: 90 MG/DL (ref 70–105)
GLUCOSE UR STRIP-MCNC: NEGATIVE MG/DL
HCG UR QL: NEGATIVE
HCT VFR BLD AUTO: 40.2 % (ref 35–47)
HGB BLD-MCNC: 13.1 G/DL (ref 11.7–15.7)
HGB UR QL STRIP: NEGATIVE
IMM GRANULOCYTES # BLD: 0 10E9/L (ref 0–0.4)
IMM GRANULOCYTES NFR BLD: 0.3 %
INR PPP: 1.08 (ref 0–1.3)
KETONES UR STRIP-MCNC: 15 MG/DL
LACTATE SERPL-SCNC: 0.6 MMOL/L (ref 0.5–2.2)
LEUKOCYTE ESTERASE UR QL STRIP: NEGATIVE
LIPASE SERPL-CCNC: 30 U/L (ref 11–82)
LYMPHOCYTES # BLD AUTO: 2.9 10E9/L (ref 0.8–5.3)
LYMPHOCYTES NFR BLD AUTO: 37 %
MCH RBC QN AUTO: 29.3 PG (ref 26.5–33)
MCHC RBC AUTO-ENTMCNC: 32.6 G/DL (ref 31.5–36.5)
MCV RBC AUTO: 90 FL (ref 78–100)
MONOCYTES # BLD AUTO: 0.5 10E9/L (ref 0–1.3)
MONOCYTES NFR BLD AUTO: 6.3 %
NEUTROPHILS # BLD AUTO: 4.1 10E9/L (ref 1.6–8.3)
NEUTROPHILS NFR BLD AUTO: 52.4 %
NITRATE UR QL: NEGATIVE
NON-SQ EPI CELLS #/AREA URNS LPF: NORMAL /LPF
PH UR STRIP: 6.5 PH (ref 5–9)
PLATELET # BLD AUTO: 315 10E9/L (ref 150–450)
POTASSIUM SERPL-SCNC: 3.5 MMOL/L (ref 3.5–5.1)
PROT SERPL-MCNC: 8.2 G/DL (ref 6.4–8.9)
RBC # BLD AUTO: 4.47 10E12/L (ref 3.8–5.2)
RBC #/AREA URNS AUTO: NORMAL /HPF
SODIUM SERPL-SCNC: 137 MMOL/L (ref 134–144)
SOURCE: ABNORMAL
SP GR UR STRIP: 1.02 (ref 1–1.03)
UROBILINOGEN UR STRIP-ACNC: 1 EU/DL (ref 0.2–1)
WBC # BLD AUTO: 7.8 10E9/L (ref 4–11)
WBC #/AREA URNS AUTO: NORMAL /HPF

## 2019-03-17 PROCEDURE — 83690 ASSAY OF LIPASE: CPT | Performed by: FAMILY MEDICINE

## 2019-03-17 PROCEDURE — 81025 URINE PREGNANCY TEST: CPT | Performed by: FAMILY MEDICINE

## 2019-03-17 PROCEDURE — 74177 CT ABD & PELVIS W/CONTRAST: CPT | Mod: TC

## 2019-03-17 PROCEDURE — 96374 THER/PROPH/DIAG INJ IV PUSH: CPT | Performed by: FAMILY MEDICINE

## 2019-03-17 PROCEDURE — 82150 ASSAY OF AMYLASE: CPT | Performed by: FAMILY MEDICINE

## 2019-03-17 PROCEDURE — 85610 PROTHROMBIN TIME: CPT | Performed by: FAMILY MEDICINE

## 2019-03-17 PROCEDURE — 99284 EMERGENCY DEPT VISIT MOD MDM: CPT | Mod: Z6 | Performed by: FAMILY MEDICINE

## 2019-03-17 PROCEDURE — 81001 URINALYSIS AUTO W/SCOPE: CPT | Performed by: FAMILY MEDICINE

## 2019-03-17 PROCEDURE — 80053 COMPREHEN METABOLIC PANEL: CPT | Performed by: FAMILY MEDICINE

## 2019-03-17 PROCEDURE — 83605 ASSAY OF LACTIC ACID: CPT | Performed by: FAMILY MEDICINE

## 2019-03-17 PROCEDURE — 25000128 H RX IP 250 OP 636: Performed by: FAMILY MEDICINE

## 2019-03-17 PROCEDURE — 99285 EMERGENCY DEPT VISIT HI MDM: CPT | Mod: 25 | Performed by: FAMILY MEDICINE

## 2019-03-17 PROCEDURE — 96375 TX/PRO/DX INJ NEW DRUG ADDON: CPT | Performed by: FAMILY MEDICINE

## 2019-03-17 PROCEDURE — 85025 COMPLETE CBC W/AUTO DIFF WBC: CPT | Performed by: FAMILY MEDICINE

## 2019-03-17 PROCEDURE — 85730 THROMBOPLASTIN TIME PARTIAL: CPT | Performed by: FAMILY MEDICINE

## 2019-03-17 PROCEDURE — 99284 EMERGENCY DEPT VISIT MOD MDM: CPT | Mod: 25 | Performed by: FAMILY MEDICINE

## 2019-03-17 RX ORDER — ONDANSETRON 2 MG/ML
4 INJECTION INTRAMUSCULAR; INTRAVENOUS EVERY 30 MIN PRN
Status: DISCONTINUED | OUTPATIENT
Start: 2019-03-17 | End: 2019-03-18 | Stop reason: HOSPADM

## 2019-03-17 RX ORDER — LORAZEPAM 2 MG/ML
1 INJECTION INTRAMUSCULAR ONCE
Status: COMPLETED | OUTPATIENT
Start: 2019-03-17 | End: 2019-03-18

## 2019-03-17 RX ORDER — KETOROLAC TROMETHAMINE 15 MG/ML
15 INJECTION, SOLUTION INTRAMUSCULAR; INTRAVENOUS ONCE
Status: COMPLETED | OUTPATIENT
Start: 2019-03-17 | End: 2019-03-17

## 2019-03-17 RX ORDER — MORPHINE SULFATE 4 MG/ML
4 INJECTION, SOLUTION INTRAMUSCULAR; INTRAVENOUS
Status: COMPLETED | OUTPATIENT
Start: 2019-03-17 | End: 2019-03-18

## 2019-03-17 RX ORDER — ACETAMINOPHEN 500 MG
500-1000 TABLET ORAL EVERY 6 HOURS PRN
COMMUNITY

## 2019-03-17 RX ADMIN — SODIUM CHLORIDE 1000 ML: 9 INJECTION, SOLUTION INTRAVENOUS at 22:12

## 2019-03-17 RX ADMIN — ONDANSETRON 4 MG: 2 INJECTION INTRAMUSCULAR; INTRAVENOUS at 22:15

## 2019-03-17 RX ADMIN — KETOROLAC TROMETHAMINE 15 MG: 15 INJECTION, SOLUTION INTRAMUSCULAR; INTRAVENOUS at 22:15

## 2019-03-17 RX ADMIN — ONDANSETRON 4 MG: 2 INJECTION INTRAMUSCULAR; INTRAVENOUS at 22:55

## 2019-03-17 SDOH — HEALTH STABILITY: MENTAL HEALTH: HOW OFTEN DO YOU HAVE A DRINK CONTAINING ALCOHOL?: NEVER

## 2019-03-17 ASSESSMENT — ENCOUNTER SYMPTOMS
COUGH: 0
BACK PAIN: 0
FLANK PAIN: 0
FEVER: 0
PALPITATIONS: 0
DYSURIA: 0
DIARRHEA: 0
VOMITING: 0
ABDOMINAL PAIN: 1
NAUSEA: 1
CHILLS: 0
ABDOMINAL DISTENTION: 0
FATIGUE: 0
SHORTNESS OF BREATH: 0
SORE THROAT: 0
DIAPHORESIS: 0
LIGHT-HEADEDNESS: 0

## 2019-03-17 ASSESSMENT — MIFFLIN-ST. JEOR: SCORE: 1629.04

## 2019-03-17 NOTE — ED AVS SNAPSHOT
[facility]  [address]  [place] [address]  Phone:  [phone]  Fax:  [phone]                                    Nury Gilbert   MRN: [id number]    Department:  [facility]   Date of Visit:  3/17/2019           After Visit Summary Signature Page    I have received my discharge instructions, and my questions have been answered. I have discussed any challenges I see with this plan with the nurse or doctor.    ..........................................................................................................................................  Patient/Patient Representative Signature      ..........................................................................................................................................  Patient Representative Print Name and Relationship to Patient    ..................................................               ................................................  Date                                   Time    ..........................................................................................................................................  Reviewed by Signature/Title    ...................................................              ..............................................  Date                                               Time          22EPIC Rev 08/18

## 2019-03-18 ENCOUNTER — OFFICE VISIT (OUTPATIENT)
Dept: OBGYN | Facility: OTHER | Age: 24
End: 2019-03-18
Attending: OBSTETRICS & GYNECOLOGY
Payer: COMMERCIAL

## 2019-03-18 VITALS
OXYGEN SATURATION: 99 % | HEIGHT: 70 IN | BODY MASS INDEX: 25.05 KG/M2 | RESPIRATION RATE: 18 BRPM | HEART RATE: 70 BPM | DIASTOLIC BLOOD PRESSURE: 76 MMHG | TEMPERATURE: 98.9 F | WEIGHT: 175 LBS | SYSTOLIC BLOOD PRESSURE: 103 MMHG

## 2019-03-18 VITALS
WEIGHT: 177.3 LBS | BODY MASS INDEX: 25.44 KG/M2 | HEART RATE: 84 BPM | TEMPERATURE: 97.8 F | DIASTOLIC BLOOD PRESSURE: 58 MMHG | SYSTOLIC BLOOD PRESSURE: 106 MMHG | RESPIRATION RATE: 18 BRPM

## 2019-03-18 DIAGNOSIS — R10.2 PELVIC PAIN IN FEMALE: Primary | ICD-10-CM

## 2019-03-18 PROCEDURE — G0463 HOSPITAL OUTPT CLINIC VISIT: HCPCS | Mod: 25,27

## 2019-03-18 PROCEDURE — 99213 OFFICE O/P EST LOW 20 MIN: CPT | Performed by: OBSTETRICS & GYNECOLOGY

## 2019-03-18 PROCEDURE — 25500064 ZZH RX 255 OP 636: Performed by: FAMILY MEDICINE

## 2019-03-18 PROCEDURE — 25000128 H RX IP 250 OP 636: Performed by: FAMILY MEDICINE

## 2019-03-18 PROCEDURE — G0463 HOSPITAL OUTPT CLINIC VISIT: HCPCS

## 2019-03-18 RX ORDER — HYDROCODONE BITARTRATE AND ACETAMINOPHEN 5; 325 MG/1; MG/1
2 TABLET ORAL EVERY 6 HOURS PRN
Qty: 10 TABLET | Refills: 0 | Status: SHIPPED | OUTPATIENT
Start: 2019-03-18 | End: 2019-04-18

## 2019-03-18 RX ADMIN — MORPHINE SULFATE 4 MG: 4 INJECTION INTRAVENOUS at 00:23

## 2019-03-18 RX ADMIN — LORAZEPAM 1 MG: 2 INJECTION INTRAMUSCULAR; INTRAVENOUS at 00:01

## 2019-03-18 RX ADMIN — IOHEXOL 100 ML: 350 INJECTION, SOLUTION INTRAVENOUS at 00:42

## 2019-03-18 ASSESSMENT — ANXIETY QUESTIONNAIRES
IF YOU CHECKED OFF ANY PROBLEMS ON THIS QUESTIONNAIRE, HOW DIFFICULT HAVE THESE PROBLEMS MADE IT FOR YOU TO DO YOUR WORK, TAKE CARE OF THINGS AT HOME, OR GET ALONG WITH OTHER PEOPLE: SOMEWHAT DIFFICULT
5. BEING SO RESTLESS THAT IT IS HARD TO SIT STILL: NOT AT ALL
2. NOT BEING ABLE TO STOP OR CONTROL WORRYING: SEVERAL DAYS
1. FEELING NERVOUS, ANXIOUS, OR ON EDGE: SEVERAL DAYS
6. BECOMING EASILY ANNOYED OR IRRITABLE: NEARLY EVERY DAY
3. WORRYING TOO MUCH ABOUT DIFFERENT THINGS: NOT AT ALL
7. FEELING AFRAID AS IF SOMETHING AWFUL MIGHT HAPPEN: NOT AT ALL
GAD7 TOTAL SCORE: 5

## 2019-03-18 ASSESSMENT — PAIN SCALES - GENERAL: PAINLEVEL: MODERATE PAIN (5)

## 2019-03-18 ASSESSMENT — PATIENT HEALTH QUESTIONNAIRE - PHQ9
SUM OF ALL RESPONSES TO PHQ QUESTIONS 1-9: 6
5. POOR APPETITE OR OVEREATING: NOT AT ALL

## 2019-03-18 NOTE — ED PROVIDER NOTES
History     Chief Complaint   Patient presents with     Abdominal Pain     HPI  Nury Gilbert is a 24 year old female who presents to ED with abdominal pain. Patient reports the pain began about 2 months ago and has been occurring intermittently.  The pain started up again this afternoon and progressively worsened over the evening.. The pain is located in the right lower quadrant and described as aching and sharp, 8 out of 10 for severity. The pain is exacerbated by movement, walking and relieved by nothing. The patient complains of associated nausea. Denies associated fever, sweats, chills, chest pain, shortness of breath, vomiting, diarrhea, dysuria, back or flank pain, vaginal discharge or other vaginal symptoms.  She denies any history of similar pain.  She states that her problems started when she started on Depo a few months ago.  She is currently breast-feeding.   Patient denies other complaints    Allergies:  Allergies   Allergen Reactions     Influenza Vaccines Swelling     Influenza Virus Vaccine H5n1      Arm swelling, pain, sweating.      Penicillins      Phenergan Dm [Promethazine-Dm] Other (See Comments)     Hallucinations and muscle twitching       Problem List:    Patient Active Problem List    Diagnosis Date Noted     S/P  section 2018     Priority: Medium     On stimulant medication - contract signed 3/31/17 2017     Priority: Medium     Scheduled Medication Use Agreement Signed 3/31/17 with Naye Villeda, [facility]  Pharmacy -  [facility], [place]  Ph.  [phone]  Needs to be seen every 3 months for refills per Agreement.         ACP (advance care planning) 2016     Priority: Medium     Advance Care Planning 2016: ACP Review of Chart / Resources Provided:  Reviewed chart for advance care plan.  Nury Gilbert has no plan or code status on file. Discussed available resources and provided with information. Confirmed code status reflects current choices pending  further ACP discussions.  Confirmed/documented legally designated decision makers.  Added by Amy Mullen    Advance Care Planning 2017: ACP Review of Chart / Resources Provided:  Reviewed chart for advance care plan.  Nury Gilbert has no plan or code status on file. Discussed available resources and provided with information.   Added by Lorri Zuleta               Attention deficit disorder 2011     Priority: Medium     Migraines 2011     Priority: Medium     Overview:   IMO Update 10/11  Overview:   IMO Update 10/11       Difficulty sleeping 2011     Priority: Medium        Past Medical History:    Past Medical History:   Diagnosis Date     Anxiety disorder      Attention-deficit hyperactivity disorder      Superficial foreign body of finger        Past Surgical History:    Past Surgical History:   Procedure Laterality Date     ADENOIDECTOMY      No Comments Provided     APPENDECTOMY OPEN      2006     ARTHROSCOPY KNEE      2012,2nd as well at age 18      SECTION N/A 2018    Procedure:  SECTION;  External version attemted prior to Section;  Surgeon: Naye Johns MD;  Location: [facility] OR     OTHER SURGICAL HISTORY      1999,HYB782,BLADDER SURGERY     OTHER SURGICAL HISTORY      2017,75158.0,PA REMOVE FOREIGN BODY SIMPLE       Family History:    History reviewed. No pertinent family history.    Social History:  Marital Status:  Single [1]  Social History     Tobacco Use     Smoking status: Never Smoker     Smokeless tobacco: Never Used   Substance Use Topics     Alcohol use: No     Alcohol/week: 0.0 oz     Frequency: Never     Drug use: No     Comment: Drug use: No        Medications:      acetaminophen (TYLENOL) 500 MG tablet   amphetamine-dextroamphetamine (ADDERALL) 20 MG per tablet   citalopram (CELEXA) 20 MG tablet   HYDROcodone-acetaminophen (NORCO) 5-325 MG tablet   ibuprofen (ADVIL/MOTRIN) 600 MG tablet   Prenatal Vit-Fe Fumarate-FA  "(PRENATAL VITAMINS) 28-0.8 MG TABS   triamcinolone (KENALOG) 0.1 % external ointment   LORazepam (ATIVAN) 0.5 MG tablet         Review of Systems   Constitutional: Negative for chills, diaphoresis, fatigue and fever.   HENT: Negative for congestion and sore throat.    Respiratory: Negative for cough and shortness of breath.    Cardiovascular: Negative for chest pain, palpitations and leg swelling.   Gastrointestinal: Positive for abdominal pain and nausea. Negative for abdominal distention, diarrhea and vomiting.   Genitourinary: Negative for dysuria, flank pain, vaginal bleeding and vaginal discharge.   Musculoskeletal: Negative for back pain.   Skin: Negative for rash.   Neurological: Negative for light-headedness.   All other systems reviewed and are negative.      Physical Exam   BP: 109/76  Pulse: 93  Temp: 98  F (36.7  C)  Resp: 18  Height: 177.8 cm (5' 10\")  Weight: 79.4 kg (175 lb)  SpO2: 100 %      Physical Exam   Constitutional: She is oriented to person, place, and time. She appears well-developed and well-nourished. She is cooperative. She does not appear ill. No distress.   HENT:   Head: Normocephalic and atraumatic.   Right Ear: External ear normal.   Left Ear: External ear normal.   Nose: Nose normal.   Mouth/Throat: Oropharynx is clear and moist.   Eyes: Conjunctivae and EOM are normal. Pupils are equal, round, and reactive to light.   Neck: Normal range of motion. Neck supple.   Cardiovascular: Normal rate, regular rhythm, normal heart sounds and intact distal pulses.   No murmur heard.  Pulmonary/Chest: Effort normal and breath sounds normal. She has no rales.   Abdominal: Soft. Bowel sounds are normal. There is no hepatosplenomegaly. There is tenderness in the right lower quadrant. There is no rigidity, no rebound, no guarding, no CVA tenderness, no tenderness at McBurney's point and negative Doyle's sign.   Musculoskeletal: Normal range of motion. She exhibits no edema or tenderness. "   Lymphadenopathy:     She has no cervical adenopathy.   Neurological: She is alert and oriented to person, place, and time.   Skin: Skin is warm. Capillary refill takes less than 2 seconds. No rash noted. She is not diaphoretic.   Nursing note and vitals reviewed.      ED Course     Procedures       Critical Care time:  none  Results for orders placed or performed during the hospital encounter of 03/17/19 (from the past 24 hour(s))   *UA reflex to Microscopic   Result Value Ref Range    Color Urine Yellow     Appearance Urine Clear     Glucose Urine Negative NEG^Negative mg/dL    Bilirubin Urine Negative NEG^Negative    Ketones Urine 15 (A) NEG^Negative mg/dL    Specific Gravity Urine 1.025 1.000 - 1.030    Blood Urine Negative NEG^Negative    pH Urine 6.5 5.0 - 9.0 pH    Protein Albumin Urine Trace (A) NEG^Negative mg/dL    Urobilinogen Urine 1.0 0.2 - 1.0 EU/dL    Nitrite Urine Negative NEG^Negative    Leukocyte Esterase Urine Negative NEG^Negative    Source Unspecified Urine    HCG qualitative urine   Result Value Ref Range    HCG Qual Urine Negative NEG^Negative   Urine Microscopic   Result Value Ref Range    WBC Urine 0 - 5 OTO5^0 - 5 /HPF    RBC Urine O - 2 OTO2^O - 2 /HPF    Squamous Epithelial /LPF Urine Few FEW^Few /LPF   CBC with platelets differential   Result Value Ref Range    WBC 7.8 4.0 - 11.0 10e9/L    RBC Count 4.47 3.8 - 5.2 10e12/L    Hemoglobin 13.1 11.7 - 15.7 g/dL    Hematocrit 40.2 35.0 - 47.0 %    MCV 90 78 - 100 fl    MCH 29.3 26.5 - 33.0 pg    MCHC 32.6 31.5 - 36.5 g/dL    RDW 12.0 10.0 - 15.0 %    Platelet Count 315 150 - 450 10e9/L    Diff Method Automated Method     % Neutrophils 52.4 %    % Lymphocytes 37.0 %    % Monocytes 6.3 %    % Eosinophils 3.2 %    % Basophils 0.8 %    % Immature Granulocytes 0.3 %    Absolute Neutrophil 4.1 1.6 - 8.3 10e9/L    Absolute Lymphocytes 2.9 0.8 - 5.3 10e9/L    Absolute Monocytes 0.5 0.0 - 1.3 10e9/L    Absolute Eosinophils 0.3 0.0 - 0.7 10e9/L     Absolute Basophils 0.1 0.0 - 0.2 10e9/L    Abs Immature Granulocytes 0.0 0 - 0.4 10e9/L   INR   Result Value Ref Range    INR 1.08 0 - 1.3   Partial thromboplastin time   Result Value Ref Range    PTT 33 26 - 39 sec   Comprehensive metabolic panel   Result Value Ref Range    Sodium 137 134 - 144 mmol/L    Potassium 3.5 3.5 - 5.1 mmol/L    Chloride 105 98 - 107 mmol/L    Carbon Dioxide 23 21 - 31 mmol/L    Anion Gap 9 3 - 14 mmol/L    Glucose 90 70 - 105 mg/dL    Urea Nitrogen 15 7 - 25 mg/dL    Creatinine 0.79 0.60 - 1.20 mg/dL    GFR Estimate >90 >60 mL/min/[1.73_m2]    GFR Estimate If Black >90 >60 mL/min/[1.73_m2]    Calcium 9.5 8.6 - 10.3 mg/dL    Bilirubin Total 0.4 0.3 - 1.0 mg/dL    Albumin 4.8 3.5 - 5.7 g/dL    Protein Total 8.2 6.4 - 8.9 g/dL    Alkaline Phosphatase 62 34 - 104 U/L    ALT 5 (L) 7 - 52 U/L    AST 14 13 - 39 U/L   Lactic acid   Result Value Ref Range    Lactic Acid 0.6 0.5 - 2.2 mmol/L   Lipase   Result Value Ref Range    Lipase 30 11 - 82 U/L   Amylase   Result Value Ref Range    Amylase 29 29 - 103 U/L   CT Abdomen Pelvis w Contrast    Narrative    EXAM:    CT Abdomen and Pelvis With Contrast     EXAM DATE/TIME:    3/17/2019 10:16 PM     CLINICAL HISTORY:    24 years old, female; Signs and symptoms; Other: Abd pain, unspecified     TECHNIQUE:    Axial computed tomography images of the abdomen and pelvis with intravenous   contrast.    All CT scans at this facility use at least one of these dose optimization   techniques: automated exposure control; mA and/or kV adjustment per patient   size (includes targeted exams where dose is matched to clinical indication); or   iterative reconstruction.    Coronal and sagittal reformatted images were created and reviewed.     CONTRAST:    Contrast Material: 100 ml of OMNIPAQUE 350; Contrast Route: IV     COMPARISON:    CT ABDOMEN AND PELVIS W 3/24/2015 2:52 AM     FINDINGS:    Lower thorax: No acute findings.     ABDOMEN:    Liver: Normal. No mass.     Gallbladder and bile ducts: Normal. No calcified stones. No ductal dilation.    Pancreas: Normal. No ductal dilation.    Spleen: Normal. No splenomegaly.    Adrenals: Normal. No mass.    Kidneys and ureters:  Small cystic structure located in the left kidney. This   has a diameter of 1 cm. This has a density is 8 mm. The kidneys are of normal   size. No hydronephrosis or nephrolithiasis.    Stomach and bowel: Normal. No obstruction. No mucosal thickening.    Appendix: No evidence of appendicitis.     PELVIS:    Bladder: Unremarkable as visualized.    Reproductive:  Increase in the vascular flow to the uterus. Distention of the   ovarian veins.     ABDOMEN and PELVIS:    Intraperitoneal space: Normal. No free air. No significant fluid collection.    Bones/joints: No acute fracture. No dislocation.    Soft tissues: Unremarkable.    Vasculature: Normal. No abdominal aortic aneurysm.    Lymph nodes: Normal. No enlarged lymph nodes.       Impression    IMPRESSION:   Increased vascularity to the uterus. This is associated with distention of the   both ovarian veins. This raises the possibility of pelvic congestion syndrome.   No other abnormality is seen.     THIS DOCUMENT HAS BEEN ELECTRONICALLY SIGNED BY RUSTY HARRINGTON MD       Medications   ondansetron (ZOFRAN) injection 4 mg (4 mg Intravenous Given 3/17/19 2255)   0.9% sodium chloride BOLUS (0 mLs Intravenous Stopped 3/17/19 2351)   morphine (PF) injection 4 mg (4 mg Intravenous Given 3/18/19 0023)   ketorolac (TORADOL) injection 15 mg (15 mg Intravenous Given 3/17/19 2215)   iohexol (OMNIPAQUE) 350 mg/mL solution 100 mL (100 mLs Intravenous Given 3/18/19 0042)   iohexol (OMNIPAQUE) 240 mg/mL solution 50 mL (50 mLs Oral Given 3/17/19 2238)   LORazepam (ATIVAN) injection 1 mg (1 mg Intravenous Given 3/18/19 0001)       I had a discussion with the patient and the family regarding the symptoms, exam, laboratory, CT Scan results, diagnosis, and plan.  The CT scan is  reviewed as above.  Labs reviewed as above and are negative for any acute findings.    The patient is discharged home in good condition.  Follow-up with OB/GYN tomorrow or the next day.  She is given Norco #10, for her discomfort and is encouraged to continue taking ibuprofen 800 mg every 8 hours.    I answered all questions to the best of my ability.    The patient voiced understanding of the plan, was agreeable, and had no further questions or concerns. Advised to return for any worsening symptoms.      Assessments & Plan (with Medical Decision Making)     I have reviewed the nursing notes.    I have reviewed the findings, diagnosis, plan and need for follow up with the patient.       Medication List      Started    HYDROcodone-acetaminophen 5-325 MG tablet  Commonly known as:  NORCO  2 tablets, Oral, EVERY 6 HOURS PRN        ASK your doctor about these medications    cephALEXin 500 MG capsule  Commonly known as:  KEFLEX  500 mg, Oral, 2 TIMES DAILY  Ask about: Should I take this medication?            Final diagnoses:   Abdominal pain, right upper quadrant       3/17/2019   [facility]     Haider Kent MD  03/18/19 0200

## 2019-03-18 NOTE — ED TRIAGE NOTES
"Depo 8 months ago, cramping off and on since then, but last 2 weeks or so, more severe on right side, today was worst pain yet at 8/10.  Describes pain as \"period like cramps.'' Took pregnancy test yesterday, states result \"unvalaid\".  Pt states large \"lump\" on right side in same area as appendix scar, she first noticed this yesterday, pt states when she presses on this lump the pain feels a little better, like \"it relieves the pressure.\" No period since last July.  Pt is sexually active.  Denies urinary sx or any other maladies. Pt is currently breast feeding.   "

## 2019-03-18 NOTE — ED NOTES
Pt rates pain 4/10, nausea 4/10.  States pain tolerable, requesting nausea med to help her get PO contrast down.

## 2019-03-18 NOTE — PROGRESS NOTES
Nury is a 24-year-old para 1 who is 10months out from a  section for breech.  Sees Dr. Erica Johns for her obstetrical care.  Presented to the emergency room yesterday with some right lower quadrant pain that has been intermittent for the past couple of months getting to the point where it is an 8 out of 10.  Some associated nausea.  No fever chills sweats.  Has been on Depo-Provera since 2 weeks postop.  Is amenorrheic.  Did not use Depo-Provera prior to her pregnancy.  No issues with bladder or bowel.    Review of the emergency department report shows negative UA, negative hCG.  Normal CBC and comprehensive medical profile.  Also had a negative CT scan of the    GYN  and GI review of systems on the day    Physical exam:  Patient is alert orientated x3 appears in no acute distress  Blood pressure is 106/58 temp is 97.8 pulse is 84  Abdomen is soft thin benign tenderness is present largely at the myofascial level in the right lower quadrant but at least 6-7 cm above her Pfannenstiel incision.  I do not detect any bulging to her incision clarity.  No evidence of herniation.    Assessment:  Abdominal wall versus right lower quadrant pain ongoing for a few months.  10 months out from  section.  Workup thus far negative.  On Depo-Provera.    Plan: Will check ultrasound of the uterus to make sure there is not uterine pathology or an intrauterine filling defect creating the cramping.  We will also use ultrasound to assess if it appears that the uterus is scarred to the lower midline.  The patient will get this obtained tomorrow and see Dr. Erica Johns in follow-up same day.

## 2019-03-18 NOTE — ED NOTES
Pt requesting anti anxiety medication, provider aware.  Pt continues to c/o pain and nausea, declining medication intervention at this time.

## 2019-03-18 NOTE — PROGRESS NOTES
1.  Has the patient had a previous reaction to IV contrast? no      2.  Does the patient have kidney disease? no    3.  Is the patient on dialysis? no    If YES to any of these questions, exam will be reviewed with a Radiologist before administering contrast.    IV Contrast- Discharge Instructions After Your CT Scan      The IV contrast you received today will be filtered from your bloodstream by your kidneys during the next 24 hours and pass from the body in urine.  You will not be aware of this process and your urine will not change in color.  To help this process you should drink at least 4 additional glasses of water or juice today.  This reduces stress on your kidneys.    Most contrast reactions are immediate.  Should you develop symptoms of concern after discharge, contact the department at the number below.  After hours you should contact your personal physician.  If you develop breathing distress or wheezing, call 911.

## 2019-03-18 NOTE — NURSING NOTE
Patient is here for an ER follow up for possible PCOS.  Vianney Arzola LPN .............3/18/2019     1:09 PM      No LMP recorded. Patient has had an injection.  Medication Reconciliation: complete    Vianney Arzola LPN  3/18/2019 1:16 PM

## 2019-03-19 ENCOUNTER — TELEPHONE (OUTPATIENT)
Dept: OBGYN | Facility: OTHER | Age: 24
End: 2019-03-19

## 2019-03-19 ENCOUNTER — TELEPHONE (OUTPATIENT)
Dept: FAMILY MEDICINE | Facility: OTHER | Age: 24
End: 2019-03-19

## 2019-03-19 ENCOUNTER — HOSPITAL ENCOUNTER (OUTPATIENT)
Dept: ULTRASOUND IMAGING | Facility: OTHER | Age: 24
Discharge: HOME | End: 2019-03-19
Attending: OBSTETRICS & GYNECOLOGY | Admitting: OBSTETRICS & GYNECOLOGY
Payer: COMMERCIAL

## 2019-03-19 ENCOUNTER — OFFICE VISIT (OUTPATIENT)
Dept: OBGYN | Facility: OTHER | Age: 24
End: 2019-03-19
Attending: OBSTETRICS & GYNECOLOGY
Payer: COMMERCIAL

## 2019-03-19 VITALS
DIASTOLIC BLOOD PRESSURE: 64 MMHG | RESPIRATION RATE: 18 BRPM | TEMPERATURE: 98.2 F | WEIGHT: 173 LBS | HEART RATE: 56 BPM | BODY MASS INDEX: 24.82 KG/M2 | SYSTOLIC BLOOD PRESSURE: 106 MMHG

## 2019-03-19 DIAGNOSIS — N94.89 PELVIC CONGESTION SYNDROME: Primary | ICD-10-CM

## 2019-03-19 DIAGNOSIS — R10.2 PELVIC PAIN IN FEMALE: ICD-10-CM

## 2019-03-19 PROCEDURE — 99214 OFFICE O/P EST MOD 30 MIN: CPT | Performed by: OBSTETRICS & GYNECOLOGY

## 2019-03-19 PROCEDURE — G0463 HOSPITAL OUTPT CLINIC VISIT: HCPCS | Mod: 25

## 2019-03-19 PROCEDURE — G0463 HOSPITAL OUTPT CLINIC VISIT: HCPCS

## 2019-03-19 PROCEDURE — 76830 TRANSVAGINAL US NON-OB: CPT

## 2019-03-19 RX ORDER — MEDROXYPROGESTERONE ACETATE 10 MG
40 TABLET ORAL DAILY
Qty: 360 TABLET | Refills: 3 | Status: SHIPPED | OUTPATIENT
Start: 2019-03-19 | End: 2019-03-26

## 2019-03-19 ASSESSMENT — ANXIETY QUESTIONNAIRES
GAD7 TOTAL SCORE: 5
1. FEELING NERVOUS, ANXIOUS, OR ON EDGE: SEVERAL DAYS
IF YOU CHECKED OFF ANY PROBLEMS ON THIS QUESTIONNAIRE, HOW DIFFICULT HAVE THESE PROBLEMS MADE IT FOR YOU TO DO YOUR WORK, TAKE CARE OF THINGS AT HOME, OR GET ALONG WITH OTHER PEOPLE: SOMEWHAT DIFFICULT
5. BEING SO RESTLESS THAT IT IS HARD TO SIT STILL: SEVERAL DAYS
7. FEELING AFRAID AS IF SOMETHING AWFUL MIGHT HAPPEN: SEVERAL DAYS
GAD7 TOTAL SCORE: 11
3. WORRYING TOO MUCH ABOUT DIFFERENT THINGS: SEVERAL DAYS
6. BECOMING EASILY ANNOYED OR IRRITABLE: NEARLY EVERY DAY
2. NOT BEING ABLE TO STOP OR CONTROL WORRYING: MORE THAN HALF THE DAYS

## 2019-03-19 ASSESSMENT — PAIN SCALES - GENERAL: PAINLEVEL: MODERATE PAIN (5)

## 2019-03-19 ASSESSMENT — PATIENT HEALTH QUESTIONNAIRE - PHQ9
5. POOR APPETITE OR OVEREATING: MORE THAN HALF THE DAYS
SUM OF ALL RESPONSES TO PHQ QUESTIONS 1-9: 10

## 2019-03-19 NOTE — PROGRESS NOTES
"Follow-Up Visit    S: Ms. Nury Gilbert is a 24 year old  here for pelvic pain follow up. She reports the pain has been getting progressively worse for >6 months. It is worse when she is more active, especially after working as a . She describes it as menstrual-like cramping, sometimes a \"swollen\" feeling. Sometimes is worse after intercourse. It is worse on the right, although sometimes happens on the left. It is never present in the morning    O:  /64 (BP Location: Right arm, Patient Position: Sitting, Cuff Size: Adult Regular)   Pulse 56   Temp 98.2  F (36.8  C) (Tympanic)   Resp 18   Wt 78.5 kg (173 lb)   Breastfeeding? Yes   BMI 24.82 kg/m    Gen: Well-appearing, NAD  Pulm: nonlabored  Psych: appropriate mood and affect    Pelvic:  Normal appearing external female genitalia. Normal hair distribution. Vagina is without lesions. No varicosities. Moderate white discharge. Cervix normal, no lesions.+cervical tenderness. Uterus is small, mobile, mildly tender, anteverted. No adnexal tenderness or masses    CT A/P:   IMPRESSION:   Increased vascularity to the uterus. This is associated with distention of the   both ovarian veins. This raises the possibility of pelvic congestion syndrome.   No other abnormality is seen.     Pelvic US:   FINDINGS:   Uterus: Unremarkable . Prominent vessels are noted surrounding the  uterus.  Endometrium: 4 mm.  Right ovary: Unremarkable  Right ovary size: 3.0 x 2.2 x 2.1 cm  Left ovary: Unremarkable  Left ovary size: 2.8 x 2.1 x 2.1 cm  Mild free fluid is seen.    A/P:  Ms. Nury Gilbert is a 24 year old  here for chronic pelvic pain. Based on imaging, history, and tenderness on exam- suspect pelvic congestion syndrome. Discussed the diagnosis in detail with patient and reviewed treatment options given her desire to maintain fertility. She has previously had the Nexplanon but has significant irritability that was affecting her relationships and " does not want to try this again. She would like to try the PO medroxyprogesterone. Discussed that this may or may not help. She is planning to wait at least another year before trying for pregnancy again. Explained that her pain may get worse after she stops Provera and while pregnant and ultimately she may require hysterectomy when she has completed childbearing if pain cannot be managed medically. She voiced understanding    25 minutes were spent with the patient with >50% spent counseling on pelvic congestion syndrome.      Naye Johns MD  OB/GYN  3/19/2019 11:39 AM

## 2019-03-19 NOTE — TELEPHONE ENCOUNTER
Patient stated she only received #20 and was not told why. Per Beck, they will have more in stock tomorrow. Patient notified.    Michelle Ovalle RN...................3/19/2019 2:42 PM

## 2019-03-19 NOTE — NURSING NOTE
"Chief Complaint   Patient presents with     RECHECK     u/s results       Initial /64 (BP Location: Right arm, Patient Position: Sitting, Cuff Size: Adult Regular)   Pulse 56   Temp 98.2  F (36.8  C) (Tympanic)   Resp 18   Wt 78.5 kg (173 lb)   Breastfeeding? Yes   BMI 24.82 kg/m   Estimated body mass index is 24.82 kg/m  as calculated from the following:    Height as of 3/17/19: 1.778 m (5' 10\").    Weight as of this encounter: 78.5 kg (173 lb).  Medication Reconciliation: complete    Shandra Chaudhry LPN  "

## 2019-03-20 ENCOUNTER — MYC MEDICAL ADVICE (OUTPATIENT)
Dept: OBGYN | Facility: OTHER | Age: 24
End: 2019-03-20

## 2019-03-20 ASSESSMENT — ANXIETY QUESTIONNAIRES: GAD7 TOTAL SCORE: 11

## 2019-03-20 NOTE — TELEPHONE ENCOUNTER
Called patient to answer questions. Discussed that pelvic congestion syndrome is a diagnosis of exclusion- recommend diagnostic laparoscopy to rule out other etiologies of pain. Patient agreeable to plan- will schedule appt for consent signings.  Naye Johns MD  OB/GYN  3/20/2019 4:23 PM

## 2019-03-26 ENCOUNTER — OFFICE VISIT (OUTPATIENT)
Dept: OBGYN | Facility: OTHER | Age: 24
End: 2019-03-26
Attending: OBSTETRICS & GYNECOLOGY
Payer: COMMERCIAL

## 2019-03-26 VITALS
HEIGHT: 70 IN | RESPIRATION RATE: 16 BRPM | BODY MASS INDEX: 25.15 KG/M2 | DIASTOLIC BLOOD PRESSURE: 60 MMHG | TEMPERATURE: 98.3 F | HEART RATE: 60 BPM | SYSTOLIC BLOOD PRESSURE: 108 MMHG | WEIGHT: 175.7 LBS

## 2019-03-26 DIAGNOSIS — R10.2 CHRONIC PELVIC PAIN IN FEMALE: Primary | ICD-10-CM

## 2019-03-26 DIAGNOSIS — G89.29 CHRONIC PELVIC PAIN IN FEMALE: Primary | ICD-10-CM

## 2019-03-26 PROCEDURE — G0463 HOSPITAL OUTPT CLINIC VISIT: HCPCS

## 2019-03-26 PROCEDURE — 99213 OFFICE O/P EST LOW 20 MIN: CPT | Performed by: OBSTETRICS & GYNECOLOGY

## 2019-03-26 ASSESSMENT — MIFFLIN-ST. JEOR: SCORE: 1632.22

## 2019-03-26 ASSESSMENT — PAIN SCALES - GENERAL: PAINLEVEL: MILD PAIN (3)

## 2019-03-26 NOTE — NURSING NOTE
"Chief Complaint   Patient presents with     RECHECK     sign surgical consents       Initial /60 (BP Location: Right arm, Patient Position: Sitting, Cuff Size: Adult Regular)   Pulse 60   Temp 98.3  F (36.8  C) (Tympanic)   Resp 16   Ht 1.778 m (5' 10\")   Wt 79.7 kg (175 lb 11.2 oz)   Breastfeeding? No   BMI 25.21 kg/m   Estimated body mass index is 25.21 kg/m  as calculated from the following:    Height as of this encounter: 1.778 m (5' 10\").    Weight as of this encounter: 79.7 kg (175 lb 11.2 oz).  Medication Reconciliation: Completed     Tamar Thompson LPN  "

## 2019-03-26 NOTE — NURSING NOTE
"Date of Surgery: 4/25/19  Type of Surgery: Diagnostic Laparoscopy, possible lysis of adhesions  Surgeon: Dr. Naye Johns MD     Patient's consents were signed and appropriate appointments were scheduled by the [facility] . Patient was given surgical folder which includes pre-operative bathing instructions related to the two packets of Hibiclens surgical prep provided. Surgical forms were copied and kept for informative purposes. Originals were delivered to Day-surgery. Patient denies questions at this time.        STOP BANG    Fever/Chills or other infectious symptoms in past month? no  >10 pound weight loss in the past 2 months? no  Health Care Directive on file? no  History of blood transfusions? no  Td up to date? yes  History of VRE/MRSA? no      Obstructive Sleep Apnea screening    Preoperative Evaluation: Obstructive Sleep Apnea screening    S: Snore -  Do you snore loudly? (louder than talking or loud enough to be heard through closed doors)no  T: Tired - Do you often feel tired, fatigued, or sleepy during the daytime?no  O: Observed - Has anyone ever observed you stop breathing during your sleep?no  P: Pressure - Do you have or are you being treated for high blood pressure?no  B: BMI - BMI greater than 35kg/m2?no  A: Age - Age over 50 years old?no  N: Neck - Neck circumference greater than 40 cm?no  G: Gender - Gender: Male?no    Total number of \"YES\" responses:  0    Scoring: Low risk of JERRI 0-2  At Risk of JERRI: >3 High Risk of JERRI: 5-8      Total yes answers in JERRI section:    Low risk 0-2  At risk 3-4  High risk 5-8    Michelle Ovalle............. 3/26/2019 2:09 PM     "

## 2019-03-26 NOTE — PROGRESS NOTES
"Follow-Up Visit- Preop H&P    S: Ms. Nury Gilbert is a 24 year old  here for surgical consent signing for chronic pelvic pain, possible pelvic congestion syndrome    Past Medical History:   Diagnosis Date     Anxiety disorder     No Comments Provided     Attention-deficit hyperactivity disorder     No Comments Provided     Superficial foreign body of finger     2017     Past Surgical History:   Procedure Laterality Date     ADENOIDECTOMY      No Comments Provided     APPENDECTOMY OPEN      2006     ARTHROSCOPY KNEE      2012,2nd as well at age 18      SECTION N/A 2018    Procedure:  SECTION;  External version attemted prior to Section;  Surgeon: Naye Johns MD;  Location: [facility] OR     OTHER SURGICAL HISTORY      1999,UNB280,BLADDER SURGERY     OTHER SURGICAL HISTORY      2017,75424.0,[place] REMOVE FOREIGN BODY SIMPLE     FamH: negative  SocH:   Social History     Tobacco Use     Smoking status: Never Smoker     Smokeless tobacco: Never Used   Substance Use Topics     Alcohol use: No     Alcohol/week: 0.0 oz     Frequency: Never     Drug use: No     Comment: Drug use: No   Engaged    ROS: 10 point review of systems negative unless stated in HPI    O:  /60 (BP Location: Right arm, Patient Position: Sitting, Cuff Size: Adult Regular)   Pulse 60   Temp 98.3  F (36.8  C) (Tympanic)   Resp 16   Ht 1.778 m (5' 10\")   Wt 79.7 kg (175 lb 11.2 oz)   Breastfeeding? Yes   BMI 25.21 kg/m    Gen: Well-appearing, NAD  CV: RRR  Resp: CTAB  Psych: appropriate mood and affect    A/P:  Ms. Nury Gilbert is a 24 year old  here for surgical consent signing. Discussed risks and benefits in detail, including risk of bleeding, infection, injury to surrounding organs. Consent signed for diagnostic laparoscopy, possible lysis of adhesions. Tentatively scheduled for 19.  Does not need a separate preop physical.    Naye Johns MD  OB/GYN  3/26/2019 1:52 PM     "

## 2019-03-26 NOTE — H&P (VIEW-ONLY)
"Follow-Up Visit- Preop H&P    S: Ms. Nury Gilbert is a 24 year old  here for surgical consent signing for chronic pelvic pain, possible pelvic congestion syndrome    Past Medical History:   Diagnosis Date     Anxiety disorder     No Comments Provided     Attention-deficit hyperactivity disorder     No Comments Provided     Superficial foreign body of finger     2017     Past Surgical History:   Procedure Laterality Date     ADENOIDECTOMY      No Comments Provided     APPENDECTOMY OPEN      2006     ARTHROSCOPY KNEE      2012,2nd as well at age 18      SECTION N/A 2018    Procedure:  SECTION;  External version attemted prior to Section;  Surgeon: Naye Johns MD;  Location: [facility] OR     OTHER SURGICAL HISTORY      1999,ZHZ689,BLADDER SURGERY     OTHER SURGICAL HISTORY      2017,28859.0,[place] REMOVE FOREIGN BODY SIMPLE     FamH: negative  SocH:   Social History     Tobacco Use     Smoking status: Never Smoker     Smokeless tobacco: Never Used   Substance Use Topics     Alcohol use: No     Alcohol/week: 0.0 oz     Frequency: Never     Drug use: No     Comment: Drug use: No   Engaged    ROS: 10 point review of systems negative unless stated in HPI    O:  /60 (BP Location: Right arm, Patient Position: Sitting, Cuff Size: Adult Regular)   Pulse 60   Temp 98.3  F (36.8  C) (Tympanic)   Resp 16   Ht 1.778 m (5' 10\")   Wt 79.7 kg (175 lb 11.2 oz)   Breastfeeding? Yes   BMI 25.21 kg/m    Gen: Well-appearing, NAD  CV: RRR  Resp: CTAB  Psych: appropriate mood and affect    A/P:  Ms. Nury Gilbert is a 24 year old  here for surgical consent signing. Discussed risks and benefits in detail, including risk of bleeding, infection, injury to surrounding organs. Consent signed for diagnostic laparoscopy, possible lysis of adhesions. Tentatively scheduled for 19.  Does not need a separate preop physical.    Naye Johns MD  OB/GYN  3/26/2019 1:52 PM     "

## 2019-04-18 ENCOUNTER — DOCUMENTATION ONLY (OUTPATIENT)
Dept: OTHER | Facility: CLINIC | Age: 24
End: 2019-04-18

## 2019-04-18 RX ORDER — MEDROXYPROGESTERONE ACETATE 10 MG
TABLET ORAL
Refills: 3 | COMMUNITY
Start: 2019-03-21 | End: 2019-05-17

## 2019-04-18 RX ORDER — POLYETHYLENE GLYCOL 3350 17 G/17G
POWDER, FOR SOLUTION ORAL
Refills: 1 | COMMUNITY
Start: 2018-05-04 | End: 2020-03-31

## 2019-04-24 ENCOUNTER — ANESTHESIA EVENT (OUTPATIENT)
Dept: SURGERY | Facility: OTHER | Age: 24
End: 2019-04-24
Payer: COMMERCIAL

## 2019-04-25 ENCOUNTER — HOSPITAL ENCOUNTER (OUTPATIENT)
Facility: OTHER | Age: 24
Discharge: HOME | End: 2019-04-25
Attending: OBSTETRICS & GYNECOLOGY | Admitting: OBSTETRICS & GYNECOLOGY
Payer: COMMERCIAL

## 2019-04-25 ENCOUNTER — ANESTHESIA (OUTPATIENT)
Dept: SURGERY | Facility: OTHER | Age: 24
End: 2019-04-25
Payer: COMMERCIAL

## 2019-04-25 VITALS
DIASTOLIC BLOOD PRESSURE: 57 MMHG | OXYGEN SATURATION: 98 % | SYSTOLIC BLOOD PRESSURE: 94 MMHG | TEMPERATURE: 98.6 F | HEART RATE: 57 BPM | RESPIRATION RATE: 16 BRPM

## 2019-04-25 DIAGNOSIS — Z98.890 S/P LAPAROSCOPY: Primary | ICD-10-CM

## 2019-04-25 LAB — HCG UR QL: NEGATIVE

## 2019-04-25 PROCEDURE — 25800030 ZZH RX IP 258 OP 636: Performed by: NURSE ANESTHETIST, CERTIFIED REGISTERED

## 2019-04-25 PROCEDURE — 25000125 ZZHC RX 250: Performed by: NURSE ANESTHETIST, CERTIFIED REGISTERED

## 2019-04-25 PROCEDURE — 36000056 ZZH SURGERY LEVEL 3 1ST 30 MIN: Performed by: OBSTETRICS & GYNECOLOGY

## 2019-04-25 PROCEDURE — 40000306 ZZH STATISTIC PRE PROC ASSESS II: Performed by: OBSTETRICS & GYNECOLOGY

## 2019-04-25 PROCEDURE — 49320 DIAG LAPARO SEPARATE PROC: CPT | Performed by: NURSE ANESTHETIST, CERTIFIED REGISTERED

## 2019-04-25 PROCEDURE — 27210794 ZZH OR GENERAL SUPPLY STERILE: Performed by: OBSTETRICS & GYNECOLOGY

## 2019-04-25 PROCEDURE — 25000125 ZZHC RX 250: Performed by: OBSTETRICS & GYNECOLOGY

## 2019-04-25 PROCEDURE — 49320 DIAG LAPARO SEPARATE PROC: CPT | Performed by: OBSTETRICS & GYNECOLOGY

## 2019-04-25 PROCEDURE — 25000132 ZZH RX MED GY IP 250 OP 250 PS 637: Performed by: OBSTETRICS & GYNECOLOGY

## 2019-04-25 PROCEDURE — 37000008 ZZH ANESTHESIA TECHNICAL FEE, 1ST 30 MIN: Performed by: OBSTETRICS & GYNECOLOGY

## 2019-04-25 PROCEDURE — 71000014 ZZH RECOVERY PHASE 1 LEVEL 2 FIRST HR: Performed by: OBSTETRICS & GYNECOLOGY

## 2019-04-25 PROCEDURE — 81025 URINE PREGNANCY TEST: CPT | Performed by: OBSTETRICS & GYNECOLOGY

## 2019-04-25 PROCEDURE — 37000009 ZZH ANESTHESIA TECHNICAL FEE, EACH ADDTL 15 MIN: Performed by: OBSTETRICS & GYNECOLOGY

## 2019-04-25 PROCEDURE — 25000564 ZZH DESFLURANE, EA 15 MIN: Performed by: OBSTETRICS & GYNECOLOGY

## 2019-04-25 PROCEDURE — 71000027 ZZH RECOVERY PHASE 2 EACH 15 MINS: Performed by: OBSTETRICS & GYNECOLOGY

## 2019-04-25 PROCEDURE — 36000058 ZZH SURGERY LEVEL 3 EA 15 ADDTL MIN: Performed by: OBSTETRICS & GYNECOLOGY

## 2019-04-25 PROCEDURE — 25000128 H RX IP 250 OP 636: Performed by: NURSE ANESTHETIST, CERTIFIED REGISTERED

## 2019-04-25 RX ORDER — FENTANYL CITRATE 50 UG/ML
25-50 INJECTION, SOLUTION INTRAMUSCULAR; INTRAVENOUS
Status: DISCONTINUED | OUTPATIENT
Start: 2019-04-25 | End: 2019-04-25 | Stop reason: HOSPADM

## 2019-04-25 RX ORDER — HYDROMORPHONE HYDROCHLORIDE 1 MG/ML
.3-.5 INJECTION, SOLUTION INTRAMUSCULAR; INTRAVENOUS; SUBCUTANEOUS EVERY 10 MIN PRN
Status: DISCONTINUED | OUTPATIENT
Start: 2019-04-25 | End: 2019-04-25 | Stop reason: HOSPADM

## 2019-04-25 RX ORDER — BUPIVACAINE HYDROCHLORIDE AND EPINEPHRINE 5; 5 MG/ML; UG/ML
INJECTION, SOLUTION PERINEURAL PRN
Status: DISCONTINUED | OUTPATIENT
Start: 2019-04-25 | End: 2019-04-25 | Stop reason: HOSPADM

## 2019-04-25 RX ORDER — FENTANYL CITRATE 50 UG/ML
INJECTION, SOLUTION INTRAMUSCULAR; INTRAVENOUS PRN
Status: DISCONTINUED | OUTPATIENT
Start: 2019-04-25 | End: 2019-04-25

## 2019-04-25 RX ORDER — GLYCOPYRROLATE 0.2 MG/ML
INJECTION, SOLUTION INTRAMUSCULAR; INTRAVENOUS PRN
Status: DISCONTINUED | OUTPATIENT
Start: 2019-04-25 | End: 2019-04-25

## 2019-04-25 RX ORDER — KETAMINE HYDROCHLORIDE 50 MG/ML
INJECTION, SOLUTION INTRAMUSCULAR; INTRAVENOUS PRN
Status: DISCONTINUED | OUTPATIENT
Start: 2019-04-25 | End: 2019-04-25

## 2019-04-25 RX ORDER — ONDANSETRON 2 MG/ML
4 INJECTION INTRAMUSCULAR; INTRAVENOUS EVERY 30 MIN PRN
Status: DISCONTINUED | OUTPATIENT
Start: 2019-04-25 | End: 2019-04-25 | Stop reason: HOSPADM

## 2019-04-25 RX ORDER — ACETAMINOPHEN 10 MG/ML
INJECTION, SOLUTION INTRAVENOUS PRN
Status: DISCONTINUED | OUTPATIENT
Start: 2019-04-25 | End: 2019-04-25

## 2019-04-25 RX ORDER — SODIUM CHLORIDE, SODIUM LACTATE, POTASSIUM CHLORIDE, CALCIUM CHLORIDE 600; 310; 30; 20 MG/100ML; MG/100ML; MG/100ML; MG/100ML
INJECTION, SOLUTION INTRAVENOUS CONTINUOUS
Status: DISCONTINUED | OUTPATIENT
Start: 2019-04-25 | End: 2019-04-25 | Stop reason: HOSPADM

## 2019-04-25 RX ORDER — NEOSTIGMINE METHYLSULFATE 1 MG/ML
VIAL (ML) INJECTION PRN
Status: DISCONTINUED | OUTPATIENT
Start: 2019-04-25 | End: 2019-04-25

## 2019-04-25 RX ORDER — LIDOCAINE 40 MG/G
CREAM TOPICAL
Status: DISCONTINUED | OUTPATIENT
Start: 2019-04-25 | End: 2019-04-25 | Stop reason: HOSPADM

## 2019-04-25 RX ORDER — PROPOFOL 10 MG/ML
INJECTION, EMULSION INTRAVENOUS CONTINUOUS PRN
Status: DISCONTINUED | OUTPATIENT
Start: 2019-04-25 | End: 2019-04-25

## 2019-04-25 RX ORDER — OXYCODONE AND ACETAMINOPHEN 5; 325 MG/1; MG/1
1-2 TABLET ORAL
Status: COMPLETED | OUTPATIENT
Start: 2019-04-25 | End: 2019-04-25

## 2019-04-25 RX ORDER — DEXAMETHASONE SODIUM PHOSPHATE 4 MG/ML
INJECTION, SOLUTION INTRA-ARTICULAR; INTRALESIONAL; INTRAMUSCULAR; INTRAVENOUS; SOFT TISSUE PRN
Status: DISCONTINUED | OUTPATIENT
Start: 2019-04-25 | End: 2019-04-25

## 2019-04-25 RX ORDER — ONDANSETRON 4 MG/1
4 TABLET, ORALLY DISINTEGRATING ORAL EVERY 30 MIN PRN
Status: DISCONTINUED | OUTPATIENT
Start: 2019-04-25 | End: 2019-04-25 | Stop reason: HOSPADM

## 2019-04-25 RX ORDER — MEPERIDINE HYDROCHLORIDE 50 MG/ML
12.5 INJECTION INTRAMUSCULAR; INTRAVENOUS; SUBCUTANEOUS
Status: DISCONTINUED | OUTPATIENT
Start: 2019-04-25 | End: 2019-04-25 | Stop reason: HOSPADM

## 2019-04-25 RX ORDER — NALOXONE HYDROCHLORIDE 0.4 MG/ML
.1-.4 INJECTION, SOLUTION INTRAMUSCULAR; INTRAVENOUS; SUBCUTANEOUS
Status: DISCONTINUED | OUTPATIENT
Start: 2019-04-25 | End: 2019-04-25 | Stop reason: HOSPADM

## 2019-04-25 RX ORDER — KETOROLAC TROMETHAMINE 30 MG/ML
INJECTION, SOLUTION INTRAMUSCULAR; INTRAVENOUS PRN
Status: DISCONTINUED | OUTPATIENT
Start: 2019-04-25 | End: 2019-04-25

## 2019-04-25 RX ORDER — LIDOCAINE HYDROCHLORIDE 20 MG/ML
INJECTION, SOLUTION INFILTRATION; PERINEURAL PRN
Status: DISCONTINUED | OUTPATIENT
Start: 2019-04-25 | End: 2019-04-25

## 2019-04-25 RX ORDER — PROPOFOL 10 MG/ML
INJECTION, EMULSION INTRAVENOUS PRN
Status: DISCONTINUED | OUTPATIENT
Start: 2019-04-25 | End: 2019-04-25

## 2019-04-25 RX ORDER — OXYCODONE AND ACETAMINOPHEN 5; 325 MG/1; MG/1
1-2 TABLET ORAL EVERY 4 HOURS PRN
Qty: 10 TABLET | Refills: 0 | Status: SHIPPED | OUTPATIENT
Start: 2019-04-25 | End: 2019-05-17

## 2019-04-25 RX ORDER — ONDANSETRON 2 MG/ML
INJECTION INTRAMUSCULAR; INTRAVENOUS PRN
Status: DISCONTINUED | OUTPATIENT
Start: 2019-04-25 | End: 2019-04-25

## 2019-04-25 RX ADMIN — LIDOCAINE HYDROCHLORIDE 0.1 ML: 10 INJECTION, SOLUTION EPIDURAL; INFILTRATION; INTRACAUDAL; PERINEURAL at 08:10

## 2019-04-25 RX ADMIN — NEOSTIGMINE METHYLSULFATE 4 MG: 1 INJECTION INTRAVENOUS at 09:07

## 2019-04-25 RX ADMIN — PROPOFOL 200 MCG/KG/MIN: 10 INJECTION, EMULSION INTRAVENOUS at 08:35

## 2019-04-25 RX ADMIN — ROCURONIUM BROMIDE 30 MG: 10 INJECTION INTRAVENOUS at 08:34

## 2019-04-25 RX ADMIN — FENTANYL CITRATE 50 MCG: 50 INJECTION, SOLUTION INTRAMUSCULAR; INTRAVENOUS at 09:57

## 2019-04-25 RX ADMIN — ONDANSETRON 4 MG: 2 INJECTION INTRAMUSCULAR; INTRAVENOUS at 08:34

## 2019-04-25 RX ADMIN — ACETAMINOPHEN 1000 MG: 10 INJECTION, SOLUTION INTRAVENOUS at 08:55

## 2019-04-25 RX ADMIN — FENTANYL CITRATE 50 MCG: 50 INJECTION, SOLUTION INTRAMUSCULAR; INTRAVENOUS at 09:52

## 2019-04-25 RX ADMIN — KETAMINE HYDROCHLORIDE 30 MG: 50 INJECTION, SOLUTION INTRAMUSCULAR; INTRAVENOUS at 08:46

## 2019-04-25 RX ADMIN — KETOROLAC TROMETHAMINE 30 MG: 30 INJECTION, SOLUTION INTRAMUSCULAR at 08:49

## 2019-04-25 RX ADMIN — PROPOFOL 200 MG: 10 INJECTION, EMULSION INTRAVENOUS at 08:34

## 2019-04-25 RX ADMIN — DEXAMETHASONE SODIUM PHOSPHATE 8 MG: 4 INJECTION, SOLUTION INTRA-ARTICULAR; INTRALESIONAL; INTRAMUSCULAR; INTRAVENOUS; SOFT TISSUE at 08:51

## 2019-04-25 RX ADMIN — GLYCOPYRROLATE 0.6 MG: 0.2 INJECTION, SOLUTION INTRAMUSCULAR; INTRAVENOUS at 09:07

## 2019-04-25 RX ADMIN — FENTANYL CITRATE 50 MCG: 50 INJECTION, SOLUTION INTRAMUSCULAR; INTRAVENOUS at 09:09

## 2019-04-25 RX ADMIN — OXYCODONE AND ACETAMINOPHEN 1 TABLET: 5; 325 TABLET ORAL at 10:28

## 2019-04-25 RX ADMIN — SODIUM CHLORIDE, POTASSIUM CHLORIDE, SODIUM LACTATE AND CALCIUM CHLORIDE: 600; 310; 30; 20 INJECTION, SOLUTION INTRAVENOUS at 08:10

## 2019-04-25 RX ADMIN — MIDAZOLAM 2 MG: 1 INJECTION INTRAMUSCULAR; INTRAVENOUS at 08:28

## 2019-04-25 RX ADMIN — FENTANYL CITRATE 50 MCG: 50 INJECTION, SOLUTION INTRAMUSCULAR; INTRAVENOUS at 08:34

## 2019-04-25 RX ADMIN — LIDOCAINE HYDROCHLORIDE 80 MG: 20 INJECTION, SOLUTION INFILTRATION; PERINEURAL at 08:34

## 2019-04-25 RX ADMIN — ROCURONIUM BROMIDE 10 MG: 10 INJECTION INTRAVENOUS at 08:55

## 2019-04-25 NOTE — ANESTHESIA PREPROCEDURE EVALUATION
Anesthesia Pre-Procedure Evaluation    Patient: Nury Gilbert   MRN: [id number] : 1995          Preoperative Diagnosis: pelvic pain in female    Procedure(s):  LAPAROSCOPY DIAGNOSTIC , Possible Lysis of Adhesions    Past Medical History:   Diagnosis Date     Anxiety disorder     No Comments Provided     Attention-deficit hyperactivity disorder     No Comments Provided     Superficial foreign body of finger     2017     Past Surgical History:   Procedure Laterality Date     ADENOIDECTOMY      No Comments Provided     APPENDECTOMY OPEN      2006     ARTHROSCOPY KNEE      2012,2nd as well at age 18      SECTION N/A 2018    Procedure:  SECTION;  External version attemted prior to Section;  Surgeon: Naye Johns MD;  Location: [facility] OR     OTHER SURGICAL HISTORY      1999,HBL829,BLADDER SURGERY     OTHER SURGICAL HISTORY      2017,92540.0,[place] REMOVE FOREIGN BODY SIMPLE       Anesthesia Evaluation     . Pt has had prior anesthetic.     No history of anesthetic complications          ROS/MED HX    ENT/Pulmonary:  - neg pulmonary ROS     Neurologic:  - neg neurologic ROS     Cardiovascular:  - neg cardiovascular ROS       METS/Exercise Tolerance:  >4 METS   Hematologic:  - neg hematologic  ROS       Musculoskeletal:  - neg musculoskeletal ROS       GI/Hepatic:  - neg GI/hepatic ROS       Renal/Genitourinary:  - ROS Renal section negative       Endo:  - neg endo ROS       Psychiatric: Comment: ADD, taking Addera    (+) psychiatric history other (comment)      Infectious Disease:  - neg infectious disease ROS       Malignancy:      - no malignancy   Other:    (+) No chance of pregnancy C-spine cleared: N/A, no H/O Chronic Pain,no other significant disability   - neg other ROS                      Physical Exam  Normal systems: cardiovascular, pulmonary and dental    Airway   Mallampati: II  TM distance: >3 FB  Neck ROM: full    Dental     Cardiovascular   Rhythm and rate:  "regular and normal      Pulmonary    breath sounds clear to auscultation            Lab Results   Component Value Date    WBC 7.8 03/17/2019    HGB 13.1 03/17/2019    HCT 40.2 03/17/2019     03/17/2019    CRP 6.4 08/19/2015     03/17/2019    POTASSIUM 3.5 03/17/2019    CHLORIDE 105 03/17/2019    CO2 23 03/17/2019    BUN 15 03/17/2019    CR 0.79 03/17/2019    GLC 90 03/17/2019    SUNNY 9.5 03/17/2019    MAG 2.1 06/24/2017    ALBUMIN 4.8 03/17/2019    PROTTOTAL 8.2 03/17/2019    ALT 5 (L) 03/17/2019    AST 14 03/17/2019    ALKPHOS 62 03/17/2019    BILITOTAL 0.4 03/17/2019    LIPASE 30 03/17/2019    AMYLASE 29 03/17/2019    PTT 33 03/17/2019    INR 1.08 03/17/2019    TSH 1.28 09/23/2013    HCG Negative 04/25/2019       Preop Vitals  BP Readings from Last 3 Encounters:   04/25/19 102/53   03/26/19 108/60   03/19/19 106/64    Pulse Readings from Last 3 Encounters:   03/26/19 60   03/19/19 56   03/18/19 84      Resp Readings from Last 3 Encounters:   04/25/19 16   03/26/19 16   03/19/19 18    SpO2 Readings from Last 3 Encounters:   04/25/19 99%   03/18/19 99%   03/05/19 98%      Temp Readings from Last 1 Encounters:   04/25/19 98.6  F (37  C) (Tympanic)    Ht Readings from Last 1 Encounters:   03/26/19 1.778 m (5' 10\")      Wt Readings from Last 1 Encounters:   03/26/19 79.7 kg (175 lb 11.2 oz)    Estimated body mass index is 25.21 kg/m  as calculated from the following:    Height as of 3/26/19: 1.778 m (5' 10\").    Weight as of 3/26/19: 79.7 kg (175 lb 11.2 oz).       Anesthesia Plan      History & Physical Review      ASA Status:  1 .    NPO Status:  > 8 hours    Plan for General and ETT with Intravenous induction. Maintenance will be Balanced.    PONV prophylaxis:  Ondansetron (or other 5HT-3) and Dexamethasone or Solumedrol  Currently breastfeeding, advised to \"pump and dump\" for 8 hours post surgery.       Postoperative Care  Postoperative pain management:  IV analgesics and Multi-modal analgesia.  "     Consents  Anesthetic plan, risks, benefits and alternatives discussed with:  Patient and Parent (Mother and/or Father).  Use of blood products discussed: No .   .                 SALLY Nuno CRNA

## 2019-04-25 NOTE — INTERVAL H&P NOTE
History and Physical Update    The history and physical has been reviewed and the patient has been examined.  There are no interim changes to the patient's history or physical condition.    Naye Johns MD

## 2019-04-25 NOTE — DISCHARGE INSTRUCTIONS
[facility]  Adult Discharge Orders & Instructions      For 24 hours after surgery:  1. Get plenty of rest.  A responsible adult must stay with you for at least 24 hours after you leave the hospital.   2. You may feel lightheaded.  IF so, sit for a few minutes before standing.  Have someone help you get up.   3. You may have a slight fever. Call the doctor if your fever is over 101 F (38.3 C) (taken under the tongue) or lasts longer than 24 hours.  4. You may have a dry mouth, a sore throat, muscle aches or trouble sleeping.  These should go away after 24 hours.  5. Do not make important or legal decisions.  6.   Do not drive or use heavy equipment.  If you have weakness or tingling, don't drive or use heavy equipment until this feeling goes away.                                                                                                                                                                         To contact a doctor, call    [phone]______________

## 2019-04-25 NOTE — ANESTHESIA POSTPROCEDURE EVALUATION
Patient: Nury Gilbert    Procedure(s):  LAPAROSCOPY DIAGNOSTIC    Diagnosis:pelvic pain in female  Diagnosis Additional Information: No value filed.    Anesthesia Type:  General, ETT    Note:  Anesthesia Post Evaluation    Patient location during evaluation: Bedside  Patient participation: Able to fully participate in evaluation  Level of consciousness: awake and alert  Pain management: adequate  Airway patency: patent  Cardiovascular status: acceptable  Respiratory status: acceptable  Hydration status: acceptable  PONV: none     Anesthetic complications: None          Last vitals:  Vitals:    04/25/19 1030 04/25/19 1045 04/25/19 1100   BP: (!) 71/42 100/58 94/57   Pulse: 70 59 57   Resp:   16   Temp:   98.6  F (37  C)   SpO2: 98% 98% 98%         Electronically Signed By: SALLY Nuno CRNA  April 25, 2019  11:48 AM

## 2019-04-25 NOTE — OR NURSING
PACU Transfer Note    Nury Gilbert was transferred to D[facility] via cart.  Equipment used for transport:  none.  Accompanied by:  GAEL Garcia RN  Prescriptions were: in red chart    PACU Respiratory Event Documentation     1) Episodes of Apnea greater than or equal to 10 seconds: 0    2) Bradypnea - less than 8 breaths per minute: 0    3) Pain score on 0 to 10 scale: 3    4) Pain-sedation mismatch (yes or no): no    5) Repeated 02 desaturation less than 90% (yes or no): no    Anesthesia notified? (yes or no): na    Any of the above events occuring repeatedly in separate 30 minute intervals may be considered recurrent PACU respiratory events.    Patient stable and meets phase 1 discharge criteria for transport from PACU  T

## 2019-04-25 NOTE — ANESTHESIA CARE TRANSFER NOTE
Patient: Nury Gilbert    Procedure(s):  LAPAROSCOPY DIAGNOSTIC    Diagnosis: pelvic pain in female  Diagnosis Additional Information: No value filed.    Anesthesia Type:   General, ETT     Note:  Airway :Face Mask  Patient transferred to:PACU  Handoff Report: Identifed the Patient, Identified the Reponsible Provider, Reviewed the pertinent medical history, Discussed the surgical course, Reviewed Intra-OP anesthesia mangement and issues during anesthesia, Set expectations for post-procedure period and Allowed opportunity for questions and acknowledgement of understanding      Vitals: (Last set prior to Anesthesia Care Transfer)    CRNA VITALS  4/25/2019 0848 - 4/25/2019 0920      4/25/2019             Resp Rate (set):  10                Electronically Signed By: SALLY LEMA CRNA  April 25, 2019  9:20 AM

## 2019-04-25 NOTE — OP NOTE
Gynecologic Operative Note   Nury Gilbert  [id number]  2019    Preoperative Diagnosis:   Chronic pelvic pain  S/p  section      Postoperative Diagnosis:   Same s/p below procedure     Procedure:   1. Diagnostic Laparoscopy    Surgeon: Naye Johns MD     Anesthesia: general endotracheal     Complications: none apparent     EBL: 5 mL     Findings: Upon entry of the abdomen with the laparoscope, no evidence of injury.  A survey of the upper abdomen showed normal anatomy. Survey of the pelvis revealed normal anatomy, no adhesive disease or endometriosis    Indications: Ms. Gilbert is a 24 year old female who presented with complaint of chronic pelvic pain that started after her  section.  Preoperative ultrasound was unremarkable with the exception of prominent vessels in the outer myometrium of the uterus. She had suspected pelvic congestion syndrome but not responding to usual therapy and was recommended to undergo diagnostic laparoscopy to rule out other etiologies of chronic pain.  All risks, benefits and alternatives were discussed and written informed consent was obtained.     Technique:  The patient was taken to the operating room where she was placed in the supine position. General endotracheal anesthesia was administered. An exam under anesthesia was performed. The patient was then prepped and draped in the usual sterile fashion. Attention was then turned to the abdomen where 0.5% bupivicaine was used to infiltrate the inferior aspect of the umbilicus. An 11-blade scalpel was used to make a 5 mm vertical incision in the umbilicus and a [place] used to expand the incision. A 5mm visiport was used to enter the peritoneal cavity under direct visualization. CO2 gas was attached and pneumoperitoneum achieved. The 5 mm scope was placed in the port and visualized the abdomen which was free of any injury. Upper abdomen was explored with no abnormal findings. An additional 5mm port was  placed in the LLQ after infiltration with local anesthetic under direct visualization. Attention was turned to the uterus, ovaries, fallopian tubes, and lower abdominal walls. All appeared normal with no indication of endometriosis, cysts, or fibroids. The ovarian vessels were not notably dilated. After thorough exploration the ports were removed under direct visualization, pneumoperitoneum was expelled. The skin incisions were closed using 3-0 monocryl and sterile dressings applied.     Instrument, sponge, and needle counts were correct times 2. The patient was extubated in the operating room and transferred to the PACU in stable condition.    Naye Johns MD  OB/GYN  4/25/2019 9:13 AM

## 2019-05-07 ENCOUNTER — OFFICE VISIT (OUTPATIENT)
Dept: OBGYN | Facility: OTHER | Age: 24
End: 2019-05-07
Attending: OBSTETRICS & GYNECOLOGY
Payer: COMMERCIAL

## 2019-05-07 VITALS
SYSTOLIC BLOOD PRESSURE: 104 MMHG | WEIGHT: 169 LBS | HEART RATE: 80 BPM | TEMPERATURE: 97.5 F | BODY MASS INDEX: 24.25 KG/M2 | DIASTOLIC BLOOD PRESSURE: 58 MMHG

## 2019-05-07 DIAGNOSIS — Z98.890 S/P LAPAROSCOPY: Primary | ICD-10-CM

## 2019-05-07 PROCEDURE — 99213 OFFICE O/P EST LOW 20 MIN: CPT | Performed by: OBSTETRICS & GYNECOLOGY

## 2019-05-07 PROCEDURE — G0463 HOSPITAL OUTPT CLINIC VISIT: HCPCS

## 2019-05-07 ASSESSMENT — PAIN SCALES - GENERAL: PAINLEVEL: NO PAIN (0)

## 2019-05-07 NOTE — PROGRESS NOTES
Postoperative Visit  2019    S: Nury Gilbert is a 24 year old  here for post-operative visit following diagnostic laparoscopy on 19. She reports feeling well. No pain.     O:   /58 (BP Location: Right arm, Patient Position: Sitting, Cuff Size: Adult Regular)   Pulse 80   Temp 97.5  F (36.4  C) (Oral)   Wt 76.7 kg (169 lb)   BMI 24.25 kg/m    Gen:  Well-appearing, NAD  Abd: soft, nondistended, nontender. Two laparoscopic incision sites healing well    A/P:   Ms. Nury Gilbert is a 24 year old  two weeks s/p diagnostic laparoscopy. Doing well, no concerns. Reviewed intraoperative findings. She has recently stopped contraception, plans to start attempting pregnancy. Is still nursing but planning to wean soon. Encouraged her to continue her prenatal vitamins.   RTC prkiet Johns MD  OB/GYN  2019 3:24 PM

## 2019-05-07 NOTE — NURSING NOTE
"Chief Complaint   Patient presents with     Surgical Followup     s/p laparoscopy        Initial /58 (BP Location: Right arm, Patient Position: Sitting, Cuff Size: Adult Regular)   Pulse 80   Temp 97.5  F (36.4  C) (Oral)   Wt 76.7 kg (169 lb)   BMI 24.25 kg/m   Estimated body mass index is 24.25 kg/m  as calculated from the following:    Height as of 3/26/19: 1.778 m (5' 10\").    Weight as of this encounter: 76.7 kg (169 lb).  Medication Reconciliation: complete    Shandra Chaudhry LPN  "

## 2019-05-17 ENCOUNTER — HOSPITAL ENCOUNTER (EMERGENCY)
Facility: OTHER | Age: 24
Discharge: HOME | End: 2019-05-18
Attending: FAMILY MEDICINE | Admitting: FAMILY MEDICINE
Payer: COMMERCIAL

## 2019-05-17 VITALS
DIASTOLIC BLOOD PRESSURE: 63 MMHG | OXYGEN SATURATION: 98 % | RESPIRATION RATE: 16 BRPM | HEIGHT: 70 IN | BODY MASS INDEX: 26.48 KG/M2 | TEMPERATURE: 98.1 F | HEART RATE: 85 BPM | SYSTOLIC BLOOD PRESSURE: 117 MMHG | WEIGHT: 185 LBS

## 2019-05-17 DIAGNOSIS — M54.59 ACUTE MECHANICAL LOW BACK PAIN WITH DURATION OF LESS THAN SIX WEEKS: ICD-10-CM

## 2019-05-17 LAB
ALBUMIN UR-MCNC: NEGATIVE MG/DL
APPEARANCE UR: CLEAR
BILIRUB UR QL STRIP: NEGATIVE
COLOR UR AUTO: YELLOW
GLUCOSE UR STRIP-MCNC: NEGATIVE MG/DL
HCG UR QL: NEGATIVE
HGB UR QL STRIP: NEGATIVE
KETONES UR STRIP-MCNC: NEGATIVE MG/DL
LEUKOCYTE ESTERASE UR QL STRIP: NEGATIVE
NITRATE UR QL: NEGATIVE
PH UR STRIP: 6 PH (ref 5–9)
SOURCE: ABNORMAL
SP GR UR STRIP: >1.03 (ref 1–1.03)
UROBILINOGEN UR STRIP-ACNC: 0.2 EU/DL (ref 0.2–1)

## 2019-05-17 PROCEDURE — 81003 URINALYSIS AUTO W/O SCOPE: CPT | Performed by: FAMILY MEDICINE

## 2019-05-17 PROCEDURE — 99284 EMERGENCY DEPT VISIT MOD MDM: CPT | Performed by: FAMILY MEDICINE

## 2019-05-17 PROCEDURE — 99283 EMERGENCY DEPT VISIT LOW MDM: CPT | Mod: Z6 | Performed by: FAMILY MEDICINE

## 2019-05-17 PROCEDURE — 99284 EMERGENCY DEPT VISIT MOD MDM: CPT | Mod: 25 | Performed by: FAMILY MEDICINE

## 2019-05-17 PROCEDURE — 81025 URINE PREGNANCY TEST: CPT | Performed by: FAMILY MEDICINE

## 2019-05-17 RX ORDER — IBUPROFEN 200 MG
400-600 TABLET ORAL EVERY 6 HOURS PRN
COMMUNITY
End: 2019-11-11

## 2019-05-17 ASSESSMENT — MIFFLIN-ST. JEOR: SCORE: 1674.4

## 2019-05-17 NOTE — ED AVS SNAPSHOT
[facility]  [address]  [place] [address]  Phone:  [phone]  Fax:  [phone]                                    Nury Gilbert   MRN: [id number]    Department:  [facility]   Date of Visit:  5/17/2019           After Visit Summary Signature Page    I have received my discharge instructions, and my questions have been answered. I have discussed any challenges I see with this plan with the nurse or doctor.    ..........................................................................................................................................  Patient/Patient Representative Signature      ..........................................................................................................................................  Patient Representative Print Name and Relationship to Patient    ..................................................               ................................................  Date                                   Time    ..........................................................................................................................................  Reviewed by Signature/Title    ...................................................              ..............................................  Date                                               Time          22EPIC Rev 08/18

## 2019-05-18 ENCOUNTER — APPOINTMENT (OUTPATIENT)
Dept: GENERAL RADIOLOGY | Facility: OTHER | Age: 24
End: 2019-05-18
Attending: FAMILY MEDICINE
Payer: COMMERCIAL

## 2019-05-18 PROCEDURE — 72110 X-RAY EXAM L-2 SPINE 4/>VWS: CPT | Mod: TC

## 2019-05-18 PROCEDURE — 72072 X-RAY EXAM THORAC SPINE 3VWS: CPT | Mod: TC

## 2019-05-18 ASSESSMENT — ENCOUNTER SYMPTOMS
ABDOMINAL PAIN: 0
SHORTNESS OF BREATH: 0
FEVER: 0
NEUROLOGICAL NEGATIVE: 1
PSYCHIATRIC NEGATIVE: 1
BACK PAIN: 1
JOINT SWELLING: 0
MYALGIAS: 0
NECK PAIN: 0
FLANK PAIN: 1
ARTHRALGIAS: 0

## 2019-05-18 NOTE — DISCHARGE INSTRUCTIONS
Recommend ibuprofen 600 mg with food as needed for discomfort . Follow up with your primary care provider next week

## 2019-05-18 NOTE — ED PROVIDER NOTES
"  History     Chief Complaint   Patient presents with     Flank Pain     HPI  Nury Gilbert is a 24 year old female who presents for right sided back pain that started 6 days ago . Works as a  at a local restaurant .Pain occasionally radiates down anterior thigh . Dull ache when goes down the front of her leg.  States in high school she \"messed UP \" a vertebra when landed on a high bar jumping but hasnt had any problems since then. Pain has been getting progressively worse .  Laying down makes worse. Pain gets worse after long shift at work .   NO weakness in legs. No bowel or bladder dysfunction .  No UTI symptoms .    Allergies:  Allergies   Allergen Reactions     Penicillins Swelling     Throat swelling and hives      Phenergan Dm [Promethazine-Dm] Other (See Comments)     Hallucinations and muscle twitching       Problem List:    Patient Active Problem List    Diagnosis Date Noted     S/P  section 2018     Priority: Medium     On stimulant medication - contract signed 3/31/17 2017     Priority: Medium     Scheduled Medication Use Agreement Signed 3/31/17 with Naye Villeda, formerly [facility]  Pharmacy -  [facility], [place]  Ph.  [phone]  Needs to be seen every 3 months for refills per Agreement.         Attention deficit disorder 2011     Priority: Medium     Migraines 2011     Priority: Medium     Overview:   IMO Update 10/11  Overview:   IMO Update 10/11       Difficulty sleeping 2011     Priority: Medium        Past Medical History:    Past Medical History:   Diagnosis Date     Anxiety disorder      Attention-deficit hyperactivity disorder      Superficial foreign body of finger        Past Surgical History:    Past Surgical History:   Procedure Laterality Date     ADENOIDECTOMY      No Comments Provided     APPENDECTOMY OPEN      2006     ARTHROSCOPY KNEE      2012,2nd as well at age 18      SECTION N/A 2018    Procedure:  SECTION;  External " "version attemted prior to Section;  Surgeon: Naye Johns MD;  Location:  OR     LAPAROSCOPY DIAGNOSTIC (GYN) N/A 4/25/2019    Procedure: LAPAROSCOPY DIAGNOSTIC;  Surgeon: Naye Johns MD;  Location: [facility] OR     OTHER SURGICAL HISTORY      01/1999,[id number],BLADDER SURGERY     OTHER SURGICAL HISTORY      5/19/2017,23103.0,GA REMOVE FOREIGN BODY SIMPLE       Family History:    History reviewed. No pertinent family history.    Social History:  Marital Status:  Single [1]  Social History     Tobacco Use     Smoking status: Never Smoker     Smokeless tobacco: Never Used   Substance Use Topics     Alcohol use: No     Alcohol/week: 0.0 oz     Frequency: Never     Drug use: No     Comment: Drug use: No        Medications:      acetaminophen (TYLENOL) 500 MG tablet   amphetamine-dextroamphetamine (ADDERALL) 20 MG per tablet   HEMP OIL OR EXTRACT OR OTHER CBD CANNABINOID, NOT MEDICAL CANNABIS,   ibuprofen (ADVIL/MOTRIN) 200 MG tablet   polyethylene glycol (MIRALAX/GLYCOLAX) powder   Prenatal Vit-Fe Fumarate-FA (PRENATAL VITAMINS) 28-0.8 MG TABS   LORazepam (ATIVAN) 0.5 MG tablet         Review of Systems   Constitutional: Negative for fever.   HENT: Negative.    Respiratory: Negative for shortness of breath.    Cardiovascular: Negative for chest pain.   Gastrointestinal: Negative for abdominal pain.   Genitourinary: Positive for flank pain.   Musculoskeletal: Positive for back pain. Negative for arthralgias, gait problem, joint swelling, myalgias and neck pain.   Neurological: Negative.    Psychiatric/Behavioral: Negative.    All other systems reviewed and are negative.      Physical Exam   BP: 117/63  Pulse: 85  Temp: 98.1  F (36.7  C)  Resp: 16  Height: 177.8 cm (5' 10\")  Weight: 83.9 kg (185 lb)  SpO2: 98 %      Physical Exam   Constitutional: She appears well-developed and well-nourished.   Cardiovascular: Normal rate and regular rhythm. Exam reveals no gallop and no friction rub.   No murmur " heard.  Pulmonary/Chest: Effort normal and breath sounds normal. No stridor. No respiratory distress. She has no wheezes. She has no rales. She exhibits no tenderness.   Abdominal: Soft. Bowel sounds are normal.   Musculoskeletal:   NO flank tenderness to palpation . Point tenderness to palpaiton lower thoracic and upper lumbar spine. Right sacroiliac joint  . Normal strenght and ROM lower extremities with negative straight leg raise    Nursing note and vitals reviewed.      ED Course        Procedures          Patient presetns to ER with complaint of 6 day history of increasing mid to low back pain . Patient with remote history of back injury but does not recall which level. Patient triaged to exam room. UA ordered per nurse triage protocol. Urine pregnancy test negative. Medical records reviewed. History and exam completed. Exam most consistent with mechanical low back pain . Xrays ordered. Expect patient possibly with spondylisthesis and some core weakness .  Recommend avoid heavy lifting. Would would work shorter shifts next few days. NSAIDS . Recommend schedule follow up appointment with primary care . Likely benefit from PT for back conditioning and core strengthening .       Results for orders placed or performed during the hospital encounter of 05/17/19   XR Lumbar Spine G/E 4 Views    Narrative    EXAM:    XR Lumbosacral Spine, 4 or 5 Views     EXAM DATE/TIME:    5/18/2019 12:30 AM     CLINICAL HISTORY:    24 years old, female; Low back pain     TECHNIQUE:    Imaging protocol: XR of the lumbosacral spine, 4 or 5 views.     COMPARISON:    CT ABDOMEN PELVIS W CONTRAST 3/17/2019 10:23 PM     FINDINGS:    Vertebrae: No abnormal subluxation upon flexion or extension. No neoplastic   osseous lesion. No acute subluxation. No definite acute displaced fracture.    Soft tissues: No evidence of suspicious abnormal radiopaque foreign body.   Right pelvic clip.      Impression    IMPRESSION:   1. No acute displaced  fracture.   2. Remainder of findings described as above.     THIS DOCUMENT HAS BEEN ELECTRONICALLY SIGNED BY FRANCISCA ROWLEY MD   XR Thoracic Spine 3 Views    Narrative    EXAM:    XR Thoracic Spine, 3 Views     EXAM DATE/TIME:    5/18/2019 12:29 AM     CLINICAL HISTORY:    24 years old, female; Pain in thoracic spine; Without myelpathy or   radiculopathy     TECHNIQUE:    Imaging protocol: XR of the thoracic spine, 3 views.     COMPARISON:    No relevant prior studies available.     FINDINGS:    Vertebrae: No neoplastic osseous lesion. No acute subluxation. No definite   acute displaced fracture.    Soft tissues: No evidence of suspicious abnormal radiopaque foreign body.       Impression    IMPRESSION:   1. No acute displaced fracture.   2. Remainder of findings described as above.     THIS DOCUMENT HAS BEEN ELECTRONICALLY SIGNED BY FRANCISCA ROWLEY MD   UA reflex to Microscopic and Culture   Result Value Ref Range    Color Urine Yellow     Appearance Urine Clear     Glucose Urine Negative NEG^Negative mg/dL    Bilirubin Urine Negative NEG^Negative    Ketones Urine Negative NEG^Negative mg/dL    Specific Gravity Urine >1.030 (H) 1.000 - 1.030    Blood Urine Negative NEG^Negative    pH Urine 6.0 5.0 - 9.0 pH    Protein Albumin Urine Negative NEG^Negative mg/dL    Urobilinogen Urine 0.2 0.2 - 1.0 EU/dL    Nitrite Urine Negative NEG^Negative    Leukocyte Esterase Urine Negative NEG^Negative    Source Midstream Urine    HCG qualitative urine   Result Value Ref Range    HCG Qual Urine Negative NEG^Negative        Results for orders placed or performed during the hospital encounter of 05/17/19 (from the past 24 hour(s))   UA reflex to Microscopic and Culture   Result Value Ref Range    Color Urine Yellow     Appearance Urine Clear     Glucose Urine Negative NEG^Negative mg/dL    Bilirubin Urine Negative NEG^Negative    Ketones Urine Negative NEG^Negative mg/dL    Specific Gravity Urine >1.030 (H) 1.000 - 1.030    Blood Urine  Negative NEG^Negative    pH Urine 6.0 5.0 - 9.0 pH    Protein Albumin Urine Negative NEG^Negative mg/dL    Urobilinogen Urine 0.2 0.2 - 1.0 EU/dL    Nitrite Urine Negative NEG^Negative    Leukocyte Esterase Urine Negative NEG^Negative    Source Midstream Urine    HCG qualitative urine   Result Value Ref Range    HCG Qual Urine Negative NEG^Negative       Medications - No data to display    Assessments & Plan (with Medical Decision Making)     I have reviewed the nursing notes.    I have reviewed the findings, diagnosis, plan and need for follow up with the patient.         Medication List      There are no discharge medications for this visit.         Final diagnoses:   Acute mechanical low back pain with duration of less than six weeks       5/17/2019   [facility] Bel Chauhan MD  05/19/19 [id number]

## 2019-05-18 NOTE — ED TRIAGE NOTES
Pt c/o rt flank pain the past couple days and progressively getting worse. Denies any urinary symptoms.

## 2019-05-20 ENCOUNTER — OFFICE VISIT (OUTPATIENT)
Dept: FAMILY MEDICINE | Facility: OTHER | Age: 24
End: 2019-05-20
Attending: NURSE PRACTITIONER
Payer: COMMERCIAL

## 2019-05-20 VITALS
OXYGEN SATURATION: 98 % | WEIGHT: 172 LBS | RESPIRATION RATE: 16 BRPM | BODY MASS INDEX: 24.62 KG/M2 | DIASTOLIC BLOOD PRESSURE: 60 MMHG | TEMPERATURE: 97.6 F | HEART RATE: 88 BPM | HEIGHT: 70 IN | SYSTOLIC BLOOD PRESSURE: 98 MMHG

## 2019-05-20 DIAGNOSIS — S39.012A STRAIN OF LUMBAR REGION, INITIAL ENCOUNTER: Primary | ICD-10-CM

## 2019-05-20 PROCEDURE — 99213 OFFICE O/P EST LOW 20 MIN: CPT | Performed by: NURSE PRACTITIONER

## 2019-05-20 PROCEDURE — G0463 HOSPITAL OUTPT CLINIC VISIT: HCPCS

## 2019-05-20 ASSESSMENT — ANXIETY QUESTIONNAIRES
3. WORRYING TOO MUCH ABOUT DIFFERENT THINGS: MORE THAN HALF THE DAYS
6. BECOMING EASILY ANNOYED OR IRRITABLE: SEVERAL DAYS
GAD7 TOTAL SCORE: 10
2. NOT BEING ABLE TO STOP OR CONTROL WORRYING: SEVERAL DAYS
7. FEELING AFRAID AS IF SOMETHING AWFUL MIGHT HAPPEN: SEVERAL DAYS
IF YOU CHECKED OFF ANY PROBLEMS ON THIS QUESTIONNAIRE, HOW DIFFICULT HAVE THESE PROBLEMS MADE IT FOR YOU TO DO YOUR WORK, TAKE CARE OF THINGS AT HOME, OR GET ALONG WITH OTHER PEOPLE: SOMEWHAT DIFFICULT
1. FEELING NERVOUS, ANXIOUS, OR ON EDGE: MORE THAN HALF THE DAYS
5. BEING SO RESTLESS THAT IT IS HARD TO SIT STILL: SEVERAL DAYS

## 2019-05-20 ASSESSMENT — PATIENT HEALTH QUESTIONNAIRE - PHQ9
SUM OF ALL RESPONSES TO PHQ QUESTIONS 1-9: 8
5. POOR APPETITE OR OVEREATING: MORE THAN HALF THE DAYS

## 2019-05-20 ASSESSMENT — ENCOUNTER SYMPTOMS
JOINT SWELLING: 0
NECK PAIN: 0
BACK PAIN: 1
ARTHRALGIAS: 0
DYSURIA: 0
FLANK PAIN: 0

## 2019-05-20 ASSESSMENT — MIFFLIN-ST. JEOR: SCORE: 1615.44

## 2019-05-20 ASSESSMENT — PAIN SCALES - GENERAL: PAINLEVEL: MODERATE PAIN (5)

## 2019-05-20 NOTE — PROGRESS NOTES
SUBJECTIVE:   Nury Gilbert is a 24 year old female who presents to clinic today for the following health issues:    HPI  Patient presents for evaluation of ER follow-up. She was seen on  for right sided back pain. She denied any trauma. UA negative. XR of lumbar spine and thoracic were negative. She works as a  and does a fair amount of heaving lifting. She has treated with tylenol and heat for the pain. Since ER visit pain has been improving. Pain is worse with activity and with heavy lifting. Her pain is worse on right side than the left. She denies numbness or weakness. She is requesting physical therapy evaluation.     Patient Active Problem List    Diagnosis Date Noted     S/P  section 2018     Priority: Medium     On stimulant medication - contract signed 3/31/17 2017     Priority: Medium     Scheduled Medication Use Agreement Signed 3/31/17 with Naye Villeda, [facility]  Pharmacy -  [facility], [place]  Ph.  [phone]  Needs to be seen every 3 months for refills per Agreement.         Attention deficit disorder 2011     Priority: Medium     Migraines 2011     Priority: Medium     Overview:   IMO Update 10/11  Overview:   IMO Update 10/11       Difficulty sleeping 2011     Priority: Medium     Past Medical History:   Diagnosis Date     Anxiety disorder     No Comments Provided     Attention-deficit hyperactivity disorder     No Comments Provided     Superficial foreign body of finger     2017      Past Surgical History:   Procedure Laterality Date     ADENOIDECTOMY      No Comments Provided     APPENDECTOMY OPEN           ARTHROSCOPY KNEE      2012,2nd as well at age 18      SECTION N/A 2018    Procedure:  SECTION;  External version attemted prior to Section;  Surgeon: Naye Johns MD;  Location:  OR     LAPAROSCOPY DIAGNOSTIC (GYN) N/A 2019    Procedure: LAPAROSCOPY DIAGNOSTIC;  Surgeon: Naye Johns  "MD;  Location: [facility] OR     OTHER SURGICAL HISTORY      01/1999,[id number],BLADDER SURGERY     OTHER SURGICAL HISTORY      5/19/2017,90890.0,[place] REMOVE FOREIGN BODY SIMPLE       Review of Systems   Genitourinary: Negative for dysuria and flank pain.   Musculoskeletal: Positive for back pain. Negative for arthralgias, gait problem, joint swelling and neck pain.        OBJECTIVE:     BP 98/60 (BP Location: Right arm, Patient Position: Sitting, Cuff Size: Adult Regular)   Pulse 88   Temp 97.6  F (36.4  C) (Tympanic)   Resp 16   Ht 1.778 m (5' 10\")   Wt 78 kg (172 lb)   SpO2 98%   Breastfeeding? Yes   BMI 24.68 kg/m    Body mass index is 24.68 kg/m .  Physical Exam   Constitutional: She appears well-developed and well-nourished. No distress.   Musculoskeletal: Normal range of motion.        Lumbar back: She exhibits tenderness and spasm. She exhibits normal range of motion, no bony tenderness, no swelling, no edema, no deformity and no pain.   Neurological: She is alert. She displays normal reflexes. No sensory deficit. She exhibits normal muscle tone.     Diagnostic Test Results:  none     ASSESSMENT/PLAN:   1. Strain of lumbar region, initial encounter  We discussed treatment for lumbar pain without radiculopathy. She would like to try physical therapy. Continue to use heat, ibuprofen and acetaminophen for pain. Use proper body mechanics and strengthening core muscles can help decrease risk for future injuries. Follow-up as needed.  - PHYSICAL THERAPY REFERRAL; Future    Mary Gilliam [facility]    "

## 2019-05-20 NOTE — NURSING NOTE
Patient presents to clinic for ER follow up after visit on 05/17/19 with lower back pain.  She is still experiencing right lower back pain rated 5.    Medication Reconciliation: complete    Gabriella Turner LPN

## 2019-05-21 ASSESSMENT — ANXIETY QUESTIONNAIRES: GAD7 TOTAL SCORE: 10

## 2019-06-03 ENCOUNTER — HOSPITAL ENCOUNTER (OUTPATIENT)
Dept: PHYSICAL THERAPY | Facility: OTHER | Age: 24
Setting detail: THERAPIES SERIES
End: 2019-06-03
Attending: NURSE PRACTITIONER
Payer: COMMERCIAL

## 2019-06-03 DIAGNOSIS — S39.012A STRAIN OF LUMBAR REGION, INITIAL ENCOUNTER: ICD-10-CM

## 2019-06-03 PROCEDURE — 97161 PT EVAL LOW COMPLEX 20 MIN: CPT | Mod: GP

## 2019-06-03 PROCEDURE — 97110 THERAPEUTIC EXERCISES: CPT | Mod: GP

## 2019-06-03 PROCEDURE — 97140 MANUAL THERAPY 1/> REGIONS: CPT | Mod: GP

## 2019-06-03 NOTE — PROGRESS NOTES
"   19 1400   General Information   Type of Visit Initial OP Ortho PT Evaluation   Start of Care Date 19   Referring Physician Mary Gilliam NP   Patient/Family Goals Statement To reduce her pain and improve lifting ability   Orders Evaluate and Treat   Date of Order 19   Certification Required? No   Medical Diagnosis Strain of lumbar region, initial encounter S39.012A    Surgical/Medical history reviewed Yes   Precautions/Limitations no known precautions/limitations   General Information Comments Patient is a 24 year old female referred to physical therapy with low back pain. Patient notes that she has had pain on and off for quite some time. She initially thought it was kidney pain that would come and go. Now the pain has been more consistent. She notes that the pain in the back is primarily on the right side. Will get \"shooting pains\" in the front of her thigh. She was also carrying boxes down stairs in middle May where she missed a step and slid down a few but notes she didn't really fall and hit it. Has trouble with lifting and carrying her child. Heat can improve symptoms.        Present No   Body Part(s)   Body Part(s) Lumbar Spine/SI   Presentation and Etiology   Pertinent history of current problem (include personal factors and/or comorbidities that impact the POC) Reported past medical history of depression, anxiety, pelvic congestion syndrome, apendectormy, knee surgery on right, and  during delivery   Impairments A. Pain   Functional Limitations perform activities of daily living;perform desired leisure / sports activities;perform required work activities   Symptom Location Low back, R>L   How/Where did it occur At home   Onset date of current episode/exacerbation 05/15/19   Chronicity New   Pain rating (0-10 point scale) Best (/10);Worst (/10)   Best (/10) 3   Worst (/10) 7   Pain quality C. Aching;E. Shooting   Frequency of pain/symptoms C. With " activity   Pain/symptoms are: Worse during the night   Pain/symptoms exacerbated by C. Lifting;D. Carrying;I. Bending;K. Home tasks;L. Work tasks   Pain/symptoms eased by A. Sitting;C. Rest;E. Changing positions;G. Heat;H. Cold   Progression of symptoms since onset: Improved   Prior Level of Function   Prior Level of Function-Mobility Independent   Prior Level of Function-ADLs Independent   Current Level of Function   Patient role/employment history A. Employed   Employment Comments Works as a .    [facility]   Home/community accessibility Denies issues with getting around her home   Current equipment-Gait/Locomotion None   Current equipment-ADL None   Fall Risk Screen   Fall screen completed by PT   Have you fallen 2 or more times in the past year? Yes   Have you fallen and had an injury in the past year? No   Is patient a fall risk? No   Fall screen comments Slipped on ice while outside. Denies issues with her balance.    Functional Scales   Functional Scales Other   Other Scales  PSFS: See chart   Lumbar Spine/SI Objective Findings   Observation Patient sitting in no apparent distress.    Integumentary No significant findings   Posture Protracted shoulders   Gait/Locomotion No significant devations   Flexion ROM Min limited due to pain   Extension ROM WNL   Right Side Bending ROM WNL   Left Side Bending ROM Min limited with discomfort on right   Lumbar ROM Comment Rotation: both min limited secondary to pain on the right   Hip Screen Scour; negative, PEGGY/FADIR: negative   Hip Flexion (L2) Strength 4+/5 on right, 5/5 on left   Hip Abduction Strength 5/5 bilaterally    Hip Adduction Strength 5/5 bilaterally    Knee Flexion Strength 5/5 bilaterally    Knee Extension (L3) Strength 5/5 bilaterally    Ankle Dorsiflexion (L4) Strength 5/5 bilaterally    Hamstring Flexibility Min limited bilaterally    Hip Flexor Flexibility Min limited on right   Piriformis Flexibility Min limited  "bilaterally   SLR Negative   Slump Test Negative   Lumbar/SI Special Tests Comments Leg Length: negative   Segmental Mobility Stiffness noted in L1-L3 with grade II/III PA glides to lumbar tranverse processes on right side   Sensation Testing Intact to light touch   Neurological Testing Comments Femoral nerve tensioning: positive on right   Palpation Discomfort noted in upper and middle lumbar parapspinals on right side   Planned Therapy Interventions   Planned Therapy Interventions joint mobilization;manual therapy;ROM;strengthening;stretching   Planned Modality Interventions   Planned Modality Interventions Cryotherapy;Hot packs;Ultrasound;Traction   Clinical Impression   Criteria for Skilled Therapeutic Interventions Met yes, treatment indicated   PT Diagnosis Impaired mobility, decreased strength and endurance, low back pain   Influenced by the following impairments Pain, stiffness   Functional limitations due to impairments Lifting/carrying, laundry, standing   Clinical Presentation Stable/Uncomplicated   Clinical Presentation Rationale Minimal reported comorbidities   Clinical Decision Making (Complexity) Low complexity   Therapy Frequency 2 times/Week   Predicted Duration of Therapy Intervention (days/wks) 8 weeks   Risk & Benefits of therapy have been explained Yes   Patient, Family & other staff in agreement with plan of care Yes   Clinical Impression Comments Signs and symptoms consistent with low back pain. No recent bowel or bladder changes. No unexpected weight loss or gain. Demonstrates stiffness and pain in upper to mid lumbar spine on right side. Will get \"shooting\" pains in the front side of her right thigh with lifting at various times. Stiffness noted with PA glides and with palpation to paraspinal muscles. Symptoms noted with femoral nerve tensioning on right side. She would benefit from skilled PT services in order to reduce her pain and improve her overall function   Education Assessment "   Barriers to Learning No barriers   ORTHO GOALS   PT Ortho Eval Goals 1;2;3;4   Ortho Goal 1   Goal Identifier HEP   Goal Description Patient will demonstrate compliance with her HEP in order to improve her ability to self manage her symptoms   Target Date 07/01/19   Ortho Goal 2   Goal Identifier Housework   Goal Description Patient will be able to do laundry without any increase in her back pain in order to improve her function around the home   Target Date 07/01/19   Ortho Goal 3   Goal Identifier Lifting   Goal Description Patient will be able to  her child with little to no pain in order to improve her function at home   Target Date 07/29/19   Ortho Goal 4   Goal Identifier Work   Goal Description Patient will be able to complete all work related tasks, such as carrying dishes, with no increase in back pain in order to improve her ability to complete work related duties.    Target Date 07/29/19   Total Evaluation Time   PT Eval, Low Complexity Minutes (89710) 30

## 2019-06-05 ENCOUNTER — HOSPITAL ENCOUNTER (OUTPATIENT)
Dept: PHYSICAL THERAPY | Facility: OTHER | Age: 24
Setting detail: THERAPIES SERIES
End: 2019-06-05
Attending: NURSE PRACTITIONER
Payer: COMMERCIAL

## 2019-06-05 PROCEDURE — 97110 THERAPEUTIC EXERCISES: CPT | Mod: GP

## 2019-06-05 PROCEDURE — 97140 MANUAL THERAPY 1/> REGIONS: CPT | Mod: GP

## 2019-06-12 ENCOUNTER — HOSPITAL ENCOUNTER (OUTPATIENT)
Dept: PHYSICAL THERAPY | Facility: OTHER | Age: 24
Setting detail: THERAPIES SERIES
End: 2019-06-12
Attending: NURSE PRACTITIONER
Payer: COMMERCIAL

## 2019-06-12 PROCEDURE — 97110 THERAPEUTIC EXERCISES: CPT | Mod: GP

## 2019-06-12 PROCEDURE — 97140 MANUAL THERAPY 1/> REGIONS: CPT | Mod: GP

## 2019-06-19 ENCOUNTER — HOSPITAL ENCOUNTER (OUTPATIENT)
Dept: PHYSICAL THERAPY | Facility: OTHER | Age: 24
Setting detail: THERAPIES SERIES
End: 2019-06-19
Attending: NURSE PRACTITIONER
Payer: COMMERCIAL

## 2019-06-19 PROCEDURE — 97110 THERAPEUTIC EXERCISES: CPT | Mod: GP

## 2019-06-19 PROCEDURE — 97140 MANUAL THERAPY 1/> REGIONS: CPT | Mod: GP

## 2019-06-24 ENCOUNTER — HOSPITAL ENCOUNTER (OUTPATIENT)
Dept: PHYSICAL THERAPY | Facility: OTHER | Age: 24
Setting detail: THERAPIES SERIES
End: 2019-06-24
Attending: NURSE PRACTITIONER
Payer: COMMERCIAL

## 2019-06-24 PROCEDURE — 97140 MANUAL THERAPY 1/> REGIONS: CPT | Mod: GP

## 2019-06-24 PROCEDURE — 97110 THERAPEUTIC EXERCISES: CPT | Mod: GP

## 2019-06-26 ENCOUNTER — HOSPITAL ENCOUNTER (OUTPATIENT)
Dept: PHYSICAL THERAPY | Facility: OTHER | Age: 24
Setting detail: THERAPIES SERIES
End: 2019-06-26
Attending: NURSE PRACTITIONER
Payer: COMMERCIAL

## 2019-06-26 PROCEDURE — 97110 THERAPEUTIC EXERCISES: CPT | Mod: GP

## 2019-06-26 PROCEDURE — 97140 MANUAL THERAPY 1/> REGIONS: CPT | Mod: GP

## 2019-07-31 NOTE — ADDENDUM NOTE
Encounter addended by: Micha Kirkpatrick PT on: 7/31/2019 9:38 AM   Actions taken: Sign clinical note, Episode resolved

## 2019-07-31 NOTE — PROGRESS NOTES
Outpatient Physical Therapy Discharge Note     Patient: Nury Glibert  : 1995    Beginning/End Dates of Reporting Period:  6/3/2019 to 2019    Referring Provider: Mary Gilliam NP    Therapy Diagnosis: Impaired mobility, decreased strength and endurance, low back pain     Client Self Report: Patient is doing better today. She states that the pain from the weekend has improved and she hasn't done too much at home. Today is her last scheduled visit. She would like to try independent management of her symptoms but would like to keep her chart open in case she is unable to manage by herself.     Objective Measurements:  Objective Measure: Subjective Pain Rating  Details: 3/10    Goals:  Goal Identifier HEP   Goal Description Patient will demonstrate compliance with her HEP in order to improve her ability to self manage her symptoms   Target Date 19   Date Met      Progress:     Goal Identifier Housework   Goal Description Patient will be able to do laundry without any increase in her back pain in order to improve her function around the home   Target Date 19   Date Met      Progress:     Goal Identifier Lifting   Goal Description Patient will be able to  her child with little to no pain in order to improve her function at home   Target Date 19   Date Met      Progress:     Goal Identifier Work   Goal Description Patient will be able to complete all work related tasks, such as carrying dishes, with no increase in back pain in order to improve her ability to complete work related duties.    Target Date 19   Date Met      Progress:     Progress Toward Goals:   Unable to assess progress towards as discharge is unplanned.     Plan:  Discharge from therapy.    Discharge:    Reason for Discharge: Patient has failed to schedule further appointments.    Equipment Issued: none    Discharge Plan: Patient to continue home program.

## 2019-09-17 ENCOUNTER — OFFICE VISIT (OUTPATIENT)
Dept: FAMILY MEDICINE | Facility: OTHER | Age: 24
End: 2019-09-17
Attending: PHYSICIAN ASSISTANT
Payer: COMMERCIAL

## 2019-09-17 VITALS
WEIGHT: 177.8 LBS | HEART RATE: 80 BPM | BODY MASS INDEX: 25.51 KG/M2 | SYSTOLIC BLOOD PRESSURE: 110 MMHG | TEMPERATURE: 98.4 F | DIASTOLIC BLOOD PRESSURE: 70 MMHG | RESPIRATION RATE: 18 BRPM

## 2019-09-17 DIAGNOSIS — L30.9 DERMATITIS: Primary | ICD-10-CM

## 2019-09-17 DIAGNOSIS — L30.9 ECZEMA, UNSPECIFIED TYPE: ICD-10-CM

## 2019-09-17 PROCEDURE — 99213 OFFICE O/P EST LOW 20 MIN: CPT | Performed by: PHYSICIAN ASSISTANT

## 2019-09-17 PROCEDURE — G0463 HOSPITAL OUTPT CLINIC VISIT: HCPCS

## 2019-09-17 RX ORDER — MUPIROCIN 20 MG/G
OINTMENT TOPICAL 3 TIMES DAILY
Qty: 30 G | Refills: 2 | Status: SHIPPED | OUTPATIENT
Start: 2019-09-17 | End: 2021-03-22

## 2019-09-17 RX ORDER — TRIAMCINOLONE ACETONIDE 1 MG/G
OINTMENT TOPICAL 2 TIMES DAILY PRN
Qty: 80 G | Refills: 3 | Status: SHIPPED | OUTPATIENT
Start: 2019-09-17 | End: 2020-03-31

## 2019-09-17 ASSESSMENT — PATIENT HEALTH QUESTIONNAIRE - PHQ9: SUM OF ALL RESPONSES TO PHQ QUESTIONS 1-9: 0

## 2019-09-17 NOTE — PROGRESS NOTES
Nursing Notes:   Archana Elena LPN  2019  3:23 PM  Signed  Chief Complaint   Patient presents with     Derm Problem         Medication Reconciliation: complete    Archana Elena LPN      HPI:    Nury Gilbert is a 24 year old female who presents for skin concern. Was see on 19 for eczema and was started on kenalog ointment. She did 2 cycles of the cream and got relief. Today she notes her hands are still dry and painful. The skin under her fingers have started cracking and peeling. She also notes her fingers are puffy and swollen. One finger has had a small amount of drainage. The areas on her foot have cleared up. Uses lotion daily but is not getting much relief. 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  She also deals with a lot of foods that irritate her hands.  Wondering what she can do for treatment.  No pus or drainage.  No fevers or chills.      Past Medical History:   Diagnosis Date     Anxiety disorder     No Comments Provided     Attention-deficit hyperactivity disorder     No Comments Provided     Superficial foreign body of finger     2017       Past Surgical History:   Procedure Laterality Date     ADENOIDECTOMY      No Comments Provided     APPENDECTOMY OPEN           ARTHROSCOPY KNEE      2012,2nd as well at age 18      SECTION N/A 2018    Procedure:  SECTION;  External version attemted prior to Section;  Surgeon: Naye Johns MD;  Location:  OR     LAPAROSCOPY DIAGNOSTIC (GYN) N/A 2019    Procedure: LAPAROSCOPY DIAGNOSTIC;  Surgeon: Naye Johns MD;  Location:  OR     OTHER SURGICAL HISTORY      1999,BYL147,BLADDER SURGERY     OTHER SURGICAL HISTORY      2017,48570.0,ID REMOVE FOREIGN BODY SIMPLE       History reviewed. No pertinent family history.    Social History     Tobacco Use     Smoking status: Never Smoker     Smokeless tobacco: Never Used   Substance  Use Topics     Alcohol use: No     Alcohol/week: 0.0 oz     Frequency: Never       Current Outpatient Medications   Medication Sig Dispense Refill     acetaminophen (TYLENOL) 500 MG tablet Take 500-1,000 mg by mouth every 6 hours as needed for mild pain       amphetamine-dextroamphetamine (ADDERALL) 20 MG per tablet TK ONE T PO BID  0     HEMP OIL OR EXTRACT OR OTHER CBD CANNABINOID, NOT MEDICAL CANNABIS,        ibuprofen (ADVIL/MOTRIN) 200 MG tablet Take 400-600 mg by mouth every 6 hours as needed for mild pain       LORazepam (ATIVAN) 0.5 MG tablet TK 1 T PO 3 TIMES WEEKLY PRN  2     mupirocin (BACTROBAN) 2 % external ointment Apply topically 3 times daily 30 g 2     polyethylene glycol (MIRALAX/GLYCOLAX) powder   1     Prenatal Vit-Fe Fumarate-FA (PRENATAL VITAMINS) 28-0.8 MG TABS Take 1 tablet by mouth daily        triamcinolone (KENALOG) 0.1 % external ointment Apply topically 2 times daily as needed for irritation 80 g 3       Allergies   Allergen Reactions     Penicillins Swelling     Throat swelling and hives      Phenergan Dm [Promethazine-Dm] Other (See Comments)     Hallucinations and muscle twitching       REVIEW OF SYSTEMS:  Refer to HPI.    EXAM:   Vitals:    /70 (BP Location: Right arm, Patient Position: Sitting, Cuff Size: Adult Regular)   Pulse 80   Temp 98.4  F (36.9  C)   Resp 18   Wt 80.6 kg (177 lb 12.8 oz)   BMI 25.51 kg/m      General Appearance: Pleasant, alert, appropriate appearance for age. No acute distress  Chest/Respiratory Exam: Normal chest wall and respirations. Clear to auscultation.  Cardiovascular Exam: Regular rate and rhythm. S1, S2, no murmur, click, gallop, or rubs.  Skin: Dry, cracking skin appreciated throughout the distal fingers bilaterally.  No pus, drainage, warmth.  Minimal erythema surrounding broken open, cracking skin.  Psychiatric Exam: Alert and oriented - appropriate affect.    PHQ Depression Screen  PHQ-9 SCORE 3/19/2019 5/20/2019 9/17/2019   PHQ-9  Total Score - - -   PHQ-9 Total Score 10 8 0       ASSESSMENT AND PLAN:      ICD-10-CM    1. Dermatitis L30.9 triamcinolone (KENALOG) 0.1 % external ointment     mupirocin (BACTROBAN) 2 % external ointment     DERMATOLOGY REFERRAL   2. Eczema, unspecified type L30.9 triamcinolone (KENALOG) 0.1 % external ointment       Likely exacerbated due to irritation of the hands with food and drink preparation.  Patient was given more kenalog ointment and started on bactroban as well over the next week. Will apply the kenalog twice a day as needed to irritated areas. Will apply bactroban three times a day as needed on affected areas. Continue to use moisturizing lotions at least twice a day. Follow up as needed.  Refer to dermatology for consult.  Encouraged to wear gloves at work if possible.    Managing Atopic Dermatitis (Eczema)     After bathing, gently pat your skin dry (don t rub). Apply moisturizer while your skin is still damp.   To manage your symptoms and help reduce the severity and frequency, try these self-care tips:  Caring for your skin    Use a gentle, fragrance-free cleanser (or nonsoap cleanser) for bathing. Rinse well. Pat skin dry.    Take warm, not hot, baths or showers. Try to limit them to no more that 10 to 15 minutes.     Use moisturizer liberally right after you bathe, while your skin is still damp.    Avoid scratching because it will cause more damage to your skin.     Topical, over-the-counter hydrocortisone cream may help control mild symptoms.   Controlling your environment    Avoid extreme heat or cold.    Avoid very humid or very dry air.    If your home or office air is very dry, use a humidifier.    Avoid allergens, such as dust, that may be present in bedding, carpets, plush toys, or rugs.    Know that pet hair and dander can cause flare-ups.  Seeking medical treatment  Another way to keep symptoms under control is to seek medical treatment. Talk with your healthcare provider about the type  of treatment that may work best for you. Your provider may prescribe treatments such as the following:    Topical treatments to put on the skin daily    Medicines taken by mouth (oral medicines), such as antihistamines, antibiotics, or corticosteroids    In severe cases shots (injections) may be needed to control the symptoms. You may even need antibiotics if skin infections occur.  Treatments don t work the same way for every person. So if your symptoms continue or get worse, ask your healthcare provider about other treatments.  Making lifestyle choices    Manage the stress in your life.    Wear loose-fitting cotton clothing that does not bind or rub your skin.    Avoid contact with wool or other scratchy fabrics.    Use fragrance-free products.  Getting good results  Now that you know more about atopic dermatitis, the next step is up to you. Follow your healthcare provider s treatment plan and your self-care routine. This will help bring atopic dermatitis under control. If your symptoms persist, be sure to let your health care provider know.       Felicita Calhoun PA-C PA-C..................9/17/2019 8:16 AM

## 2019-09-17 NOTE — NURSING NOTE
Chief Complaint   Patient presents with     Derm Problem         Medication Reconciliation: complete    Archana Elena, LPN

## 2019-09-17 NOTE — PATIENT INSTRUCTIONS
Patient Education     Managing Atopic Dermatitis (Eczema)     After bathing, gently pat your skin dry (don t rub). Apply moisturizer while your skin is still damp.   To manage your symptoms and help reduce the severity and frequency, try these self-care tips:  Caring for your skin    Use a gentle, fragrance-free cleanser (or nonsoap cleanser) for bathing. Rinse well. Pat skin dry.    Take warm, not hot, baths or showers. Try to limit them to no more that 10 to 15 minutes.     Use moisturizer liberally right after you bathe, while your skin is still damp.    Avoid scratching because it will cause more damage to your skin.     Topical, over-the-counter hydrocortisone cream may help control mild symptoms.   Controlling your environment    Avoid extreme heat or cold.    Avoid very humid or very dry air.    If your home or office air is very dry, use a humidifier.    Avoid allergens, such as dust, that may be present in bedding, carpets, plush toys, or rugs.    Know that pet hair and dander can cause flare-ups.  Seeking medical treatment  Another way to keep symptoms under control is to seek medical treatment. Talk with your healthcare provider about the type of treatment that may work best for you. Your provider may prescribe treatments such as the following:    Topical treatments to put on the skin daily    Medicines taken by mouth (oral medicines), such as antihistamines, antibiotics, or corticosteroids    In severe cases shots (injections) may be needed to control the symptoms. You may even need antibiotics if skin infections occur.  Treatments don t work the same way for every person. So if your symptoms continue or get worse, ask your healthcare provider about other treatments.  Making lifestyle choices    Manage the stress in your life.    Wear loose-fitting cotton clothing that does not bind or rub your skin.    Avoid contact with wool or other scratchy fabrics.    Use fragrance-free products.  Getting good  results  Now that you know more about atopic dermatitis, the next step is up to you. Follow your healthcare provider s treatment plan and your self-care routine. This will help bring atopic dermatitis under control. If your symptoms persist, be sure to let your health care provider know.   Date Last Reviewed: 2/1/2017 2000-2018 [employer]. [address] [place] [address], [place] [address]. All rights reserved. This information is not intended as a substitute for professional medical care. Always follow your healthcare professional's instructions.

## 2019-10-15 ENCOUNTER — HOSPITAL ENCOUNTER (OUTPATIENT)
Dept: CT IMAGING | Facility: OTHER | Age: 24
Discharge: HOME | End: 2019-10-15
Attending: OBSTETRICS & GYNECOLOGY | Admitting: OBSTETRICS & GYNECOLOGY
Payer: COMMERCIAL

## 2019-10-15 DIAGNOSIS — N94.89 PELVIC CONGESTION SYNDROME: ICD-10-CM

## 2019-10-15 PROCEDURE — 74177 CT ABD & PELVIS W/CONTRAST: CPT

## 2019-10-15 PROCEDURE — 25500064 ZZH RX 255 OP 636: Performed by: OBSTETRICS & GYNECOLOGY

## 2019-10-15 RX ADMIN — IOHEXOL 107 ML: 350 INJECTION, SOLUTION INTRAVENOUS at 13:46

## 2019-10-15 NOTE — PROGRESS NOTES
IV Contrast- Discharge Instructions After Your CT Scan      The IV contrast you received today will be filtered from your bloodstream by your kidneys during the next 24 hours and pass from the body in urine.  You will not be aware of this process and your urine will not change in color.  To help this process you should drink at least 4 additional glasses of water or juice today.  This reduces stress on your kidneys.    Most contrast reactions are immediate.  Should you develop symptoms of concern after discharge, contact the department at the number below.  After hours you should contact your personal physician.  If you develop breathing distress or wheezing, call 911.      1.  Has the patient had a previous reaction to IV contrast? n    2.  Does the patient have kidney disease? n    3.  Is the patient on dialysis? n    If YES to any of these questions, exam will be reviewed with a Radiologist before administering contrast.

## 2019-10-31 ENCOUNTER — OFFICE VISIT (OUTPATIENT)
Dept: FAMILY MEDICINE | Facility: OTHER | Age: 24
End: 2019-10-31
Attending: FAMILY MEDICINE
Payer: COMMERCIAL

## 2019-10-31 VITALS
BODY MASS INDEX: 26.9 KG/M2 | OXYGEN SATURATION: 99 % | WEIGHT: 177.5 LBS | HEART RATE: 88 BPM | SYSTOLIC BLOOD PRESSURE: 116 MMHG | TEMPERATURE: 99.2 F | RESPIRATION RATE: 16 BRPM | DIASTOLIC BLOOD PRESSURE: 65 MMHG | HEIGHT: 68 IN

## 2019-10-31 DIAGNOSIS — L03.113 CELLULITIS OF RIGHT UPPER EXTREMITY: ICD-10-CM

## 2019-10-31 DIAGNOSIS — L92.3 TATTOO REACTION: Primary | ICD-10-CM

## 2019-10-31 PROCEDURE — 99214 OFFICE O/P EST MOD 30 MIN: CPT | Performed by: PHYSICIAN ASSISTANT

## 2019-10-31 PROCEDURE — G0463 HOSPITAL OUTPT CLINIC VISIT: HCPCS

## 2019-10-31 RX ORDER — CEPHALEXIN 500 MG/1
500 CAPSULE ORAL 3 TIMES DAILY
Qty: 21 CAPSULE | Refills: 0 | Status: SHIPPED | OUTPATIENT
Start: 2019-10-31 | End: 2019-11-11

## 2019-10-31 SDOH — HEALTH STABILITY: MENTAL HEALTH: HOW OFTEN DO YOU HAVE A DRINK CONTAINING ALCOHOL?: NOT ASKED

## 2019-10-31 ASSESSMENT — PAIN SCALES - GENERAL: PAINLEVEL: MILD PAIN (3)

## 2019-10-31 ASSESSMENT — MIFFLIN-ST. JEOR: SCORE: 1608.63

## 2019-10-31 NOTE — PATIENT INSTRUCTIONS
Tattoo reaction  Apply ice 10-20 minutes every 1-2 hours  Avoid heat  Can apply topical antibiotic ointment 3 times daily  Rest arm  Start cephalexin 500 mg oral tablet, take 1 tablet 3 times daily for 7 days.   Seek immediate care for rapid increase in redness, swelling, pain, fever > 100.4 or no improvement in 72 hours.   Patient Education     Cellulitis  Cellulitis is an infection of the deep layers of skin. A break in the skin, such as a cut or scratch, can let bacteria under the skin. If the bacteria get to deep layers of the skin, it can be serious. If not treated, cellulitis can get into the bloodstream and lymph nodes. The infection can then spread throughout the body. This causes serious illness.  Cellulitis causes the affected skin to become red, swollen, warm, and sore. The reddened areas have a visible border. An open sore may leak fluid (pus). You may have a fever, chills, and pain.  Cellulitis is treated with antibiotics taken for 7 to 10 days. An open sore may be cleaned and covered with cool wet gauze. Symptoms should get better 1 to 2 days after treatment is started. Make sure to take all the antibiotics for the full number of days until they are gone. Keep taking the medicine even if your symptoms go away.  Home care  Follow these tips:    Limit the use of the part of your body with cellulitis.     If the infection is on your leg, keep your leg raised while sitting. This will help to reduce swelling.    Take all of the antibiotic medicine exactly as directed until it is gone. Do not miss any doses, especially during the first 7 days. Don t stop taking the medicine when your symptoms get better.    Keep the affected area clean and dry.    Wash your hands with soap and warm water before and after touching your skin. Anyone else who touches your skin should also wash his or her hands. Don't share towels.  Follow-up care  Follow up with your healthcare provider, or as advised. If your infection does  not go away on the first antibiotic, your healthcare provider will prescribe a different one.  When to seek medical advice  Call your healthcare provider right away if any of these occur:    Red areas that spread    Swelling or pain that gets worse    Fluid leaking from the skin (pus)    Fever higher of 100.4  F (38.0  C) or higher after 2 days on antibiotics  Date Last Reviewed: 9/1/2016 2000-2018 [employer]. [address] [place] [address], [place] [id number]. All rights reserved. This information is not intended as a substitute for professional medical care. Always follow your healthcare professional's instructions.

## 2019-10-31 NOTE — NURSING NOTE
"Patient presents to clinic for derm problem x 2 days. States new tattoos on Tuesday and noticed swelling, redness and bruising on Wednesday. States it is not hot to the touch. Patient has taken ibuprofen and iced the area.   Chief Complaint   Patient presents with     Derm Problem       Initial /65 (BP Location: Left arm, Patient Position: Sitting, Cuff Size: Adult Large)   Pulse 88   Temp 99.2  F (37.3  C) (Tympanic)   Resp 16   Ht 1.727 m (5' 8\")   Wt 80.5 kg (177 lb 8 oz)   LMP 09/21/2019 (Exact Date)   SpO2 99%   Breastfeeding? No   BMI 26.99 kg/m   Estimated body mass index is 26.99 kg/m  as calculated from the following:    Height as of this encounter: 1.727 m (5' 8\").    Weight as of this encounter: 80.5 kg (177 lb 8 oz).  Medication Reconciliation: complete    Richelle Keyes LPN    "

## 2019-10-31 NOTE — PROGRESS NOTES
HPI:    Nury Gilbert is a 24 year old female who presents to clinic today for evaluation of redenss around a new tattoo that she got 2 days ago. She got 2 items added to her right arm. The banner and words on dorsal arm has no reaction, the sunflower on the volar aspect of there arm is surrounded by redness and is painful.   Associated symptoms: swelling and redness on volar forearm and arm on right side surrounding tattoo.     Pain is 3/10 at rest, if she uses her arm it goes to 8/10  Treatments: ice, lotions, ibuprofen 400 mg last taken before bed last night  .     Past Medical History:   Diagnosis Date     Anxiety disorder     No Comments Provided     Attention-deficit hyperactivity disorder     No Comments Provided     Superficial foreign body of finger     2017       Past Surgical History:   Procedure Laterality Date     ADENOIDECTOMY      No Comments Provided     APPENDECTOMY OPEN           ARTHROSCOPY KNEE      2012,2nd as well at age 18      SECTION N/A 2018    Procedure:  SECTION;  External version attemted prior to Section;  Surgeon: Naye Johns MD;  Location:  OR     LAPAROSCOPY DIAGNOSTIC (GYN) N/A 2019    Procedure: LAPAROSCOPY DIAGNOSTIC;  Surgeon: Naye Johns MD;  Location: [facility] OR     OTHER SURGICAL HISTORY      1999,EXS541,BLADDER SURGERY     OTHER SURGICAL HISTORY      2017,11794.0,IA REMOVE FOREIGN BODY SIMPLE         Current Outpatient Medications   Medication Sig Dispense Refill     acetaminophen (TYLENOL) 500 MG tablet Take 500-1,000 mg by mouth every 6 hours as needed for mild pain       amphetamine-dextroamphetamine (ADDERALL) 20 MG per tablet TK ONE T PO BID  0     HEMP OIL OR EXTRACT OR OTHER CBD CANNABINOID, NOT MEDICAL CANNABIS,        ibuprofen (ADVIL/MOTRIN) 200 MG tablet Take 400-600 mg by mouth every 6 hours as needed for mild pain       LORazepam (ATIVAN) 0.5 MG tablet TK 1 T PO 3 TIMES WEEKLY PRN  2     mupirocin  "(BACTROBAN) 2 % external ointment Apply topically 3 times daily 30 g 2     polyethylene glycol (MIRALAX/GLYCOLAX) powder   1     Prenatal Vit-Fe Fumarate-FA (PRENATAL VITAMINS) 28-0.8 MG TABS Take 1 tablet by mouth daily        triamcinolone (KENALOG) 0.1 % external ointment Apply topically 2 times daily as needed for irritation 80 g 3       Allergies   Allergen Reactions     Penicillins Swelling     Throat swelling and hives      Phenergan Dm [Promethazine-Dm] Other (See Comments)     Hallucinations and muscle twitching       ROS:  General: feels well, no fever  Cardiac: negative  Respiratory: negative  Musculoskeletal: POSITIVE per HPI - tattoo volar arm has spreading redness and swelling        EXAM:  Vitals:    10/31/19 1255   BP: 116/65   BP Location: Left arm   Patient Position: Sitting   Cuff Size: Adult Large   Pulse: 88   Resp: 16   Temp: 99.2  F (37.3  C)   TempSrc: Tympanic   SpO2: 99%   Weight: 80.5 kg (177 lb 8 oz)   Height: 1.727 m (5' 8\")     General appearance: well appearing female, in no acute distress  Respiratory: clear to auscultation bilaterally  Cardiac: RRR with no murmurs  Dermatological: right volar arm has a new tattoo that has redness, swelling surrounding it and starting to spread to her right forearm. Area is TTP and has mild warmth.   Psychological: normal affect, alert and pleasant      ASSESSMENT AND PLAN:    1. Tattoo reaction    2. Cellulitis of right upper extremity        Tattoo reaction with surrounding cellultis  Apply ice 10-20 minutes every 1-2 hours  Avoid heat  Can apply topical antibiotic ointment 3 times daily  Rest arm  Start cephalexin 500 mg oral tablet, take 1 tablet 3 times daily for 7 days.   Seek immediate care for rapid increase in redness, swelling, pain, fever > 100.4 or no improvement in 72 hours.   Patient received verbal and written instruction including review of warning signs    Delaney Lin PA-C on 10/31/2019 at 1:41 PM    "

## 2019-11-01 ENCOUNTER — NURSE TRIAGE (OUTPATIENT)
Dept: OBGYN | Facility: OTHER | Age: 24
End: 2019-11-01

## 2019-11-01 ENCOUNTER — APPOINTMENT (OUTPATIENT)
Dept: ULTRASOUND IMAGING | Facility: OTHER | Age: 24
End: 2019-11-01
Attending: FAMILY MEDICINE
Payer: COMMERCIAL

## 2019-11-01 ENCOUNTER — HOSPITAL ENCOUNTER (EMERGENCY)
Facility: OTHER | Age: 24
Discharge: HOME | End: 2019-11-01
Attending: FAMILY MEDICINE | Admitting: FAMILY MEDICINE
Payer: COMMERCIAL

## 2019-11-01 VITALS
WEIGHT: 175 LBS | RESPIRATION RATE: 16 BRPM | SYSTOLIC BLOOD PRESSURE: 117 MMHG | HEART RATE: 85 BPM | DIASTOLIC BLOOD PRESSURE: 74 MMHG | TEMPERATURE: 98.1 F | OXYGEN SATURATION: 98 % | BODY MASS INDEX: 26.52 KG/M2 | HEIGHT: 68 IN

## 2019-11-01 DIAGNOSIS — Z34.91 FIRST TRIMESTER PREGNANCY: ICD-10-CM

## 2019-11-01 LAB
ALBUMIN UR-MCNC: NEGATIVE MG/DL
APPEARANCE UR: CLEAR
B-HCG SERPL-ACNC: 4361 IU/L
BILIRUB UR QL STRIP: NEGATIVE
COLOR UR AUTO: YELLOW
GLUCOSE UR STRIP-MCNC: NEGATIVE MG/DL
HGB UR QL STRIP: ABNORMAL
KETONES UR STRIP-MCNC: NEGATIVE MG/DL
LEUKOCYTE ESTERASE UR QL STRIP: ABNORMAL
NITRATE UR QL: NEGATIVE
NON-SQ EPI CELLS #/AREA URNS LPF: NORMAL /LPF
PH UR STRIP: 7 PH (ref 5–9)
RBC #/AREA URNS AUTO: NORMAL /HPF
SOURCE: ABNORMAL
SP GR UR STRIP: 1.01 (ref 1–1.03)
UROBILINOGEN UR STRIP-ACNC: 0.2 EU/DL (ref 0.2–1)
WBC #/AREA URNS AUTO: NORMAL /HPF

## 2019-11-01 PROCEDURE — 99284 EMERGENCY DEPT VISIT MOD MDM: CPT | Mod: 25 | Performed by: FAMILY MEDICINE

## 2019-11-01 PROCEDURE — 81001 URINALYSIS AUTO W/SCOPE: CPT | Performed by: EMERGENCY MEDICINE

## 2019-11-01 PROCEDURE — 36415 COLL VENOUS BLD VENIPUNCTURE: CPT | Performed by: EMERGENCY MEDICINE

## 2019-11-01 PROCEDURE — 76801 OB US < 14 WKS SINGLE FETUS: CPT | Mod: TC

## 2019-11-01 PROCEDURE — 84702 CHORIONIC GONADOTROPIN TEST: CPT | Performed by: EMERGENCY MEDICINE

## 2019-11-01 PROCEDURE — 99283 EMERGENCY DEPT VISIT LOW MDM: CPT | Mod: Z6 | Performed by: FAMILY MEDICINE

## 2019-11-01 ASSESSMENT — ENCOUNTER SYMPTOMS
ABDOMINAL PAIN: 1
CARDIOVASCULAR NEGATIVE: 1
FATIGUE: 0
CHILLS: 0
UNEXPECTED WEIGHT CHANGE: 0
FEVER: 0
CONSTITUTIONAL NEGATIVE: 1
NEUROLOGICAL NEGATIVE: 1
DYSURIA: 0
MUSCULOSKELETAL NEGATIVE: 1
DIFFICULTY URINATING: 0
FLANK PAIN: 0
FREQUENCY: 0
RESPIRATORY NEGATIVE: 1
PSYCHIATRIC NEGATIVE: 1

## 2019-11-01 ASSESSMENT — MIFFLIN-ST. JEOR: SCORE: 1597.29

## 2019-11-01 NOTE — ED AVS SNAPSHOT
[facility]  [address]  [place] [address]  Phone:  [phone]  Fax:  [phone]                                    Nury Gilbert   MRN: [id number]    Department:  [facility]   Date of Visit:  11/1/2019           After Visit Summary Signature Page    I have received my discharge instructions, and my questions have been answered. I have discussed any challenges I see with this plan with the nurse or doctor.    ..........................................................................................................................................  Patient/Patient Representative Signature      ..........................................................................................................................................  Patient Representative Print Name and Relationship to Patient    ..................................................               ................................................  Date                                   Time    ..........................................................................................................................................  Reviewed by Signature/Title    ...................................................              ..............................................  Date                                               Time          22EPIC Rev 08/18

## 2019-11-01 NOTE — LETTER
November 1, 2019      To Whom It May Concern:      Nury Gilbert was seen in our Emergency Department today, 11/01/19.  I expect her condition to improve over the next 1 days.  She may return to work/school when improved.    Sincerely,        Tien Wynne MD

## 2019-11-01 NOTE — ED TRIAGE NOTES
Pt presents to ED with lower abd cramping, starting last Friday. Pt has positive home pregnancy test yesterday. Pt has h/o pelvic congestion syndrome. Pt denies spotting. Pt told to come be evaluated by her OB.    Coral Guo RN on 11/1/2019 at 3:25 PM

## 2019-11-01 NOTE — TELEPHONE ENCOUNTER
"Patient calls today with pregnancy concerns. She has a history of pelvic congestion syndrome and daily cramping. She states she just got a positive pregnancy test and has been experiencing more painful cramping for the past week. LMP 9/20/19 - 6w0d. She states she has a temperature of 99 F, but denies bleeding, one-sided pelvic pain, shoulder pain and bowel or bladder problems. No history of ectopic pregnancy. Per Dr. Naye Johns MD, patient should report to ED to rule out ectopic. Patient agrees.    Michelle Ovalle RN...................11/1/2019 2:53 PM      Answer Assessment - Initial Assessment Questions  1. LOCATION: \"Where does it hurt?\"       Pelvic cramping  2. RADIATION: \"Does the pain shoot anywhere else?\" (e.g., chest, back, shoulder)      denies  3. ONSET: \"When did the pain begin?\" (e.g., minutes, hours or days ago)       Past year, but last week has been worse  4. ONSET: \"Gradual or sudden onset?\"      sudden  5. PATTERN \"Does the pain come and go, or has it been constant since it started?\"       Comes and goes  6. SEVERITY: \"How bad is the pain?\" \"What does it keep you from doing?\"  (e.g., Scale 1-10; mild, moderate, or severe)    - MILD (1-3): doesn't interfere with normal activities, abdomen soft and not tender to touch     - MODERATE (4-7): interferes with normal activities or awakens from sleep, tender to touch     - SEVERE (8-10): excruciating pain, doubled over, unable to do any normal activities      8/10 at worst, usually around 5  7. RECURRENT SYMPTOM: \"Have you ever had this type of abdominal pain before?\" If so, ask: \"When was the last time?\" and \"What happened that time?\"       *No Answer*  8. CAUSE: \"What do you think is causing the abdominal pain?\"      *No Answer*  9. RELIEVING/AGGRAVATING FACTORS: \"What makes it better or worse?\" (e.g., antacids, bowel movement, movement)      *No Answer*  10. OTHER SYMPTOMS: \"Has there been any vaginal bleeding, fever, vomiting, diarrhea, " "or urine problems?\"        *No Answer*  11. OANH: \"What date are you expecting to deliver?\"        *No Answer*    Protocols used: PREGNANCY - ABDOMINAL PAIN LESS THAN 20 WEEKS EGA-A-OH      "

## 2019-11-02 NOTE — ED PROVIDER NOTES
History     Chief Complaint   Patient presents with     Abdominal Pain     HPI  Nury Gilbert is a 24 year old female who presents with concern of abdominal pain . . Right sided crampy abdominal pain . Started last week dwindled throughout the week. Slight spotting this am. .  LMP Sept 20 . Currrently 6 weeks pregnant,. 2nd pregnancy . No problems with first pregnancy .   Does not think RH negative.   NO uti symptoms. NO nausea vomitting or symptoms of morning sickness.    Allergies:  Allergies   Allergen Reactions     Penicillins Swelling     Throat swelling and hives      Phenergan Dm [Promethazine-Dm] Other (See Comments)     Hallucinations and muscle twitching       Problem List:    Patient Active Problem List    Diagnosis Date Noted     S/P  section 2018     Priority: Medium     On stimulant medication - contract signed 3/31/17 2017     Priority: Medium     Scheduled Medication Use Agreement Signed 3/31/17 with Naye Villeda, [facility]  Pharmacy -  [facility], [place]  Ph.  [phone]  Needs to be seen every 3 months for refills per Agreement.         Attention deficit disorder 2011     Priority: Medium     Migraines 2011     Priority: Medium     Overview:   IMO Update 10/11  Overview:   IMO Update 10/11       Difficulty sleeping 2011     Priority: Medium        Past Medical History:    Past Medical History:   Diagnosis Date     Anxiety disorder      Attention-deficit hyperactivity disorder      Superficial foreign body of finger        Past Surgical History:    Past Surgical History:   Procedure Laterality Date     ADENOIDECTOMY      No Comments Provided     APPENDECTOMY OPEN           ARTHROSCOPY KNEE      2012,2nd as well at age 18      SECTION N/A 2018    Procedure:  SECTION;  External version attemted prior to Section;  Surgeon: Naye Johns MD;  Location: [facility] OR     LAPAROSCOPY DIAGNOSTIC (GYN) N/A 2019    Procedure:  "LAPAROSCOPY DIAGNOSTIC;  Surgeon: Naye Johns MD;  Location: [facility] OR     OTHER SURGICAL HISTORY      01/1999,[id number],BLADDER SURGERY     OTHER SURGICAL HISTORY      5/19/2017,65094.0,CO REMOVE FOREIGN BODY SIMPLE       Family History:    History reviewed. No pertinent family history.    Social History:  Marital Status:  Single [1]  Social History     Tobacco Use     Smoking status: Never Smoker     Smokeless tobacco: Never Used   Substance Use Topics     Alcohol use: Not Currently     Alcohol/week: 0.0 standard drinks     Drug use: No     Comment: Drug use: No        Medications:    amphetamine-dextroamphetamine (ADDERALL) 20 MG per tablet  cephALEXin (KEFLEX) 500 MG capsule  ibuprofen (ADVIL/MOTRIN) 200 MG tablet  LORazepam (ATIVAN) 0.5 MG tablet  Prenatal Vit-Fe Fumarate-FA (PRENATAL VITAMINS) 28-0.8 MG TABS  acetaminophen (TYLENOL) 500 MG tablet  HEMP OIL OR EXTRACT OR OTHER CBD CANNABINOID, NOT MEDICAL CANNABIS,  mupirocin (BACTROBAN) 2 % external ointment  polyethylene glycol (MIRALAX/GLYCOLAX) powder  triamcinolone (KENALOG) 0.1 % external ointment          Review of Systems   Constitutional: Negative.  Negative for chills, fatigue, fever and unexpected weight change.   HENT: Negative.    Respiratory: Negative.    Cardiovascular: Negative.    Gastrointestinal: Positive for abdominal pain.   Genitourinary: Positive for vaginal bleeding. Negative for difficulty urinating, dyspareunia, dysuria, flank pain and frequency.   Musculoskeletal: Negative.    Skin: Negative.    Neurological: Negative.    Psychiatric/Behavioral: Negative.        Physical Exam   BP: 111/61  Pulse: 85  Heart Rate: 79  Temp: 99.1  F (37.3  C)  Resp: 16  Height: 172.7 cm (5' 8\")  Weight: 79.4 kg (175 lb)  SpO2: 100 %      Physical Exam  Vitals signs reviewed.   Constitutional:       Appearance: She is well-developed.      Comments: Tearful    HENT:      Head: Normocephalic and atraumatic.   Cardiovascular:      Rate and Rhythm: Normal " rate.      Heart sounds: Normal heart sounds.   Abdominal:      General: Abdomen is flat.      Palpations: Abdomen is soft.      Tenderness: There is tenderness in the suprapubic area. There is no guarding or rebound.   Skin:     General: Skin is warm and dry.   Neurological:      General: No focal deficit present.      Mental Status: She is alert.         ED Course        Procedures          Patient presents to ER with concern of abdominal pain in first trimester pregnancy . Patient triaged to exam . History and exam completed . Ultrasound and quant HCG ordered. Ultrasound showing intrauterine yolk sac. NO fetal pole . 5 weeks 4 days. Dr Johns notified of results. Discussed with patient . Patient discharged from ER . DR Queen office will contact patient this upcoming week to schedule prenatal care and next ultrasound. Patient should return to ER as needed for concerns of increased vaginal bleeding or increased symptoms of concern. Patient provided with work note stating she was seen in ER today      Results for orders placed or performed during the hospital encounter of 11/01/19   HCG quantitative pregnancy     Status: None   Result Value Ref Range    HCG Quantitative Serum 4,361 IU/L   UA reflex to Microscopic and Culture     Status: Abnormal   Result Value Ref Range    Color Urine Yellow     Appearance Urine Clear     Glucose Urine Negative NEG^Negative mg/dL    Bilirubin Urine Negative NEG^Negative    Ketones Urine Negative NEG^Negative mg/dL    Specific Gravity Urine 1.010 1.000 - 1.030    Blood Urine Trace (A) NEG^Negative    pH Urine 7.0 5.0 - 9.0 pH    Protein Albumin Urine Negative NEG^Negative mg/dL    Urobilinogen Urine 0.2 0.2 - 1.0 EU/dL    Nitrite Urine Negative NEG^Negative    Leukocyte Esterase Urine Trace (A) NEG^Negative    Source Midstream Urine    Urine Microscopic     Status: None   Result Value Ref Range    WBC Urine 0 - 5 OTO5^0 - 5 /HPF    RBC Urine O - 2 OTO2^O - 2 /HPF    Squamous  Epithelial /LPF Urine Few FEW^Few /LPF      Results for orders placed or performed during the hospital encounter of 11/01/19 (from the past 24 hour(s))   UA reflex to Microscopic and Culture   Result Value Ref Range    Color Urine Yellow     Appearance Urine Clear     Glucose Urine Negative NEG^Negative mg/dL    Bilirubin Urine Negative NEG^Negative    Ketones Urine Negative NEG^Negative mg/dL    Specific Gravity Urine 1.010 1.000 - 1.030    Blood Urine Trace (A) NEG^Negative    pH Urine 7.0 5.0 - 9.0 pH    Protein Albumin Urine Negative NEG^Negative mg/dL    Urobilinogen Urine 0.2 0.2 - 1.0 EU/dL    Nitrite Urine Negative NEG^Negative    Leukocyte Esterase Urine Trace (A) NEG^Negative    Source Midstream Urine    Urine Microscopic   Result Value Ref Range    WBC Urine 0 - 5 OTO5^0 - 5 /HPF    RBC Urine O - 2 OTO2^O - 2 /HPF    Squamous Epithelial /LPF Urine Few FEW^Few /LPF   HCG quantitative pregnancy   Result Value Ref Range    HCG Quantitative Serum 4,361 IU/L       Medications - No data to display    Assessments & Plan (with Medical Decision Making)     I have reviewed the nursing notes.    I have reviewed the findings, diagnosis, plan and need for follow up with the patient.      New Prescriptions    No medications on file       Final diagnoses:   First trimester pregnancy       11/1/2019   [facility] Bel Chauhan MD  11/01/19 0004

## 2019-11-02 NOTE — PROGRESS NOTES
Patient was brought back to their room, rails were up and call light was within reach.     Handoff procedure information verbally given to nurse.

## 2019-11-04 ENCOUNTER — TELEPHONE (OUTPATIENT)
Dept: OBGYN | Facility: OTHER | Age: 24
End: 2019-11-04

## 2019-11-04 NOTE — TELEPHONE ENCOUNTER
Call placed to patient and reviewed findings from ER visit. Advised her that the main concern was the possibility of an ectopic pregnancy, which was ruled out. Patient would still like Dr. Johns to review upon her return.    Michelle Ovalle RN...................11/4/2019 1:55 PM

## 2019-11-04 NOTE — TELEPHONE ENCOUNTER
Nury called back after scheduling her Intake visit, and has questions about her ER visit from Friday.  She is wondering if Dr. Mervat Johns had looked it over and if the cramping is normal or if she should come in to see her.  Jane Weller on 11/4/2019 at 10:58 AM

## 2019-11-05 NOTE — TELEPHONE ENCOUNTER
I reviewed the ultrasound images consistent with early intrauterine gestation. Some cramping at the beginning of pregnancy can be normal, especially if she is not having any bleeding. Recommend repeat US in 1-2 weeks to make sure development continues appropriately    Naye Johns MD  OB/GYN  11/5/2019 12:47 PM

## 2019-11-06 NOTE — TELEPHONE ENCOUNTER
Patient notified and all immediate questions were answered.   Michelle Ovalle RN...........11/6/2019 8:58 AM

## 2019-11-11 ENCOUNTER — ALLIED HEALTH/NURSE VISIT (OUTPATIENT)
Dept: OBGYN | Facility: OTHER | Age: 24
End: 2019-11-11
Attending: OBSTETRICS & GYNECOLOGY
Payer: COMMERCIAL

## 2019-11-11 VITALS — BODY MASS INDEX: 26.11 KG/M2 | HEIGHT: 68 IN | WEIGHT: 172.3 LBS

## 2019-11-11 DIAGNOSIS — Z32.00 PREGNANCY EXAMINATION OR TEST, PREGNANCY UNCONFIRMED: ICD-10-CM

## 2019-11-11 DIAGNOSIS — O36.80X0 ENCOUNTER TO DETERMINE FETAL VIABILITY OF PREGNANCY, SINGLE OR UNSPECIFIED FETUS: Primary | ICD-10-CM

## 2019-11-11 PROCEDURE — G0463 HOSPITAL OUTPT CLINIC VISIT: HCPCS

## 2019-11-11 PROCEDURE — 99207 ZZC OB VISIT-NO CHARGE - GICH ONLY: CPT

## 2019-11-11 ASSESSMENT — PATIENT HEALTH QUESTIONNAIRE - PHQ9: SUM OF ALL RESPONSES TO PHQ QUESTIONS 1-9: 19

## 2019-11-11 ASSESSMENT — MIFFLIN-ST. JEOR: SCORE: 1585.05

## 2019-11-11 NOTE — PATIENT INSTRUCTIONS
Follow up in 1-2 weeks.  Normal exercise.  Normal sexual activity.  Prenatal vitamins.  Anticipated weight gain 15-25 lbs.    follow-up appointment with Dr. Naye Johns MD  for pre- care, take multivitamin or pre-mercedes vitamins and OB Education packet given

## 2019-11-11 NOTE — NURSING NOTE
HPI:    This is a 24 year old female patient,  who presents for Pregnancy confirmation visit. Patient reports positive pregnancy test at home.     Obstetrical history, OB Questionnaire, and OB Demographics updated to the best of this nurse's ability based on patient report. PHQ-9 depression screening and routine Domestic Abuse screening completed. OB Education packet provided and reviewed by this nurse. All immediate questions and concerns answered.    Last menstrual period is reported as Patient's last menstrual period was 2019. OANH based on LMP is 19.   Her cycles are regular.  Her last menstrual period was normal.   Since her LMP, she has experienced  nausea, emesis, fatigue, headache, loss of appetite, pelvic pain and one episode of spotting.     Personal OB history includes: none, age of first pregnancy 22, surgical births 1, G  2 and P  1  Previous OB Provider: Dr. Naye Johns MD   Previous Delivering Clinic: [facility]  Release of Records: n/a    Current delivery plan: TOLAC vs RCS  Preferred OB Provider: Dr. Naye Johns MD   Current Primary Care Provider: Shruthi Chawla MD   Pediatrician: Shruthi Chawla MD     Additional History: Patient has pelvic congestion syndrome.     Have you travelled during the pregnancy?No  Have your sexual partner(s) travelled during the pregnancy?No    HISTORY:   Planned Pregnancy: No  Marital Status: Single  Occupation: Waitressing  Living in Household: Significant Other and Children    Father of the baby is involved.   Family and father of baby is supportive of current pregnancy.  Past Medical History of Father of Baby:No significant medical history    Past History:  Her past medical history   Past Medical History:   Diagnosis Date     Anxiety disorder     No Comments Provided     Attention-deficit hyperactivity disorder     No Comments Provided     Superficial foreign body of finger     2017   .      She has a history of  prior   section    Since her last LMP she admits to the use of Marijuana, occasional alcohol.    Pap smear history: YES - updated in Problem List and Health Maintenance accordingly. NIL 2017    STD/STI history: Chlamydia ()    STD/STI symptoms: no noticeable symptoms     Past medical, surgical, social and family history were reviewed and updated in EPIC.    Medications reviewed by this nurse. Current medication list:  Current Outpatient Medications   Medication Sig Dispense Refill     acetaminophen (TYLENOL) 500 MG tablet Take 500-1,000 mg by mouth every 6 hours as needed for mild pain       amphetamine-dextroamphetamine (ADDERALL) 20 MG per tablet TK ONE T PO BID  0     HEMP OIL OR EXTRACT OR OTHER CBD CANNABINOID, NOT MEDICAL CANNABIS,        ibuprofen (ADVIL/MOTRIN) 200 MG tablet Take 400-600 mg by mouth every 6 hours as needed for mild pain       LORazepam (ATIVAN) 0.5 MG tablet TK 1 T PO 3 TIMES WEEKLY PRN  2     mupirocin (BACTROBAN) 2 % external ointment Apply topically 3 times daily 30 g 2     polyethylene glycol (MIRALAX/GLYCOLAX) powder   1     Prenatal Vit-Fe Fumarate-FA (PRENATAL VITAMINS) 28-0.8 MG TABS Take 1 tablet by mouth daily        triamcinolone (KENALOG) 0.1 % external ointment Apply topically 2 times daily as needed for irritation 80 g 3     The following medications were recommended to be discontinued due to Pregnancy Category D status: Adderall, Ativan, CBD. She has already scheduled an appointment with her mental health provider for next week.    Risk factors:  Moderate and moderately severe risks (consult with OB/Gyn)  Previous fetal or  demise: No  History of  delivery: No  History of heart disease Class I: No  Severe anemia, unresponsive to iron therapy: No  Pelvic mass or neoplasm: No  Previous : Yes  Hyper/hypothyroidism: No  History of postpartum hemorrhage requiring transfusion:No  History of Placenta Accreta: No    High Risk (Pregnancy managed by  OB/Gyn)  Multiple pregnancy: No  Pre-gestational diabetes: No  Chronic Hypertension: No  Renal Failure: No  Heart disease, class II or greater: No  Rh Isoimmunization: No  Chronic active hepatitis: No  Convulsive disorder, poorly controlled: No  Isoimmune thrombocytopenia: No  Pre-term premature rupture of membranes: No  Lupus or other autoimmune disorder: No  Human Immunodeficiency Virus: No      ASSESSMENT/PLAN:       ICD-10-CM    1. Encounter to determine fetal viability of pregnancy, single or unspecified fetus O36.80X0 US OB < 14 Weeks Single   2. Pregnancy examination or test, pregnancy unconfirmed Z32.00        24 year old , 7w3d of pregnancy with OANH of 2020, by Last Menstrual Period    Per standing orders and scope of practice of this nurse, patient will have the following orders placed and completed prior to initial OB visit with the appropriate provider:    --repeat ultrasound to confirm dating and viability ordered     Counseling given:     - Recommended weight gain for pregnancy: 15-25 lbs.   BMI < 18.5  28-40 lbs   18.5 - 24.9 25-35   25 - 29.9 15-25   > 30  11-20      PLAN/PATIENT INSTRUCTIONS:    Follow up in 1-2 weeks.  Normal exercise.  Normal sexual activity.  Prenatal vitamins.  Anticipated weight gain 15-25 lbs.    follow-up appointment with Dr. Naye Johns MD  for pre-mercedes care, take multivitamin or pre-mercedes vitamins and OB Education packet given    Michelle Ovalle RN.................................................. 2019 10:44 AM

## 2019-11-15 ENCOUNTER — TELEPHONE (OUTPATIENT)
Dept: OBGYN | Facility: OTHER | Age: 24
End: 2019-11-15

## 2019-11-15 DIAGNOSIS — O21.9 NAUSEA AND VOMITING IN PREGNANCY: Primary | ICD-10-CM

## 2019-11-15 RX ORDER — METOCLOPRAMIDE 10 MG/1
10 TABLET ORAL EVERY 6 HOURS PRN
Qty: 30 TABLET | Refills: 1 | Status: SHIPPED | OUTPATIENT
Start: 2019-11-15 | End: 2020-07-17

## 2019-11-15 NOTE — TELEPHONE ENCOUNTER
Rx sent for Reglan. She should call if this isn't working for her.   Naye Johns MD  OB/GYN  11/15/2019 10:29 AM

## 2019-11-15 NOTE — TELEPHONE ENCOUNTER
Called patient.  Explained Rx for Reglan was sent to pharmacy and that she needs to call us back if she finds no relief.  Patient voiced understanding and had no further questions.   Felicita Simms RN on 11/15/2019 at 10:38 AM

## 2019-11-15 NOTE — TELEPHONE ENCOUNTER
Nury left a message on the unit 5 scheduling voicemail that she would like to get a Rx for her nausea.  She said that she is vomiting multiple times per day.  Jane Weller on 11/15/2019 at 9:40 AM

## 2019-11-23 ENCOUNTER — HOSPITAL ENCOUNTER (EMERGENCY)
Facility: OTHER | Age: 24
Discharge: HOME | End: 2019-11-23
Attending: FAMILY MEDICINE | Admitting: FAMILY MEDICINE
Payer: COMMERCIAL

## 2019-11-23 VITALS
TEMPERATURE: 98.6 F | OXYGEN SATURATION: 100 % | DIASTOLIC BLOOD PRESSURE: 56 MMHG | RESPIRATION RATE: 16 BRPM | HEART RATE: 77 BPM | HEIGHT: 68 IN | WEIGHT: 175 LBS | BODY MASS INDEX: 26.52 KG/M2 | SYSTOLIC BLOOD PRESSURE: 94 MMHG

## 2019-11-23 DIAGNOSIS — O21.0 HYPEREMESIS GRAVIDARUM: ICD-10-CM

## 2019-11-23 LAB
ALBUMIN UR-MCNC: NEGATIVE MG/DL
ANION GAP SERPL CALCULATED.3IONS-SCNC: 10 MMOL/L (ref 3–14)
APPEARANCE UR: CLEAR
BASOPHILS # BLD AUTO: 0 10E9/L (ref 0–0.2)
BASOPHILS NFR BLD AUTO: 0.4 %
BILIRUB UR QL STRIP: NEGATIVE
BUN SERPL-MCNC: 6 MG/DL (ref 7–25)
CALCIUM SERPL-MCNC: 9 MG/DL (ref 8.6–10.3)
CHLORIDE SERPL-SCNC: 105 MMOL/L (ref 98–107)
CO2 SERPL-SCNC: 20 MMOL/L (ref 21–31)
COLOR UR AUTO: YELLOW
CREAT SERPL-MCNC: 0.51 MG/DL (ref 0.6–1.2)
DIFFERENTIAL METHOD BLD: NORMAL
EOSINOPHIL # BLD AUTO: 0.1 10E9/L (ref 0–0.7)
EOSINOPHIL NFR BLD AUTO: 1.5 %
ERYTHROCYTE [DISTWIDTH] IN BLOOD BY AUTOMATED COUNT: 12.5 % (ref 10–15)
GFR SERPL CREATININE-BSD FRML MDRD: >90 ML/MIN/{1.73_M2}
GLUCOSE SERPL-MCNC: 86 MG/DL (ref 70–105)
GLUCOSE UR STRIP-MCNC: NEGATIVE MG/DL
HCT VFR BLD AUTO: 39.7 % (ref 35–47)
HGB BLD-MCNC: 13.2 G/DL (ref 11.7–15.7)
HGB UR QL STRIP: NEGATIVE
IMM GRANULOCYTES # BLD: 0 10E9/L (ref 0–0.4)
IMM GRANULOCYTES NFR BLD: 0.2 %
KETONES UR STRIP-MCNC: 15 MG/DL
LEUKOCYTE ESTERASE UR QL STRIP: NEGATIVE
LYMPHOCYTES # BLD AUTO: 1.3 10E9/L (ref 0.8–5.3)
LYMPHOCYTES NFR BLD AUTO: 15.2 %
MAGNESIUM SERPL-MCNC: 2.3 MG/DL (ref 1.9–2.7)
MCH RBC QN AUTO: 29.5 PG (ref 26.5–33)
MCHC RBC AUTO-ENTMCNC: 33.2 G/DL (ref 31.5–36.5)
MCV RBC AUTO: 89 FL (ref 78–100)
MONOCYTES # BLD AUTO: 0.4 10E9/L (ref 0–1.3)
MONOCYTES NFR BLD AUTO: 4.9 %
NEUTROPHILS # BLD AUTO: 6.6 10E9/L (ref 1.6–8.3)
NEUTROPHILS NFR BLD AUTO: 77.8 %
NITRATE UR QL: NEGATIVE
PH UR STRIP: 5.5 PH (ref 5–9)
PLATELET # BLD AUTO: 275 10E9/L (ref 150–450)
POTASSIUM SERPL-SCNC: 3.8 MMOL/L (ref 3.5–5.1)
RBC # BLD AUTO: 4.48 10E12/L (ref 3.8–5.2)
SODIUM SERPL-SCNC: 135 MMOL/L (ref 134–144)
SOURCE: ABNORMAL
SP GR UR STRIP: <1.005 (ref 1–1.03)
UROBILINOGEN UR STRIP-ACNC: 0.2 EU/DL (ref 0.2–1)
WBC # BLD AUTO: 8.5 10E9/L (ref 4–11)

## 2019-11-23 PROCEDURE — 81003 URINALYSIS AUTO W/O SCOPE: CPT | Performed by: FAMILY MEDICINE

## 2019-11-23 PROCEDURE — 99283 EMERGENCY DEPT VISIT LOW MDM: CPT | Mod: Z6 | Performed by: FAMILY MEDICINE

## 2019-11-23 PROCEDURE — 25800030 ZZH RX IP 258 OP 636: Performed by: FAMILY MEDICINE

## 2019-11-23 PROCEDURE — 80048 BASIC METABOLIC PNL TOTAL CA: CPT | Performed by: FAMILY MEDICINE

## 2019-11-23 PROCEDURE — 25000128 H RX IP 250 OP 636: Performed by: FAMILY MEDICINE

## 2019-11-23 PROCEDURE — 96361 HYDRATE IV INFUSION ADD-ON: CPT | Performed by: FAMILY MEDICINE

## 2019-11-23 PROCEDURE — 99284 EMERGENCY DEPT VISIT MOD MDM: CPT | Mod: 25 | Performed by: FAMILY MEDICINE

## 2019-11-23 PROCEDURE — 96374 THER/PROPH/DIAG INJ IV PUSH: CPT | Performed by: FAMILY MEDICINE

## 2019-11-23 PROCEDURE — 83735 ASSAY OF MAGNESIUM: CPT | Performed by: FAMILY MEDICINE

## 2019-11-23 PROCEDURE — 85025 COMPLETE CBC W/AUTO DIFF WBC: CPT | Performed by: FAMILY MEDICINE

## 2019-11-23 RX ORDER — SODIUM CHLORIDE 9 MG/ML
1000 INJECTION, SOLUTION INTRAVENOUS CONTINUOUS
Status: DISCONTINUED | OUTPATIENT
Start: 2019-11-23 | End: 2019-11-23 | Stop reason: HOSPADM

## 2019-11-23 RX ORDER — METOCLOPRAMIDE HYDROCHLORIDE 5 MG/ML
10 INJECTION INTRAMUSCULAR; INTRAVENOUS ONCE
Status: COMPLETED | OUTPATIENT
Start: 2019-11-23 | End: 2019-11-23

## 2019-11-23 RX ADMIN — SODIUM CHLORIDE 1000 ML: 9 INJECTION, SOLUTION INTRAVENOUS at 12:08

## 2019-11-23 RX ADMIN — SODIUM CHLORIDE 1000 ML: 9 INJECTION, SOLUTION INTRAVENOUS at 13:24

## 2019-11-23 RX ADMIN — METOCLOPRAMIDE 10 MG: 5 INJECTION, SOLUTION INTRAMUSCULAR; INTRAVENOUS at 12:44

## 2019-11-23 ASSESSMENT — MIFFLIN-ST. JEOR: SCORE: 1597.29

## 2019-11-23 ASSESSMENT — ENCOUNTER SYMPTOMS
NAUSEA: 1
VOMITING: 1
DIFFICULTY URINATING: 0
DYSURIA: 0
DIZZINESS: 1
APPETITE CHANGE: 1
CONSTIPATION: 0
RESPIRATORY NEGATIVE: 1
FLANK PAIN: 0
HEMATOLOGIC/LYMPHATIC NEGATIVE: 1
ABDOMINAL PAIN: 0
MUSCULOSKELETAL NEGATIVE: 1
DIARRHEA: 0

## 2019-11-23 NOTE — ED PROVIDER NOTES
History     Chief Complaint   Patient presents with     Emesis During Pregnancy     HPI  Nury Gilbert is a 24 year old female who presents with concerns of hyperemesis. Patient currently almost 9 weeks pregnant. Had been vomitting 2-4 times daily until this moring she vomitted 6 times in 1 hour  And then dry heaved.  Has chronic cramping due to pelvic congestion which her doctor is not concerned about. . Dizzy today .No UTI symptoms. Has reglan at home . Tried twice before coming to ER but vomitted both times     Allergies:  Allergies   Allergen Reactions     Penicillins Swelling     Throat swelling and hives      Phenergan Dm [Promethazine-Dm] Other (See Comments)     Hallucinations and muscle twitching       Problem List:    Patient Active Problem List    Diagnosis Date Noted     S/P  section 2018     Priority: Medium     On stimulant medication - contract signed 3/31/17 2017     Priority: Medium     Scheduled Medication Use Agreement Signed 3/31/17 with Naye Villeda, formerly [facility]  Pharmacy -  [facility], [place]  Ph.  [phone]  Needs to be seen every 3 months for refills per Agreement.         Attention deficit disorder 2011     Priority: Medium     Migraines 2011     Priority: Medium     Overview:   IMO Update 10/11  Overview:   IMO Update 10/11       Difficulty sleeping 2011     Priority: Medium        Past Medical History:    Past Medical History:   Diagnosis Date     Anxiety disorder      Attention-deficit hyperactivity disorder      Superficial foreign body of finger        Past Surgical History:    Past Surgical History:   Procedure Laterality Date     ADENOIDECTOMY      No Comments Provided     APPENDECTOMY OPEN           ARTHROSCOPY KNEE      2012,2nd as well at age 18      SECTION N/A 2018    Procedure:  SECTION;  External version attemted prior to Section;  Surgeon: Naye Johns MD;  Location: [facility] OR     LAPAROSCOPY  "DIAGNOSTIC (GYN) N/A 4/25/2019    Procedure: LAPAROSCOPY DIAGNOSTIC;  Surgeon: Naye Johns MD;  Location: [facility] OR     OTHER SURGICAL HISTORY      01/1999,[id number],BLADDER SURGERY     OTHER SURGICAL HISTORY      5/19/2017,76807.0,[place] REMOVE FOREIGN BODY SIMPLE       Family History:    History reviewed. No pertinent family history.    Social History:  Marital Status:  Single [1]  Social History     Tobacco Use     Smoking status: Never Smoker     Smokeless tobacco: Never Used   Substance Use Topics     Alcohol use: Not Currently     Alcohol/week: 0.0 standard drinks     Drug use: No     Comment: Drug use: No        Medications:    metoclopramide (REGLAN) 10 MG tablet  Prenatal Vit-Fe Fumarate-FA (PRENATAL VITAMINS) 28-0.8 MG TABS  acetaminophen (TYLENOL) 500 MG tablet  amphetamine-dextroamphetamine (ADDERALL) 20 MG per tablet  HEMP OIL OR EXTRACT OR OTHER CBD CANNABINOID, NOT MEDICAL CANNABIS,  LORazepam (ATIVAN) 0.5 MG tablet  mupirocin (BACTROBAN) 2 % external ointment  polyethylene glycol (MIRALAX/GLYCOLAX) powder  triamcinolone (KENALOG) 0.1 % external ointment          Review of Systems   Constitutional: Positive for appetite change.   HENT: Negative.    Respiratory: Negative.    Gastrointestinal: Positive for nausea and vomiting. Negative for abdominal pain, constipation and diarrhea.   Genitourinary: Negative for difficulty urinating, dysuria, flank pain and vaginal bleeding.   Musculoskeletal: Negative.    Skin: Negative.    Neurological: Positive for dizziness.   Hematological: Negative.        Physical Exam   BP: 109/62  Pulse: 78  Temp: 98.6  F (37  C)  Resp: 16  Height: 172.7 cm (5' 8\")  Weight: 79.4 kg (175 lb)  SpO2: 99 %      Physical Exam  Vitals signs and nursing note reviewed.   Constitutional:       Appearance: Normal appearance.   HENT:      Head: Normocephalic and atraumatic.      Right Ear: Tympanic membrane normal.      Left Ear: Tympanic membrane normal.      Nose: Nose normal.      " Mouth/Throat:      Mouth: Mucous membranes are dry.   Eyes:      Pupils: Pupils are equal, round, and reactive to light.   Neck:      Musculoskeletal: Normal range of motion and neck supple.   Cardiovascular:      Rate and Rhythm: Normal rate and regular rhythm.      Pulses: Normal pulses.   Pulmonary:      Effort: Pulmonary effort is normal.      Breath sounds: Normal breath sounds.   Abdominal:      General: Abdomen is flat. There is no distension.      Palpations: Abdomen is soft.      Tenderness: There is no abdominal tenderness.   Neurological:      General: No focal deficit present.      Mental Status: She is alert and oriented to person, place, and time.         ED Course        Procedures          Patient presents to ER for evaluation of hyperemesis. Patient is currently 9 weeks pregnant Patient triaged to exam room. Vital signs reviewed. History and exam completed. Labs ordered Peripheral IV , fluid bolus , compazine ordered. Patient feeeling better following fluid bolus and reglan and requesting discharge from ER . Labs reviewed and reassuring for no acute significant electrolyte abnormalities. Patient discharged from ER in improved condition with recommendations to follow up with her ob/gyn and continue reglan as needed. Return to ER for recurring symptoms of dehydration or any other concerns   Results for orders placed or performed during the hospital encounter of 11/23/19   Basic metabolic panel     Status: Abnormal   Result Value Ref Range    Sodium 135 134 - 144 mmol/L    Potassium 3.8 3.5 - 5.1 mmol/L    Chloride 105 98 - 107 mmol/L    Carbon Dioxide 20 (L) 21 - 31 mmol/L    Anion Gap 10 3 - 14 mmol/L    Glucose 86 70 - 105 mg/dL    Urea Nitrogen 6 (L) 7 - 25 mg/dL    Creatinine 0.51 (L) 0.60 - 1.20 mg/dL    GFR Estimate >90 >60 mL/min/[1.73_m2]    GFR Estimate If Black >90 >60 mL/min/[1.73_m2]    Calcium 9.0 8.6 - 10.3 mg/dL   Magnesium     Status: None   Result Value Ref Range    Magnesium 2.3 1.9  - 2.7 mg/dL   UA reflex to Microscopic and Culture     Status: Abnormal   Result Value Ref Range    Color Urine Yellow     Appearance Urine Clear     Glucose Urine Negative NEG^Negative mg/dL    Bilirubin Urine Negative NEG^Negative    Ketones Urine 15 (A) NEG^Negative mg/dL    Specific Gravity Urine <1.005 1.000 - 1.030    Blood Urine Negative NEG^Negative    pH Urine 5.5 5.0 - 9.0 pH    Protein Albumin Urine Negative NEG^Negative mg/dL    Urobilinogen Urine 0.2 0.2 - 1.0 EU/dL    Nitrite Urine Negative NEG^Negative    Leukocyte Esterase Urine Negative NEG^Negative    Source Midstream Urine    CBC with platelets differential     Status: None   Result Value Ref Range    WBC 8.5 4.0 - 11.0 10e9/L    RBC Count 4.48 3.8 - 5.2 10e12/L    Hemoglobin 13.2 11.7 - 15.7 g/dL    Hematocrit 39.7 35.0 - 47.0 %    MCV 89 78 - 100 fl    MCH 29.5 26.5 - 33.0 pg    MCHC 33.2 31.5 - 36.5 g/dL    RDW 12.5 10.0 - 15.0 %    Platelet Count 275 150 - 450 10e9/L    Diff Method Automated Method     % Neutrophils 77.8 %    % Lymphocytes 15.2 %    % Monocytes 4.9 %    % Eosinophils 1.5 %    % Basophils 0.4 %    % Immature Granulocytes 0.2 %    Absolute Neutrophil 6.6 1.6 - 8.3 10e9/L    Absolute Lymphocytes 1.3 0.8 - 5.3 10e9/L    Absolute Monocytes 0.4 0.0 - 1.3 10e9/L    Absolute Eosinophils 0.1 0.0 - 0.7 10e9/L    Absolute Basophils 0.0 0.0 - 0.2 10e9/L    Abs Immature Granulocytes 0.0 0 - 0.4 10e9/L             Results for orders placed or performed during the hospital encounter of 11/23/19 (from the past 24 hour(s))   Basic metabolic panel   Result Value Ref Range    Sodium 135 134 - 144 mmol/L    Potassium 3.8 3.5 - 5.1 mmol/L    Chloride 105 98 - 107 mmol/L    Carbon Dioxide 20 (L) 21 - 31 mmol/L    Anion Gap 10 3 - 14 mmol/L    Glucose 86 70 - 105 mg/dL    Urea Nitrogen 6 (L) 7 - 25 mg/dL    Creatinine 0.51 (L) 0.60 - 1.20 mg/dL    GFR Estimate >90 >60 mL/min/[1.73_m2]    GFR Estimate If Black >90 >60 mL/min/[1.73_m2]    Calcium 9.0  8.6 - 10.3 mg/dL   Magnesium   Result Value Ref Range    Magnesium 2.3 1.9 - 2.7 mg/dL   CBC with platelets differential   Result Value Ref Range    WBC 8.5 4.0 - 11.0 10e9/L    RBC Count 4.48 3.8 - 5.2 10e12/L    Hemoglobin 13.2 11.7 - 15.7 g/dL    Hematocrit 39.7 35.0 - 47.0 %    MCV 89 78 - 100 fl    MCH 29.5 26.5 - 33.0 pg    MCHC 33.2 31.5 - 36.5 g/dL    RDW 12.5 10.0 - 15.0 %    Platelet Count 275 150 - 450 10e9/L    Diff Method Automated Method     % Neutrophils 77.8 %    % Lymphocytes 15.2 %    % Monocytes 4.9 %    % Eosinophils 1.5 %    % Basophils 0.4 %    % Immature Granulocytes 0.2 %    Absolute Neutrophil 6.6 1.6 - 8.3 10e9/L    Absolute Lymphocytes 1.3 0.8 - 5.3 10e9/L    Absolute Monocytes 0.4 0.0 - 1.3 10e9/L    Absolute Eosinophils 0.1 0.0 - 0.7 10e9/L    Absolute Basophils 0.0 0.0 - 0.2 10e9/L    Abs Immature Granulocytes 0.0 0 - 0.4 10e9/L       Medications   0.9% sodium chloride BOLUS (1,000 mLs Intravenous New Bag 11/23/19 1208)     Followed by   sodium chloride 0.9% infusion (has no administration in time range)   metoclopramide (REGLAN) injection 10 mg (has no administration in time range)       Assessments & Plan (with Medical Decision Making)     I have reviewed the nursing notes.    I have reviewed the findings, diagnosis, plan and need for follow up with the patient.      New Prescriptions    No medications on file       Final diagnoses:   Hyperemesis gravidarum       11/23/2019   [facility] Bel Chauhan MD  11/23/19 3319

## 2019-11-23 NOTE — ED TRIAGE NOTES
Pt presents to ED with c/o nausea/vomiting. States she has vomited appx 7-8 times since this AM, took reglan x2 without relief. Has had N/V throughout this pregnancy (pt is appx 9 weeks pregnant). Pt unable to keep liquids down. C/o HA, rates pain 5/10.  Gloria Kirkpatrick RN

## 2019-11-23 NOTE — ED AVS SNAPSHOT
[facility]  [address]  [place] [address]  Phone:  [phone]  Fax:  [phone]                                    Nury Gilbert   MRN: [id number]    Department:  [facility]   Date of Visit:  11/23/2019           After Visit Summary Signature Page    I have received my discharge instructions, and my questions have been answered. I have discussed any challenges I see with this plan with the nurse or doctor.    ..........................................................................................................................................  Patient/Patient Representative Signature      ..........................................................................................................................................  Patient Representative Print Name and Relationship to Patient    ..................................................               ................................................  Date                                   Time    ..........................................................................................................................................  Reviewed by Signature/Title    ...................................................              ..............................................  Date                                               Time          22EPIC Rev 08/18

## 2019-11-23 NOTE — DISCHARGE INSTRUCTIONS
Continue reglan as needed as prescribed. Follow up with ob /gyn as recommended . Return to ER as needed for symptoms of dehydration or other concerns

## 2019-11-26 ENCOUNTER — PRENATAL OFFICE VISIT (OUTPATIENT)
Dept: OBGYN | Facility: OTHER | Age: 24
End: 2019-11-26
Attending: OBSTETRICS & GYNECOLOGY
Payer: COMMERCIAL

## 2019-11-26 ENCOUNTER — HOSPITAL ENCOUNTER (OUTPATIENT)
Dept: ULTRASOUND IMAGING | Facility: OTHER | Age: 24
Discharge: HOME | End: 2019-11-26
Attending: OBSTETRICS & GYNECOLOGY | Admitting: OBSTETRICS & GYNECOLOGY
Payer: COMMERCIAL

## 2019-11-26 VITALS
DIASTOLIC BLOOD PRESSURE: 64 MMHG | BODY MASS INDEX: 26.07 KG/M2 | HEART RATE: 60 BPM | HEIGHT: 68 IN | SYSTOLIC BLOOD PRESSURE: 102 MMHG | WEIGHT: 172 LBS

## 2019-11-26 DIAGNOSIS — Z34.80 SUPERVISION OF OTHER NORMAL PREGNANCY: Primary | ICD-10-CM

## 2019-11-26 DIAGNOSIS — O36.80X0 ENCOUNTER TO DETERMINE FETAL VIABILITY OF PREGNANCY, SINGLE OR UNSPECIFIED FETUS: ICD-10-CM

## 2019-11-26 LAB
ABO + RH BLD: NORMAL
ABO + RH BLD: NORMAL
BLD GP AB SCN SERPL QL: NORMAL
BLOOD BANK CMNT PATIENT-IMP: NORMAL
C TRACH DNA SPEC QL PROBE+SIG AMP: NOT DETECTED
ERYTHROCYTE [DISTWIDTH] IN BLOOD BY AUTOMATED COUNT: 12.7 % (ref 10–15)
HCT VFR BLD AUTO: 41.6 % (ref 35–47)
HGB BLD-MCNC: 13.9 G/DL (ref 11.7–15.7)
MCH RBC QN AUTO: 29.7 PG (ref 26.5–33)
MCHC RBC AUTO-ENTMCNC: 33.4 G/DL (ref 31.5–36.5)
MCV RBC AUTO: 89 FL (ref 78–100)
N GONORRHOEA DNA SPEC QL PROBE+SIG AMP: NOT DETECTED
PLATELET # BLD AUTO: 297 10E9/L (ref 150–450)
RBC # BLD AUTO: 4.68 10E12/L (ref 3.8–5.2)
SPECIMEN EXP DATE BLD: NORMAL
SPECIMEN SOURCE: NORMAL
WBC # BLD AUTO: 8.8 10E9/L (ref 4–11)

## 2019-11-26 PROCEDURE — 86901 BLOOD TYPING SEROLOGIC RH(D): CPT | Mod: ZL | Performed by: OBSTETRICS & GYNECOLOGY

## 2019-11-26 PROCEDURE — G0008 ADMIN INFLUENZA VIRUS VAC: HCPCS

## 2019-11-26 PROCEDURE — 90686 IIV4 VACC NO PRSV 0.5 ML IM: CPT

## 2019-11-26 PROCEDURE — 85027 COMPLETE CBC AUTOMATED: CPT | Mod: ZL | Performed by: OBSTETRICS & GYNECOLOGY

## 2019-11-26 PROCEDURE — 86900 BLOOD TYPING SEROLOGIC ABO: CPT | Mod: ZL | Performed by: OBSTETRICS & GYNECOLOGY

## 2019-11-26 PROCEDURE — 86780 TREPONEMA PALLIDUM: CPT | Mod: ZL | Performed by: OBSTETRICS & GYNECOLOGY

## 2019-11-26 PROCEDURE — 87591 N.GONORRHOEAE DNA AMP PROB: CPT | Mod: ZL | Performed by: OBSTETRICS & GYNECOLOGY

## 2019-11-26 PROCEDURE — 87491 CHLMYD TRACH DNA AMP PROBE: CPT | Mod: ZL | Performed by: OBSTETRICS & GYNECOLOGY

## 2019-11-26 PROCEDURE — 99207 ZZC OB VISIT-NO CHARGE - GICH ONLY: CPT | Performed by: OBSTETRICS & GYNECOLOGY

## 2019-11-26 PROCEDURE — 87389 HIV-1 AG W/HIV-1&-2 AB AG IA: CPT | Mod: ZL | Performed by: OBSTETRICS & GYNECOLOGY

## 2019-11-26 PROCEDURE — 86850 RBC ANTIBODY SCREEN: CPT | Mod: ZL | Performed by: OBSTETRICS & GYNECOLOGY

## 2019-11-26 PROCEDURE — 87086 URINE CULTURE/COLONY COUNT: CPT | Mod: ZL | Performed by: OBSTETRICS & GYNECOLOGY

## 2019-11-26 PROCEDURE — 90471 IMMUNIZATION ADMIN: CPT | Performed by: OBSTETRICS & GYNECOLOGY

## 2019-11-26 PROCEDURE — 87340 HEPATITIS B SURFACE AG IA: CPT | Mod: ZL | Performed by: OBSTETRICS & GYNECOLOGY

## 2019-11-26 PROCEDURE — 86762 RUBELLA ANTIBODY: CPT | Mod: ZL | Performed by: OBSTETRICS & GYNECOLOGY

## 2019-11-26 PROCEDURE — 36415 COLL VENOUS BLD VENIPUNCTURE: CPT | Mod: ZL | Performed by: OBSTETRICS & GYNECOLOGY

## 2019-11-26 PROCEDURE — 76801 OB US < 14 WKS SINGLE FETUS: CPT

## 2019-11-26 RX ORDER — ONDANSETRON 4 MG/1
4 TABLET, FILM COATED ORAL EVERY 6 HOURS PRN
Qty: 30 TABLET | Refills: 1 | Status: SHIPPED | OUTPATIENT
Start: 2019-11-26 | End: 2020-07-17

## 2019-11-26 ASSESSMENT — PAIN SCALES - GENERAL: PAINLEVEL: MODERATE PAIN (5)

## 2019-11-26 ASSESSMENT — MIFFLIN-ST. JEOR: SCORE: 1587.66

## 2019-11-26 NOTE — NURSING NOTE
Immunization Documentation    Prior to Immunization administration, verified patients identity using patient's name and date of birth. Please see IMMUNIZATIONS  and order for additional information.  Patient / Parent instructed to remain in clinic for 15 minutes and report any adverse reaction to staff immediately.    Was the entire amount of vaccines given used? Yes    Nichol Dennis LPN  11/26/2019   2:46 PM

## 2019-11-26 NOTE — NURSING NOTE
Chief Complaint   Patient presents with     Prenatal Care     OBPX 9W4D      Patient has complaints of Nausea and Vomiting so severe she has been in the ER.     Medication Reconciliation: completed   Mariann Kolb LPN  11/26/2019 2:12 PM

## 2019-11-26 NOTE — PROGRESS NOTES
New Obstetrics Visit    HPI: 24 year old  at 9w4d by LMP c/w 9w4d US here today for initial OB visit. Her LMP was 19, had regular cycles. This was a planned pregnancy. Has been very nauseated and vomiting frequently. Reglan helps but needed to go to the ED  due to vomiting. Jaffrey better after IV fluids. Doesn't feel like she needs them again right now but may soon. Still having cramping but no VB.    OBHx  OB History    Para Term  AB Living   2 1 1 0 0 1   SAB TAB Ectopic Multiple Live Births   0 0 0 0 1      # Outcome Date GA Lbr Marky/2nd Weight Sex Delivery Anes PTL Lv   2 Current            1 Term 18 40w1d  3.756 kg (8 lb 4.5 oz) F CS-LTranv Spinal N KARENA      Birth Comments: none      Name: Katie      Apgar1: 8  Apgar5: 9         PMHx:   Past Medical History:   Diagnosis Date     Anxiety disorder     No Comments Provided     Attention-deficit hyperactivity disorder     No Comments Provided     Superficial foreign body of finger     2017      PSHx:   Past Surgical History:   Procedure Laterality Date     ADENOIDECTOMY      No Comments Provided     APPENDECTOMY OPEN           ARTHROSCOPY KNEE      2012,2nd as well at age 18      SECTION N/A 2018    Procedure:  SECTION;  External version attemted prior to Section;  Surgeon: Naye Johns MD;  Location:  OR     LAPAROSCOPY DIAGNOSTIC (GYN) N/A 2019    Procedure: LAPAROSCOPY DIAGNOSTIC;  Surgeon: Naye Johns MD;  Location: [facility] OR     OTHER SURGICAL HISTORY      1999,USB175,BLADDER SURGERY     OTHER SURGICAL HISTORY      2017,11352.0,[place] REMOVE FOREIGN BODY SIMPLE      Meds:   Current Outpatient Medications   Medication     acetaminophen (TYLENOL) 500 MG tablet     HEMP OIL OR EXTRACT OR OTHER CBD CANNABINOID, NOT MEDICAL CANNABIS,     LORazepam (ATIVAN) 0.5 MG tablet     metoclopramide (REGLAN) 10 MG tablet     mupirocin (BACTROBAN) 2 % external ointment     ondansetron  "(ZOFRAN) 4 MG tablet     polyethylene glycol (MIRALAX/GLYCOLAX) powder     Prenatal Vit-Fe Fumarate-FA (PRENATAL VITAMINS) 28-0.8 MG TABS     triamcinolone (KENALOG) 0.1 % external ointment     amphetamine-dextroamphetamine (ADDERALL) 20 MG per tablet     No current facility-administered medications for this visit.      Allergies:     Allergies   Allergen Reactions     Penicillins Swelling     Throat swelling and hives      Phenergan Dm [Promethazine-Dm] Other (See Comments)     Hallucinations and muscle twitching       SocHx:   Social History     Tobacco Use     Smoking status: Never Smoker     Smokeless tobacco: Never Used   Substance Use Topics     Alcohol use: Not Currently     Alcohol/week: 0.0 standard drinks     Drug use: No     Comment: Drug use: No     Lives with her Chandrakant alamo, and their daughter. Is working as a  but will be starting a new job at [employer].    FamHx:   Family History   Problem Relation Age of Onset     Dementia Maternal Grandmother      Heart Disease Maternal Grandfather      Cancer Mother         carcinoid cancer, 30s        ROS: 10-Point ROS negative except as noted in HPI      Physical Exam  /64 (BP Location: Right arm, Patient Position: Sitting, Cuff Size: Adult Regular)   Pulse 60   Ht 1.734 m (5' 8.25\")   Wt 78 kg (172 lb)   LMP 09/20/2019   Breastfeeding No   BMI 25.96 kg/m    Body mass index is 25.96 kg/m .  Gen: Well-appearing, NAD  HEENT: Normocephalic, atraumatic  Neck: Thyroid is not enlarged, no appreciable masses palpated. Non-tender  CV:  RRR, no m/r/g auscultated  Pulm: CTAB, no w/r/r auscultated  Abd: Soft, non-tender, non-distended  Ext: No LE edema, extremities warm and well perfused    Pelvic:  Normal appearing external female genitalia. No vaginal lesions. Moderate white discharge. Cervix normal, no lesions. Uterus is 9 wk size, mobile, non-tender, anteverted. No adnexal tenderness or masses      Assessment/Plan:  Ms. Nury FORDE" Vladimir is a 24 year old  at 9w4d by LMP c/w 9w4d US, here for new OB visit. Pregnancy is complicated by pelvic congestion syndrome, previous  section, ADHD, depression.  1. Depression: has a therapist  2. Pelvic congestion syndrome: explained she will likely have more discomfort this pregnancy  3. History of  section: will discuss route of delivery later in pregnancy  4. New OB labs ord'd  5. Imaging: dating US at 9w4d  6. Immunizations: flu 2019  7. Genetics: had negative SMA and CF screen last pregnancy. Will discuss quad screen next visit  8. Nausea, vomiting: continue unisom, reglan. Added zofran. Patient will call if she feels like she needs IV fluids again    Follow up in 4 weeks or sooner duyen Johns MD  OB/GYN  2019 2:20 PM

## 2019-11-28 LAB
BACTERIA SPEC CULT: NO GROWTH
SPECIMEN SOURCE: NORMAL

## 2019-11-29 LAB
HBV SURFACE AG SERPL QL IA: NONREACTIVE
HIV 1+2 AB+HIV1 P24 AG SERPL QL IA: NONREACTIVE
RUBV IGG SERPL IA-ACNC: 8 IU/ML
T PALLIDUM AB SER QL: NONREACTIVE

## 2019-12-03 ENCOUNTER — MYC MEDICAL ADVICE (OUTPATIENT)
Dept: OBGYN | Facility: OTHER | Age: 24
End: 2019-12-03

## 2019-12-03 DIAGNOSIS — O21.9 NAUSEA AND VOMITING IN PREGNANCY: ICD-10-CM

## 2019-12-03 RX ORDER — DEXTROSE, SODIUM CHLORIDE, SODIUM LACTATE, POTASSIUM CHLORIDE, AND CALCIUM CHLORIDE 5; .6; .31; .03; .02 G/100ML; G/100ML; G/100ML; G/100ML; G/100ML
1000 INJECTION, SOLUTION INTRAVENOUS ONCE
Status: CANCELLED | OUTPATIENT
Start: 2019-12-03

## 2019-12-03 RX ORDER — ONDANSETRON 2 MG/ML
4 INJECTION INTRAMUSCULAR; INTRAVENOUS ONCE
Status: CANCELLED | OUTPATIENT
Start: 2019-12-03

## 2019-12-04 ENCOUNTER — HOSPITAL ENCOUNTER (OUTPATIENT)
Dept: INFUSION THERAPY | Facility: OTHER | Age: 24
Discharge: HOME | End: 2019-12-04
Attending: OBSTETRICS & GYNECOLOGY | Admitting: OBSTETRICS & GYNECOLOGY
Payer: COMMERCIAL

## 2019-12-04 VITALS
HEART RATE: 66 BPM | TEMPERATURE: 98.1 F | DIASTOLIC BLOOD PRESSURE: 60 MMHG | SYSTOLIC BLOOD PRESSURE: 109 MMHG | RESPIRATION RATE: 18 BRPM

## 2019-12-04 DIAGNOSIS — O21.9 NAUSEA AND VOMITING IN PREGNANCY: Primary | ICD-10-CM

## 2019-12-04 PROCEDURE — 96374 THER/PROPH/DIAG INJ IV PUSH: CPT

## 2019-12-04 PROCEDURE — 96361 HYDRATE IV INFUSION ADD-ON: CPT

## 2019-12-04 PROCEDURE — 25000128 H RX IP 250 OP 636: Performed by: OBSTETRICS & GYNECOLOGY

## 2019-12-04 RX ORDER — DEXTROSE, SODIUM CHLORIDE, SODIUM LACTATE, POTASSIUM CHLORIDE, AND CALCIUM CHLORIDE 5; .6; .31; .03; .02 G/100ML; G/100ML; G/100ML; G/100ML; G/100ML
1000 INJECTION, SOLUTION INTRAVENOUS ONCE
Status: COMPLETED | OUTPATIENT
Start: 2019-12-04 | End: 2019-12-04

## 2019-12-04 RX ORDER — ONDANSETRON 2 MG/ML
4 INJECTION INTRAMUSCULAR; INTRAVENOUS ONCE
Status: CANCELLED | OUTPATIENT
Start: 2019-12-04

## 2019-12-04 RX ORDER — ONDANSETRON 2 MG/ML
4 INJECTION INTRAMUSCULAR; INTRAVENOUS ONCE
Status: COMPLETED | OUTPATIENT
Start: 2019-12-04 | End: 2019-12-04

## 2019-12-04 RX ORDER — DEXTROSE, SODIUM CHLORIDE, SODIUM LACTATE, POTASSIUM CHLORIDE, AND CALCIUM CHLORIDE 5; .6; .31; .03; .02 G/100ML; G/100ML; G/100ML; G/100ML; G/100ML
1000 INJECTION, SOLUTION INTRAVENOUS ONCE
Status: CANCELLED | OUTPATIENT
Start: 2019-12-04

## 2019-12-04 RX ADMIN — ONDANSETRON HYDROCHLORIDE 4 MG: 2 INJECTION, SOLUTION INTRAMUSCULAR; INTRAVENOUS at 12:30

## 2019-12-04 RX ADMIN — SODIUM CHLORIDE, SODIUM LACTATE, POTASSIUM CHLORIDE, CALCIUM CHLORIDE AND DEXTROSE MONOHYDRATE 1000 ML: 5; 600; 310; 30; 20 INJECTION, SOLUTION INTRAVENOUS at 12:30

## 2019-12-04 NOTE — PROGRESS NOTES
Infusion Nursing Note:  Nury Gilbert presents today for Fluids and Zofran.    Patient seen by provider today: No   present during visit today: Not Applicable.    Note: Patient having nausea and vomiting.    Intravenous Access:  Peripheral IV placed to right antecubital space with brisk blood return    Treatment Conditions:  Not Applicable.      Post Infusion Assessment:  Patient tolerated infusion without incident.  Site patent and intact, free from redness, edema or discomfort.  No evidence of extravasations.  Access discontinued per protocol.       Discharge Plan:   Copy of AVS reviewed with patient and/or family.  Patient will return to Dr. Johns for follow up.  Patient discharged in stable condition accompanied by: self.  Departure Mode: Ambulatory.  Educated patient on updating ordering provider if continues to not improve.    Brenda Aggarwal RN

## 2019-12-31 ENCOUNTER — PRENATAL OFFICE VISIT (OUTPATIENT)
Dept: OBGYN | Facility: OTHER | Age: 24
End: 2019-12-31
Attending: OBSTETRICS & GYNECOLOGY
Payer: COMMERCIAL

## 2019-12-31 VITALS
DIASTOLIC BLOOD PRESSURE: 50 MMHG | WEIGHT: 174 LBS | SYSTOLIC BLOOD PRESSURE: 94 MMHG | BODY MASS INDEX: 26.26 KG/M2 | HEART RATE: 76 BPM

## 2019-12-31 DIAGNOSIS — K59.00 CONSTIPATION, UNSPECIFIED CONSTIPATION TYPE: ICD-10-CM

## 2019-12-31 DIAGNOSIS — O22.02 OBSTETRIC VARICOSE VEINS IN SECOND TRIMESTER: ICD-10-CM

## 2019-12-31 DIAGNOSIS — Z34.80 SUPERVISION OF OTHER NORMAL PREGNANCY: Primary | ICD-10-CM

## 2019-12-31 DIAGNOSIS — O21.9 NAUSEA AND VOMITING IN PREGNANCY: ICD-10-CM

## 2019-12-31 PROCEDURE — 99207 ZZC OB VISIT-NO CHARGE - GICH ONLY: CPT | Performed by: OBSTETRICS & GYNECOLOGY

## 2019-12-31 RX ORDER — PYRIDOXINE HCL (VITAMIN B6) 25 MG
25 TABLET ORAL DAILY
COMMUNITY
End: 2020-03-31

## 2019-12-31 RX ORDER — POLYETHYLENE GLYCOL 3350 17 G/17G
1 POWDER, FOR SOLUTION ORAL DAILY
Qty: 500 G | Refills: 3 | Status: SHIPPED | OUTPATIENT
Start: 2019-12-31 | End: 2021-03-22

## 2019-12-31 ASSESSMENT — PAIN SCALES - GENERAL: PAINLEVEL: MILD PAIN (3)

## 2019-12-31 NOTE — NURSING NOTE
Chief Complaint   Patient presents with     Prenatal Care     14W4D      Discuss Quad Screen     Medication Reconciliation: completed   Mariann Kolb LPN  12/31/2019 2:08 PM

## 2019-12-31 NOTE — PROGRESS NOTES
Return OB Visit    S: Patient is feeling okay. Nausea better. Has been dealing with constipation, miralax is helpful. Some cramping but getting better. No VB. Has bothersome varicose veins in her legs, is wearing compression stockings but would like to see if her insurance would cover.    O: BP 94/50 (BP Location: Right arm, Patient Position: Sitting, Cuff Size: Adult Regular)   Pulse 76   Wt 78.9 kg (174 lb)   LMP 2019   Breastfeeding No   BMI 26.26 kg/m    Gen: Well-appearing, NAD  See OB Flowsheet    A/P:  Nury Gilbert is a 24 year old  at 14w4d by LMP c/w 9w4d US, here for return OB visit.  Depression: therapist  Pelvic congestion syndrome  Hx of c/s for breech    PNC: Rh positive, Rubella equivocal- will recheck with GCT  Genetics: normal SMA and CF in previous pregnancy, plan quad screen next visit  Imaging: dating US at 9w4d  Immunizations: s/p flu  RTC 4 weeks    Naye Johns MD  OB/GYN  2019 2:22 PM

## 2020-01-02 ENCOUNTER — TELEPHONE (OUTPATIENT)
Dept: OBGYN | Facility: OTHER | Age: 25
End: 2020-01-02

## 2020-01-02 DIAGNOSIS — O21.9 NAUSEA AND VOMITING IN PREGNANCY: ICD-10-CM

## 2020-01-02 NOTE — TELEPHONE ENCOUNTER
Patient left message on [employer] answering machine requesting a letter for work stating that she was seen on 12/31/19. Letter printed and emailed, per patient request.    Michelle Ovalle RN...................1/2/2020 9:44 AM

## 2020-01-02 NOTE — LETTER
January 2, 2020      Nury Gilbert  [address]   [facility] [address]        To Whom It May Concern:    Nury Gilbert was seen on 12/31/19.  She may return to work with no restrictions.      Sincerely,        Naye Johns MD

## 2020-01-10 ENCOUNTER — MYC MEDICAL ADVICE (OUTPATIENT)
Dept: OBGYN | Facility: OTHER | Age: 25
End: 2020-01-10

## 2020-01-10 DIAGNOSIS — Z3A.20 20 WEEKS GESTATION OF PREGNANCY: Primary | ICD-10-CM

## 2020-01-29 ENCOUNTER — PRENATAL OFFICE VISIT (OUTPATIENT)
Dept: OBGYN | Facility: OTHER | Age: 25
End: 2020-01-29
Attending: OBSTETRICS & GYNECOLOGY
Payer: COMMERCIAL

## 2020-01-29 VITALS
RESPIRATION RATE: 18 BRPM | SYSTOLIC BLOOD PRESSURE: 116 MMHG | HEART RATE: 84 BPM | DIASTOLIC BLOOD PRESSURE: 70 MMHG | BODY MASS INDEX: 26.88 KG/M2 | WEIGHT: 178.1 LBS

## 2020-01-29 DIAGNOSIS — F41.9 ANXIETY: ICD-10-CM

## 2020-01-29 DIAGNOSIS — O21.9 NAUSEA AND VOMITING IN PREGNANCY: ICD-10-CM

## 2020-01-29 DIAGNOSIS — Z3A.18 18 WEEKS GESTATION OF PREGNANCY: Primary | ICD-10-CM

## 2020-01-29 PROCEDURE — 99207 ZZC OB VISIT-NO CHARGE - GICH ONLY: CPT | Performed by: OBSTETRICS & GYNECOLOGY

## 2020-01-29 PROCEDURE — 36415 COLL VENOUS BLD VENIPUNCTURE: CPT | Mod: ZL | Performed by: OBSTETRICS & GYNECOLOGY

## 2020-01-29 PROCEDURE — 81511 FTL CGEN ABNOR FOUR ANAL: CPT | Mod: ZL | Performed by: OBSTETRICS & GYNECOLOGY

## 2020-01-29 PROCEDURE — G0463 HOSPITAL OUTPT CLINIC VISIT: HCPCS

## 2020-01-29 RX ORDER — CITALOPRAM HYDROBROMIDE 10 MG/1
10 TABLET ORAL DAILY
Qty: 30 TABLET | Refills: 1 | Status: SHIPPED | OUTPATIENT
Start: 2020-01-29 | End: 2020-03-31

## 2020-01-29 ASSESSMENT — ANXIETY QUESTIONNAIRES
2. NOT BEING ABLE TO STOP OR CONTROL WORRYING: MORE THAN HALF THE DAYS
1. FEELING NERVOUS, ANXIOUS, OR ON EDGE: NEARLY EVERY DAY
5. BEING SO RESTLESS THAT IT IS HARD TO SIT STILL: MORE THAN HALF THE DAYS
GAD7 TOTAL SCORE: 16
IF YOU CHECKED OFF ANY PROBLEMS ON THIS QUESTIONNAIRE, HOW DIFFICULT HAVE THESE PROBLEMS MADE IT FOR YOU TO DO YOUR WORK, TAKE CARE OF THINGS AT HOME, OR GET ALONG WITH OTHER PEOPLE: EXTREMELY DIFFICULT
3. WORRYING TOO MUCH ABOUT DIFFERENT THINGS: MORE THAN HALF THE DAYS
7. FEELING AFRAID AS IF SOMETHING AWFUL MIGHT HAPPEN: NEARLY EVERY DAY
6. BECOMING EASILY ANNOYED OR IRRITABLE: SEVERAL DAYS

## 2020-01-29 ASSESSMENT — PAIN SCALES - GENERAL: PAINLEVEL: NO PAIN (0)

## 2020-01-29 ASSESSMENT — PATIENT HEALTH QUESTIONNAIRE - PHQ9: 5. POOR APPETITE OR OVEREATING: NEARLY EVERY DAY

## 2020-01-29 NOTE — NURSING NOTE
"Chief Complaint   Patient presents with     Prenatal Care     18 w 5 d       Initial /70 (BP Location: Right arm, Patient Position: Sitting, Cuff Size: Adult Regular)   Pulse 84   Resp 18   Wt 80.8 kg (178 lb 1.6 oz)   LMP 09/20/2019   BMI 26.88 kg/m   Estimated body mass index is 26.88 kg/m  as calculated from the following:    Height as of 11/26/19: 1.734 m (5' 8.25\").    Weight as of this encounter: 80.8 kg (178 lb 1.6 oz).  Medication Reconciliation: complete    Cat Doyle LPN  "

## 2020-01-29 NOTE — PROGRESS NOTES
Return OB Visit    S: Patient is feeling okay. Her mood has been very up and down. Her anxiety is worse and feels like she needs to restart celexa. This has helped her in the past. No cramping or VB. No FM yet.    O: /70 (BP Location: Right arm, Patient Position: Sitting, Cuff Size: Adult Regular)   Pulse 84   Resp 18   Wt 80.8 kg (178 lb 1.6 oz)   LMP 2019   BMI 26.88 kg/m    Gen: Well-appearing, NAD  See OB Flowsheet    A/P:  Nury Gilbert is a 24 year old  at 18w5d by LMP c/w 9w4d US, here for return OB visit.  Depression, anxiety: therapist. Will add celexa  Pelvic congestion syndrome  Hx of c/s for breech     PNC: Rh positive, Rubella equivocal- will recheck with GCT  Genetics: normal SMA and CF in previous pregnancy, plan quad screen next visit  Imaging: dating US at 9w4d, anatomy survey scheduled for   Immunizations: s/p flu  RTC 4 weeks    Naye Johns MD  OB/GYN  2020 3:52 PM

## 2020-01-30 ASSESSMENT — ANXIETY QUESTIONNAIRES: GAD7 TOTAL SCORE: 16

## 2020-02-02 LAB
# FETUSES US: NORMAL
# FETUSES: NORMAL
AFP ADJ MOM AMN: 0.81
AFP SERPL-MCNC: 35 NG/ML
AGE - REPORTED: 24.4 YR
CURRENT SMOKER: NO
CURRENT SMOKER: NO
DIABETES STATUS PATIENT: NO
FAMILY MEMBER DISEASES HX: NO
FAMILY MEMBER DISEASES HX: NO
GA METHOD: NORMAL
GA METHOD: NORMAL
GA: NORMAL WK
HCG MOM SERPL: 0.46
HCG SERPL-ACNC: 9250 IU/L
HX OF HEREDITARY DISORDERS: NO
IDDM PATIENT QL: NO
INHIBIN A MOM SERPL: 0.73
INHIBIN A SERPL-MCNC: 112 PG/ML
INTEGRATED SCN PATIENT-IMP: NORMAL
IVF PREGNANCY: NO
LMP START DATE: NORMAL
MONOCHORIONIC TWINS: NO
PATHOLOGY STUDY: NORMAL
PREV FETUS DEFECT: NO
SERVICE CMNT-IMP: NO
SPECIMEN DRAWN SERPL: NORMAL
U ESTRIOL MOM SERPL: 0.82
U ESTRIOL SERPL-MCNC: 1.5 NG/ML
VALPROIC/CARBAMAZEPINE STATUS: NO
WEIGHT UNITS: NORMAL

## 2020-02-11 ENCOUNTER — HOSPITAL ENCOUNTER (OUTPATIENT)
Dept: ULTRASOUND IMAGING | Facility: OTHER | Age: 25
Discharge: HOME | End: 2020-02-11
Attending: OBSTETRICS & GYNECOLOGY | Admitting: OBSTETRICS & GYNECOLOGY
Payer: COMMERCIAL

## 2020-02-11 DIAGNOSIS — Z3A.20 20 WEEKS GESTATION OF PREGNANCY: ICD-10-CM

## 2020-02-11 PROCEDURE — 76805 OB US >/= 14 WKS SNGL FETUS: CPT

## 2020-02-19 ENCOUNTER — MYC MEDICAL ADVICE (OUTPATIENT)
Dept: OBGYN | Facility: OTHER | Age: 25
End: 2020-02-19

## 2020-02-19 DIAGNOSIS — Z34.80 SUPERVISION OF OTHER NORMAL PREGNANCY: Primary | ICD-10-CM

## 2020-02-25 ENCOUNTER — HOSPITAL ENCOUNTER (OUTPATIENT)
Dept: ULTRASOUND IMAGING | Facility: OTHER | Age: 25
Discharge: HOME | End: 2020-02-25
Attending: OBSTETRICS & GYNECOLOGY | Admitting: OBSTETRICS & GYNECOLOGY
Payer: COMMERCIAL

## 2020-02-25 ENCOUNTER — PRENATAL OFFICE VISIT (OUTPATIENT)
Dept: OBGYN | Facility: OTHER | Age: 25
End: 2020-02-25
Attending: OBSTETRICS & GYNECOLOGY
Payer: COMMERCIAL

## 2020-02-25 VITALS
BODY MASS INDEX: 27.02 KG/M2 | WEIGHT: 179 LBS | HEART RATE: 80 BPM | SYSTOLIC BLOOD PRESSURE: 104 MMHG | DIASTOLIC BLOOD PRESSURE: 60 MMHG

## 2020-02-25 DIAGNOSIS — Z34.80 SUPERVISION OF OTHER NORMAL PREGNANCY: Primary | ICD-10-CM

## 2020-02-25 DIAGNOSIS — F32.A DEPRESSION, UNSPECIFIED DEPRESSION TYPE: ICD-10-CM

## 2020-02-25 DIAGNOSIS — Z34.80 SUPERVISION OF OTHER NORMAL PREGNANCY: ICD-10-CM

## 2020-02-25 DIAGNOSIS — O21.9 NAUSEA AND VOMITING IN PREGNANCY: ICD-10-CM

## 2020-02-25 PROCEDURE — 76816 OB US FOLLOW-UP PER FETUS: CPT

## 2020-02-25 PROCEDURE — 99207 ZZC OB VISIT-NO CHARGE - GICH ONLY: CPT | Performed by: OBSTETRICS & GYNECOLOGY

## 2020-02-25 RX ORDER — CITALOPRAM HYDROBROMIDE 20 MG/1
20 TABLET ORAL DAILY
Qty: 90 TABLET | Refills: 1 | Status: SHIPPED | OUTPATIENT
Start: 2020-02-25 | End: 2020-07-17

## 2020-02-25 ASSESSMENT — PATIENT HEALTH QUESTIONNAIRE - PHQ9: SUM OF ALL RESPONSES TO PHQ QUESTIONS 1-9: 13

## 2020-02-25 ASSESSMENT — PAIN SCALES - GENERAL: PAINLEVEL: NO PAIN (0)

## 2020-02-25 NOTE — NURSING NOTE
Chief Complaint   Patient presents with     Prenatal Care     22W4D          Medication Reconciliation: completed   Mariann Kolb LPN  2/25/2020 3:05 PM

## 2020-02-25 NOTE — PROGRESS NOTES
Return OB Visit    S: Patient is doing okay. Nausea improved but still vomiting a few times a week. Is trying to figure out her triggers. Mood is better since starting celexa but still not great. No cramping or VB. +FM    O: /60 (BP Location: Right arm, Patient Position: Sitting, Cuff Size: Adult Large)   Pulse 80   Wt 81.2 kg (179 lb)   LMP 2019   Breastfeeding No   BMI 27.02 kg/m    Gen: Well-appearing, NAD  See OB Flowsheet    A/P:  Nury Gilbert is a 25 year old  at 22w4d by LMP c/w 9w4d US, here for return OB visit.  Depression, anxiety: therapy, increase celexa  Pelvic congestion syndrome  Hx of c/s for breech     PNC: Rh positive, Rubella equivocal- will recheck with GCT  Genetics: normal SMA and CF in previous pregnancy, normal quad screen  Imaging: dating US at 9w4d, anatomy survey normal except unable to get profile view  Immunizations: s/p flu  RTC 4 weeks    Naye Johns MD  OB/GYN  2020 3:08 PM

## 2020-03-02 ENCOUNTER — HEALTH MAINTENANCE LETTER (OUTPATIENT)
Age: 25
End: 2020-03-02

## 2020-03-31 ENCOUNTER — PRENATAL OFFICE VISIT (OUTPATIENT)
Dept: FAMILY MEDICINE | Facility: OTHER | Age: 25
End: 2020-03-31
Attending: FAMILY MEDICINE
Payer: COMMERCIAL

## 2020-03-31 VITALS
HEART RATE: 88 BPM | OXYGEN SATURATION: 98 % | HEIGHT: 68 IN | WEIGHT: 187 LBS | DIASTOLIC BLOOD PRESSURE: 66 MMHG | SYSTOLIC BLOOD PRESSURE: 112 MMHG | RESPIRATION RATE: 18 BRPM | TEMPERATURE: 98.4 F | BODY MASS INDEX: 28.34 KG/M2

## 2020-03-31 DIAGNOSIS — O34.219 PREGNANCY WITH HISTORY OF CESAREAN SECTION, ANTEPARTUM: Primary | ICD-10-CM

## 2020-03-31 DIAGNOSIS — O09.899 RUBELLA NON-IMMUNE STATUS, ANTEPARTUM: ICD-10-CM

## 2020-03-31 DIAGNOSIS — O21.9 NAUSEA AND VOMITING IN PREGNANCY: ICD-10-CM

## 2020-03-31 DIAGNOSIS — Z28.39 RUBELLA NON-IMMUNE STATUS, ANTEPARTUM: ICD-10-CM

## 2020-03-31 PROCEDURE — G0463 HOSPITAL OUTPT CLINIC VISIT: HCPCS

## 2020-03-31 PROCEDURE — 90715 TDAP VACCINE 7 YRS/> IM: CPT

## 2020-03-31 PROCEDURE — 90471 IMMUNIZATION ADMIN: CPT

## 2020-03-31 PROCEDURE — 99207 ZZC OB VISIT-NO CHARGE - GICH ONLY: CPT | Performed by: FAMILY MEDICINE

## 2020-03-31 PROCEDURE — G0463 HOSPITAL OUTPT CLINIC VISIT: HCPCS | Mod: 25

## 2020-03-31 ASSESSMENT — ANXIETY QUESTIONNAIRES
2. NOT BEING ABLE TO STOP OR CONTROL WORRYING: NEARLY EVERY DAY
1. FEELING NERVOUS, ANXIOUS, OR ON EDGE: NEARLY EVERY DAY
GAD7 TOTAL SCORE: 17
IF YOU CHECKED OFF ANY PROBLEMS ON THIS QUESTIONNAIRE, HOW DIFFICULT HAVE THESE PROBLEMS MADE IT FOR YOU TO DO YOUR WORK, TAKE CARE OF THINGS AT HOME, OR GET ALONG WITH OTHER PEOPLE: VERY DIFFICULT
7. FEELING AFRAID AS IF SOMETHING AWFUL MIGHT HAPPEN: NEARLY EVERY DAY
6. BECOMING EASILY ANNOYED OR IRRITABLE: SEVERAL DAYS
3. WORRYING TOO MUCH ABOUT DIFFERENT THINGS: MORE THAN HALF THE DAYS
5. BEING SO RESTLESS THAT IT IS HARD TO SIT STILL: MORE THAN HALF THE DAYS

## 2020-03-31 ASSESSMENT — PAIN SCALES - GENERAL: PAINLEVEL: NO PAIN (0)

## 2020-03-31 ASSESSMENT — PATIENT HEALTH QUESTIONNAIRE - PHQ9: 5. POOR APPETITE OR OVEREATING: NEARLY EVERY DAY

## 2020-03-31 ASSESSMENT — MIFFLIN-ST. JEOR: SCORE: 1650.7

## 2020-03-31 NOTE — NURSING NOTE
Patient presents to the clinic today for an ob check. She does also have a cough today, she is not concerned about it.   Med rec complete.  Alize Lynn LPN.................. 3/31/2020 2:18 PM

## 2020-03-31 NOTE — PROGRESS NOTES
"Nursing Notes:   Shane Aliez ART, LPN  3/31/2020  2:31 PM  Signed  Patient presents to the clinic today for an ob check. She does also have a cough today, she is not concerned about it.   Med rec complete.  Alize Shane OWENSN.................. 3/31/2020 2:18 PM       /66   Pulse 88   Temp 98.4  F (36.9  C) (Tympanic)   Resp 18   Ht 1.734 m (5' 8.25\")   Wt 84.8 kg (187 lb)   LMP 2019   SpO2 98%   Breastfeeding No   BMI 28.23 kg/m     Nury Gilbert is a 25 year old female   27w4d.  She was triaged to unit 1 due to having a cough.  She does not have wheezing, no shortness of breath, no temp.  She has seasonal allergies and does have some clear rhinorrhea.  Sleep is pretty good - takes unisom prn  She's had a previous .    Hemoglobin, treponema and Glucose test being done today.    Patient had an equivocal rubella test and that is due to be rechecked today as well.  Lab Results   Component Value Date    ABO B 2019    RH Pos 2019    [id number] B Rh Positive 2017         Patient with normal heart and lung exam.        ICD-10-CM    1. Pregnancy with history of  section, antepartum  O34.219 [facility] IMM-  TDAP VACCINE (BOOSTRIX )   2. Rubella non-immune status, antepartum  O99.89 Rubella Antibody IgG Quantitative    Z28.3    3. Nausea and vomiting in pregnancy  O21.9      As of right now her plan is to have a RCS.  Discussed Boostrix update - and immunization updated.   Reviewed signs and symptoms of PTL, preeclampsia; she has been having more headaches than when not pregnant.    Currently novaginal discharge, bleeding cramping.  Discussed COVD precautions and home quarantining at 37 weeks.  Follow up appointment schedule for last trimester reviewed.    Brooke Lopez MD     "

## 2020-04-01 ASSESSMENT — ANXIETY QUESTIONNAIRES: GAD7 TOTAL SCORE: 17

## 2020-04-29 ENCOUNTER — TELEPHONE (OUTPATIENT)
Dept: OBGYN | Facility: OTHER | Age: 25
End: 2020-04-29

## 2020-04-29 ENCOUNTER — PRENATAL OFFICE VISIT (OUTPATIENT)
Dept: OBGYN | Facility: OTHER | Age: 25
End: 2020-04-29
Attending: OBSTETRICS & GYNECOLOGY
Payer: COMMERCIAL

## 2020-04-29 VITALS — BODY MASS INDEX: 29.22 KG/M2 | SYSTOLIC BLOOD PRESSURE: 106 MMHG | WEIGHT: 193.6 LBS | DIASTOLIC BLOOD PRESSURE: 70 MMHG

## 2020-04-29 DIAGNOSIS — Z3A.31 31 WEEKS GESTATION OF PREGNANCY: ICD-10-CM

## 2020-04-29 DIAGNOSIS — R51.9 HEADACHE IN PREGNANCY, ANTEPARTUM, THIRD TRIMESTER: Primary | ICD-10-CM

## 2020-04-29 DIAGNOSIS — O26.893 HEADACHE IN PREGNANCY, ANTEPARTUM, THIRD TRIMESTER: Primary | ICD-10-CM

## 2020-04-29 PROCEDURE — 99207 ZZC OB VISIT-NO CHARGE - GICH ONLY: CPT | Performed by: OBSTETRICS & GYNECOLOGY

## 2020-04-29 PROCEDURE — G0463 HOSPITAL OUTPT CLINIC VISIT: HCPCS

## 2020-04-29 RX ORDER — BUTALBITAL, ACETAMINOPHEN AND CAFFEINE 50; 325; 40 MG/1; MG/1; MG/1
1 TABLET ORAL EVERY 4 HOURS PRN
Qty: 12 TABLET | Refills: 0 | Status: SHIPPED | OUTPATIENT
Start: 2020-04-29 | End: 2020-07-31

## 2020-04-29 ASSESSMENT — PAIN SCALES - GENERAL: PAINLEVEL: SEVERE PAIN (6)

## 2020-04-29 NOTE — TELEPHONE ENCOUNTER
Patient calls stating she has had a headache for a few days and has not been able to get it to go away with rest, Tylenol or fluids. She also reports that her mom brought her a blood pressure cuff and her blood pressure was 135/80 when it is normally 100's/60's. Patient was offered and accepted appointment today.    Michelle Ovalle RN...................4/29/2020 1:34 PM

## 2020-04-29 NOTE — PROGRESS NOTES
Not feeling well x past week.  Headache.  Some nausea  No cough, fever, visual changes,  etc.  Try fioricett, caffeine  BP good

## 2020-04-29 NOTE — NURSING NOTE
Chief Complaint   Patient presents with     Prenatal Care     Concerns      Complaints of a headache for the last few weeks. Little to no relief from tylenol. Last dose was today at 1230. Rates pain 6/10. Also has been nauseous. Not able to eat or drink much. Was checking BP at home and getting elevated readings. Most recent at home was at 1330 of 131/84. I planning for a repeat .      Medication Reconciliation: complete    Beatriz Ba LPN

## 2020-05-05 ENCOUNTER — PRENATAL OFFICE VISIT (OUTPATIENT)
Dept: OBGYN | Facility: OTHER | Age: 25
End: 2020-05-05
Attending: OBSTETRICS & GYNECOLOGY
Payer: COMMERCIAL

## 2020-05-05 VITALS
DIASTOLIC BLOOD PRESSURE: 64 MMHG | WEIGHT: 194 LBS | HEART RATE: 100 BPM | SYSTOLIC BLOOD PRESSURE: 110 MMHG | BODY MASS INDEX: 29.28 KG/M2

## 2020-05-05 DIAGNOSIS — K21.00 GASTROESOPHAGEAL REFLUX DISEASE WITH ESOPHAGITIS: Primary | ICD-10-CM

## 2020-05-05 DIAGNOSIS — Z34.90 NORMAL PREGNANCY, ANTEPARTUM: ICD-10-CM

## 2020-05-05 PROCEDURE — 99207 ZZC OB VISIT-NO CHARGE - GICH ONLY: CPT | Performed by: OBSTETRICS & GYNECOLOGY

## 2020-05-05 ASSESSMENT — PAIN SCALES - GENERAL: PAINLEVEL: SEVERE PAIN (7)

## 2020-05-05 NOTE — NURSING NOTE
"Date of Surgery: 2020 (tentative)  Type of Surgery:   Surgeon: Dr. Naye Johns MD    Patient's consents were signed and appropriate appointments were scheduled by the [facility] . Patient was given surgical folder which includes pre-operative bathing instructions related to the two packets of Hibiclens surgical prep provided. Surgical forms were copied and kept for informative purposes. Originals were delivered to Day-surgery. Patient denies questions at this time.        STOP BANG    Fever/Chills or other infectious symptoms in past month? No  >10 pound weight loss in the past 2 months? No  Health Care Directive on file? No  History of blood transfusions? No  Td up to date? Yes  History of VRE/MRSA? No      Obstructive Sleep Apnea screening    Preoperative Evaluation: Obstructive Sleep Apnea screening    S: Snore -  Do you snore loudly? (louder than talking or loud enough to be heard through closed doors)No  T: Tired - Do you often feel tired, fatigued, or sleepy during the daytime?Yes  O: Observed - Has anyone ever observed you stop breathing during your sleep?No  P: Pressure - Do you have or are you being treated for high blood pressure?No  B: BMI - BMI greater than 35kg/m2?No  A: Age - Age over 50 years old?No  N: Neck - Neck circumference greater than 40 cm?No  G: Gender - Gender: Male?No    Total number of \"YES\" responses:  1    Scoring: Low risk of JERRI 0-2  At Risk of JERRI: >3 High Risk of JERRI: 5-8      Total yes answers in JERRI section:    Low risk 0-2  At risk 3-4  High risk 5-8    Umair Angeles RN............. 2020 2:23 PM     Induction or  date: 2020 (tentative)  In the past 24 hours have you had shortness of breath when resting, speaking, walking, or climbing stairs? No  Do you have a cough? No  Do you have a fever greater than 100? No  Have you been exposed to anyone with a positive test result for Covid 19? No    Order placed for Covid 19 testing for 72 " hours prior to planned Induction or .  Patient transferred to appointment line ([phone]) to schedule appointment for Curbside testing.    Umair Angeles RN 2020 2:25 PM

## 2020-05-05 NOTE — NURSING NOTE
Chief Complaint   Patient presents with     Prenatal Care     32w4d    patient states she has increased pelvic pressure     Medication Reconciliation: completed   Mariann Kolb LPN  5/5/2020 1:55 PM

## 2020-05-05 NOTE — PROGRESS NOTES
CC: Recheck OB visit at 32w4d    HPI: Nury Gilbert presents for a routine OB visit now at 32w4d  She has no concerns. Denies cramping, bleeding, normal fetal movement. Struggling with some reflux.    OB History    Para Term  AB Living   2 1 1 0 0 1   SAB TAB Ectopic Multiple Live Births   0 0 0 0 1      # Outcome Date GA Lbr Marky/2nd Weight Sex Delivery Anes PTL Lv   2 Current            1 Term 18 40w1d  3.756 kg (8 lb 4.5 oz) F CS-LTranv Spinal N KAERNA      Birth Comments: none      Name: Katie      Apgar1: 8  Apgar5: 9     Current Outpatient Medications   Medication     acetaminophen (TYLENOL) 500 MG tablet     butalbital-acetaminophen-caffeine (ESGIC) -40 MG tablet     citalopram (CELEXA) 20 MG tablet     HEMP OIL OR EXTRACT OR OTHER CBD CANNABINOID, NOT MEDICAL CANNABIS,     metoclopramide (REGLAN) 10 MG tablet     mupirocin (BACTROBAN) 2 % external ointment     ondansetron (ZOFRAN) 4 MG tablet     polyethylene glycol (MIRALAX/GLYCOLAX) powder     Prenatal Vit-Fe Fumarate-FA (PRENATAL VITAMINS) 28-0.8 MG TABS     No current facility-administered medications for this visit.          O: /64 (BP Location: Right arm, Patient Position: Sitting, Cuff Size: Adult Large)   Pulse 100   Wt 88 kg (194 lb)   LMP 2019   Breastfeeding No   BMI 29.28 kg/m    Body mass index is 29.28 kg/m .  See OB flow sheet  EXAM:  NAD  FH:32 cm  FHT: 140 bpm    No results found for any visits on 20.    A/P: 32w4d gestation  Have instructed her nursing staff to schedule her for roughly 39 weeks gestation for her repeat  section.  This would be .  Dr. Erica Johns is her primary physician.  She understands risks benefits and alternatives.      Recheck in 2 weeks    Problem List:   Prior  for breech  Planning repeat.  GERD- trial of pepcid    Lei Monsivais MD FACOG  2:08 PM 2020

## 2020-05-08 DIAGNOSIS — Z98.891 HISTORY OF CESAREAN SECTION: Primary | ICD-10-CM

## 2020-05-08 RX ORDER — CLINDAMYCIN PHOSPHATE 900 MG/50ML
900 INJECTION, SOLUTION INTRAVENOUS
Status: CANCELLED | OUTPATIENT
Start: 2020-05-08

## 2020-05-08 RX ORDER — SODIUM CHLORIDE, SODIUM LACTATE, POTASSIUM CHLORIDE, CALCIUM CHLORIDE 600; 310; 30; 20 MG/100ML; MG/100ML; MG/100ML; MG/100ML
INJECTION, SOLUTION INTRAVENOUS CONTINUOUS
Status: CANCELLED | OUTPATIENT
Start: 2020-05-08

## 2020-05-08 RX ORDER — CITRIC ACID/SODIUM CITRATE 334-500MG
30 SOLUTION, ORAL ORAL
Status: CANCELLED | OUTPATIENT
Start: 2020-05-08

## 2020-05-12 DIAGNOSIS — Z01.812 PRE-PROCEDURE LAB EXAM: Primary | ICD-10-CM

## 2020-05-14 ENCOUNTER — TELEPHONE (OUTPATIENT)
Dept: OBGYN | Facility: OTHER | Age: 25
End: 2020-05-14

## 2020-05-14 NOTE — TELEPHONE ENCOUNTER
Nury called and said that she thinks she might have a yeast infection.  She has never had one before so is not sure, but is wondering if she needs to come in before her appointment on Tuesday to have it checked out.  Jane Weller on 5/14/2020 at 10:11 AM

## 2020-05-14 NOTE — TELEPHONE ENCOUNTER
Patient reports vaginal itching, and thick, white discharge. She states there is no odor. Patient was advised to try Monistat 7 and report back at her appointment, or sooner. Patient agrees and will call back if needed.    Michelle Ovalle RN...................5/14/2020 10:40 AM

## 2020-05-19 ENCOUNTER — PRENATAL OFFICE VISIT (OUTPATIENT)
Dept: OBGYN | Facility: OTHER | Age: 25
End: 2020-05-19
Attending: OBSTETRICS & GYNECOLOGY
Payer: COMMERCIAL

## 2020-05-19 VITALS
SYSTOLIC BLOOD PRESSURE: 116 MMHG | DIASTOLIC BLOOD PRESSURE: 76 MMHG | BODY MASS INDEX: 29.92 KG/M2 | OXYGEN SATURATION: 98 % | HEART RATE: 115 BPM | WEIGHT: 198.2 LBS | RESPIRATION RATE: 18 BRPM

## 2020-05-19 DIAGNOSIS — Z3A.34 34 WEEKS GESTATION OF PREGNANCY: Primary | ICD-10-CM

## 2020-05-19 DIAGNOSIS — O21.9 NAUSEA AND VOMITING IN PREGNANCY: ICD-10-CM

## 2020-05-19 PROCEDURE — 99207 ZZC OB VISIT-NO CHARGE - GICH ONLY: CPT | Performed by: OBSTETRICS & GYNECOLOGY

## 2020-05-19 ASSESSMENT — PAIN SCALES - GENERAL: PAINLEVEL: NO PAIN (0)

## 2020-05-19 NOTE — PROGRESS NOTES
Doing well  Self treating for yeast infection  Infant active  Postpartum contraceptive options reviewed.  Leaning towards minipill with breast feeding.

## 2020-05-19 NOTE — NURSING NOTE
"Chief Complaint   Patient presents with     Prenatal Care     34w 4d       Initial /76 (BP Location: Right arm, Patient Position: Sitting, Cuff Size: Adult Regular)   Pulse 115   Resp 18   Wt 89.9 kg (198 lb 3.2 oz)   LMP 09/20/2019   SpO2 98%   Breastfeeding No   BMI 29.92 kg/m   Estimated body mass index is 29.92 kg/m  as calculated from the following:    Height as of 3/31/20: 1.734 m (5' 8.25\").    Weight as of this encounter: 89.9 kg (198 lb 3.2 oz).  Medication Reconciliation: complete    Cat Doyle LPN  "

## 2020-06-02 ENCOUNTER — PRENATAL OFFICE VISIT (OUTPATIENT)
Dept: OBGYN | Facility: OTHER | Age: 25
End: 2020-06-02
Attending: OBSTETRICS & GYNECOLOGY
Payer: COMMERCIAL

## 2020-06-02 VITALS
DIASTOLIC BLOOD PRESSURE: 62 MMHG | HEART RATE: 112 BPM | SYSTOLIC BLOOD PRESSURE: 116 MMHG | BODY MASS INDEX: 29.89 KG/M2 | WEIGHT: 198 LBS

## 2020-06-02 DIAGNOSIS — O21.9 NAUSEA AND VOMITING IN PREGNANCY: ICD-10-CM

## 2020-06-02 DIAGNOSIS — N89.8 VAGINAL ITCHING: ICD-10-CM

## 2020-06-02 DIAGNOSIS — Z34.83 ENCOUNTER FOR SUPERVISION OF OTHER NORMAL PREGNANCY, THIRD TRIMESTER: Primary | ICD-10-CM

## 2020-06-02 LAB
SPECIMEN SOURCE: ABNORMAL
WET PREP SPEC: ABNORMAL

## 2020-06-02 PROCEDURE — 87081 CULTURE SCREEN ONLY: CPT | Mod: ZL | Performed by: OBSTETRICS & GYNECOLOGY

## 2020-06-02 PROCEDURE — 87210 SMEAR WET MOUNT SALINE/INK: CPT | Mod: ZL | Performed by: OBSTETRICS & GYNECOLOGY

## 2020-06-02 PROCEDURE — 99207 ZZC OB VISIT-NO CHARGE - GICH ONLY: CPT | Performed by: OBSTETRICS & GYNECOLOGY

## 2020-06-02 RX ORDER — METRONIDAZOLE 500 MG/1
500 TABLET ORAL 2 TIMES DAILY
Qty: 14 TABLET | Refills: 0 | Status: SHIPPED | OUTPATIENT
Start: 2020-06-02 | End: 2020-06-12

## 2020-06-02 ASSESSMENT — PAIN SCALES - GENERAL: PAINLEVEL: NO PAIN (0)

## 2020-06-02 ASSESSMENT — PATIENT HEALTH QUESTIONNAIRE - PHQ9: SUM OF ALL RESPONSES TO PHQ QUESTIONS 1-9: 9

## 2020-06-02 NOTE — NURSING NOTE
Chief Complaint   Patient presents with     Prenatal Care     36w4d       Medication Reconciliation: completed   Mariann Kolb LPN  6/2/2020 1:17 PM

## 2020-06-02 NOTE — PROGRESS NOTES
"Return OB Visit    S: Patient is feeling well overall. Still having some vaginal itching and \"puffiness.\"  Some BH ctx, no VB or LOF. +FM.     O: /62 (BP Location: Right arm, Patient Position: Sitting, Cuff Size: Adult Large)   Pulse 112   Wt 89.8 kg (198 lb)   LMP 2019   Breastfeeding No   BMI 29.89 kg/m    Gen: Well-appearing, NAD  See OB Flowsheet    A/P:  Nury Gilbert is a 25 year old  at 36w4d by LMP c/w 9w4d US, here for return OB visit.  Depression, anxiety: therapy, increase celexa  Pelvic congestion syndrome  Hx of c/s for breech: repeat scheduled for   Plans breastfeeding, Mirena     PNC: Rh positive, Rubella non-immune,   Genetics: normal SMA and CF in previous pregnancy, normal quad screen  Imaging: dating US at 9w4d, anatomy survey normal except unable to get profile view  Immunizations: s/p flu  RTC weekly until delivery    Naye Johns MD  OB/GYN  2020 1:24 PM     "

## 2020-06-03 ENCOUNTER — TELEPHONE (OUTPATIENT)
Dept: OBGYN | Facility: OTHER | Age: 25
End: 2020-06-03

## 2020-06-03 NOTE — TELEPHONE ENCOUNTER
[facility] called stating Monistat 3 is not covered by insurance. Per Dr. Naye Johns MD okay for patient to use 3-day or 7-day OTC product. Patient was notified of this. Also discussed breast pump. She will contact [employer] to have them fax us their form or she will call back for a prescription.    Michelle Ovalle RN...................6/3/2020 2:21 PM

## 2020-06-04 LAB
BACTERIA SPEC CULT: NORMAL
SPECIMEN SOURCE: NORMAL

## 2020-06-09 ENCOUNTER — PRENATAL OFFICE VISIT (OUTPATIENT)
Dept: OBGYN | Facility: OTHER | Age: 25
End: 2020-06-09
Attending: OBSTETRICS & GYNECOLOGY
Payer: COMMERCIAL

## 2020-06-09 VITALS
WEIGHT: 199.3 LBS | SYSTOLIC BLOOD PRESSURE: 102 MMHG | HEART RATE: 102 BPM | OXYGEN SATURATION: 98 % | DIASTOLIC BLOOD PRESSURE: 60 MMHG | BODY MASS INDEX: 30.08 KG/M2 | RESPIRATION RATE: 18 BRPM

## 2020-06-09 DIAGNOSIS — Z34.83 ENCOUNTER FOR SUPERVISION OF OTHER NORMAL PREGNANCY, THIRD TRIMESTER: Primary | ICD-10-CM

## 2020-06-09 DIAGNOSIS — O21.9 NAUSEA AND VOMITING IN PREGNANCY: ICD-10-CM

## 2020-06-09 PROCEDURE — 99207 ZZC OB VISIT-NO CHARGE - GICH ONLY: CPT | Performed by: OBSTETRICS & GYNECOLOGY

## 2020-06-09 RX ORDER — BREAST PUMP
1 EACH MISCELLANEOUS DAILY
Qty: 1 EACH | Refills: 0 | Status: SHIPPED | OUTPATIENT
Start: 2020-06-09 | End: 2020-07-31

## 2020-06-09 ASSESSMENT — PAIN SCALES - GENERAL: PAINLEVEL: NO PAIN (0)

## 2020-06-09 NOTE — NURSING NOTE
"Chief Complaint   Patient presents with     Prenatal Care     37 w 4 d       Initial /60 (BP Location: Right arm, Patient Position: Sitting, Cuff Size: Adult Regular)   Pulse 102   Resp 18   Wt 90.4 kg (199 lb 4.8 oz)   LMP 09/20/2019   SpO2 98%   Breastfeeding No   BMI 30.08 kg/m   Estimated body mass index is 30.08 kg/m  as calculated from the following:    Height as of 3/31/20: 1.734 m (5' 8.25\").    Weight as of this encounter: 90.4 kg (199 lb 4.8 oz).  Medication Reconciliation: complete    Cat Doyle LPN  "

## 2020-06-09 NOTE — PROGRESS NOTES
Return OB Visit    S: Patient is feeling well. No ctx, VB or LOF. +FM    O: /60 (BP Location: Right arm, Patient Position: Sitting, Cuff Size: Adult Regular)   Pulse 102   Resp 18   Wt 90.4 kg (199 lb 4.8 oz)   LMP 2019   SpO2 98%   Breastfeeding No   BMI 30.08 kg/m    Gen: Well-appearing, NAD  See OB Flowsheet    A/P:  Nury Gilbert is a 25 year old  at 37w4d by LMP c/w 9w4d US, here for return OB visit.  Depression, anxiety: therapy, increase celexa  Pelvic congestion syndrome  Hx of c/s for breech: repeat scheduled for   Plans breastfeeding, Mirena     PNC: Rh positive, Rubella non-immune, , GBS negative  Genetics: normal SMA and CF in previous pregnancy, normal quad screen  Imaging: dating US at 9w4d, anatomy survey normal except unable to get profile view  Immunizations: s/p flu  RTC weekly until delivery    Naye Johns MD  OB/GYN  2020 1:23 PM

## 2020-06-16 ENCOUNTER — ALLIED HEALTH/NURSE VISIT (OUTPATIENT)
Dept: FAMILY MEDICINE | Facility: OTHER | Age: 25
End: 2020-06-16
Attending: OBSTETRICS & GYNECOLOGY
Payer: COMMERCIAL

## 2020-06-16 ENCOUNTER — PRENATAL OFFICE VISIT (OUTPATIENT)
Dept: OBGYN | Facility: OTHER | Age: 25
End: 2020-06-16
Attending: OBSTETRICS & GYNECOLOGY
Payer: COMMERCIAL

## 2020-06-16 VITALS
SYSTOLIC BLOOD PRESSURE: 120 MMHG | DIASTOLIC BLOOD PRESSURE: 82 MMHG | HEART RATE: 116 BPM | BODY MASS INDEX: 30.11 KG/M2 | WEIGHT: 199.5 LBS

## 2020-06-16 DIAGNOSIS — O21.9 NAUSEA AND VOMITING IN PREGNANCY: ICD-10-CM

## 2020-06-16 DIAGNOSIS — Z01.812 PRE-PROCEDURE LAB EXAM: ICD-10-CM

## 2020-06-16 DIAGNOSIS — Z34.83 ENCOUNTER FOR SUPERVISION OF OTHER NORMAL PREGNANCY, THIRD TRIMESTER: Primary | ICD-10-CM

## 2020-06-16 LAB
SARS-COV-2 PCR COMMENT: NORMAL
SARS-COV-2 RNA SPEC QL NAA+PROBE: NEGATIVE
SARS-COV-2 RNA SPEC QL NAA+PROBE: NORMAL
SPECIMEN SOURCE: NORMAL
SPECIMEN SOURCE: NORMAL

## 2020-06-16 PROCEDURE — 99207 ZZC OB VISIT-NO CHARGE - GICH ONLY: CPT | Performed by: OBSTETRICS & GYNECOLOGY

## 2020-06-16 PROCEDURE — C9803 HOPD COVID-19 SPEC COLLECT: HCPCS

## 2020-06-16 PROCEDURE — U0003 INFECTIOUS AGENT DETECTION BY NUCLEIC ACID (DNA OR RNA); SEVERE ACUTE RESPIRATORY SYNDROME CORONAVIRUS 2 (SARS-COV-2) (CORONAVIRUS DISEASE [COVID-19]), AMPLIFIED PROBE TECHNIQUE, MAKING USE OF HIGH THROUGHPUT TECHNOLOGIES AS DESCRIBED BY CMS-2020-01-R: HCPCS | Mod: ZL | Performed by: OBSTETRICS & GYNECOLOGY

## 2020-06-16 ASSESSMENT — PAIN SCALES - GENERAL: PAINLEVEL: MILD PAIN (3)

## 2020-06-16 NOTE — NURSING NOTE
Pateint here for prenatal care. She states she has a lot of pain and pressure.   Pain right now is a 3/10. Denies any abnormal discharge.     Medication Reconciliation: wang Branch LPN  6/16/2020 1:33 PM

## 2020-06-16 NOTE — PROGRESS NOTES
Return OB Visit    S: Patient reports increased sharp pelvic pain, unsure if it is contractions. No VB or LOF. +FM. Was particularly nauseous yesterday morning, vomited and popped a blood vessel in her eye. Feeling better today.    O: /82 (BP Location: Right arm, Patient Position: Sitting, Cuff Size: Adult Regular)   Pulse 116   Wt 90.5 kg (199 lb 8 oz)   LMP 2019   BMI 30.11 kg/m    Gen: Well-appearing, NAD  See OB Flowsheet    A/P:  Nury Gilbert is a 25 year old  at 38w4d by LMP c/w 9w4d US, here for return OB visit.  Depression, anxiety: therapy, celexa  Pelvic congestion syndrome  Hx of c/s for breech: repeat scheduled for   Plans breastfeeding, Mirena     PNC: Rh positive, Rubella non-immune, , GBS negative  Genetics: normal SMA and CF in previous pregnancy, normal quad screen  Imaging: dating US at 9w4d, anatomy survey normal except unable to get profile view  Immunizations: s/p flu  Repeat c/s     Naye Johns MD  OB/GYN  2020 1:39 PM

## 2020-06-18 ENCOUNTER — ANESTHESIA EVENT (OUTPATIENT)
Dept: SURGERY | Facility: OTHER | Age: 25
End: 2020-06-18
Payer: COMMERCIAL

## 2020-06-18 RX ORDER — GENTAMICIN SULFATE 60 MG/50ML
1.5 INJECTION, SOLUTION INTRAVENOUS ONCE
Status: DISCONTINUED | OUTPATIENT
Start: 2020-06-19 | End: 2020-06-18

## 2020-06-19 ENCOUNTER — HOSPITAL ENCOUNTER (INPATIENT)
Facility: OTHER | Age: 25
LOS: 2 days | Discharge: HOME | End: 2020-06-21
Attending: OBSTETRICS & GYNECOLOGY | Admitting: OBSTETRICS & GYNECOLOGY
Payer: COMMERCIAL

## 2020-06-19 ENCOUNTER — ANESTHESIA (OUTPATIENT)
Dept: SURGERY | Facility: OTHER | Age: 25
End: 2020-06-19
Payer: COMMERCIAL

## 2020-06-19 DIAGNOSIS — Z98.891 HISTORY OF CESAREAN SECTION: ICD-10-CM

## 2020-06-19 DIAGNOSIS — Z98.891 S/P CESAREAN SECTION: Primary | ICD-10-CM

## 2020-06-19 LAB
ABO + RH BLD: NORMAL
ABO + RH BLD: NORMAL
BLD GP AB SCN SERPL QL: NORMAL
BLOOD BANK CMNT PATIENT-IMP: NORMAL
ERYTHROCYTE [DISTWIDTH] IN BLOOD BY AUTOMATED COUNT: 13 % (ref 10–15)
HCT VFR BLD AUTO: 38.1 % (ref 35–47)
HGB BLD-MCNC: 12.5 G/DL (ref 11.7–15.7)
MCH RBC QN AUTO: 29.8 PG (ref 26.5–33)
MCHC RBC AUTO-ENTMCNC: 32.8 G/DL (ref 31.5–36.5)
MCV RBC AUTO: 91 FL (ref 78–100)
PLATELET # BLD AUTO: 270 10E9/L (ref 150–450)
RBC # BLD AUTO: 4.19 10E12/L (ref 3.8–5.2)
SPECIMEN EXP DATE BLD: NORMAL
WBC # BLD AUTO: 10.5 10E9/L (ref 4–11)

## 2020-06-19 PROCEDURE — 85027 COMPLETE CBC AUTOMATED: CPT | Performed by: OBSTETRICS & GYNECOLOGY

## 2020-06-19 PROCEDURE — 25000125 ZZHC RX 250: Performed by: OBSTETRICS & GYNECOLOGY

## 2020-06-19 PROCEDURE — 71000014 ZZH RECOVERY PHASE 1 LEVEL 2 FIRST HR: Performed by: OBSTETRICS & GYNECOLOGY

## 2020-06-19 PROCEDURE — 86850 RBC ANTIBODY SCREEN: CPT | Performed by: OBSTETRICS & GYNECOLOGY

## 2020-06-19 PROCEDURE — 86901 BLOOD TYPING SEROLOGIC RH(D): CPT | Performed by: OBSTETRICS & GYNECOLOGY

## 2020-06-19 PROCEDURE — 37000009 ZZH ANESTHESIA TECHNICAL FEE, EACH ADDTL 15 MIN: Performed by: OBSTETRICS & GYNECOLOGY

## 2020-06-19 PROCEDURE — 25000132 ZZH RX MED GY IP 250 OP 250 PS 637: Performed by: OBSTETRICS & GYNECOLOGY

## 2020-06-19 PROCEDURE — 36000056 ZZH SURGERY LEVEL 3 1ST 30 MIN: Performed by: OBSTETRICS & GYNECOLOGY

## 2020-06-19 PROCEDURE — 25000128 H RX IP 250 OP 636: Performed by: NURSE ANESTHETIST, CERTIFIED REGISTERED

## 2020-06-19 PROCEDURE — 12000000 ZZH R&B MED SURG/OB

## 2020-06-19 PROCEDURE — 25000128 H RX IP 250 OP 636: Performed by: OBSTETRICS & GYNECOLOGY

## 2020-06-19 PROCEDURE — 86780 TREPONEMA PALLIDUM: CPT | Performed by: OBSTETRICS & GYNECOLOGY

## 2020-06-19 PROCEDURE — 59510 CESAREAN DELIVERY: CPT | Performed by: OBSTETRICS & GYNECOLOGY

## 2020-06-19 PROCEDURE — 86900 BLOOD TYPING SEROLOGIC ABO: CPT | Performed by: OBSTETRICS & GYNECOLOGY

## 2020-06-19 PROCEDURE — 37000008 ZZH ANESTHESIA TECHNICAL FEE, 1ST 30 MIN: Performed by: OBSTETRICS & GYNECOLOGY

## 2020-06-19 PROCEDURE — 25000125 ZZHC RX 250: Performed by: NURSE ANESTHETIST, CERTIFIED REGISTERED

## 2020-06-19 PROCEDURE — 27210794 ZZH OR GENERAL SUPPLY STERILE: Performed by: OBSTETRICS & GYNECOLOGY

## 2020-06-19 PROCEDURE — 25800030 ZZH RX IP 258 OP 636: Performed by: OBSTETRICS & GYNECOLOGY

## 2020-06-19 PROCEDURE — 25800030 ZZH RX IP 258 OP 636: Performed by: NURSE ANESTHETIST, CERTIFIED REGISTERED

## 2020-06-19 PROCEDURE — 36000058 ZZH SURGERY LEVEL 3 EA 15 ADDTL MIN: Performed by: OBSTETRICS & GYNECOLOGY

## 2020-06-19 PROCEDURE — 36415 COLL VENOUS BLD VENIPUNCTURE: CPT | Performed by: OBSTETRICS & GYNECOLOGY

## 2020-06-19 RX ORDER — FENTANYL CITRATE 50 UG/ML
25-50 INJECTION, SOLUTION INTRAMUSCULAR; INTRAVENOUS
Status: DISCONTINUED | OUTPATIENT
Start: 2020-06-19 | End: 2020-06-19

## 2020-06-19 RX ORDER — SODIUM CHLORIDE, SODIUM LACTATE, POTASSIUM CHLORIDE, CALCIUM CHLORIDE 600; 310; 30; 20 MG/100ML; MG/100ML; MG/100ML; MG/100ML
INJECTION, SOLUTION INTRAVENOUS CONTINUOUS
Status: DISCONTINUED | OUTPATIENT
Start: 2020-06-19 | End: 2020-06-19 | Stop reason: HOSPADM

## 2020-06-19 RX ORDER — KETOROLAC TROMETHAMINE 30 MG/ML
INJECTION, SOLUTION INTRAMUSCULAR; INTRAVENOUS PRN
Status: DISCONTINUED | OUTPATIENT
Start: 2020-06-19 | End: 2020-06-19

## 2020-06-19 RX ORDER — LIDOCAINE HYDROCHLORIDE 10 MG/ML
INJECTION, SOLUTION INFILTRATION; PERINEURAL PRN
Status: DISCONTINUED | OUTPATIENT
Start: 2020-06-19 | End: 2020-06-19

## 2020-06-19 RX ORDER — OXYCODONE HYDROCHLORIDE 5 MG/1
5 TABLET ORAL EVERY 4 HOURS PRN
Status: DISCONTINUED | OUTPATIENT
Start: 2020-06-19 | End: 2020-06-21 | Stop reason: HOSPADM

## 2020-06-19 RX ORDER — OXYTOCIN 10 [USP'U]/ML
INJECTION, SOLUTION INTRAMUSCULAR; INTRAVENOUS PRN
Status: DISCONTINUED | OUTPATIENT
Start: 2020-06-19 | End: 2020-06-19

## 2020-06-19 RX ORDER — GENTAMICIN SULFATE 60 MG/50ML
1.5 INJECTION, SOLUTION INTRAVENOUS ONCE
Status: COMPLETED | OUTPATIENT
Start: 2020-06-19 | End: 2020-06-19

## 2020-06-19 RX ORDER — NALOXONE HYDROCHLORIDE 0.4 MG/ML
.1-.4 INJECTION, SOLUTION INTRAMUSCULAR; INTRAVENOUS; SUBCUTANEOUS
Status: DISCONTINUED | OUTPATIENT
Start: 2020-06-19 | End: 2020-06-19

## 2020-06-19 RX ORDER — MORPHINE SULFATE 0.5 MG/ML
INJECTION, SOLUTION EPIDURAL; INTRATHECAL; INTRAVENOUS PRN
Status: DISCONTINUED | OUTPATIENT
Start: 2020-06-19 | End: 2020-06-19

## 2020-06-19 RX ORDER — BUPIVACAINE HYDROCHLORIDE 7.5 MG/ML
INJECTION, SOLUTION INTRASPINAL PRN
Status: DISCONTINUED | OUTPATIENT
Start: 2020-06-19 | End: 2020-06-19

## 2020-06-19 RX ORDER — IBUPROFEN 400 MG/1
800 TABLET, FILM COATED ORAL EVERY 6 HOURS
Status: DISCONTINUED | OUTPATIENT
Start: 2020-06-19 | End: 2020-06-21 | Stop reason: HOSPADM

## 2020-06-19 RX ORDER — MAGNESIUM HYDROXIDE 1200 MG/15ML
LIQUID ORAL PRN
Status: DISCONTINUED | OUTPATIENT
Start: 2020-06-19 | End: 2020-06-19

## 2020-06-19 RX ORDER — ONDANSETRON 2 MG/ML
4 INJECTION INTRAMUSCULAR; INTRAVENOUS EVERY 6 HOURS PRN
Status: DISCONTINUED | OUTPATIENT
Start: 2020-06-19 | End: 2020-06-21 | Stop reason: HOSPADM

## 2020-06-19 RX ORDER — LIDOCAINE 40 MG/G
CREAM TOPICAL
Status: DISCONTINUED | OUTPATIENT
Start: 2020-06-19 | End: 2020-06-21 | Stop reason: HOSPADM

## 2020-06-19 RX ORDER — SODIUM CHLORIDE, SODIUM LACTATE, POTASSIUM CHLORIDE, CALCIUM CHLORIDE 600; 310; 30; 20 MG/100ML; MG/100ML; MG/100ML; MG/100ML
INJECTION, SOLUTION INTRAVENOUS CONTINUOUS
Status: DISCONTINUED | OUTPATIENT
Start: 2020-06-19 | End: 2020-06-19

## 2020-06-19 RX ORDER — ACETAMINOPHEN 325 MG/1
975 TABLET ORAL EVERY 6 HOURS
Status: DISCONTINUED | OUTPATIENT
Start: 2020-06-19 | End: 2020-06-21 | Stop reason: HOSPADM

## 2020-06-19 RX ORDER — FENTANYL CITRATE 50 UG/ML
INJECTION, SOLUTION INTRAMUSCULAR; INTRAVENOUS PRN
Status: DISCONTINUED | OUTPATIENT
Start: 2020-06-19 | End: 2020-06-19

## 2020-06-19 RX ORDER — OXYTOCIN 10 [USP'U]/ML
10 INJECTION, SOLUTION INTRAMUSCULAR; INTRAVENOUS
Status: DISCONTINUED | OUTPATIENT
Start: 2020-06-19 | End: 2020-06-21 | Stop reason: HOSPADM

## 2020-06-19 RX ORDER — KETOROLAC TROMETHAMINE 30 MG/ML
30 INJECTION, SOLUTION INTRAMUSCULAR; INTRAVENOUS EVERY 6 HOURS
Status: COMPLETED | OUTPATIENT
Start: 2020-06-19 | End: 2020-06-20

## 2020-06-19 RX ORDER — NALOXONE HYDROCHLORIDE 0.4 MG/ML
.1-.4 INJECTION, SOLUTION INTRAMUSCULAR; INTRAVENOUS; SUBCUTANEOUS
Status: DISCONTINUED | OUTPATIENT
Start: 2020-06-19 | End: 2020-06-21 | Stop reason: HOSPADM

## 2020-06-19 RX ORDER — ONDANSETRON 4 MG/1
4 TABLET, ORALLY DISINTEGRATING ORAL EVERY 30 MIN PRN
Status: DISCONTINUED | OUTPATIENT
Start: 2020-06-19 | End: 2020-06-19

## 2020-06-19 RX ORDER — SIMETHICONE 80 MG
80 TABLET,CHEWABLE ORAL 4 TIMES DAILY PRN
Status: DISCONTINUED | OUTPATIENT
Start: 2020-06-19 | End: 2020-06-21 | Stop reason: HOSPADM

## 2020-06-19 RX ORDER — LIDOCAINE 40 MG/G
CREAM TOPICAL
Status: DISCONTINUED | OUTPATIENT
Start: 2020-06-19 | End: 2020-06-19 | Stop reason: HOSPADM

## 2020-06-19 RX ORDER — AMOXICILLIN 250 MG
2 CAPSULE ORAL 2 TIMES DAILY
Status: DISCONTINUED | OUTPATIENT
Start: 2020-06-19 | End: 2020-06-21 | Stop reason: HOSPADM

## 2020-06-19 RX ORDER — ONDANSETRON 2 MG/ML
4 INJECTION INTRAMUSCULAR; INTRAVENOUS EVERY 30 MIN PRN
Status: DISCONTINUED | OUTPATIENT
Start: 2020-06-19 | End: 2020-06-19

## 2020-06-19 RX ORDER — CITRIC ACID/SODIUM CITRATE 334-500MG
30 SOLUTION, ORAL ORAL
Status: COMPLETED | OUTPATIENT
Start: 2020-06-19 | End: 2020-06-19

## 2020-06-19 RX ORDER — CLINDAMYCIN PHOSPHATE 900 MG/50ML
900 INJECTION, SOLUTION INTRAVENOUS
Status: DISCONTINUED | OUTPATIENT
Start: 2020-06-19 | End: 2020-06-19 | Stop reason: HOSPADM

## 2020-06-19 RX ORDER — BISACODYL 10 MG
10 SUPPOSITORY, RECTAL RECTAL DAILY PRN
Status: DISCONTINUED | OUTPATIENT
Start: 2020-06-21 | End: 2020-06-21 | Stop reason: HOSPADM

## 2020-06-19 RX ORDER — AMOXICILLIN 250 MG
1 CAPSULE ORAL 2 TIMES DAILY
Status: DISCONTINUED | OUTPATIENT
Start: 2020-06-19 | End: 2020-06-21 | Stop reason: HOSPADM

## 2020-06-19 RX ORDER — DEXTROSE, SODIUM CHLORIDE, SODIUM LACTATE, POTASSIUM CHLORIDE, AND CALCIUM CHLORIDE 5; .6; .31; .03; .02 G/100ML; G/100ML; G/100ML; G/100ML; G/100ML
INJECTION, SOLUTION INTRAVENOUS CONTINUOUS
Status: DISCONTINUED | OUTPATIENT
Start: 2020-06-19 | End: 2020-06-21 | Stop reason: HOSPADM

## 2020-06-19 RX ORDER — HYDROCORTISONE 2.5 %
CREAM (GRAM) TOPICAL 3 TIMES DAILY PRN
Status: DISCONTINUED | OUTPATIENT
Start: 2020-06-19 | End: 2020-06-21 | Stop reason: HOSPADM

## 2020-06-19 RX ORDER — MODIFIED LANOLIN
OINTMENT (GRAM) TOPICAL
Status: DISCONTINUED | OUTPATIENT
Start: 2020-06-19 | End: 2020-06-21 | Stop reason: HOSPADM

## 2020-06-19 RX ADMIN — SODIUM CHLORIDE, POTASSIUM CHLORIDE, SODIUM LACTATE AND CALCIUM CHLORIDE 500 ML: 600; 310; 30; 20 INJECTION, SOLUTION INTRAVENOUS at 10:42

## 2020-06-19 RX ADMIN — KETOROLAC TROMETHAMINE 30 MG: 30 INJECTION, SOLUTION INTRAMUSCULAR at 20:23

## 2020-06-19 RX ADMIN — ACETAMINOPHEN 975 MG: 325 TABLET, FILM COATED ORAL at 23:59

## 2020-06-19 RX ADMIN — OXYCODONE HYDROCHLORIDE 5 MG: 5 TABLET ORAL at 16:55

## 2020-06-19 RX ADMIN — OXYTOCIN 3 UNITS: 10 INJECTION, SOLUTION INTRAMUSCULAR; INTRAVENOUS at 13:57

## 2020-06-19 RX ADMIN — GENTAMICIN SULFATE 120 MG: 60 INJECTION, SOLUTION INTRAVENOUS at 10:56

## 2020-06-19 RX ADMIN — Medication 200 ML/HR: at 14:06

## 2020-06-19 RX ADMIN — ACETAMINOPHEN 975 MG: 325 TABLET, FILM COATED ORAL at 17:42

## 2020-06-19 RX ADMIN — LIDOCAINE HYDROCHLORIDE 20 ML: 10 INJECTION, SOLUTION INFILTRATION; PERINEURAL at 13:33

## 2020-06-19 RX ADMIN — MIDAZOLAM 1 MG: 1 INJECTION INTRAMUSCULAR; INTRAVENOUS at 14:10

## 2020-06-19 RX ADMIN — KETOROLAC TROMETHAMINE 30 MG: 30 INJECTION, SOLUTION INTRAMUSCULAR at 14:19

## 2020-06-19 RX ADMIN — SODIUM CITRATE AND CITRIC ACID MONOHYDRATE 30 ML: 500; 334 SOLUTION ORAL at 10:59

## 2020-06-19 RX ADMIN — MORPHINE SULFATE 0.1 MG: 0.5 INJECTION, SOLUTION EPIDURAL; INTRATHECAL; INTRAVENOUS at 13:35

## 2020-06-19 RX ADMIN — MIDAZOLAM 1 MG: 1 INJECTION INTRAMUSCULAR; INTRAVENOUS at 14:13

## 2020-06-19 RX ADMIN — SODIUM CHLORIDE, SODIUM LACTATE, POTASSIUM CHLORIDE, CALCIUM CHLORIDE AND DEXTROSE MONOHYDRATE: 5; 600; 310; 30; 20 INJECTION, SOLUTION INTRAVENOUS at 20:27

## 2020-06-19 RX ADMIN — SODIUM CHLORIDE, POTASSIUM CHLORIDE, SODIUM LACTATE AND CALCIUM CHLORIDE: 600; 310; 30; 20 INJECTION, SOLUTION INTRAVENOUS at 13:04

## 2020-06-19 RX ADMIN — OXYTOCIN 3 UNITS: 10 INJECTION, SOLUTION INTRAMUSCULAR; INTRAVENOUS at 13:54

## 2020-06-19 RX ADMIN — BUPIVACAINE HYDROCHLORIDE IN DEXTROSE 2 ML: 7.5 INJECTION, SOLUTION SUBARACHNOID at 13:35

## 2020-06-19 RX ADMIN — CLINDAMYCIN IN 5 PERCENT DEXTROSE 900 MG: 18 INJECTION, SOLUTION INTRAVENOUS at 13:38

## 2020-06-19 RX ADMIN — PHENYLEPHRINE HYDROCHLORIDE 0.01 MCG/KG/MIN: 10 INJECTION INTRAVENOUS at 13:36

## 2020-06-19 RX ADMIN — DOCUSATE SODIUM 50 MG AND SENNOSIDES 8.6 MG 2 TABLET: 8.6; 5 TABLET, FILM COATED ORAL at 20:26

## 2020-06-19 RX ADMIN — FENTANYL CITRATE 50 MCG: 50 INJECTION, SOLUTION INTRAMUSCULAR; INTRAVENOUS at 14:10

## 2020-06-19 ASSESSMENT — ACTIVITIES OF DAILY LIVING (ADL)
SWALLOWING: 0-->SWALLOWS FOODS/LIQUIDS WITHOUT DIFFICULTY
COGNITION: 0 - NO COGNITION ISSUES REPORTED
AMBULATION: 0-->INDEPENDENT
DRESS: 0-->INDEPENDENT
RETIRED_EATING: 0-->INDEPENDENT
TOILETING: 0-->INDEPENDENT
TRANSFERRING: 0-->INDEPENDENT
RETIRED_COMMUNICATION: 0-->UNDERSTANDS/COMMUNICATES WITHOUT DIFFICULTY
BATHING: 0-->INDEPENDENT
FALL_HISTORY_WITHIN_LAST_SIX_MONTHS: NO

## 2020-06-19 ASSESSMENT — MIFFLIN-ST. JEOR: SCORE: 1719.31

## 2020-06-19 NOTE — H&P
[facility] Labor and Delivery History and Physical    Nury Gilbert MRN# [id number]   Age: 25 year old YOB: 1995     Date of Admission:  2020    Primary care provider: Shruthi Frey           Chief Complaint:   Nury Gilbert is a 25 year old  at 39w0d by LMP c/w 9w4d US admitted for scheduled repeat  section. She has no questions today.          Pregnancy history:     OBSTETRIC HISTORY:    OB History    Para Term  AB Living   2 1 1 0 0 1   SAB TAB Ectopic Multiple Live Births   0 0 0 0 1      # Outcome Date GA Lbr Marky/2nd Weight Sex Delivery Anes PTL Lv   2 Current            1 Term 18 40w1d  3.756 kg (8 lb 4.5 oz) F CS-LTranv Spinal N KARENA      Birth Comments: none      Name: Katie      Apgar1: 8  Apgar5: 9       EDC: Estimated Date of Delivery: 2020    Prenatal Labs:   Lab Results   Component Value Date    ABO B 2019    RH Pos 2019    AS Neg 2019    HEPBANG Nonreactive 2019    CHPCRT  10/03/2016     Negative   Negative for C. trachomatis rRNA by transcription mediated amplification.   A negative result by transcription mediated amplification does not preclude the   presence of C. trachomatis infection because results are dependent on proper   and adequate collection, absence of inhibitors, and sufficient rRNA to be   detected.      GCPCRT  10/03/2016     Negative   Negative for N. gonorrhoeae rRNA by transcription mediated amplification.   A negative result by transcription mediated amplification does not preclude the   presence of N. gonorrhoeae infection because results are dependent on proper   and adequate collection, absence of inhibitors, and sufficient rRNA to be   detected.      HGB 12.5 2020       GBS Status:   No results found for: GBS    Active Problem List  Patient Active Problem List   Diagnosis     Attention deficit disorder     On stimulant medication - contract signed  3/31/17     Migraines     Difficulty sleeping     S/P  section     Nausea and vomiting in pregnancy     Pregnancy with history of  section, antepartum     Rubella non-immune status, antepartum     History of  section     H/O  section       Medication Prior to Admission  Medications Prior to Admission   Medication Sig Dispense Refill Last Dose     acetaminophen (TYLENOL) 500 MG tablet Take 500-1,000 mg by mouth every 6 hours as needed for mild pain   Past Week at Unknown time     butalbital-acetaminophen-caffeine (ESGIC) -40 MG tablet Take 1 tablet by mouth every 4 hours as needed for headaches 12 tablet 0 Past Month at Unknown time     HEMP OIL OR EXTRACT OR OTHER CBD CANNABINOID, NOT MEDICAL CANNABIS,    Past Week at Unknown time     mupirocin (BACTROBAN) 2 % external ointment Apply topically 3 times daily 30 g 2 Past Month at Unknown time     omeprazole (PRILOSEC) 20 MG DR capsule Take 20 mg by mouth 2 times daily   2020 at 1800     polyethylene glycol (MIRALAX/GLYCOLAX) powder Take 17 g (1 capful) by mouth daily 500 g 3 Past Month at Unknown time     Prenatal Vit-Fe Fumarate-FA (PRENATAL VITAMINS) 28-0.8 MG TABS Take 1 tablet by mouth daily    2020 at 1800     citalopram (CELEXA) 20 MG tablet Take 1 tablet (20 mg) by mouth daily (Patient not taking: Reported on 2020) 90 tablet 1 More than a month at Unknown time     metoclopramide (REGLAN) 10 MG tablet Take 1 tablet (10 mg) by mouth every 6 hours as needed (nausea) 30 tablet 1 More than a month at Unknown time     Misc. Devices (BREAST PUMP) MISC 1 each daily 1 each 0 Unknown at Unknown time     ondansetron (ZOFRAN) 4 MG tablet Take 1 tablet (4 mg) by mouth every 6 hours as needed for nausea (Patient not taking: Reported on 2020) 30 tablet 1 More than a month at Unknown time   .        Maternal Past Medical History:     Past Medical History:   Diagnosis Date     Anxiety disorder     No Comments Provided  "    Attention-deficit hyperactivity disorder     No Comments Provided     Depressive disorder      Superficial foreign body of finger     2017     Past Surgical History:   Procedure Laterality Date     ADENOIDECTOMY      No Comments Provided     APPENDECTOMY OPEN      2006     ARTHROSCOPY KNEE      2012,2nd as well at age 18      SECTION N/A 2018    Procedure:  SECTION;  External version attemted prior to Section;  Surgeon: Naye Johns MD;  Location: [facility] OR     LAPAROSCOPY DIAGNOSTIC (GYN) N/A 2019    Procedure: LAPAROSCOPY DIAGNOSTIC;  Surgeon: Naye Johns MD;  Location: [facility] OR     OTHER SURGICAL HISTORY      1999,SZR770,BLADDER SURGERY     OTHER SURGICAL HISTORY      2017,64557.0,NE REMOVE FOREIGN BODY SIMPLE                       Family History:     Family History   Problem Relation Age of Onset     Dementia Maternal Grandmother      Heart Disease Maternal Grandfather      Cancer Mother         carcinoid cancer, 30s            Social History:     Social History     Tobacco Use     Smoking status: Never Smoker     Smokeless tobacco: Never Used   Substance Use Topics     Alcohol use: Not Currently     Alcohol/week: 0.0 standard drinks            Review of Systems:   The Review of Systems is negative other than noted in the HPI          Physical Exam:     Patient Vitals for the past 8 hrs:   BP Temp Temp src Pulse Resp SpO2 Height Weight   20 1020 (P) 112/65 (P) 98.7  F (37.1  C) (P) Tympanic (P) 82 (P) 16 (P) 99 % (P) 1.753 m (5' 9\") (P) 90.5 kg (199 lb 8 oz)     Gen: resting in bed, NAD  CV: RRR  Resp: CTAB  Abd: gravid, soft, nontender  Ext: nontender    Cervix: deferred  Membranes: intact  EFW: 7 lbs  Presentation:Cephalic    Fetal Heart Rate Tracin, mod variability, + accels, no decels  Tocometer: quiet        Assessment:   Nury Gilbert is a 25 year old  at 39w0d admitted with scheduled repeat  section.          Plan: "   Prepare for  delivery    Naye Johns MD

## 2020-06-19 NOTE — OR NURSING
PACU Transfer Note    Nury Gilbert was transferred to [facility] via bed.  Equipment used for transport:  NA.  Accompanied by:  RN  Prescriptions were: NA    PACU Respiratory Event Documentation     1) Episodes of Apnea greater than or equal to 10 seconds: no    2) Bradypnea - less than 8 breaths per minute: no    3) Pain score on 0 to 10 scale: 0    4) Pain-sedation mismatch (yes or no): no    5) Repeated 02 desaturation less than 90% (yes or no): no    Anesthesia notified? (yes or no): no    Any of the above events occuring repeatedly in separate 30 minute intervals may be considered recurrent PACU respiratory events.    Patient stable and meets phase 1 discharge criteria for transport from PACU.  Report given to geeta GARCIA.    Changed pad also

## 2020-06-19 NOTE — OP NOTE
[facility]  Obstetrics Service Operative Report    Surgery Date:  2020  Surgeon(s): Naye Johns MD   Assistants: Priscilla Infante MD    Preoperative Diagnoses:  1.  Intrauterine pregnancy at 39w0d  2.  History of  section x1    Postoperative diagnoses: Same     Procedure performed:  Repeat low segment transverse  section via Pfannenstiel incision with double layer uterine closure    Anesthesia:  Spinal with duramorph  Est Blood Loss (mL): 600 mL  Specimens: cord blood  Complications: None      Operative findings: Single viable female infant at 1353 hours on 2020. Apgars of 9 and 7 at one and five minutes.  Birth weight (GM): 3498 GM.  Fetal presentation: cephalic.  Position: LOP  Amniotic fluid: clear.  Placenta intact with 3 vessel cord.   Normal appearing uterus, fallopian tubes, ovaries.  No intraabdominal adhesions.  No abdominal wall adhesions      Indication: Nury Gilbert is a 25 year old, , who was admitted at 39w0d by LMP c/w 9w4d ultrasound for scheduled repeat  section. The risks, benefits, and alternatives of  delivery were explained and the patient agreed to proceed.     Procedure details:  After obtaining informed consent, the patient was taken to the operating room. She received gent/clinda prior to the skin incision. She was placed in the dorsal supine position with a leftward tilt and prepped and draped in the usual sterile fashion. Following test of adequate spinal anesthesia, the abdomen was entered through a transverse skin incision through her previous scar. The skin incision was made sharply and carried through the subcutaneous tissue to the fascia.  Fascia was incised in the midline and extended laterally with the Morris scissors.  The superior margin of the fascial incision was grasped with Kocher clamps and dissected from the underlying muscle sharp and blunt dissecton, which was then repeated at the lower margin of the fascial  incision.  The muscle was  in the midline.  The peritoneum was entered bluntly and the opening extended by digital dissection with care to avoid the bladder.  An Ho O retractor was placed. The vesicouterine peritoneum was entered sharply and incision extended laterally. The bladder flap was created digitally. The lower segment of the uterus was opened sharply in a transverse fashion and extended with digital pressure. The infant's head was elevated to the level of the hysterotomy and was delivered atraumatically. The cord was doubly clamped and cut and the infant was handed off Dr Infante. The placenta was expressed.  The uterus was exteriorized from the abdomen and cleared of all clots and debris.  The uterus was massaged and was noted to be firm.  Oxytocin was given through the running IV.  With massage as well as administration of oxytocin, good uterine tone was achieved. The hysterotomy was repaired with 0-vicryl suture in a running locked fashion. A 2nd layer of 0-monocryl was used to imbricate the incision and good hemostasis was achieved. The bladder flap was inspected and found to be hemostatic.      The posterior cul-de-sac was suctioned and the uterus was returned to the abdomen.  The bilateral pericolic gutters were suctioned.  The hysterotomy was again inspected and found to have no active sites of bleeding.  The abdominal wall was examined and found to have no active sites of bleeding.  The fascia was closed with a running suture of 0-vicryl.  Subcutaneous tissue was irrigated. Areas that were oozing were controlled with cautery. The subcutaneous tissue was reapproximated with 3-0 plain gut. The skin was closed with 4-0 vicryl. The patient tolerated the procedure well and was taken to the recovery room in stable condition. All sponge, needle and instrument counts were correct x2.     Assistant: Dr Infante was requested to be present for this  section due to possible need for   resuscitation.    Naye Johns  Ob/Gyn   6/19/2020 2:38 PM

## 2020-06-19 NOTE — ANESTHESIA POSTPROCEDURE EVALUATION
Patient: Nury Gilbert    Procedure(s):   SECTION    Diagnosis:History of  section [Z98.891]  Diagnosis Additional Information: No value filed.    Anesthesia Type:  Spinal    Note:  Anesthesia Post Evaluation    Patient location during evaluation: OB PACU  Patient participation: Able to fully participate in evaluation  Level of consciousness: awake and alert  Pain management: adequate  Airway patency: patent  Cardiovascular status: acceptable  Respiratory status: acceptable  Hydration status: acceptable  PONV: none             Last vitals:  Vitals:    20 1445 20 1450 20 1456   BP: 100/66 98/57 98/57   Pulse: 80 74    Resp: (!) 43 (!) 91 (!) 63   Temp: 96.8  F (36  C)  97  F (36.1  C)   SpO2: 98% 99% 99%         Electronically Signed By: David Kellerman, APRN CRNA  2020  3:00 PM

## 2020-06-19 NOTE — ANESTHESIA PROCEDURE NOTES
Procedure note : intrathecal  Staff -       CRNA: Robin Hensley APRN CRNA    Performed By: CRNA        Pre-Procedure    Location: OR    Procedure Times:6/19/2020 1:31 PM and 6/19/2020 1:36 PM  Pre-Anesthestic Checklist: patient identified, IV checked, site marked, risks and benefits discussed, informed consent, monitors and equipment checked, pre-op evaluation and at physician/surgeon's request    Timeout  Correct Patient: Yes   Correct Procedure: Yes   Correct Site: Yes     Correct Position: Yes     .   Procedure Documentation  ASA 2  .    Procedure: intrathecal, .   Patient Position:sitting Insertion Site:L3-4  (midline approach)     Patient Prep/Sterile Barriers; chlorhexidine gluconate and isopropyl alcohol.  .  Needle:  Spinal Needle (gauge): 20  Spinal/LP Needle Length (inches): 3.5 # of attempts: 1 and  # of redirects:  1 Introducer used Introducer: 20 G .        Assessment/Narrative  Paresthesias: No.  .  .  clear CSF fluid removed .

## 2020-06-19 NOTE — ANESTHESIA CARE TRANSFER NOTE
Patient: Nury Gilbert    Procedure(s):   SECTION    Diagnosis: History of  section [Z98.891]  Diagnosis Additional Information: No value filed.    Anesthesia Type:   Spinal     Note:  Airway :Room Air  Patient transferred to:Labor and Delivery  Handoff Report: Identifed the Patient, Identified the Reponsible Provider, Reviewed the pertinent medical history, Discussed the surgical course, Reviewed Intra-OP anesthesia mangement and issues during anesthesia, Set expectations for post-procedure period and Allowed opportunity for questions and acknowledgement of understanding      Vitals: (Last set prior to Anesthesia Care Transfer)    CRNA VITALS  2020 1351 - 2020 1430      2020             NIBP:  98/51    NIBP Mean:  72    Resp Rate (set):  10                Electronically Signed By: SALLY LEMA CRNA  2020  2:30 PM

## 2020-06-19 NOTE — ANESTHESIA PREPROCEDURE EVALUATION
Anesthesia Pre-Procedure Evaluation    Patient: Nury Gilbert   MRN: [id number] : 1995          Preoperative Diagnosis: History of  section [Z98.891]    Procedure(s):   SECTION    Past Medical History:   Diagnosis Date     Anxiety disorder     No Comments Provided     Attention-deficit hyperactivity disorder     No Comments Provided     Depressive disorder      Superficial foreign body of finger     2017     Past Surgical History:   Procedure Laterality Date     ADENOIDECTOMY      No Comments Provided     APPENDECTOMY OPEN      2006     ARTHROSCOPY KNEE      2012,2nd as well at age 18      SECTION N/A 2018    Procedure:  SECTION;  External version attemted prior to Section;  Surgeon: Naye Johns MD;  Location:  OR     LAPAROSCOPY DIAGNOSTIC (GYN) N/A 2019    Procedure: LAPAROSCOPY DIAGNOSTIC;  Surgeon: Naye Johns MD;  Location:  OR     OTHER SURGICAL HISTORY      1999,[id number],BLADDER SURGERY     OTHER SURGICAL HISTORY      2017,66430.0,[place] REMOVE FOREIGN BODY SIMPLE       Anesthesia Evaluation     . Pt has had prior anesthetic. Type: Regional           ROS/MED HX    ENT/Pulmonary:  - neg pulmonary ROS     Neurologic:  - neg neurologic ROS     Cardiovascular:  - neg cardiovascular ROS       METS/Exercise Tolerance:  >4 METS   Hematologic:  - neg hematologic  ROS       Musculoskeletal:  - neg musculoskeletal ROS       GI/Hepatic:  - neg GI/hepatic ROS       Renal/Genitourinary:  - ROS Renal section negative       Endo:  - neg endo ROS       Psychiatric: Comment: ADHD    (+) psychiatric history depression      Infectious Disease:  - neg infectious disease ROS       Malignancy:      - no malignancy   Other: Comment: History of  section.    Hx of nausea and vomiting with pregnancy.   (+) Possibly pregnant                         Physical Exam  Normal systems: cardiovascular, pulmonary and dental    Airway   Mallampati: II  TM  "distance: >3 FB  Neck ROM: full    Dental     Cardiovascular   Rhythm and rate: regular and normal      Pulmonary    breath sounds clear to auscultation            Lab Results   Component Value Date    WBC 10.5 06/19/2020    HGB 12.5 06/19/2020    HCT 38.1 06/19/2020     06/19/2020    CRP 6.4 08/19/2015     11/23/2019    POTASSIUM 3.8 11/23/2019    CHLORIDE 105 11/23/2019    CO2 20 (L) 11/23/2019    BUN 6 (L) 11/23/2019    CR 0.51 (L) 11/23/2019    GLC 86 11/23/2019    SUNNY 9.0 11/23/2019    MAG 2.3 11/23/2019    ALBUMIN 4.8 03/17/2019    PROTTOTAL 8.2 03/17/2019    ALT 5 (L) 03/17/2019    AST 14 03/17/2019    ALKPHOS 62 03/17/2019    BILITOTAL 0.4 03/17/2019    LIPASE 30 03/17/2019    AMYLASE 29 03/17/2019    PTT 33 03/17/2019    INR 1.08 03/17/2019    TSH 1.28 09/23/2013    HCG Negative 05/17/2019       Preop Vitals  BP Readings from Last 3 Encounters:   06/19/20 (P) 112/65   06/16/20 120/82   06/09/20 102/60    Pulse Readings from Last 3 Encounters:   06/19/20 (P) 82   06/16/20 116   06/09/20 102      Resp Readings from Last 3 Encounters:   06/19/20 (P) 16   06/09/20 18   05/19/20 18    SpO2 Readings from Last 3 Encounters:   06/19/20 (P) 99%   06/09/20 98%   05/19/20 98%      Temp Readings from Last 1 Encounters:   06/19/20 (P) 98.7  F (37.1  C) ((P) Tympanic)    Ht Readings from Last 1 Encounters:   06/19/20 (P) 1.753 m (5' 9\")      Wt Readings from Last 1 Encounters:   06/19/20 (P) 90.5 kg (199 lb 8 oz)    Estimated body mass index is 29.46 kg/m  (pended) as calculated from the following:    Height as of this encounter: (P) 1.753 m (5' 9\").    Weight as of this encounter: (P) 90.5 kg (199 lb 8 oz).       Anesthesia Plan      History & Physical Review      ASA Status:  2 .    NPO Status:  > 8 hours    Plan for Spinal   PONV prophylaxis:  Ondansetron (or other 5HT-3) and Dexamethasone or Solumedrol         Postoperative Care      Consents  Anesthetic plan, risks, benefits and alternatives discussed " with:  Patient..                 David Kellerman, APRN CRNA

## 2020-06-20 LAB
HGB BLD-MCNC: 11.3 G/DL (ref 11.7–15.7)
T PALLIDUM AB SER QL: NONREACTIVE

## 2020-06-20 PROCEDURE — 36415 COLL VENOUS BLD VENIPUNCTURE: CPT | Performed by: OBSTETRICS & GYNECOLOGY

## 2020-06-20 PROCEDURE — 85018 HEMOGLOBIN: CPT | Performed by: OBSTETRICS & GYNECOLOGY

## 2020-06-20 PROCEDURE — 25000128 H RX IP 250 OP 636: Performed by: OBSTETRICS & GYNECOLOGY

## 2020-06-20 PROCEDURE — 99207 ZZC NO CHARGE LOS: CPT | Performed by: SURGERY

## 2020-06-20 PROCEDURE — 25000132 ZZH RX MED GY IP 250 OP 250 PS 637: Performed by: OBSTETRICS & GYNECOLOGY

## 2020-06-20 PROCEDURE — 12000000 ZZH R&B MED SURG/OB

## 2020-06-20 RX ADMIN — ACETAMINOPHEN 975 MG: 325 TABLET, FILM COATED ORAL at 05:59

## 2020-06-20 RX ADMIN — KETOROLAC TROMETHAMINE 30 MG: 30 INJECTION, SOLUTION INTRAMUSCULAR at 02:49

## 2020-06-20 RX ADMIN — DOCUSATE SODIUM 50 MG AND SENNOSIDES 8.6 MG 2 TABLET: 8.6; 5 TABLET, FILM COATED ORAL at 08:16

## 2020-06-20 RX ADMIN — ACETAMINOPHEN 975 MG: 325 TABLET, FILM COATED ORAL at 23:55

## 2020-06-20 RX ADMIN — DEXTRAN 70, GLYCERIN, HYPROMELLOSE 2 DROP: 1; 2; 3 SOLUTION/ DROPS OPHTHALMIC at 09:00

## 2020-06-20 RX ADMIN — IBUPROFEN 800 MG: 400 TABLET, FILM COATED ORAL at 15:35

## 2020-06-20 RX ADMIN — ACETAMINOPHEN 975 MG: 325 TABLET, FILM COATED ORAL at 18:32

## 2020-06-20 RX ADMIN — DOCUSATE SODIUM 50 MG AND SENNOSIDES 8.6 MG 2 TABLET: 8.6; 5 TABLET, FILM COATED ORAL at 18:32

## 2020-06-20 RX ADMIN — ACETAMINOPHEN 975 MG: 325 TABLET, FILM COATED ORAL at 13:00

## 2020-06-20 RX ADMIN — OXYCODONE HYDROCHLORIDE 5 MG: 5 TABLET ORAL at 02:50

## 2020-06-20 RX ADMIN — OXYCODONE HYDROCHLORIDE 5 MG: 5 TABLET ORAL at 23:55

## 2020-06-20 RX ADMIN — IBUPROFEN 800 MG: 400 TABLET, FILM COATED ORAL at 20:33

## 2020-06-20 RX ADMIN — KETOROLAC TROMETHAMINE 30 MG: 30 INJECTION, SOLUTION INTRAMUSCULAR at 08:15

## 2020-06-20 RX ADMIN — SODIUM CHLORIDE, SODIUM LACTATE, POTASSIUM CHLORIDE, CALCIUM CHLORIDE AND DEXTROSE MONOHYDRATE: 5; 600; 310; 30; 20 INJECTION, SOLUTION INTRAVENOUS at 04:45

## 2020-06-20 NOTE — PLAN OF CARE
"BP 98/63 (BP Location: Right arm)   Pulse 64   Temp 97.4  F (36.3  C) (Tympanic)   Resp 16   Ht 1.753 m (5' 9\")   Wt 90.5 kg (199 lb 8 oz)   LMP 09/20/2019   SpO2 98%   Breastfeeding Unknown   BMI 29.46 kg/m    VS.  PRN pain medication adequate for pain control as well as ice to abdominal incision.  Dressing clean dry and intact.  Tolerating ambulating in room.  Pichardo pulled.  Due to void  Yin Bernstein RN.............................6/20/2020 6:40 AM      "

## 2020-06-20 NOTE — PLAN OF CARE
Patient reports her pain is controlled at this time. Pt ambulating in room, independently, steady on feet. Pichardo pulled this am and patient voided 400 ml clear pink tinged urine. Fundus firm U/1. Scant-rubra lochia. Patient is pumping at this time, due to infant in nursery. Will transition to breastfeeding when able. Mother attentive to infant. Tolerating regular diet, passing flatus. ABD incision D/I. Myra Evans RN .............. 6/20/2020  8:57 AM

## 2020-06-21 VITALS
WEIGHT: 199.5 LBS | OXYGEN SATURATION: 98 % | HEIGHT: 69 IN | SYSTOLIC BLOOD PRESSURE: 107 MMHG | DIASTOLIC BLOOD PRESSURE: 73 MMHG | RESPIRATION RATE: 16 BRPM | HEART RATE: 67 BPM | BODY MASS INDEX: 29.55 KG/M2 | TEMPERATURE: 97.3 F

## 2020-06-21 PROCEDURE — 25000132 ZZH RX MED GY IP 250 OP 250 PS 637: Performed by: OBSTETRICS & GYNECOLOGY

## 2020-06-21 RX ORDER — OXYCODONE HYDROCHLORIDE 5 MG/1
5 TABLET ORAL EVERY 6 HOURS PRN
Qty: 10 TABLET | Refills: 0 | Status: SHIPPED | OUTPATIENT
Start: 2020-06-21 | End: 2020-07-31

## 2020-06-21 RX ORDER — ACETAMINOPHEN 325 MG/1
650 TABLET ORAL EVERY 6 HOURS PRN
Qty: 100 TABLET | Refills: 0 | Status: SHIPPED | OUTPATIENT
Start: 2020-06-21 | End: 2020-07-31

## 2020-06-21 RX ORDER — MODIFIED LANOLIN
OINTMENT (GRAM) TOPICAL
Qty: 7 G | Refills: 0 | Status: SHIPPED | OUTPATIENT
Start: 2020-06-21 | End: 2021-03-22

## 2020-06-21 RX ORDER — IBUPROFEN 200 MG
600 TABLET ORAL EVERY 6 HOURS
Qty: 100 TABLET | Refills: 0 | Status: SHIPPED | OUTPATIENT
Start: 2020-06-21

## 2020-06-21 RX ADMIN — ACETAMINOPHEN 975 MG: 325 TABLET, FILM COATED ORAL at 06:35

## 2020-06-21 RX ADMIN — IBUPROFEN 800 MG: 400 TABLET, FILM COATED ORAL at 02:48

## 2020-06-21 RX ADMIN — ACETAMINOPHEN 975 MG: 325 TABLET, FILM COATED ORAL at 12:59

## 2020-06-21 RX ADMIN — IBUPROFEN 800 MG: 400 TABLET, FILM COATED ORAL at 10:58

## 2020-06-21 NOTE — DISCHARGE SUMMARY
DELIVERY DISCHARGE SUMMARY    Admit date: 2020  Discharge date: 2020    Admit Dx:   - 25 year old y/o  at 39w0d  - History of  section     Discharge Dx:  - Same as above, s/p repeat low transverse  section    Procedures:  - repeat low transverse  section with double layer closure via Pfannenstiel incision  - Spinal analgesia    Admit HPI:  Ms. Nury Gilbert is a 25 year old  at 39w0d who was admitted on 2020 for repeat .  Please see her admit H&P for full details of her PMH, PSH, Meds, Allergies and exam on admit.    Hospital course:  After discussion of risk and and benefits and signing informed consent the patient was taken to the operating room for repeat  section.    EBL from the delivery was 600. Please see her  Section Operative Note for full details regarding her delivery.    Her postoperative course was uncomplicated. On POD#2, she was meeting all of her postpartum goals and deemed stable for discharge. She was voiding without difficulty, tolerating a regular diet without nausea and vomiting, her pain was well controlled on oral pain medicines and her lochia was appropriate. Rhogam was not indicated.    Discharge Medications:  Current Discharge Medication List      START taking these medications    Details   !! acetaminophen (TYLENOL) 325 MG tablet Take 2 tablets (650 mg) by mouth every 6 hours as needed for mild pain  Qty: 100 tablet, Refills: 0    Associated Diagnoses: S/P  section      ibuprofen (ADVIL/MOTRIN) 200 MG tablet Take 3 tablets (600 mg) by mouth every 6 hours  Qty: 100 tablet, Refills: 0    Associated Diagnoses: S/P  section      lanolin ointment Apply topically every hour as needed for dry skin (soreness)  Qty: 7 g, Refills: 0    Associated Diagnoses: S/P  section      oxyCODONE (ROXICODONE) 5 MG tablet Take 1 tablet (5 mg) by mouth every 6 hours as needed for breakthrough  pain  Qty: 10 tablet, Refills: 0    Associated Diagnoses: S/P  section       !! - Potential duplicate medications found. Please discuss with provider.      CONTINUE these medications which have NOT CHANGED    Details   !! acetaminophen (TYLENOL) 500 MG tablet Take 500-1,000 mg by mouth every 6 hours as needed for mild pain      butalbital-acetaminophen-caffeine (ESGIC) -40 MG tablet Take 1 tablet by mouth every 4 hours as needed for headaches  Qty: 12 tablet, Refills: 0    Associated Diagnoses: Headache in pregnancy, antepartum, third trimester      HEMP OIL OR EXTRACT OR OTHER CBD CANNABINOID, NOT MEDICAL CANNABIS,       mupirocin (BACTROBAN) 2 % external ointment Apply topically 3 times daily  Qty: 30 g, Refills: 2    Associated Diagnoses: Dermatitis      omeprazole (PRILOSEC) 20 MG DR capsule Take 20 mg by mouth 2 times daily      polyethylene glycol (MIRALAX/GLYCOLAX) powder Take 17 g (1 capful) by mouth daily  Qty: 500 g, Refills: 3    Associated Diagnoses: Constipation, unspecified constipation type      Prenatal Vit-Fe Fumarate-FA (PRENATAL VITAMINS) 28-0.8 MG TABS Take 1 tablet by mouth daily       citalopram (CELEXA) 20 MG tablet Take 1 tablet (20 mg) by mouth daily  Qty: 90 tablet, Refills: 1    Associated Diagnoses: Depression, unspecified depression type      metoclopramide (REGLAN) 10 MG tablet Take 1 tablet (10 mg) by mouth every 6 hours as needed (nausea)  Qty: 30 tablet, Refills: 1    Associated Diagnoses: Nausea and vomiting in pregnancy      Misc. Devices (BREAST PUMP) MISC 1 each daily  Qty: 1 each, Refills: 0    Comments: Reason for need: breastfeeding mother. Length of need: 1 year  Associated Diagnoses: Lactating mother      ondansetron (ZOFRAN) 4 MG tablet Take 1 tablet (4 mg) by mouth every 6 hours as needed for nausea  Qty: 30 tablet, Refills: 1    Associated Diagnoses: Supervision of other normal pregnancy       !! - Potential duplicate medications found. Please discuss with  provider.            Discharge/Disposition:  Nury Gilbert was discharged to home in stable condition with the following instructions/medications:  1) Call for temperature > 100.4, bright red vaginal bleeding >1 pad an hour x 2 hours, foul smelling vaginal discharge, pain not controlled by usual oral pain meds, persistent nausea and vomiting not controlled on medications, drainage or redness from incision site  2) For feeding she decided to breast feed.  3) She was instructed to follow-up with Dr. Johns in 2 weeks for a post op check.  4) Discharge activity:  No heavy lifting >20 lbs for 4-6 weeks, pelvic rest for 4-6 weeks, no driving or operating machinery while on narcotics.

## 2020-06-21 NOTE — DISCHARGE INSTRUCTIONS
Activity: Go back to your normal activities, except abstain from sexual intercourse x 6 weeks    Diet: You may eat a regular diet. Drink plenty of fluids.      Call your Doctor  if you have any of these symptoms:    * You soak a sanitary pad with blood within 1 hour, or you see clots larger than a golf ball.    * Bleeding that lasts more than 6 weeks.     *Bad-smelling fluid that comes out of your vagina.    *A fever above 100.4 degrees Fahrenheit (38.4 degrees Celsius) with or without chills.    * Severe pain, cramping, or tenderness in your lower belly area.    * Increased pain, swelling, redness or fluid around your stitches.    * A need to urinate more frequently (use the toilet more often), more urgently (use the toilet very quickly), or it burns when you urinate.    * Redness, swelling, or pain around a vein in your leg.    * Problems coping with sadness, anxiety, or depression.    * Problems breastfeeding, or a red or painful area on your breast.    * You have questions or concerns after you return home.      [facility]: [phone]

## 2020-06-21 NOTE — PLAN OF CARE
Pt doing well, notes having some left sided pain, but notes ice does help. Taking oral pain medication and tolerating well. Hopes to discharge home soon.

## 2020-06-24 ENCOUNTER — LACTATION ENCOUNTER (OUTPATIENT)
Age: 25
End: 2020-06-24

## 2020-06-24 ENCOUNTER — HOSPITAL ENCOUNTER (OUTPATIENT)
Dept: OBGYN | Facility: OTHER | Age: 25
End: 2020-06-24
Attending: FAMILY MEDICINE
Payer: COMMERCIAL

## 2020-06-24 PROCEDURE — S9443 LACTATION CLASS: HCPCS | Performed by: REGISTERED NURSE

## 2020-06-24 NOTE — LACTATION NOTE
This note was copied from a baby's chart.  Outpatient Lactation Visit    Edy Johns  [id number]    Consultation Date: 2020     Reason for Lactation Referral: Initial Lactation Consult    Baby's : 2020    Baby's Current Age: 5 day old  Baby's Gestational Age: Gestational Age: 39w0d    Primary Care Provider: No Ref-Primary, Physician    Presenting Problem (concerns as stated by parent): no concerns    MATERNAL HISTORY   History of Breast Surgery: no  Breast Changes During Pregnancy: no  Breast Feeding History: nursed first child for 14 months  Maternal Meds: daily prenatal vitamin  Pregnancy Complications: none  Anesthesia during labor: spinal    MATERNAL ASSESSMENT    Breast Size: average, symmetrical, soft after feeding and filling prior to feeding  Nipple Appearance - Left: slightly cracked, with signs of healing, education on further healing techniques provided  Nipple Appearance - Right: slightly cracked, with signs of healing, education on further healing techniques provided  Nipple Erectility - Left: erect with stimulation  Nipple Erectility - Right: erect with stimulation  Areolas Compressibility: soft  Nipple Size: average  Special Equipment Used: none  Day mother reports milk came in:  Day 4    INFANT ASSESSMENT    Oral Anatomy  Mouth: normal  Palate: normal  Jaw: normal  Tongue: normal  Frenulum: normal   Digital Suck Exam: root    FEEDING   Feeding Time: aggressively for 20 minutes  Position:  cradle  Effort to Latch: awake and alert, latched easily  Duration of Breast Feeding: Right Breast: 20 minutes; Left Breast: 0  Results: excellent breast feed    Volume of Intake:    Birth Weight: 7 lb 11.4 oz    Hospital discharge weight: 7 lb 4.2 oz    Today's Weight 6 lb 14.5 oz    Total Intake: 2.1 oz  Output: 3-4 soil diapers in last 24 hours, 3-4 wet diapers in last 24 hours    LATCH Score:   Latch: 2 - Good Latch  Audible Swallowin - Spontaneous & frequent  Type of Nipple:  (Breast/Nipple) 2 - Everted  Comfort: 2 - Soft, Nontender  Hold: 2 - No Assist   Total LATCH Score:  10    FEEDING PLAN    Home Feeding Plan: Continue to feed on demand when  elicits feeding cues with deep latch.  Babe should be eating 8-12 times in a 24 hour period.  Exclusivity explained and encouraged in the early weeks to establish breastfeeding and order in milk supply.  Rooming-in encouraged with explanation of the benefits.  Continue to apply expressed breast milk and Lanolin cream to nipples after feedings for healing and comfort.  Postpartum breastfeeding assessment completed and education provided.  Items included in the education are:     proper positioning and latch    effectiveness of feeding    manual expression    handling and storing breastmilk    maintenance of breastfeeding for the first 6 months    sign/symptoms of infant feeding issues requiring referral to qualified health care provider    LACTATION COMMENTS   Deep latch explained for proper positioning of breast in infant's mouth, maximizing milk transfer and comfort.  Reassurance and encouragement provided in regard to mom's concerns about milk supply.  Follow-up support information provided.  Parents plan to keep  Well-Child Check with Dr. Frey as scheduled for 2 week well child check.      Face-to-face Time: 60 minutes with assessment and education.    Joaquina Lopez, RN  2020  1:15 PM

## 2020-07-03 ENCOUNTER — OFFICE VISIT (OUTPATIENT)
Dept: OBGYN | Facility: OTHER | Age: 25
End: 2020-07-03
Attending: OBSTETRICS & GYNECOLOGY
Payer: COMMERCIAL

## 2020-07-03 VITALS — BODY MASS INDEX: 27.13 KG/M2 | WEIGHT: 183.7 LBS | DIASTOLIC BLOOD PRESSURE: 80 MMHG | SYSTOLIC BLOOD PRESSURE: 116 MMHG

## 2020-07-03 DIAGNOSIS — Z98.891 S/P CESAREAN SECTION: Primary | ICD-10-CM

## 2020-07-03 DIAGNOSIS — O21.9 NAUSEA AND VOMITING IN PREGNANCY: ICD-10-CM

## 2020-07-03 PROCEDURE — 99024 POSTOP FOLLOW-UP VISIT: CPT | Performed by: OBSTETRICS & GYNECOLOGY

## 2020-07-03 PROCEDURE — G0463 HOSPITAL OUTPT CLINIC VISIT: HCPCS

## 2020-07-03 ASSESSMENT — PAIN SCALES - GENERAL: PAINLEVEL: NO PAIN (0)

## 2020-07-03 NOTE — PROGRESS NOTES
Postoperative Visit  7/3/2020    S: Nury Gilbert is a 25 year old  here for post-operative visit following RLTCS on 20. She reports feeling well physically. Her bowels and bladder are back to normal. Minimal pain. Lochia has stopped. Breastfeeding is going well. Sleep is going well. However, her mood is not good. She reports depression and anxiety symptoms. She has been very tearful. She feels like she is obsessive about her baby and doesn't trust others to take care of her. She feels a lot of guilt that she is not helping more around the house. She did not have issues with postpartum depression after her first baby and wonders if the initial respiratory and blood sugar struggles for her baby contributed to her poor mood. Denies SI/HI.    O:   /80 (BP Location: Right arm, Patient Position: Sitting, Cuff Size: Adult Regular)   Wt 83.3 kg (183 lb 11.2 oz)   LMP 2019   Breastfeeding Yes   BMI 27.13 kg/m    Gen:  Well-appearing, NAD  Abd: soft, nondistended, nontender. Incision c/d/i  Psych: somewhat tearful. Makes good eye contact. Open and conversant.     A/P:   Ms. Nury Gilbert is a 25 year old  two weeks s/p RLTCS. Discussed baby blues vs postpartum depression. She is not interested in counseling right now, wanted information on medications if her mood gets worse. Discussed self-cares and warning signs.   RTC 2 weeks    Naye Johns MD  OB/GYN  7/3/2020 1:33 PM

## 2020-07-03 NOTE — NURSING NOTE
Chief Complaint   Patient presents with     Postpartum Care     Incision healing well. Bowels and bladder normal. Some mood concerns.     Medication Reconciliation: complete    Beatriz Ba LPN

## 2020-07-07 ENCOUNTER — PATIENT OUTREACH (OUTPATIENT)
Dept: CARE COORDINATION | Facility: CLINIC | Age: 25
End: 2020-07-07

## 2020-07-07 NOTE — PROGRESS NOTES
Clinic Care Coordination Contact    Patient referred to care coordination opportunities due to recent baby and may benefit from coordination with resources available in the community.  Writer reached out to patient on this date, voice message left requesting return call to offer support.    ANANYA Krueger on 7/7/2020 at 8:51 AM

## 2020-07-17 ENCOUNTER — OFFICE VISIT (OUTPATIENT)
Dept: OBGYN | Facility: OTHER | Age: 25
End: 2020-07-17
Attending: OBSTETRICS & GYNECOLOGY
Payer: COMMERCIAL

## 2020-07-17 VITALS
WEIGHT: 183.6 LBS | BODY MASS INDEX: 27.11 KG/M2 | SYSTOLIC BLOOD PRESSURE: 90 MMHG | DIASTOLIC BLOOD PRESSURE: 50 MMHG | HEART RATE: 78 BPM

## 2020-07-17 PROCEDURE — 99024 POSTOP FOLLOW-UP VISIT: CPT | Performed by: OBSTETRICS & GYNECOLOGY

## 2020-07-17 SDOH — HEALTH STABILITY: MENTAL HEALTH: HOW OFTEN DO YOU HAVE A DRINK CONTAINING ALCOHOL?: 2-4 TIMES A MONTH

## 2020-07-17 SDOH — HEALTH STABILITY: MENTAL HEALTH: HOW MANY STANDARD DRINKS CONTAINING ALCOHOL DO YOU HAVE ON A TYPICAL DAY?: 1 OR 2

## 2020-07-17 ASSESSMENT — PAIN SCALES - GENERAL: PAINLEVEL: MILD PAIN (2)

## 2020-07-17 NOTE — PROGRESS NOTES
Follow-Up Visit    S: Ms. Nury Gilbert is a 25 year old  here for mood follow up. She reports she is somewhat less obsessed with her baby, mood is maybe a little better since her last visit and starting celexa. She is starting to get more sleep at night and now nursing on demand instead of setting an alarm. She is still feeling guilty but this is getting better.     O:  BP 90/50 (BP Location: Right arm, Patient Position: Sitting, Cuff Size: Adult Regular)   Pulse 78   Wt 83.3 kg (183 lb 9.6 oz)   LMP 2019   Breastfeeding Yes   BMI 27.11 kg/m    Gen: Well-appearing, NAD  Pulm: nonlabored  Psych: flat affect but good eye contact, insightful    A/P:  Ms. Nury Gilbert is a 25 year old  here for postpartum depression follow up. Slowly improving. Continue celexa. Will see her again in 2 weeks. Still declines counseling    Naye Johns MD  OB/GYN  2020 4:35 PM

## 2020-07-17 NOTE — NURSING NOTE
Patient here for 4 week post-op .   Pt states she is lightly bleeding. States pressure on her stomach causes pain, but that is it.     Medication Reconciliation: wang Branch LPN  2020 3:21 PM

## 2020-07-31 ENCOUNTER — PRENATAL OFFICE VISIT (OUTPATIENT)
Dept: OBGYN | Facility: OTHER | Age: 25
End: 2020-07-31
Attending: OBSTETRICS & GYNECOLOGY
Payer: COMMERCIAL

## 2020-07-31 VITALS
WEIGHT: 185 LBS | HEART RATE: 60 BPM | DIASTOLIC BLOOD PRESSURE: 60 MMHG | SYSTOLIC BLOOD PRESSURE: 102 MMHG | BODY MASS INDEX: 27.32 KG/M2

## 2020-07-31 DIAGNOSIS — Z12.4 SCREENING FOR MALIGNANT NEOPLASM OF CERVIX: ICD-10-CM

## 2020-07-31 DIAGNOSIS — Z30.430 ENCOUNTER FOR IUD INSERTION: ICD-10-CM

## 2020-07-31 DIAGNOSIS — Z01.812 PRE-PROCEDURE LAB EXAM: ICD-10-CM

## 2020-07-31 LAB — HCG UR QL: NEGATIVE

## 2020-07-31 PROCEDURE — 81025 URINE PREGNANCY TEST: CPT | Mod: ZL | Performed by: OBSTETRICS & GYNECOLOGY

## 2020-07-31 PROCEDURE — G0463 HOSPITAL OUTPT CLINIC VISIT: HCPCS | Mod: 25

## 2020-07-31 PROCEDURE — 58300 INSERT INTRAUTERINE DEVICE: CPT | Performed by: OBSTETRICS & GYNECOLOGY

## 2020-07-31 PROCEDURE — G0123 SCREEN CERV/VAG THIN LAYER: HCPCS | Performed by: OBSTETRICS & GYNECOLOGY

## 2020-07-31 PROCEDURE — 99207 ZZC POST-PARTUM 6 WK VISIT - GICH ONLY: CPT | Performed by: OBSTETRICS & GYNECOLOGY

## 2020-07-31 PROCEDURE — 25000128 H RX IP 250 OP 636: Performed by: OBSTETRICS & GYNECOLOGY

## 2020-07-31 RX ADMIN — LEVONORGESTREL 20 MCG: 52 INTRAUTERINE DEVICE INTRAUTERINE at 14:30

## 2020-07-31 ASSESSMENT — PAIN SCALES - GENERAL: PAINLEVEL: MILD PAIN (3)

## 2020-07-31 NOTE — PROGRESS NOTES
"6 week Postpartum Visit Note    S:  Ms. Nury Gilbert is a 25 year old  here for her 6-week postpartum checkup.   - Had a RLTCS on 20.  - Infant gender:  girl, weight 7 pounds 11.4 oz.  - Feeding Method:  .  Complications reported with feeding:  none, infant thriving .    - Bleeding:  None.  Duration:  Stopped after 4 weeks.  Menses resumed:  No  - Bowel/Urinary problems:  No  - Mood: much better. Less obsessed with her baby. Tolerating prozac well   - Sleep: not great but manageable     - Contraception Planned:  IUD Mirena  - She  has not had intercourse since delivery..    - Current tobacco use:  No  - Hx of Abuse:  No  ================================================================  ROS: 10 point ROS neg other than the symptoms noted above in the HPI.     O:  /60 (BP Location: Right arm, Patient Position: Sitting, Cuff Size: Adult Large)   Pulse 60   Wt 83.9 kg (185 lb)   LMP 2019   Breastfeeding Yes   BMI 27.32 kg/m    Gen: Well-appearing, NAD  Psych:  Appropriate mood and affect  Breasts: normal without suspicious masses, skin changes or axillary nodes.   Abd:  Benign, Soft, flat, non-tender and No masses, organomegaly  Inc:   well healed   Pelvic: Normal external genitalia. Normal vagina and cervix. Uterus normal size, mobile, anteverted. No adnexal masses or tenderness    Pap collected    IUD Insertion Note:      Time Out - \"Pause for the Cause\"  Just before the procedure begins, through verbal and active participation of team members, verify:    Initials   Patient Name    Nury Gilbert    Patient Date of Birth 1995    Procedure to be performed  IUD insertion     Consent:  Risks, benefits of treatment, and no treatment were discussed.  Patient's questions were elicited and answered.     After informed consent was obtained from the patient, a speculum was placed in the vagina to visualized the cervix.  The cervix was then swabbed with a betadine prep x 3.   " Tenaculum was placed at the 12 o'clock position on the cervix and the uterus sounded to 7cm.  The Mirena  IUD was then placed in the usual fashion under sterile technique.  Strings were clipped about 2 cm from the cervical os.  Tenaculum was removed and cervix was hemostatic.  There were no complications.  The patient tolerated the procedure well.    Lot: [id number]  Exp: 2022    A/P:  Ms. Nury Gilbert is a 25 year old  here for 6 week postpartum visit after RLTCS. Doing well.  - Contraception: Mirena  - Feeding: breast  - Follow-up: 1 month IUD check    Naye Johns MD  OB/GYN  2020 1:36 PM

## 2020-07-31 NOTE — LETTER
August 4, 2020      [address] Gilbert  [address]   [facility] [place] [address]        Dear Nury,     I am happy to inform you that your recent cervical cancer screening test (PAP smear) was normal.      Preventative screenings such as this help to ensure your health for years to come. You should repeat a pap smear in 3 years, unless otherwise directed.      You will still need to return to the clinic every year for your annual exam and other preventive tests.     If you have additional questions regarding this result, please call our registered nurse at [phone].          Sincerely,        Naye Johns MD

## 2020-07-31 NOTE — NURSING NOTE
Chief Complaint   Patient presents with     Postpartum Care     6 week post partum      Prior to the start of the procedure and with procedural staff participation, I verbally confirmed the patient s identity using two indicators, relevant allergies, that the procedure was appropriate and matched the consent or emergent situation, and that the correct equipment/implants were available. Immediately prior to starting the procedure I conducted the Time Out with the procedural staff and re-confirmed the patient s name, procedure, and site/side. (The Joint Commission universal protocol was followed.)  Yes    Sedation (Moderate or Deep): None  Mariann Kolb LPN........................7/31/2020  1:30 PM     Medication Reconciliation: completed   Mariann Kolb LPN  7/31/2020 1:30 PM

## 2020-08-04 LAB
COPATH REPORT: NORMAL
PAP: NORMAL

## 2020-08-12 ENCOUNTER — TELEPHONE (OUTPATIENT)
Dept: OBGYN | Facility: OTHER | Age: 25
End: 2020-08-12

## 2020-08-12 NOTE — TELEPHONE ENCOUNTER
Returned patients call regarding her concern as she took 2 of her prozac today. 20mg each tab.  Stated she had forgot that she put a dose with her vitamins and so had taken it twice.  Advised patient she is still within a safe dose for a 24 hour period and she may notice some increase in side effects with her and to just watch baby as she is breast feeding.  Patients states it has been a few hours now and she hasn't noticed any side effects.  Patient voiced understanding and will call with any further questions or concerns regarding this.    Violet Johns RN on 8/12/2020 at 11:04 AM

## 2020-08-19 ENCOUNTER — MYC MEDICAL ADVICE (OUTPATIENT)
Dept: OBGYN | Facility: OTHER | Age: 25
End: 2020-08-19

## 2020-08-19 NOTE — TELEPHONE ENCOUNTER
"S-(situation): Pt sent [employer] message with mood concerns and wondering if r/t adjustment to IUD or if increased in Prozac is possible.    B-(background): Prozac 20 mg daily ordered 7/3/20, at 2-wk. postpartum visit for depression and anxiety sx Pt had 6-wk postpartum f/u: Pt was tolerating Prozac well and mood was reportedly much better. Mirena IUD placed at visit. 1-month IUD check-up scheduled for 8/28.      A-(assessment): Pt  c/o \"feeling extremely overwhelmed. Not depressed, not much more anxious, just like the smallest thing can happen & I wanna snap. Things are getting to me much easier than I feel they should.\"     R-(recommendations): Routing to Dr. ANNIE Johns for consideration of dose increase to 40 mg daily and recommendations for follow up with primary care.    Gabriella Garces RN .............. 8/19/2020  2:33 PM  "

## 2020-08-20 RX ORDER — FLUOXETINE 40 MG/1
40 CAPSULE ORAL DAILY
Qty: 30 CAPSULE | Refills: 0 | Status: CANCELLED | OUTPATIENT
Start: 2020-08-20

## 2020-08-20 NOTE — TELEPHONE ENCOUNTER
Called patient, still having some anxiety but mostly irritability. Just picked up her 20mg prescription dose. Instructed her to start taking 2 tablets once a day (40mg) and will discuss more at her appt next week  Naye Johns MD  OB/GYN  8/20/2020 9:13 AM

## 2020-08-28 ENCOUNTER — OFFICE VISIT (OUTPATIENT)
Dept: OBGYN | Facility: OTHER | Age: 25
End: 2020-08-28
Attending: OBSTETRICS & GYNECOLOGY
Payer: COMMERCIAL

## 2020-08-28 VITALS
HEART RATE: 72 BPM | SYSTOLIC BLOOD PRESSURE: 124 MMHG | WEIGHT: 190 LBS | DIASTOLIC BLOOD PRESSURE: 70 MMHG | BODY MASS INDEX: 28.06 KG/M2

## 2020-08-28 DIAGNOSIS — Z97.5 IUD (INTRAUTERINE DEVICE) IN PLACE: ICD-10-CM

## 2020-08-28 PROCEDURE — G0463 HOSPITAL OUTPT CLINIC VISIT: HCPCS

## 2020-08-28 PROCEDURE — 99214 OFFICE O/P EST MOD 30 MIN: CPT | Performed by: OBSTETRICS & GYNECOLOGY

## 2020-08-28 RX ORDER — FLUOXETINE 40 MG/1
40 CAPSULE ORAL DAILY
Qty: 30 CAPSULE | Refills: 1 | Status: SHIPPED | OUTPATIENT
Start: 2020-08-28 | End: 2020-11-04

## 2020-08-28 ASSESSMENT — PAIN SCALES - GENERAL: PAINLEVEL: NO PAIN (0)

## 2020-08-28 NOTE — PROGRESS NOTES
Follow-Up Visit    S: Ms. Nury Gilbert is a 25 year old  here for IUD check. She had a Mirena placed on 20. She reports minimal discharge and a slight odor that is still ongoing but improving. Regarding her postpartum depression, anxiety- she increased her prozac dose about 10 days ago. She reports her mood is getting worse. She doesn't think it is necessarily depression or anxiety symptoms but is feeling angry and short-tempered all the time. Her baby is starting to sleep better but her toddler has been up more at night. Her boyfriend does help with the kids but she is alone with them all day and finds herself getting frustrated and having to restrain from yelling at the kids. She is upset with herself for feeling this way and will start crying. No SI/HI. Has not tried counseling. She is still breastfeeding but only a few times per day- didn't have supply issues but needed to be able to have others help her feed the baby. This is making her also feel guilty.    O:  /70 (BP Location: Right arm, Patient Position: Sitting, Cuff Size: Adult Large)   Pulse 72   Wt 86.2 kg (190 lb)   Breastfeeding Yes   BMI 28.06 kg/m    Gen: Well-appearing, NAD  Pulm: nonlabored  Psych: appropriate mood and affect    Pelvic:  Normal appearing external female genitalia. Normal hair distribution. Vagina is without lesions. Small brown discharge. Cervix normal, IUD strings appear appropriate length    A/P:  Ms. Nury Gilbert is a 25 year old  here for IUD and mood check. Discussed expected bleeding patterns. Regarding her mood, encouraged patient to reconsider counseling and resources were provided for this. Also discussed whether or not prozac is the right medication for her as her anger seems to be getting worse- discussed option of switching to a different selective serotonin reuptake inhibitor vs adding wellbutrin. She wants to give it another week and will call back if she wants to do something  different. She has previously taken celexa and did not like it. Has not tried any others.   - RTC 1 month for mood check    25 minutes were spent with the patient with >50% spent counseling.      Naye Johns MD  OB/GYN  8/28/2020 2:01 PM

## 2020-08-28 NOTE — NURSING NOTE
Chief Complaint   Patient presents with     RECHECK     1 month follow up IUD          Medication Reconciliation: completed   Mariann Kolb LPN  8/28/2020 2:15 PM

## 2020-09-08 NOTE — TELEPHONE ENCOUNTER
Dose increased 8/28/2020. Patient should have refills on file.     Disp  Refills  Start  End  SHANDA    FLUoxetine (PROZAC) 40 MG capsule  30 capsule  1  8/28/2020   --    Sig - Route: Take 1 capsule (40 mg) by mouth daily - Oral    Sent to pharmacy as: FLUoxetine HCl 40 MG Oral Capsule (PROzac)    Class: E-Prescribe    Order: [id number]    E-Prescribing Status: Receipt confirmed by pharmacy (8/28/2020  2:44 PM CDT)      [id number] - [place], [place] - [address]     Unable to complete prescription refill per RN Medication Refill Policy.................... Michelle Ovalle RN ....................  9/8/2020   8:18 AM

## 2020-09-25 ENCOUNTER — VIRTUAL VISIT (OUTPATIENT)
Dept: OBGYN | Facility: OTHER | Age: 25
End: 2020-09-25
Attending: OBSTETRICS & GYNECOLOGY
Payer: COMMERCIAL

## 2020-09-25 PROCEDURE — G0463 HOSPITAL OUTPT CLINIC VISIT: HCPCS | Mod: TEL

## 2020-09-25 PROCEDURE — 99443 ZZC PHYSICIAN TELEPHONE EVALUATION 21-30 MIN: CPT | Performed by: OBSTETRICS & GYNECOLOGY

## 2020-09-25 RX ORDER — FLUOXETINE 40 MG/1
40 CAPSULE ORAL DAILY
Qty: 90 CAPSULE | Refills: 1 | Status: SHIPPED | OUTPATIENT
Start: 2020-09-25 | End: 2021-03-30

## 2020-09-25 NOTE — LETTER
[facility]  [address]  [place] [address]  [phone]          September 25, 2020    RE:  Nury FORDE Vladimir                                                                                                                                                       [address]   [facility] [id number]            To whom it may concern:    Nury Gilbert is under my professional care for Postpartum depression. She is no longer breastfeeding.      Sincerely,        Naye Johns MD

## 2020-09-25 NOTE — PROGRESS NOTES
Nury Gilbert is a 25 year old female who is being evaluated via a billable telephone visit.      Patient has given verbal consent for Telephone visit?  Yes    What phone number would you like to be contacted at? [phone]    How would you like to obtain your AVS? Walter   [place] Depression Scale  Thoughts of Harming Self: 0-->never  Total Score: 16        Follow-Up Visit    Ms. Nury Gilbert is a 25 year old  three months postpartum with postpartum depression, anxiety for telephone visit. She has some nasal congestion and did not want to come to the clinic today due to COVID concerns. No cough or fever.  Her mood is much better. Anxiety still present but constant worry is better. Her anger seems to be better as well. She is able to calm herself down when she starts getting upset.  Sleep is going okay. Done breastfeeding, coming to terms with it. 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 She feels like the adult interaction is helping with her mood as well.     Overall improving. EPDS scores decreasing. Feels like she is on a good dose for prozac for her and is working well. Requested a letter for [facility] in [place] to resume her CBD products for anxiety now that she isn't breastfeeding. Refill for prozac given for 6 months. Will reassess at that time. Discussed the need to taper when she is ready to get off but don't want to discontinue too soon.       Phone call duration: 25 minutes    Naye Johns MD

## 2020-09-29 ENCOUNTER — MYC MEDICAL ADVICE (OUTPATIENT)
Dept: OBGYN | Facility: OTHER | Age: 25
End: 2020-09-29

## 2020-09-29 NOTE — TELEPHONE ENCOUNTER
Patient states she is still having fishy/foul odor after IUD placement. She would like an appointment. She will be seen Friday.    Michelle Ovalle RN...................9/29/2020 3:00 PM

## 2020-10-02 ENCOUNTER — OFFICE VISIT (OUTPATIENT)
Dept: OBGYN | Facility: OTHER | Age: 25
End: 2020-10-02
Attending: OBSTETRICS & GYNECOLOGY
Payer: COMMERCIAL

## 2020-10-02 VITALS
BODY MASS INDEX: 28.8 KG/M2 | WEIGHT: 195 LBS | HEART RATE: 84 BPM | DIASTOLIC BLOOD PRESSURE: 60 MMHG | SYSTOLIC BLOOD PRESSURE: 100 MMHG

## 2020-10-02 DIAGNOSIS — N89.8 VAGINAL DISCHARGE: Primary | ICD-10-CM

## 2020-10-02 LAB
C TRACH DNA SPEC QL NAA+PROBE: NOT DETECTED
N GONORRHOEA DNA SPEC QL NAA+PROBE: NOT DETECTED
SPECIMEN SOURCE: ABNORMAL
SPECIMEN SOURCE: NORMAL
WET PREP SPEC: ABNORMAL

## 2020-10-02 PROCEDURE — 99213 OFFICE O/P EST LOW 20 MIN: CPT | Performed by: OBSTETRICS & GYNECOLOGY

## 2020-10-02 PROCEDURE — G0463 HOSPITAL OUTPT CLINIC VISIT: HCPCS

## 2020-10-02 PROCEDURE — 87210 SMEAR WET MOUNT SALINE/INK: CPT | Mod: ZL | Performed by: OBSTETRICS & GYNECOLOGY

## 2020-10-02 PROCEDURE — 87491 CHLMYD TRACH DNA AMP PROBE: CPT | Mod: ZL | Performed by: OBSTETRICS & GYNECOLOGY

## 2020-10-02 PROCEDURE — 87591 N.GONORRHOEAE DNA AMP PROB: CPT | Mod: ZL | Performed by: OBSTETRICS & GYNECOLOGY

## 2020-10-02 RX ORDER — METRONIDAZOLE 500 MG/1
500 TABLET ORAL 2 TIMES DAILY
Qty: 14 TABLET | Refills: 0 | Status: SHIPPED | OUTPATIENT
Start: 2020-10-02 | End: 2020-10-09

## 2020-10-02 ASSESSMENT — PAIN SCALES - GENERAL: PAINLEVEL: NO PAIN (0)

## 2020-10-02 NOTE — NURSING NOTE
Chief Complaint   Patient presents with     Vaginal Problem     odor/ discomfort        Medication Reconciliation: completed   Mariann Kolb LPN  10/2/2020 10:23 AM

## 2020-10-02 NOTE — PROGRESS NOTES
Follow-Up Visit    S: Ms. Nury Gilbert is a 25 year old  here for vaginal odor and some discharge x2-3 weeks. Also having a generally uncomfortable feeling. Her IUD bleeding has stopped, no menses yet. Her mood has been doing much better. Also requesting STI screening, had a new sexual partner in the last few weeks    O:  /60 (BP Location: Right arm, Patient Position: Sitting, Cuff Size: Adult Regular)   Pulse 84   Wt 88.5 kg (195 lb)   Breastfeeding No   BMI 28.80 kg/m    Gen: Well-appearing, NAD  Pulm: nonlabored  Psych: appropriate mood and affect    Pelvic:  Normal appearing external female genitalia. Normal hair distribution. Vagina is without lesions. Moderate yellow white discharge. Cervix normal, no lesions, no cervical motion tenderness, IUD strings visible and appropriate length    A/P:  Ms. Nury Gilbert is a 25 year old  here for vaginal discharge and odor. Wet prep and GC/Chlam collected, will call with results. Postpartum depression symptoms improved    Naye Johns MD  OB/GYN  10/2/2020 10:44 AM

## 2020-10-12 ENCOUNTER — ALLIED HEALTH/NURSE VISIT (OUTPATIENT)
Dept: FAMILY MEDICINE | Facility: OTHER | Age: 25
End: 2020-10-12
Attending: FAMILY MEDICINE
Payer: COMMERCIAL

## 2020-10-12 DIAGNOSIS — J02.9 SORE THROAT: Primary | ICD-10-CM

## 2020-10-12 PROCEDURE — C9803 HOPD COVID-19 SPEC COLLECT: HCPCS

## 2020-10-12 PROCEDURE — U0003 INFECTIOUS AGENT DETECTION BY NUCLEIC ACID (DNA OR RNA); SEVERE ACUTE RESPIRATORY SYNDROME CORONAVIRUS 2 (SARS-COV-2) (CORONAVIRUS DISEASE [COVID-19]), AMPLIFIED PROBE TECHNIQUE, MAKING USE OF HIGH THROUGHPUT TECHNOLOGIES AS DESCRIBED BY CMS-2020-01-R: HCPCS | Mod: ZL | Performed by: FAMILY MEDICINE

## 2020-10-12 PROCEDURE — 99207 PR NO CHARGE NURSE ONLY: CPT

## 2020-10-14 LAB
SARS-COV-2 RNA SPEC QL NAA+PROBE: NOT DETECTED
SPECIMEN SOURCE: NORMAL

## 2020-11-04 ENCOUNTER — OFFICE VISIT (OUTPATIENT)
Dept: FAMILY MEDICINE | Facility: OTHER | Age: 25
End: 2020-11-04
Attending: FAMILY MEDICINE
Payer: COMMERCIAL

## 2020-11-04 VITALS
TEMPERATURE: 98.1 F | SYSTOLIC BLOOD PRESSURE: 112 MMHG | DIASTOLIC BLOOD PRESSURE: 70 MMHG | BODY MASS INDEX: 29.42 KG/M2 | OXYGEN SATURATION: 98 % | RESPIRATION RATE: 18 BRPM | HEART RATE: 82 BPM | WEIGHT: 199.2 LBS

## 2020-11-04 DIAGNOSIS — N89.8 VAGINAL ODOR: Primary | ICD-10-CM

## 2020-11-04 LAB
SPECIMEN SOURCE: NORMAL
WET PREP SPEC: NORMAL

## 2020-11-04 PROCEDURE — 87210 SMEAR WET MOUNT SALINE/INK: CPT | Mod: ZL | Performed by: FAMILY MEDICINE

## 2020-11-04 PROCEDURE — 99213 OFFICE O/P EST LOW 20 MIN: CPT | Performed by: FAMILY MEDICINE

## 2020-11-04 PROCEDURE — G0463 HOSPITAL OUTPT CLINIC VISIT: HCPCS

## 2020-11-04 NOTE — PROGRESS NOTES
"  SUBJECTIVE:   Nury Gilbert is a 25 year old female who presents to clinic today for the following health issues:    HPI  Vaginal Odor:  She was treated for bacterial vaginosis at the beginning of last month.  She is concerned she may have recurrent infection.  Current symptoms include vaginal odor and \"very light\" vaginal discharge.  Symptoms have been present for the past week.  She denies vaginal pain, bleeding, and pruritus.  She was told that taking a probiotic could help prevent recurrent infections.  She denies douching.  She has been taking bubble baths with her children.    Past Medical History:   Diagnosis Date     Anxiety disorder     No Comments Provided     Attention-deficit hyperactivity disorder     No Comments Provided     Depressive disorder      Superficial foreign body of finger     2017      Past Surgical History:   Procedure Laterality Date     ADENOIDECTOMY      No Comments Provided     APPENDECTOMY OPEN           ARTHROSCOPY KNEE      2012,2nd as well at age 18      SECTION N/A 2018    Procedure:  SECTION;  External version attemted prior to Section;  Surgeon: Naye Johns MD;  Location:  OR      SECTION N/A 2020    Procedure:  SECTION;  Surgeon: Naye Johns MD;  Location:  OR     LAPAROSCOPY DIAGNOSTIC (GYN) N/A 2019    Procedure: LAPAROSCOPY DIAGNOSTIC;  Surgeon: Naye Johns MD;  Location: [facility] OR     OTHER SURGICAL HISTORY      1999,DKE730,BLADDER SURGERY     OTHER SURGICAL HISTORY      2017,62503.0,[place] REMOVE FOREIGN BODY SIMPLE     Family History   Problem Relation Age of Onset     Dementia Maternal Grandmother      Heart Disease Maternal Grandfather      Cancer Mother         carcinoid cancer, 30s     Social History     Tobacco Use     Smoking status: Never Smoker     Smokeless tobacco: Never Used   Substance Use Topics     Alcohol use: Yes     Alcohol/week: 0.0 standard drinks     Frequency: " 2-4 times a month     Drinks per session: 1 or 2     Current Outpatient Medications   Medication Sig Dispense Refill     acetaminophen (TYLENOL) 500 MG tablet Take 500-1,000 mg by mouth every 6 hours as needed for mild pain       FLUoxetine (PROZAC) 40 MG capsule Take 1 capsule (40 mg) by mouth daily 90 capsule 1     HEMP OIL OR EXTRACT OR OTHER CBD CANNABINOID, NOT MEDICAL CANNABIS,        ibuprofen (ADVIL/MOTRIN) 200 MG tablet Take 3 tablets (600 mg) by mouth every 6 hours 100 tablet 0     lanolin ointment Apply topically every hour as needed for dry skin (soreness) 7 g 0     levonorgestrel (MIRENA) 20 MCG/24HR IUD 1 each (20 mcg) by Intrauterine route once for 1 dose 1 each 0     mupirocin (BACTROBAN) 2 % external ointment Apply topically 3 times daily 30 g 2     polyethylene glycol (MIRALAX/GLYCOLAX) powder Take 17 g (1 capful) by mouth daily 500 g 3     Prenatal Vit-Fe Fumarate-FA (PRENATAL VITAMINS) 28-0.8 MG TABS Take 1 tablet by mouth daily        Allergies   Allergen Reactions     Penicillins Swelling     Throat swelling and hives      Phenergan Dm [Promethazine-Dm] Other (See Comments)     Hallucinations and muscle twitching     Review of Systems   Constitutional: Negative for chills and fever.   Genitourinary: Negative for dyspareunia, dysuria, pelvic pain, vaginal bleeding and vaginal pain.      OBJECTIVE:     /70   Pulse 82   Temp 98.1  F (36.7  C) (Temporal)   Resp 18   Wt 90.4 kg (199 lb 3.2 oz)   SpO2 98%   Breastfeeding No   BMI 29.42 kg/m    Body mass index is 29.42 kg/m .  Physical Exam  Constitutional:       General: She is not in acute distress.     Appearance: Normal appearance. She is not ill-appearing.   Genitourinary:     Exam position: Lithotomy position.      Labia:         Right: No rash.         Left: No rash.       Vagina: Vaginal discharge present.      Cervix: No friability or erythema.   Neurological:      Mental Status: She is alert.     Diagnostic Test  Results:  Results for orders placed or performed in visit on 11/04/20   Wet Prep, Genital     Status: None    Specimen: Vagina   Result Value Ref Range    Specimen Description Vagina     Wet Prep No yeast seen     Wet Prep No clue cells seen     Wet Prep No Trichomonas seen      ASSESSMENT/PLAN:     1. Vaginal odor  Wet prep negative for yeast, trichomonas, and clue cells.  Encouraged appropriate hygiene, daily probiotic, and avoidance of bubble baths.  Follow-up if symptoms persist or worsen.  - Wet Prep, Genital      DO KARLI Conrad [facility]

## 2020-11-04 NOTE — NURSING NOTE
Pt presents to clinic today for clear discharge, and urinary odor.      Medication Reconciliation: complete  Noni Verdin LPN

## 2020-11-05 ASSESSMENT — ENCOUNTER SYMPTOMS
FEVER: 0
DYSURIA: 0
CHILLS: 0

## 2020-12-20 ENCOUNTER — HEALTH MAINTENANCE LETTER (OUTPATIENT)
Age: 25
End: 2020-12-20

## 2020-12-30 ENCOUNTER — DOCUMENTATION ONLY (OUTPATIENT)
Dept: OTHER | Facility: CLINIC | Age: 25
End: 2020-12-30

## 2020-12-31 ENCOUNTER — OFFICE VISIT (OUTPATIENT)
Dept: OBGYN | Facility: OTHER | Age: 25
End: 2020-12-31
Attending: OBSTETRICS & GYNECOLOGY
Payer: COMMERCIAL

## 2020-12-31 VITALS
HEART RATE: 84 BPM | WEIGHT: 209 LBS | BODY MASS INDEX: 30.86 KG/M2 | SYSTOLIC BLOOD PRESSURE: 122 MMHG | DIASTOLIC BLOOD PRESSURE: 76 MMHG

## 2020-12-31 DIAGNOSIS — A74.9 CHLAMYDIA: ICD-10-CM

## 2020-12-31 DIAGNOSIS — N89.8 VAGINAL DISCHARGE: Primary | ICD-10-CM

## 2020-12-31 LAB
C TRACH DNA SPEC QL NAA+PROBE: ABNORMAL
N GONORRHOEA DNA SPEC QL NAA+PROBE: NOT DETECTED
SPECIMEN SOURCE: ABNORMAL
SPECIMEN SOURCE: NORMAL
WET PREP SPEC: NORMAL

## 2020-12-31 PROCEDURE — G0463 HOSPITAL OUTPT CLINIC VISIT: HCPCS

## 2020-12-31 PROCEDURE — 87210 SMEAR WET MOUNT SALINE/INK: CPT | Mod: ZL | Performed by: OBSTETRICS & GYNECOLOGY

## 2020-12-31 PROCEDURE — 99213 OFFICE O/P EST LOW 20 MIN: CPT | Performed by: OBSTETRICS & GYNECOLOGY

## 2020-12-31 PROCEDURE — 87491 CHLMYD TRACH DNA AMP PROBE: CPT | Mod: ZL | Performed by: OBSTETRICS & GYNECOLOGY

## 2020-12-31 PROCEDURE — 87591 N.GONORRHOEAE DNA AMP PROB: CPT | Mod: ZL | Performed by: OBSTETRICS & GYNECOLOGY

## 2020-12-31 RX ORDER — AZITHROMYCIN 500 MG/1
1000 TABLET, FILM COATED ORAL DAILY
Qty: 2 TABLET | Refills: 0 | Status: SHIPPED | OUTPATIENT
Start: 2020-12-31 | End: 2021-01-01

## 2020-12-31 ASSESSMENT — PAIN SCALES - GENERAL: PAINLEVEL: NO PAIN (0)

## 2020-12-31 NOTE — NURSING NOTE
Chief Complaint   Patient presents with     Clinic Care Coordination - Follow-up     recurrent Vaginal irritation        Medication Reconciliation: completed   Mariann Kolb LPN  12/31/2020 9:12 AM

## 2021-01-21 ENCOUNTER — TELEPHONE (OUTPATIENT)
Dept: OBGYN | Facility: OTHER | Age: 26
End: 2021-01-21

## 2021-01-21 DIAGNOSIS — N93.9 VAGINAL BLEEDING: Primary | ICD-10-CM

## 2021-01-21 RX ORDER — NORGESTIMATE AND ETHINYL ESTRADIOL 0.25-0.035
1 KIT ORAL DAILY
Qty: 28 TABLET | Refills: 0 | Status: SHIPPED | OUTPATIENT
Start: 2021-01-21 | End: 2021-03-22

## 2021-01-21 NOTE — TELEPHONE ENCOUNTER
Called patient regarding request for OCP. Patient states she is not breastfeeding and is not a smoker. Patient would like Rx sent to [facility] in [place]. Rx has been sent.  Violet Johns RN on 1/21/2021 at 9:46 AM

## 2021-01-21 NOTE — TELEPHONE ENCOUNTER
Nury left a message on voicemail that she would like to try one month of birth control to try and get her body back to normal after having her baby and getting an IUD.    Jane Weller on 1/21/2021 at 9:17 AM

## 2021-02-16 DIAGNOSIS — N93.9 VAGINAL BLEEDING: ICD-10-CM

## 2021-02-16 RX ORDER — NORGESTIMATE AND ETHINYL ESTRADIOL 0.25-0.035
KIT ORAL
Qty: 28 TABLET | Refills: 0 | OUTPATIENT
Start: 2021-02-16

## 2021-02-16 NOTE — TELEPHONE ENCOUNTER
Patient called regarding pharmacy request for refill of her Ortho cyclen birth control. Patient states she tried it for one month and feels she is fine without the pills as she has her IUD. No further questions or concerns at this time. Refill request denied.  Violet Johns RN on 2/16/2021 at 4:19 PM

## 2021-02-21 ENCOUNTER — OFFICE VISIT (OUTPATIENT)
Dept: FAMILY MEDICINE | Facility: OTHER | Age: 26
End: 2021-02-21
Attending: NURSE PRACTITIONER
Payer: COMMERCIAL

## 2021-02-21 VITALS
OXYGEN SATURATION: 99 % | WEIGHT: 214 LBS | HEART RATE: 78 BPM | RESPIRATION RATE: 16 BRPM | DIASTOLIC BLOOD PRESSURE: 70 MMHG | TEMPERATURE: 97.7 F | HEIGHT: 69 IN | BODY MASS INDEX: 31.7 KG/M2 | SYSTOLIC BLOOD PRESSURE: 124 MMHG

## 2021-02-21 DIAGNOSIS — B07.0 PLANTAR WART OF LEFT FOOT: Primary | ICD-10-CM

## 2021-02-21 PROCEDURE — G0463 HOSPITAL OUTPT CLINIC VISIT: HCPCS | Mod: 25

## 2021-02-21 PROCEDURE — 17110 DESTRUCTION B9 LES UP TO 14: CPT | Performed by: NURSE PRACTITIONER

## 2021-02-21 PROCEDURE — G0463 HOSPITAL OUTPT CLINIC VISIT: HCPCS

## 2021-02-21 PROCEDURE — 99212 OFFICE O/P EST SF 10 MIN: CPT | Mod: 25 | Performed by: NURSE PRACTITIONER

## 2021-02-21 RX ORDER — LORAZEPAM 1 MG/1
1 TABLET ORAL EVERY 6 HOURS PRN
COMMUNITY
End: 2024-07-25

## 2021-02-21 RX ORDER — DEXTROAMPHETAMINE SACCHARATE, AMPHETAMINE ASPARTATE, DEXTROAMPHETAMINE SULFATE AND AMPHETAMINE SULFATE 5; 5; 5; 5 MG/1; MG/1; MG/1; MG/1
40 TABLET ORAL 2 TIMES DAILY
COMMUNITY
End: 2021-10-21 | Stop reason: ALTCHOICE

## 2021-02-21 ASSESSMENT — PAIN SCALES - GENERAL: PAINLEVEL: SEVERE PAIN (7)

## 2021-02-21 ASSESSMENT — MIFFLIN-ST. JEOR: SCORE: 1780.08

## 2021-02-22 NOTE — NURSING NOTE
"Chief Complaint   Patient presents with     Derm Problem     Patient is here for pain in the bottom of her left foot that worsened yesterday. Patient states she has a wart there that started 3 years ago. Patient has tried alternating Tylenol and Ibuprofen with no relief of pain.     Initial /70   Pulse 78   Temp 97.7  F (36.5  C) (Tympanic)   Resp 16   Ht 1.753 m (5' 9\")   Wt 97.1 kg (214 lb)   SpO2 99%   BMI 31.60 kg/m   Estimated body mass index is 31.6 kg/m  as calculated from the following:    Height as of this encounter: 1.753 m (5' 9\").    Weight as of this encounter: 97.1 kg (214 lb).  Medication Reconciliation: complete    Joaquina Crisostomo LPN  "

## 2021-02-22 NOTE — PROGRESS NOTES
HPI:    Nury Gilbert is a 26 year old female  who presents to [facility] today for foot pain due to wart.      States she has had a wart on her left foot for about 3 years.  She has had it treated in the past but it never resolved.   She has currently been using OTC wart treatment for about a week and a half.  Pain on left plantar foot for the past 3 days and progressively worsening.  Painful to bear weight and walk.  She works on her feet all day waitressing.  She has been taking tylenol and ibuprofen without relief.          Past Medical History:   Diagnosis Date     Anxiety disorder     No Comments Provided     Attention-deficit hyperactivity disorder     No Comments Provided     Depressive disorder      Superficial foreign body of finger     2017     Past Surgical History:   Procedure Laterality Date     ADENOIDECTOMY      No Comments Provided     APPENDECTOMY OPEN           ARTHROSCOPY KNEE      2012,2nd as well at age 18      SECTION N/A 2018    Procedure:  SECTION;  External version attemted prior to Section;  Surgeon: Naye Johns MD;  Location: [facility] OR      SECTION N/A 2020    Procedure:  SECTION;  Surgeon: Naye Johns MD;  Location:  OR     LAPAROSCOPY DIAGNOSTIC (GYN) N/A 2019    Procedure: LAPAROSCOPY DIAGNOSTIC;  Surgeon: Naye Johns MD;  Location: [facility] OR     OTHER SURGICAL HISTORY      1999,NIM034,BLADDER SURGERY     OTHER SURGICAL HISTORY      2017,41568.0,[place] REMOVE FOREIGN BODY SIMPLE     Social History     Tobacco Use     Smoking status: Never Smoker     Smokeless tobacco: Never Used   Substance Use Topics     Alcohol use: Yes     Alcohol/week: 0.0 standard drinks     Frequency: 2-4 times a month     Drinks per session: 1 or 2     Comment: rare     Current Outpatient Medications   Medication Sig Dispense Refill     acetaminophen (TYLENOL) 500 MG tablet Take 500-1,000 mg by mouth every 6 hours as needed for  "mild pain       amphetamine-dextroamphetamine (ADDERALL) 20 MG tablet Take 40 mg by mouth 2 times daily       FLUoxetine (PROZAC) 40 MG capsule Take 1 capsule (40 mg) by mouth daily 90 capsule 1     HEMP OIL OR EXTRACT OR OTHER CBD CANNABINOID, NOT MEDICAL CANNABIS,        ibuprofen (ADVIL/MOTRIN) 200 MG tablet Take 3 tablets (600 mg) by mouth every 6 hours 100 tablet 0     levonorgestrel (MIRENA) 20 MCG/24HR IUD 1 each (20 mcg) by Intrauterine route once for 1 dose 1 each 0     LORazepam (ATIVAN) 1 MG tablet Take 1 mg by mouth every 6 hours as needed for anxiety       norgestimate-ethinyl estradiol (ORTHO-CYCLEN) 0.25-35 MG-MCG tablet Take 1 tablet by mouth daily 28 tablet 0     polyethylene glycol (MIRALAX/GLYCOLAX) powder Take 17 g (1 capful) by mouth daily 500 g 3     Prenatal Vit-Fe Fumarate-FA (PRENATAL VITAMINS) 28-0.8 MG TABS Take 1 tablet by mouth daily        lanolin ointment Apply topically every hour as needed for dry skin (soreness) (Patient not taking: Reported on 2/21/2021) 7 g 0     mupirocin (BACTROBAN) 2 % external ointment Apply topically 3 times daily (Patient not taking: Reported on 2/21/2021) 30 g 2     Allergies   Allergen Reactions     Penicillins Swelling     Throat swelling and hives      Phenergan Dm [Promethazine-Dm] Other (See Comments)     Hallucinations and muscle twitching         Past medical history, past surgical history, current medications and allergies reviewed and accurate to the best of my knowledge.        ROS:  Refer to HPI    /70   Pulse 78   Temp 97.7  F (36.5  C) (Tympanic)   Resp 16   Ht 1.753 m (5' 9\")   Wt 97.1 kg (214 lb)   SpO2 99%   BMI 31.60 kg/m      EXAM:  General Appearance: Well appearing adult female, appropriate appearance for age. No acute distress  Musculoskeletal:  Equal movement of bilateral upper extremities.  Equal movement of bilateral lower extremities.  Normal gait.    Dermatological: Left plantar foot with 0.5 to 1 cm round firm plantar " wart with surrounding callused white skin measuring 2 cm from at home wart treatment.  Two additional tiny hard firm warts in surrounding area present.  Psychological: normal affect, alert, oriented, and pleasant.       Verbal consent received to perform cryotherapy to left plantar warts  Risks and benefits discussed.  Warts and surrounding callused macerated skin were pared down with a #10 blade.  Warts were then frozen with liquid nitrogen 3 times until wart and immediate surrounding area turned white x3.            ASSESSMENT/PLAN:    I have reviewed the nursing notes.  I have reviewed the findings, diagnosis, plan and need for follow up with the patient.    1. Plantar wart of left foot    - DESTRUCT BENIGN LESION, UP TO 14    Warts and surrounding callused macerated skin were pared down with a #10 blade.  Warts were then frozen with liquid nitrogen 3 times until wart and immediate surrounding area turned white x3.    Bandage applied    Instructed to avoid OTC home wart treatments at this time    May use over-the-counter Tylenol or ibuprofen PRN    Follow up in approximately 3 weeks for repeat treatement        I explained my diagnostic considerations and recommendations to the patient, who voiced understanding and agreement with the treatment plan. All questions were answered. We discussed potential side effects of any prescribed or recommended therapies, as well as expectations for response to treatments.    Disclaimer:  This note consists of words and symbols derived from keyboarding, dictation, or using voice recognition software. As a result, there may be errors in the script that have gone undetected. Please consider this when interpreting information found in this note.

## 2021-02-24 ENCOUNTER — OFFICE VISIT (OUTPATIENT)
Dept: FAMILY MEDICINE | Facility: OTHER | Age: 26
End: 2021-02-24
Attending: NURSE PRACTITIONER
Payer: COMMERCIAL

## 2021-02-24 VITALS
TEMPERATURE: 98.5 F | WEIGHT: 216.4 LBS | BODY MASS INDEX: 32.05 KG/M2 | HEIGHT: 69 IN | RESPIRATION RATE: 18 BRPM | SYSTOLIC BLOOD PRESSURE: 120 MMHG | DIASTOLIC BLOOD PRESSURE: 72 MMHG | OXYGEN SATURATION: 97 % | HEART RATE: 75 BPM

## 2021-02-24 DIAGNOSIS — B07.0 PLANTAR WART OF LEFT FOOT: Primary | ICD-10-CM

## 2021-02-24 PROCEDURE — 99213 OFFICE O/P EST LOW 20 MIN: CPT | Performed by: NURSE PRACTITIONER

## 2021-02-24 PROCEDURE — G0463 HOSPITAL OUTPT CLINIC VISIT: HCPCS

## 2021-02-24 ASSESSMENT — MIFFLIN-ST. JEOR: SCORE: 1790.96

## 2021-02-24 ASSESSMENT — PAIN SCALES - GENERAL: PAINLEVEL: SEVERE PAIN (6)

## 2021-02-24 NOTE — PROGRESS NOTES
HPI:    Nury Gilbert is a 26 year old female who presents to [facility] today for a wart to the plantar surface of her left foot. The patient had the wart treated on 21 in the [facility] using liquid nitrogen. The patient states that the wart does appear much better than before it was treated. However, she is still having pain to her bottom of left foot to the skin surrounding the wart. The patient has been soaking it in water and soap BID and applying antibiotic ointment. The patient states that she will be working the next few days as a  and doesn't know if she will be able to stay on her feet all day due to the pain. Rating her pain 6/10.     Past Medical History:   Diagnosis Date     Anxiety disorder     No Comments Provided     Attention-deficit hyperactivity disorder     No Comments Provided     Depressive disorder      Superficial foreign body of finger     2017     Past Surgical History:   Procedure Laterality Date     ADENOIDECTOMY      No Comments Provided     APPENDECTOMY OPEN           ARTHROSCOPY KNEE      2012,2nd as well at age 18      SECTION N/A 2018    Procedure:  SECTION;  External version attemted prior to Section;  Surgeon: Naye Johns MD;  Location:  OR      SECTION N/A 2020    Procedure:  SECTION;  Surgeon: Naye Johns MD;  Location:  OR     LAPAROSCOPY DIAGNOSTIC (GYN) N/A 2019    Procedure: LAPAROSCOPY DIAGNOSTIC;  Surgeon: Naye Johns MD;  Location: [facility] OR     OTHER SURGICAL HISTORY      1999,QFG481,BLADDER SURGERY     OTHER SURGICAL HISTORY      2017,62206.0,[place] REMOVE FOREIGN BODY SIMPLE     Social History     Tobacco Use     Smoking status: Never Smoker     Smokeless tobacco: Never Used   Substance Use Topics     Alcohol use: Yes     Alcohol/week: 0.0 standard drinks     Frequency: 2-4 times a month     Drinks per session: 1 or 2     Comment: rare     Current Outpatient  "Medications   Medication Sig Dispense Refill     acetaminophen (TYLENOL) 500 MG tablet Take 500-1,000 mg by mouth every 6 hours as needed for mild pain       amphetamine-dextroamphetamine (ADDERALL) 20 MG tablet Take 40 mg by mouth 2 times daily       FLUoxetine (PROZAC) 40 MG capsule Take 1 capsule (40 mg) by mouth daily 90 capsule 1     HEMP OIL OR EXTRACT OR OTHER CBD CANNABINOID, NOT MEDICAL CANNABIS,        ibuprofen (ADVIL/MOTRIN) 200 MG tablet Take 3 tablets (600 mg) by mouth every 6 hours 100 tablet 0     lanolin ointment Apply topically every hour as needed for dry skin (soreness) 7 g 0     levonorgestrel (MIRENA) 20 MCG/24HR IUD 1 each (20 mcg) by Intrauterine route once for 1 dose 1 each 0     LORazepam (ATIVAN) 1 MG tablet Take 1 mg by mouth every 6 hours as needed for anxiety       mupirocin (BACTROBAN) 2 % external ointment Apply topically 3 times daily 30 g 2     norgestimate-ethinyl estradiol (ORTHO-CYCLEN) 0.25-35 MG-MCG tablet Take 1 tablet by mouth daily 28 tablet 0     polyethylene glycol (MIRALAX/GLYCOLAX) powder Take 17 g (1 capful) by mouth daily 500 g 3     Prenatal Vit-Fe Fumarate-FA (PRENATAL VITAMINS) 28-0.8 MG TABS Take 1 tablet by mouth daily        Allergies   Allergen Reactions     Penicillins Swelling     Throat swelling and hives      Phenergan Dm [Promethazine-Dm] Other (See Comments)     Hallucinations and muscle twitching         Past medical history, past surgical history, current medications and allergies reviewed and accurate to the best of my knowledge.        ROS:  Refer to HPI    /72   Pulse 75   Temp 98.5  F (36.9  C) (Tympanic)   Resp 18   Ht 1.753 m (5' 9\")   Wt 98.2 kg (216 lb 6.4 oz)   SpO2 97%   BMI 31.96 kg/m      EXAM:  General Appearance: Well appearing female, appropriate appearance for age. No acute distress   Musculoskeletal:  Equal movement of bilateral upper extremities.  Equal movement of bilateral lower extremities.  Normal gait.  "   Dermatological: No erythema, swelling or discharge noted on exam. Small brown/black area that is flush with the surrounding skin on the plantar surface of the left foot where the wart was located previously.  Psychological: normal affect, alert, oriented, and pleasant.             ASSESSMENT/PLAN:  1. Plantar wart of left foot    The patient was able to show a picture the plantar wart before the callused area was removed and it was treated with liquid nitrogen. Comparing this picture to the patient's foot today, it appears to be healing appropriately. There are no concerns for infection or re-treatment at this time.      Discussed with the patient that she will likely experience some pain of the surrounding skin. Advised her to try applying an OTC wart cushion bandage or placing a cushioned insert into her shoes.     Instructed the patient to continue to wash her foot using soap and water and applying the antibiotic ointment.     A work note was written on behalf of the patient to give her one additional day to rest her foot.       May use over-the-counter Tylenol or ibuprofen PRN    Discussed warning signs/symptoms indicative of need to f/u    Follow up if symptoms persist or worsen or concerns      I explained my diagnostic considerations and recommendations to the patient, who voiced understanding and agreement with the treatment plan. All questions were answered. We discussed potential side effects of any prescribed or recommended therapies, as well as expectations for response to treatments.    Disclaimer:  This note consists of words and symbols derived from keyboarding, dictation, or using voice recognition software. As a result, there may be errors in the script that have gone undetected. Please consider this when interpreting information found in this note.

## 2021-02-24 NOTE — PATIENT INSTRUCTIONS
Please try applying heat or ice.     Take tylenol and ibuprofen PRN     Try using a wart padding or some type of padding to cushion the bottom of your foot.     Continue to wash the foot twice daily and apply ointment.

## 2021-02-24 NOTE — LETTER
February 24, 2021        Nury Gilbert     [facility] [id number]    To Whom it May Concern:    Nury Gilbert was seen in our office on 2/24/2021 and was given the following instructions:  Patient may return to work on 2/26/21.    Sincerely,          Keturah Connell NP ..................2/24/2021 1:44 PM

## 2021-02-24 NOTE — NURSING NOTE
"Chief Complaint   Patient presents with     Derm Problem     left foot     Patient is here for pain in the left foot. Patient had a wart removed on Sunday. Patient has been soaking her foot twice a day and taking Tylenol and Ibuprofen with some relief.     Initial /72   Pulse 75   Temp 98.5  F (36.9  C) (Tympanic)   Resp 18   Ht 1.753 m (5' 9\")   Wt 98.2 kg (216 lb 6.4 oz)   SpO2 97%   BMI 31.96 kg/m   Estimated body mass index is 31.96 kg/m  as calculated from the following:    Height as of this encounter: 1.753 m (5' 9\").    Weight as of this encounter: 98.2 kg (216 lb 6.4 oz).  Medication Reconciliation: complete    Joaquina Crisostomo LPN  "

## 2021-03-22 ENCOUNTER — OFFICE VISIT (OUTPATIENT)
Dept: FAMILY MEDICINE | Facility: OTHER | Age: 26
End: 2021-03-22
Attending: NURSE PRACTITIONER
Payer: COMMERCIAL

## 2021-03-22 VITALS
WEIGHT: 216.2 LBS | TEMPERATURE: 98.4 F | DIASTOLIC BLOOD PRESSURE: 72 MMHG | SYSTOLIC BLOOD PRESSURE: 104 MMHG | OXYGEN SATURATION: 98 % | HEART RATE: 73 BPM | BODY MASS INDEX: 31.93 KG/M2 | RESPIRATION RATE: 18 BRPM

## 2021-03-22 DIAGNOSIS — B07.0 PLANTAR WARTS: Primary | ICD-10-CM

## 2021-03-22 PROCEDURE — G0463 HOSPITAL OUTPT CLINIC VISIT: HCPCS

## 2021-03-22 PROCEDURE — 17110 DESTRUCTION B9 LES UP TO 14: CPT | Performed by: NURSE PRACTITIONER

## 2021-03-22 ASSESSMENT — PAIN SCALES - GENERAL: PAINLEVEL: MILD PAIN (3)

## 2021-03-22 NOTE — NURSING NOTE
"Chief Complaint   Patient presents with     Derm Problem     warts     Has 2 warts one on left foot one on right big toe.     Initial There were no vitals taken for this visit. Estimated body mass index is 31.96 kg/m  as calculated from the following:    Height as of 2/24/21: 1.753 m (5' 9\").    Weight as of 2/24/21: 98.2 kg (216 lb 6.4 oz).         Medication Reconciliation: Complete      Chandrakant Dixon LPN   "

## 2021-03-22 NOTE — PROGRESS NOTES
HPI:    Nury Gilbert is a 26 year old female  who presents to [facility] today for warts.    Patient was seen a month ago on 21 for plantar warts on her left plantar foot which were treated in clinic with liquid nitrogen.  She returns today as instructed for repeat treatment.  She states she has also now developed the start of a small plantar wart on her right great toe as well.  She denies any complications after the previous treatment other than some pain.        Past Medical History:   Diagnosis Date     Anxiety disorder     No Comments Provided     Attention-deficit hyperactivity disorder     No Comments Provided     Depressive disorder      Superficial foreign body of finger     2017     Past Surgical History:   Procedure Laterality Date     ADENOIDECTOMY      No Comments Provided     APPENDECTOMY OPEN      2006     ARTHROSCOPY KNEE      2012,2nd as well at age 18      SECTION N/A 2018    Procedure:  SECTION;  External version attemted prior to Section;  Surgeon: Naye Johns MD;  Location: [facility] OR      SECTION N/A 2020    Procedure:  SECTION;  Surgeon: Naye Johns MD;  Location: [facility] OR     LAPAROSCOPY DIAGNOSTIC (GYN) N/A 2019    Procedure: LAPAROSCOPY DIAGNOSTIC;  Surgeon: Naye Johns MD;  Location: [facility] OR     OTHER SURGICAL HISTORY      1999,RHA304,BLADDER SURGERY     OTHER SURGICAL HISTORY      2017,52416.0,[place] REMOVE FOREIGN BODY SIMPLE     Social History     Tobacco Use     Smoking status: Never Smoker     Smokeless tobacco: Never Used   Substance Use Topics     Alcohol use: Yes     Alcohol/week: 0.0 standard drinks     Frequency: 2-4 times a month     Drinks per session: 1 or 2     Comment: rare     Current Outpatient Medications   Medication Sig Dispense Refill     acetaminophen (TYLENOL) 500 MG tablet Take 500-1,000 mg by mouth every 6 hours as needed for mild pain       amphetamine-dextroamphetamine (ADDERALL)  20 MG tablet Take 40 mg by mouth 2 times daily       FLUoxetine (PROZAC) 40 MG capsule Take 1 capsule (40 mg) by mouth daily 90 capsule 1     HEMP OIL OR EXTRACT OR OTHER CBD CANNABINOID, NOT MEDICAL CANNABIS,        ibuprofen (ADVIL/MOTRIN) 200 MG tablet Take 3 tablets (600 mg) by mouth every 6 hours 100 tablet 0     levonorgestrel (MIRENA) 20 MCG/24HR IUD 1 each (20 mcg) by Intrauterine route once for 1 dose 1 each 0     LORazepam (ATIVAN) 1 MG tablet Take 1 mg by mouth every 6 hours as needed for anxiety       Prenatal Vit-Fe Fumarate-FA (PRENATAL VITAMINS) 28-0.8 MG TABS Take 1 tablet by mouth daily        Allergies   Allergen Reactions     Penicillins Swelling     Throat swelling and hives      Phenergan Dm [Promethazine-Dm] Other (See Comments)     Hallucinations and muscle twitching         Past medical history, past surgical history, current medications and allergies reviewed and accurate to the best of my knowledge.        ROS:  Refer to HPI    /72   Pulse 73   Temp 98.4  F (36.9  C) (Tympanic)   Resp 18   Wt 98.1 kg (216 lb 3.2 oz)   SpO2 98%   BMI 31.93 kg/m      EXAM:  General Appearance: Well appearing adult female, appropriate appearance for age. No acute distress  Musculoskeletal:  Equal movement of bilateral upper extremities.  Equal movement of bilateral lower extremities.  Normal gait.    Dermatological: left plantar foot with 0.5 cm round firm plantar wart with surrounding calloused skin present in the area of the base of the 1st and 2nd metatarsal, this has decreased in size by over half; no surrounding erythema.  Right great toe plantar side mid toe with tiny hard raised plantar wart without surrounding erythema.  Psychological: normal affect, alert, oriented, and pleasant.     Verbal consent received to perform cryotherapy to left plantar warts and right great toe.  Risks and benefits discussed.  Warts and surrounding callused macerated skin were pared down with a #10 blade.  Warts  were then frozen with liquid nitrogen 3 times until wart and immediate surrounding area turned white x3.          ASSESSMENT/PLAN:    I have reviewed the nursing notes.  I have reviewed the findings, diagnosis, plan and need for follow up with the patient.    1. Plantar warts    - DESTRUCT BENIGN LESION, UP TO 14    Warts were pared down with a #15 blade.  Warts were then frozen with liquid nitrogen 3 times until wart and immediate surrounding area turned white x3.    Return after 3 weeks if repeat treatment is needed      I explained my diagnostic considerations and recommendations to the patient, who voiced understanding and agreement with the treatment plan. All questions were answered. We discussed potential side effects of any prescribed or recommended therapies, as well as expectations for response to treatments.    Disclaimer:  This note consists of words and symbols derived from keyboarding, dictation, or using voice recognition software. As a result, there may be errors in the script that have gone undetected. Please consider this when interpreting information found in this note.

## 2021-03-30 NOTE — TELEPHONE ENCOUNTER
Refill request received for Prozac. Per visit in September 2020 when medication was prescribed, patient to reassess symptoms in 6 months. Call placed to patient and she reports she has weaned herself down to 20 mg daily and this is working well at this time. She would like to stay on this dose. She was advised that a limited fill will be requested from Dr. Naye Johns MD and she should follow up with her primary for further refills. Patient verbalized understanding and agreement.    Michelle Ovalle RN...................3/30/2021 8:35 AM

## 2021-03-30 NOTE — TELEPHONE ENCOUNTER
Refill given for another 90 days. At that point, she will be 1 year postpartum and should be seen for mood follow up with her PCP  Naye Johns MD  OB/GYN  3/30/2021 10:17 AM

## 2021-04-18 ENCOUNTER — HEALTH MAINTENANCE LETTER (OUTPATIENT)
Age: 26
End: 2021-04-18

## 2021-05-03 ENCOUNTER — HOSPITAL ENCOUNTER (OUTPATIENT)
Dept: GENERAL RADIOLOGY | Facility: OTHER | Age: 26
Discharge: HOME | End: 2021-05-03
Attending: CHIROPRACTOR | Admitting: FAMILY MEDICINE
Payer: COMMERCIAL

## 2021-05-03 DIAGNOSIS — M54.9 BACK PAIN: ICD-10-CM

## 2021-05-03 PROCEDURE — 72100 X-RAY EXAM L-S SPINE 2/3 VWS: CPT

## 2021-05-12 ENCOUNTER — OFFICE VISIT (OUTPATIENT)
Dept: FAMILY MEDICINE | Facility: OTHER | Age: 26
End: 2021-05-12
Attending: NURSE PRACTITIONER
Payer: COMMERCIAL

## 2021-05-12 VITALS
BODY MASS INDEX: 31.73 KG/M2 | WEIGHT: 221.6 LBS | DIASTOLIC BLOOD PRESSURE: 88 MMHG | OXYGEN SATURATION: 97 % | SYSTOLIC BLOOD PRESSURE: 136 MMHG | TEMPERATURE: 98.2 F | RESPIRATION RATE: 18 BRPM | HEIGHT: 70 IN | HEART RATE: 76 BPM

## 2021-05-12 DIAGNOSIS — M54.50 ACUTE BILATERAL LOW BACK PAIN WITHOUT SCIATICA: Primary | ICD-10-CM

## 2021-05-12 PROCEDURE — 96372 THER/PROPH/DIAG INJ SC/IM: CPT | Performed by: NURSE PRACTITIONER

## 2021-05-12 PROCEDURE — 99213 OFFICE O/P EST LOW 20 MIN: CPT | Performed by: NURSE PRACTITIONER

## 2021-05-12 PROCEDURE — G0463 HOSPITAL OUTPT CLINIC VISIT: HCPCS | Mod: 25 | Performed by: NURSE PRACTITIONER

## 2021-05-12 PROCEDURE — 250N000011 HC RX IP 250 OP 636: Performed by: NURSE PRACTITIONER

## 2021-05-12 RX ORDER — KETOROLAC TROMETHAMINE 30 MG/ML
30 INJECTION, SOLUTION INTRAMUSCULAR; INTRAVENOUS ONCE
Status: COMPLETED | OUTPATIENT
Start: 2021-05-12 | End: 2021-05-12

## 2021-05-12 RX ADMIN — KETOROLAC TROMETHAMINE 30 MG: 30 INJECTION, SOLUTION INTRAMUSCULAR; INTRAVENOUS at 12:48

## 2021-05-12 ASSESSMENT — MIFFLIN-ST. JEOR: SCORE: 1830.42

## 2021-05-12 ASSESSMENT — PAIN SCALES - GENERAL: PAINLEVEL: EXTREME PAIN (8)

## 2021-05-12 NOTE — PROGRESS NOTES
ASSESSMENT/PLAN:  1. Acute bilateral low back pain without sciatica    - ketorolac (TORADOL) injection 30 mg  - PHYSICAL THERAPY REFERRAL    X-ray results of the lumbar spine from 5/3/2021 were reviewed today.    Discussed with the patient that based on her symptoms and physical exam findings she was administered 30 mg Toradol IM injection.     A referral for PT was ordered.     The patient was instructed to continue with alternating tylenol and ibuprofen. She was also instructed to apply ice/heat to her lower back.     A follow up appointment was scheduled with her PCP on 5/17/21.     Discussed warning signs/symptoms indicative of need to f/u    Follow up if symptoms persist or worsen or concerns      I explained my diagnostic considerations and recommendations to the patient, who voiced understanding and agreement with the treatment plan. All questions were answered. We discussed potential side effects of any prescribed or recommended therapies, as well as expectations for response to treatments.    Disclaimer:  This note consists of words and symbols derived from keyboarding, dictation, or using voice recognition software. As a result, there may be errors in the script that have gone undetected. Please consider this when interpreting information found in this note.    HPI:    Nury Gilbert is a 26 year old female  who presents to [facility] today for lower back pain. Started in early April. Rating her pain 8/10. The pain is worse she states that usually when she is sitting or laying this causes more pain. The pain improves when she has been up and moving. She has been seeing a chiropractor regarding her low back pain. Applying heat/ice, and taking tylenol and ibuprofen. She has been taking medical marijuana. She feels like her pain has been worsening. She states that she has little kids and believes that it could have started with carrying them. Chiropractor had ordered an xray last week, which appeared  unremarkable. No injury to lower back.  Denies bladder or bowel incontinence. Some intermittent tingling down bilateral legs when standing for too long.  Denies any numbness to bilateral lower extremities.       Past Medical History:   Diagnosis Date     Anxiety disorder     No Comments Provided     Attention-deficit hyperactivity disorder     No Comments Provided     Depressive disorder      Superficial foreign body of finger     2017     Past Surgical History:   Procedure Laterality Date     ADENOIDECTOMY      No Comments Provided     APPENDECTOMY OPEN      2006     ARTHROSCOPY KNEE      2012,2nd as well at age 18      SECTION N/A 2018    Procedure:  SECTION;  External version attemted prior to Section;  Surgeon: Naye Johns MD;  Location: [facility] OR      SECTION N/A 2020    Procedure:  SECTION;  Surgeon: Naye Johns MD;  Location: [facility] OR     LAPAROSCOPY DIAGNOSTIC (GYN) N/A 2019    Procedure: LAPAROSCOPY DIAGNOSTIC;  Surgeon: Naye Johns MD;  Location: [facility] OR     OTHER SURGICAL HISTORY      1999,[id number],BLADDER SURGERY     OTHER SURGICAL HISTORY      2017,77374.0,[place] REMOVE FOREIGN BODY SIMPLE     Social History     Tobacco Use     Smoking status: Never Smoker     Smokeless tobacco: Never Used   Substance Use Topics     Alcohol use: Yes     Alcohol/week: 0.0 standard drinks     Frequency: 2-4 times a month     Drinks per session: 1 or 2     Comment: rare     Current Outpatient Medications   Medication Sig Dispense Refill     acetaminophen (TYLENOL) 500 MG tablet Take 500-1,000 mg by mouth every 6 hours as needed for mild pain       amphetamine-dextroamphetamine (ADDERALL) 20 MG tablet Take 40 mg by mouth 2 times daily       FLUoxetine (PROZAC) 20 MG capsule Take 1 capsule (20 mg) by mouth daily 90 capsule 0     HEMP OIL OR EXTRACT OR OTHER CBD CANNABINOID, NOT MEDICAL CANNABIS,        ibuprofen (ADVIL/MOTRIN) 200 MG tablet Take 3 tablets  "(600 mg) by mouth every 6 hours 100 tablet 0     levonorgestrel (MIRENA) 20 MCG/24HR IUD 1 each (20 mcg) by Intrauterine route once for 1 dose 1 each 0     LORazepam (ATIVAN) 1 MG tablet Take 1 mg by mouth every 6 hours as needed for anxiety       Prenatal Vit-Fe Fumarate-FA (PRENATAL VITAMINS) 28-0.8 MG TABS Take 1 tablet by mouth daily        Allergies   Allergen Reactions     Penicillins Swelling     Throat swelling and hives      Phenergan Dm [Promethazine-Dm] Other (See Comments)     Hallucinations and muscle twitching         Past medical history, past surgical history, current medications and allergies reviewed and accurate to the best of my knowledge.        ROS:  Refer to HPI    /88   Pulse 76   Temp 98.2  F (36.8  C) (Tympanic)   Resp 18   Ht 1.778 m (5' 10\")   Wt 100.5 kg (221 lb 9.6 oz)   SpO2 97%   BMI 31.80 kg/m      EXAM:  General Appearance: Well appearing female, appropriate appearance for age. No acute distress  Musculoskeletal:  Equal movement of bilateral upper extremities.  Equal movement of bilateral lower extremities.  Negative straight leg lift.  Patient expresses pain with flexion of the back.  Able to complete full extension of her back.  Normal gait.    Dermatological: No erythema, ecchymosis or swelling noted to her lower back.  Psychological: normal affect, alert, oriented, and pleasant.         "

## 2021-05-12 NOTE — NURSING NOTE
"Chief Complaint   Patient presents with     Musculoskeletal Problem     lower back   patient is here for pain in her lower back that started early April. Patient states she does not know of any injury. Patient has tried icyhot, warm baths, Tylenol, ibuprofen, medical marijuana and the chiropractor with no relief of pain.     Initial /88   Pulse 76   Temp 98.2  F (36.8  C) (Tympanic)   Resp 18   Ht 1.778 m (5' 10\")   Wt 100.5 kg (221 lb 9.6 oz)   SpO2 97%   BMI 31.80 kg/m   Estimated body mass index is 31.8 kg/m  as calculated from the following:    Height as of this encounter: 1.778 m (5' 10\").    Weight as of this encounter: 100.5 kg (221 lb 9.6 oz).  Medication Reconciliation: complete    Joaquina Crisostomo, STUART  "

## 2021-05-12 NOTE — PATIENT INSTRUCTIONS
Patient Education     Back Care Tips     Caring for your back  These are things you can do to prevent a recurrence of acute back pain and to reduce symptoms from chronic back pain:    Stay at a healthy weight. If you are overweight, losing weight will help most types of back pain.    Exercise is an important part of recovery from most types of back pain. The muscles behind and in front of the spine support the back. This means strengthening both the back muscles and the abdominal muscles will provide better support for your spine.     Swimming and brisk walking are good overall exercises to improve your fitness level.    Practice safe lifting methods (see below).    Practice good posture when sitting, standing, and walking. Don't sit for a long time. This puts more stress on the lower back than standing or walking.    Wear quality shoes with good arch support. Foot and ankle alignment can affect back symptoms. Don't wear high heels.    Therapeutic massage can help relax the back muscles without stretching them.    During the first 24 to 72 hours after an acute injury or flare-up of chronic back pain, put an ice pack on the painful area for 20 minutes and then remove it for 20 minutes. Do thisover a period of 60 to 90 minutes, or several times a day. As a safety precaution, don't use a heating pad at bedtime. Sleeping on a heating pad can lead to skin burns or tissue damage.    You can alternate using ice and heat.  Medicines  Talk with your healthcare provider before using medicines, especially if you have other health problems or are taking other medicines.    You may use over-the-counter medicines, such as acetaminophen, ibuprofen, or naprosyn to control pain, unless your healthcare provider prescribed other pain medicine. Talk with your healthcare provider before taking any medicines if you have a chronic condition such as diabetes, liver or kidney disease, stomach ulcers, or digestive bleeding, or are taking  blood thinners.    Be careful if you are given prescription pain medicines, opioids, or medicine for muscle spasm. They can cause drowsiness, and affect your coordination, reflexes, and judgment. Don't drive or operate heavy machinery while taking these types of medicines. Take prescription pain medicine only as prescribed by your healthcare provider.  Lumbar stretch  This simple stretch will help relax muscle spasm and keep your back more limber. If exercise makes your back pain worse, don t do it.    Lie on your back with your knees bent and both feet on the ground.    Slowly raise your left knee to your chest as you flatten your lower back against the floor. Hold for 5 seconds.    Relax and repeat the exercise with your right knee.    Do 10 of these exercises for each leg.  Safe lifting method    Don t bend over at the waist to lift an object off the floor.  Instead, bend your knees and hips in a squat.     Keep your back and head upright    Hold the object close to your body, directly in front of you.    Straighten your legs to lift the object.     Lower the object to the floor in the reverse fashion.    If you must slide something across the floor, push it.    Posture tips  Sitting  Sit in chairs with straight backs or low-back support. Keep your knees lower than your hips, with your feet flat on the floor.  When driving, sit up straight. Adjust the seat forward so you are not leaning toward the steering wheel.  A small pillow or rolled towel behind your lower back may help if you are driving long distances.   Standing  When standing for long periods, shift most of your weight to one leg at a time. Switch legs every few minutes.   Sleeping  The best way to sleep is on your side with your knees bent. Put a low pillow under your head to support your neck in a neutral spine position. Don't use thick pillows that bend your neck to one side. Put a pillow between your legs to further relax your lower back. If you  sleep on your back, put pillows under your knees to support your legs in a slightly flexed position. Use a firm mattress. If your mattress sags, replace it, or use a 1/2-inch plywood board under the mattress to add support.  Follow-up care  Follow up with your healthcare provider, or as advised.  If X-rays, a CT scan or an MRI scan were taken, they may be reviewed by a radiologist. You will be told of any new findings that may affect your care.  Call 911  Call 911 if any of the following occur:    Trouble breathing    Confusion    Very drowsy    Fainting or loss of consciousness    Rapid or very slow heart rate    Loss of  bowel or bladder control  When to seek medical advice  Call your healthcare provider right away if any of the following occur:    Pain becomes worse or spreads to your arms or legs    Weakness or numbness in one or both arms or legs    Numbness in the groin area  [employer] last reviewed this educational content on 11/1/2019 2000-2021 [employer]. All rights reserved. This information is not intended as a substitute for professional medical care. Always follow your healthcare professional's instructions.           Patient Education     Exercises to Strengthen Your Lower Back  Strong lower back and abdominal muscles work together to support your spine. The exercises below will help strengthen the lower back. It's important that you start exercising slowly and increase levels gradually.   Always start any exercise program with stretching. If you feel pain while doing any of these exercises, stop and talk to your doctor about a more specific exercise program that better suits your condition.    Low back stretch  The point of stretching is to make you more flexible and increase your range of motion. Stretch only as much as you are able. Stretch slowly. Don't push your stretch to the limit. If at any point you feel pain while stretching, this is your (temporary) limit.     Lie on your  back with your knees bent and both feet on the ground.    Slowly raise your left knee to your chest as you flatten your lower back against the floor. Hold for 5 seconds.    Relax and repeat the exercise with your right knee.    Do 10 of these exercises for each leg.    Repeat hugging both knees to your chest at the same time.  Building lower back strength  Start your exercise routine with 10 to 30 minutes a day, 1 to 3 times a day.  Initial exercises  Lying on your back:  1. Ankle pumps. Move your foot up and down, towards your head, and then away. Repeat 10 times with each foot.  2. Heel slides. Slowly bend your knee, drawing the heel of your foot towards you. Then slide your heel/foot from you, straightening your knee. Don't lift your foot off the floor (this is not a leg lift).  3. Abdominal contraction. Bend your knees and put your hands on your stomach. Tighten your stomach muscles. Hold for 5 seconds, then relax. Repeat 10 times.  4. Straight leg raise. Bend one leg at the knee and keep the other leg straight. Tighten your stomach muscles. Slowly lift your straight leg 6 to 12 inches off the floor and hold for up to 5 seconds. Repeat 10 times on each side.  Standin. Wall squats. Stand with your back against the wall. Move your feet about 12 inches away from the wall. Tighten your stomach muscles, and slowly bend your knees until they are at about a 45 degree angle. Don't go down too far. Hold about 5 seconds. Then slowly return to your starting position. Repeat 10 times.  2. Heel raises. Stand facing the wall. Slowly raise the heels of your feet up and down, while keeping your toes on the floor. If you have trouble balancing, you can touch the wall with your hands. Repeat 10 times.  More advanced exercises  When you feel comfortable enough, try these exercises.  1. Kneeling lumbar extension. Start on your hands and knees. At the same time, raise and straighten your right arm and left leg until they are  parallel to the ground. Hold for 2 seconds and come back slowly to a starting position. Repeat with left arm and right leg, alternating 10 times.  2. Prone lumbar extension. Lie face down, arms extended overhead, palms on the floor. At the same time, raise your right arm and left leg as high as comfortably possible. Hold for 10 seconds and slowly return to start. Repeat with left arm and right leg, alternating 10 times. Gradually build up to 20 times. (Advanced: Repeat this exercise raising both arms and both legs a few inches off the floor at the same time. Hold for 5 seconds and release.)  3. Pelvic tilt. Lie on the floor on your back with your knees bent at 90 degrees. Your feet should be flat on the floor. Inhale, exhale, then slowly contract your abdominal muscles bringing your navel toward your spine. Let your pelvis rock back until your lower back is flat on the floor. Hold for 10 seconds while breathing smoothly.  4. Abdominal crunch. Perform a pelvic tilt (above) flattening your lower back against the floor. Holding the tension in your abdominal muscles, take another breath and raise your shoulder blades off the ground (this is not a full sit-up). Keep your head in line with your body (don t bend your neck forward). Hold for 2 seconds, then slowly lower.  [employer] last reviewed this educational content on 8/1/2019 2000-2021 [employer]. All rights reserved. This information is not intended as a substitute for professional medical care. Always follow your healthcare professional's instructions.

## 2021-05-17 ENCOUNTER — OFFICE VISIT (OUTPATIENT)
Dept: FAMILY MEDICINE | Facility: OTHER | Age: 26
End: 2021-05-17
Attending: FAMILY MEDICINE
Payer: COMMERCIAL

## 2021-05-17 VITALS
HEART RATE: 89 BPM | DIASTOLIC BLOOD PRESSURE: 66 MMHG | RESPIRATION RATE: 18 BRPM | BODY MASS INDEX: 31.31 KG/M2 | TEMPERATURE: 98.3 F | SYSTOLIC BLOOD PRESSURE: 108 MMHG | WEIGHT: 218.2 LBS | OXYGEN SATURATION: 97 %

## 2021-05-17 DIAGNOSIS — F41.8 SITUATIONAL ANXIETY: ICD-10-CM

## 2021-05-17 DIAGNOSIS — F32.9 REACTIVE DEPRESSION: ICD-10-CM

## 2021-05-17 DIAGNOSIS — M54.50 ACUTE BILATERAL LOW BACK PAIN WITHOUT SCIATICA: Primary | ICD-10-CM

## 2021-05-17 PROCEDURE — 99214 OFFICE O/P EST MOD 30 MIN: CPT | Performed by: FAMILY MEDICINE

## 2021-05-17 PROCEDURE — G0463 HOSPITAL OUTPT CLINIC VISIT: HCPCS

## 2021-05-17 RX ORDER — TIZANIDINE 2 MG/1
TABLET ORAL
Qty: 30 TABLET | Refills: 1 | Status: SHIPPED | OUTPATIENT
Start: 2021-05-17 | End: 2021-06-16

## 2021-05-17 ASSESSMENT — ANXIETY QUESTIONNAIRES
GAD7 TOTAL SCORE: 18
3. WORRYING TOO MUCH ABOUT DIFFERENT THINGS: MORE THAN HALF THE DAYS
IF YOU CHECKED OFF ANY PROBLEMS ON THIS QUESTIONNAIRE, HOW DIFFICULT HAVE THESE PROBLEMS MADE IT FOR YOU TO DO YOUR WORK, TAKE CARE OF THINGS AT HOME, OR GET ALONG WITH OTHER PEOPLE: VERY DIFFICULT
6. BECOMING EASILY ANNOYED OR IRRITABLE: MORE THAN HALF THE DAYS
7. FEELING AFRAID AS IF SOMETHING AWFUL MIGHT HAPPEN: NEARLY EVERY DAY
1. FEELING NERVOUS, ANXIOUS, OR ON EDGE: NEARLY EVERY DAY
5. BEING SO RESTLESS THAT IT IS HARD TO SIT STILL: MORE THAN HALF THE DAYS
2. NOT BEING ABLE TO STOP OR CONTROL WORRYING: NEARLY EVERY DAY

## 2021-05-17 ASSESSMENT — PATIENT HEALTH QUESTIONNAIRE - PHQ9
5. POOR APPETITE OR OVEREATING: NEARLY EVERY DAY
SUM OF ALL RESPONSES TO PHQ QUESTIONS 1-9: 13

## 2021-05-17 ASSESSMENT — PAIN SCALES - GENERAL: PAINLEVEL: SEVERE PAIN (7)

## 2021-05-17 NOTE — NURSING NOTE
"Patient here for a follow up from [facility], and chiropractors for low back pain.   Mariann Sharma LPN ..........5/17/2021 11:11 AM   Chief Complaint   Patient presents with     RECHECK     follow up [facility] back pain       Initial /66 (BP Location: Right arm, Patient Position: Sitting, Cuff Size: Adult Regular)   Pulse 89   Temp 98.3  F (36.8  C) (Tympanic)   Resp 18   Wt 99 kg (218 lb 3.2 oz)   LMP  (LMP Unknown)   SpO2 97%   BMI 31.31 kg/m   Estimated body mass index is 31.31 kg/m  as calculated from the following:    Height as of 5/12/21: 1.778 m (5' 10\").    Weight as of this encounter: 99 kg (218 lb 3.2 oz).  Medication Reconciliation: complete    Mariann Sharma LPN    "
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
## 2021-05-18 ASSESSMENT — ANXIETY QUESTIONNAIRES: GAD7 TOTAL SCORE: 18

## 2021-05-26 ENCOUNTER — IMMUNIZATION (OUTPATIENT)
Dept: FAMILY MEDICINE | Facility: OTHER | Age: 26
End: 2021-05-26
Attending: FAMILY MEDICINE
Payer: COMMERCIAL

## 2021-05-26 PROCEDURE — 91300 PR COVID VAC PFIZER DIL RECON 30 MCG/0.3 ML IM: CPT

## 2021-06-15 DIAGNOSIS — M54.50 ACUTE BILATERAL LOW BACK PAIN WITHOUT SCIATICA: ICD-10-CM

## 2021-06-16 ENCOUNTER — IMMUNIZATION (OUTPATIENT)
Dept: FAMILY MEDICINE | Facility: OTHER | Age: 26
End: 2021-06-16
Attending: FAMILY MEDICINE
Payer: COMMERCIAL

## 2021-06-16 PROCEDURE — 91300 PR COVID VAC PFIZER DIL RECON 30 MCG/0.3 ML IM: CPT

## 2021-06-16 RX ORDER — TIZANIDINE 2 MG/1
TABLET ORAL
Qty: 30 TABLET | Refills: 1 | Status: SHIPPED | OUTPATIENT
Start: 2021-06-16 | End: 2021-07-23

## 2021-06-16 NOTE — TELEPHONE ENCOUNTER
Terzea [facility]  sent Rx request for the following:     Requested Prescriptions   Pending Prescriptions Disp Refills     tiZANidine (ZANAFLEX) 2 MG tablet [Pharmacy Med Name: TIZANIDINE 2MG TABLETS] 30 tablet 1     Sig: TAKE 1 TO 2 TABLETS BY MOUTH AT BEDTIME AS NEEDED FOR MUSCLE SPASM     Last Prescription Date:   5/17/2021  Last Fill Qty/Refills:         30, R-1    Last Office Visit:              5/17/2021   Future Office visit:           none    Routing refill request to provider for review/approval because:  Drug not on the [facility], [facility] or [facility] refill protocol or controlled substance        There is no refill protocol information for this order        Unable to complete prescription refill per RN Medication Refill Policy.................... Leah Powell RN ....................  6/16/2021   11:21 AM

## 2021-07-22 DIAGNOSIS — M54.50 ACUTE BILATERAL LOW BACK PAIN WITHOUT SCIATICA: ICD-10-CM

## 2021-07-22 NOTE — TELEPHONE ENCOUNTER
Tereza GR  sent Rx request for the following:     Requested Prescriptions   Pending Prescriptions Disp Refills     tiZANidine (ZANAFLEX) 2 MG tablet [Pharmacy Med Name: TIZANIDINE 2MG TABLETS] 30 tablet 1     Sig: TAKE 1 TO 2 TABLETS BY MOUTH AT BEDTIME AS NEEDED FOR MUSCLE SPASM     Last Prescription Date:   6/16/2021  Last Fill Qty/Refills:         30, R-1    Last Office Visit:              5/17/2021  Future Office visit:           none    Routing refill request to provider for review/approval because:  Drug not on the [facility], [facility] or [facility] refill protocol or controlled substance  Drug not on the [facility] refill protocol       There is no refill protocol information for this order        Unable to complete prescription refill per RN Medication Refill Policy.................... Leah Powell RN ....................  7/22/2021   3:13 PM

## 2021-07-23 RX ORDER — TIZANIDINE 2 MG/1
TABLET ORAL
Qty: 30 TABLET | Refills: 1 | Status: SHIPPED | OUTPATIENT
Start: 2021-07-23 | End: 2021-09-08

## 2021-09-02 DIAGNOSIS — M54.50 ACUTE BILATERAL LOW BACK PAIN WITHOUT SCIATICA: ICD-10-CM

## 2021-09-07 NOTE — TELEPHONE ENCOUNTER
TIZANIDINE 2MG TABLETS   Last Written Prescription Date:  07/23/21  Last Fill Quantity: 30,   # refills: 1  Last Office Visit: 5/17/21  Future Office visit:       Routing refill request to provider for review/approval because:  Drug not on the [facility], [facility] or [facility] refill protocol or controlled substance.   Unable to complete prescription refill per RNMedication Refill Policy.................... Brneda Aggarwal RN ....................  9/7/2021   2:55 PM

## 2021-09-08 RX ORDER — TIZANIDINE 2 MG/1
TABLET ORAL
Qty: 30 TABLET | Refills: 1 | Status: SHIPPED | OUTPATIENT
Start: 2021-09-08 | End: 2021-10-21

## 2021-10-03 ENCOUNTER — HEALTH MAINTENANCE LETTER (OUTPATIENT)
Age: 26
End: 2021-10-03

## 2021-10-21 ENCOUNTER — OFFICE VISIT (OUTPATIENT)
Dept: FAMILY MEDICINE | Facility: OTHER | Age: 26
End: 2021-10-21
Attending: STUDENT IN AN ORGANIZED HEALTH CARE EDUCATION/TRAINING PROGRAM
Payer: COMMERCIAL

## 2021-10-21 VITALS
BODY MASS INDEX: 33.62 KG/M2 | HEART RATE: 80 BPM | WEIGHT: 221.8 LBS | HEIGHT: 68 IN | OXYGEN SATURATION: 97 % | DIASTOLIC BLOOD PRESSURE: 60 MMHG | SYSTOLIC BLOOD PRESSURE: 100 MMHG | RESPIRATION RATE: 16 BRPM | TEMPERATURE: 96.8 F

## 2021-10-21 DIAGNOSIS — L98.9 ECZEMATOUS SKIN LESIONS: ICD-10-CM

## 2021-10-21 DIAGNOSIS — R53.82 CHRONIC FATIGUE: ICD-10-CM

## 2021-10-21 DIAGNOSIS — Z11.59 NEED FOR HEPATITIS C SCREENING TEST: Primary | ICD-10-CM

## 2021-10-21 DIAGNOSIS — M54.50 ACUTE RIGHT-SIDED LOW BACK PAIN WITHOUT SCIATICA: ICD-10-CM

## 2021-10-21 LAB
ALBUMIN UR-MCNC: NEGATIVE MG/DL
APPEARANCE UR: CLEAR
BASOPHILS # BLD AUTO: 0.1 10E3/UL (ref 0–0.2)
BASOPHILS NFR BLD AUTO: 1 %
BILIRUB UR QL STRIP: NEGATIVE
COLOR UR AUTO: YELLOW
EOSINOPHIL # BLD AUTO: 0.5 10E3/UL (ref 0–0.7)
EOSINOPHIL NFR BLD AUTO: 6 %
ERYTHROCYTE [DISTWIDTH] IN BLOOD BY AUTOMATED COUNT: 12.1 % (ref 10–15)
GLUCOSE UR STRIP-MCNC: NEGATIVE MG/DL
HCT VFR BLD AUTO: 42.5 % (ref 35–47)
HGB BLD-MCNC: 14.5 G/DL (ref 11.7–15.7)
HGB UR QL STRIP: NEGATIVE
IMM GRANULOCYTES # BLD: 0 10E3/UL
IMM GRANULOCYTES NFR BLD: 0 %
KETONES UR STRIP-MCNC: NEGATIVE MG/DL
LEUKOCYTE ESTERASE UR QL STRIP: NEGATIVE
LYMPHOCYTES # BLD AUTO: 1.9 10E3/UL (ref 0.8–5.3)
LYMPHOCYTES NFR BLD AUTO: 24 %
MCH RBC QN AUTO: 30.9 PG (ref 26.5–33)
MCHC RBC AUTO-ENTMCNC: 34.1 G/DL (ref 31.5–36.5)
MCV RBC AUTO: 90 FL (ref 78–100)
MONOCYTES # BLD AUTO: 0.6 10E3/UL (ref 0–1.3)
MONOCYTES NFR BLD AUTO: 7 %
NEUTROPHILS # BLD AUTO: 4.8 10E3/UL (ref 1.6–8.3)
NEUTROPHILS NFR BLD AUTO: 62 %
NITRATE UR QL: NEGATIVE
NRBC # BLD AUTO: 0 10E3/UL
NRBC BLD AUTO-RTO: 0 /100
PH UR STRIP: 7 [PH] (ref 5–9)
PLATELET # BLD AUTO: 326 10E3/UL (ref 150–450)
RBC # BLD AUTO: 4.7 10E6/UL (ref 3.8–5.2)
SP GR UR STRIP: 1.02 (ref 1–1.03)
TSH SERPL DL<=0.005 MIU/L-ACNC: 0.64 MU/L (ref 0.4–4)
UROBILINOGEN UR STRIP-MCNC: NORMAL MG/DL
WBC # BLD AUTO: 7.9 10E3/UL (ref 4–11)

## 2021-10-21 PROCEDURE — 81003 URINALYSIS AUTO W/O SCOPE: CPT | Mod: ZL | Performed by: STUDENT IN AN ORGANIZED HEALTH CARE EDUCATION/TRAINING PROGRAM

## 2021-10-21 PROCEDURE — 99214 OFFICE O/P EST MOD 30 MIN: CPT | Performed by: STUDENT IN AN ORGANIZED HEALTH CARE EDUCATION/TRAINING PROGRAM

## 2021-10-21 PROCEDURE — 86803 HEPATITIS C AB TEST: CPT | Mod: ZL | Performed by: STUDENT IN AN ORGANIZED HEALTH CARE EDUCATION/TRAINING PROGRAM

## 2021-10-21 PROCEDURE — G0463 HOSPITAL OUTPT CLINIC VISIT: HCPCS

## 2021-10-21 PROCEDURE — 36415 COLL VENOUS BLD VENIPUNCTURE: CPT | Mod: ZL | Performed by: STUDENT IN AN ORGANIZED HEALTH CARE EDUCATION/TRAINING PROGRAM

## 2021-10-21 PROCEDURE — 85025 COMPLETE CBC W/AUTO DIFF WBC: CPT | Mod: ZL | Performed by: STUDENT IN AN ORGANIZED HEALTH CARE EDUCATION/TRAINING PROGRAM

## 2021-10-21 PROCEDURE — 84443 ASSAY THYROID STIM HORMONE: CPT | Mod: ZL | Performed by: STUDENT IN AN ORGANIZED HEALTH CARE EDUCATION/TRAINING PROGRAM

## 2021-10-21 RX ORDER — TRIAMCINOLONE ACETONIDE 1 MG/G
CREAM TOPICAL 2 TIMES DAILY
Qty: 454 G | Refills: 1 | Status: SHIPPED | OUTPATIENT
Start: 2021-10-21 | End: 2022-12-08

## 2021-10-21 RX ORDER — LISDEXAMFETAMINE DIMESYLATE 40 MG/1
1 CAPSULE ORAL DAILY
COMMUNITY
Start: 2021-09-24 | End: 2022-04-27

## 2021-10-21 ASSESSMENT — ANXIETY QUESTIONNAIRES
4. TROUBLE RELAXING: SEVERAL DAYS
5. BEING SO RESTLESS THAT IT IS HARD TO SIT STILL: SEVERAL DAYS
6. BECOMING EASILY ANNOYED OR IRRITABLE: NEARLY EVERY DAY
3. WORRYING TOO MUCH ABOUT DIFFERENT THINGS: SEVERAL DAYS
1. FEELING NERVOUS, ANXIOUS, OR ON EDGE: MORE THAN HALF THE DAYS
GAD7 TOTAL SCORE: 13
7. FEELING AFRAID AS IF SOMETHING AWFUL MIGHT HAPPEN: NEARLY EVERY DAY
8. IF YOU CHECKED OFF ANY PROBLEMS, HOW DIFFICULT HAVE THESE MADE IT FOR YOU TO DO YOUR WORK, TAKE CARE OF THINGS AT HOME, OR GET ALONG WITH OTHER PEOPLE?: EXTREMELY DIFFICULT
2. NOT BEING ABLE TO STOP OR CONTROL WORRYING: MORE THAN HALF THE DAYS
GAD7 TOTAL SCORE: 13
7. FEELING AFRAID AS IF SOMETHING AWFUL MIGHT HAPPEN: NEARLY EVERY DAY
GAD7 TOTAL SCORE: 13

## 2021-10-21 ASSESSMENT — PAIN SCALES - GENERAL: PAINLEVEL: SEVERE PAIN (7)

## 2021-10-21 ASSESSMENT — PATIENT HEALTH QUESTIONNAIRE - PHQ9
10. IF YOU CHECKED OFF ANY PROBLEMS, HOW DIFFICULT HAVE THESE PROBLEMS MADE IT FOR YOU TO DO YOUR WORK, TAKE CARE OF THINGS AT HOME, OR GET ALONG WITH OTHER PEOPLE: EXTREMELY DIFFICULT
SUM OF ALL RESPONSES TO PHQ QUESTIONS 1-9: 19
SUM OF ALL RESPONSES TO PHQ QUESTIONS 1-9: 19

## 2021-10-21 ASSESSMENT — MIFFLIN-ST. JEOR: SCORE: 1803.55

## 2021-10-21 NOTE — NURSING NOTE
"Chief Complaint   Patient presents with     Physical     Pain low back and ovaries x 1 month    Initial /60   Pulse 80   Temp 96.8  F (36  C) (Temporal)   Resp 16   Ht 1.734 m (5' 8.25\")   Wt 100.6 kg (221 lb 12.8 oz)   SpO2 97%   Breastfeeding No   BMI 33.48 kg/m   Estimated body mass index is 33.48 kg/m  as calculated from the following:    Height as of this encounter: 1.734 m (5' 8.25\").    Weight as of this encounter: 100.6 kg (221 lb 12.8 oz).     FOOD SECURITY SCREENING QUESTIONS  Hunger Vital Signs:  Within the past 12 months we worried whether our food would run out before we got money to buy more. Never  Within the past 12 months the food we bought just didn't last and we didn't have money to get more. Never      Medication Reconciliation: Complete      Norma J. Gosselin, LPN   "

## 2021-10-21 NOTE — PROGRESS NOTES
Assessment & Plan     Need for hepatitis C screening test  Due for screening  - Hepatitis C Screen Reflex to HCV RNA Quant and Genotype; Future      Acute right-sided low back pain without sciatica  No CVA tenderness or urinary symptoms so less likely UTI or stone though will get UA today given her acute symptoms. 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 [facility] review shows a history of low back pain. Also delivered a about 1.5 years ago, so could have on going pelvic misalignment. Discussed supportive cares: tylenol, ibuprofen, heat or ice, topicals, PT and chiropractic cares.   - UA reflex to Microscopic and Culture; Future  - Physical Therapy Referral; Future    Chronic fatigue  Unclear cause at this point. Could be due to busy life vs thyroid disorder. Has a mirena and hasn't had a period so less likely anemia without bleeding. Could be due to mood. Will check thyroid. Depending on lab results will discuss need for further work up and treatments   - TSH Reflex GH; Future    Eczematous skin lesions  Likely eczema vs contact dermatitis from work/washing hands/weather. Discussed restoring the skin barrier with nightly bleach or vinegar baths, steroid cream, and a barrier cream. Using cotton gloves at night after applying steroid cream and barrier cream. Discussed risks of topical steroid use.   - triamcinolone (KENALOG) 0.1 % external cream; Apply topically 2 times daily      Depression Screening Follow Up    PHQ 10/21/2021   PHQ-9 Total Score 19   Q9: Thoughts of better off dead/self-harm past 2 weeks Several days   F/U: Thoughts of suicide or self-harm No   F/U: Self harm-plan -   F/U: Safety concerns No       Follow up pending lab results        Theodore Valle MD  [facility]    Subjective     Nury Gilbert is a 26 year old female who presents to clinic today for the following health issues accompanied by her self:    HPI  "      Right flank pain  - 1 month of right flank pain.   - feel deep in the flank area  - has pelvic congestion syndrome that feels like having weighted belt around waist--this flank pain feels different   - feels deep   - no history of kidney stones, no recent UTI  - no back strain or injury, does work at a restaurant and on feet/active at work  - no skin changes in that area  - tylenol sometimes helps, tries a heating pad  - comes and goes, no known pattern or trigger  - at least everyday having this pain, seemed to come on suddenly   - no diarrhea constipation--has regular soft BMs  - no blood in urine, no change in urinary symptoms     Tiredness/fatigue/family history of thyroid disease  - has had on going fatigue and tiredness  - is a mother to two little kids, these fatigue feels more than normal tiredness  - has tried to make lifestyle changes, diet, exercise but still feels tired and has gained weight   - family history of thyroid disease      Cracked skin on fingers  - works at a restaurant  - peeling cracked skin on tips of fingers to under finger nail bed.   - work in the winter  - has tried a skin shielding lotion, using finger rubber covers as able at work       Review of Systems   Noted in HPI       Objective    /60   Pulse 80   Temp 96.8  F (36  C) (Temporal)   Resp 16   Ht 1.734 m (5' 8.25\")   Wt 100.6 kg (221 lb 12.8 oz)   SpO2 97%   Breastfeeding No   BMI 33.48 kg/m    Body mass index is 33.48 kg/m .  Physical Exam   GENERAL: healthy, alert and no distress  EYES: Eyes grossly normal to inspection, PERRL and conjunctivae and sclerae normal  HENT: ear canals and TM's normal, nose and mouth without ulcers or lesions  NECK: no adenopathy, no asymmetry, masses, or scars and thyroid normal to palpation  RESP: lungs clear to auscultation - no rales, rhonchi or wheezes  CV: regular rate and rhythm, normal S1 S2, no S3 or S4, no murmur, click or rub, no peripheral edema and peripheral pulses " strong  MS: no gross musculoskeletal defects noted, no edema  SKIN: dry, thickened, cracked skin without surrounding erythema on bilateral fingertips towards nail  NEURO: Normal strength and tone, mentation intact and speech normal  PSYCH: normal mentation, affect normal  BACK: no CVA tenderness. Tenderness to palpation right paraspinal muscle near L3/L4 level. Negative straight leg raise test. Bilateral medial malleolus in alignment     Results for orders placed or performed in visit on 10/21/21 (from the past 24 hour(s))   CBC and Differential    Narrative    The following orders were created for panel order CBC and Differential.  Procedure                               Abnormality         Status                     ---------                               -----------         ------                     CBC with platelets and d...[[id number]]                                                   Please view results for these tests on the individual orders.

## 2021-10-21 NOTE — PATIENT INSTRUCTIONS
To restore the skin barrier  At night:   1. Nightly soaks in warm water with either plain bleach or vinegar  2. Dry hands (pat dry)  3. Apply steroid  and let soak in  4. Apply Vaseline or thick lotion and wear cotton gloves overnight     During the day:   1. Apply steroid   2. Apply either Vaseline or thick lotion      Only use steroid cream twice a day for 14 days, take a break and re-use as needed

## 2021-10-22 LAB — HCV AB SERPL QL IA: NONREACTIVE

## 2021-10-22 ASSESSMENT — ANXIETY QUESTIONNAIRES: GAD7 TOTAL SCORE: 13

## 2021-11-07 ENCOUNTER — OFFICE VISIT (OUTPATIENT)
Dept: FAMILY MEDICINE | Facility: OTHER | Age: 26
End: 2021-11-07
Payer: COMMERCIAL

## 2021-11-07 VITALS
DIASTOLIC BLOOD PRESSURE: 82 MMHG | HEART RATE: 74 BPM | TEMPERATURE: 98.7 F | OXYGEN SATURATION: 97 % | RESPIRATION RATE: 14 BRPM | SYSTOLIC BLOOD PRESSURE: 130 MMHG | HEIGHT: 68 IN | BODY MASS INDEX: 33.8 KG/M2 | WEIGHT: 223 LBS

## 2021-11-07 DIAGNOSIS — J02.9 SORE THROAT: Primary | ICD-10-CM

## 2021-11-07 DIAGNOSIS — R05.9 COUGH: ICD-10-CM

## 2021-11-07 LAB — GROUP A STREP BY PCR: NOT DETECTED

## 2021-11-07 PROCEDURE — 87651 STREP A DNA AMP PROBE: CPT | Mod: ZL | Performed by: NURSE PRACTITIONER

## 2021-11-07 PROCEDURE — C9803 HOPD COVID-19 SPEC COLLECT: HCPCS | Performed by: NURSE PRACTITIONER

## 2021-11-07 PROCEDURE — 99213 OFFICE O/P EST LOW 20 MIN: CPT | Performed by: NURSE PRACTITIONER

## 2021-11-07 PROCEDURE — G0463 HOSPITAL OUTPT CLINIC VISIT: HCPCS

## 2021-11-07 PROCEDURE — U0003 INFECTIOUS AGENT DETECTION BY NUCLEIC ACID (DNA OR RNA); SEVERE ACUTE RESPIRATORY SYNDROME CORONAVIRUS 2 (SARS-COV-2) (CORONAVIRUS DISEASE [COVID-19]), AMPLIFIED PROBE TECHNIQUE, MAKING USE OF HIGH THROUGHPUT TECHNOLOGIES AS DESCRIBED BY CMS-2020-01-R: HCPCS | Mod: ZL | Performed by: NURSE PRACTITIONER

## 2021-11-07 ASSESSMENT — PAIN SCALES - GENERAL: PAINLEVEL: MODERATE PAIN (5)

## 2021-11-07 ASSESSMENT — MIFFLIN-ST. JEOR: SCORE: 1805.02

## 2021-11-07 NOTE — PROGRESS NOTES
ASSESSMENT/PLAN:    I have reviewed the nursing notes.  I have reviewed the findings, diagnosis, plan and need for follow up with the patient.    1. Sore throat  2. Cough  Negative covid, negative strep.   - Group A Streptococcus PCR Throat Swab  - Symptomatic COVID-19 Virus (Coronavirus) by PCR Nose  Discussed with patient that symptoms are most likely caused by viral etiology and no antibiotics are indicated at this time. Treat symptomatically at home and follow up if worsening symptoms or new onset of symptoms or concerns present.   -Symptomatic treatment - Encouraged fluids, salt water gargles, honey (only if greater than 1 year in age due to risk of botulism), elevation, humidifier, sinus rinse/netti pot, lozenges, tea, topical vapor rub, popsicles, rest, etc   -May use over-the-counter Tylenol or ibuprofen PRN    Follow up if symptoms persist or worsen or concerns    I explained my diagnostic considerations and recommendations to the patient, who voiced understanding and agreement with the treatment plan. All questions were answered. We discussed potential side effects of any prescribed or recommended therapies, as well as expectations for response to treatments.    Beatriz Barraza NP  11/7/2021  12:47 PM    HPI:  Nury Gilbert is a 26 year old female who [facility] for concerns of cough and sore throat for past 5 days. Patient desires testing for strep and covid. Has taken dayquil and nyquil for treatment. The cough is dry, nonproductive. She cannot sleep or lie down due cough severity. No fever/chills. Reports nasal congestion, runny nose, plugged ears, sore throat. No chest pain or shortness of breath. Nonsmoker. No asthma history. Has fatigue and body aches. Children are also currently sick with similar symptoms.     Past Medical History:   Diagnosis Date     Anxiety disorder     No Comments Provided     Attention-deficit hyperactivity disorder     No Comments Provided      Depressive disorder      Superficial foreign body of finger     2017     Past Surgical History:   Procedure Laterality Date     ADENOIDECTOMY      No Comments Provided     APPENDECTOMY OPEN      2006     ARTHROSCOPY KNEE      2012,2nd as well at age 18      SECTION N/A 2018    Procedure:  SECTION;  External version attemted prior to Section;  Surgeon: Naye Johns MD;  Location: [facility]      SECTION N/A 2020    Procedure:  SECTION;  Surgeon: Naye Johns MD;  Location: [facility]     LAPAROSCOPY DIAGNOSTIC (GYN) N/A 2019    Procedure: LAPAROSCOPY DIAGNOSTIC;  Surgeon: Naye Johns MD;  Location: [facility] OR     OTHER SURGICAL HISTORY      1999,[id number],BLADDER SURGERY     OTHER SURGICAL HISTORY      2017,26322.0,AK REMOVE FOREIGN BODY SIMPLE     Social History     Tobacco Use     Smoking status: Never Smoker     Smokeless tobacco: Never Used   Substance Use Topics     Alcohol use: Not Currently     Comment: rare     Current Outpatient Medications   Medication Sig Dispense Refill     acetaminophen (TYLENOL) 500 MG tablet Take 500-1,000 mg by mouth every 6 hours as needed for mild pain       FLUoxetine (PROZAC) 20 MG capsule Take 1 capsule (20 mg) by mouth daily 90 capsule 0     HEMP OIL OR EXTRACT OR OTHER CBD CANNABINOID, NOT MEDICAL CANNABIS,        ibuprofen (ADVIL/MOTRIN) 200 MG tablet Take 3 tablets (600 mg) by mouth every 6 hours 100 tablet 0     levonorgestrel (MIRENA) 20 MCG/24HR IUD 1 each (20 mcg) by Intrauterine route once for 1 dose 1 each 0     LORazepam (ATIVAN) 1 MG tablet Take 1 mg by mouth every 6 hours as needed for anxiety       medical cannabis (Patient's own supply) (The purpose of this order is to document that the patient reports taking medical cannabis.  This is not a prescription, and is not used to certify that the patient has a qualifying medical condition.)    Takes for PTSD 0 Information only 0     triamcinolone (KENALOG)  "0.1 % external cream Apply topically 2 times daily 454 g 1     VYVANSE 40 MG capsule Take 1 capsule by mouth daily       Allergies   Allergen Reactions     Penicillins Swelling     Throat swelling and hives      Phenergan Dm [Promethazine-Dm] Other (See Comments)     Hallucinations and muscle twitching     Past medical history, past surgical history, current medications and allergies reviewed and accurate to the best of my knowledge.      ROS:  Refer to HPI    /82   Pulse 74   Temp 98.7  F (37.1  C) (Tympanic)   Resp 14   Ht 1.727 m (5' 8\")   Wt 101.2 kg (223 lb)   SpO2 97%   Breastfeeding No   BMI 33.91 kg/m      EXAM:  General Appearance: Well appearing 26 year old female, appropriate appearance for age. No acute distress  Ears: Left TM intact, translucent with bony landmarks appreciated, no erythema, no effusion, no bulging, no purulence.  Right TM intact, translucent with bony landmarks appreciated, no erythema, no effusion, no bulging, no purulence.  Left auditory canal clear. Right auditory canal clear.  Normal external ears, non tender.  Eyes: conjunctivae normal without erythema or irritation, corneas clear, no drainage or crusting, no eyelid swelling, pupils equal   Orophayrnx: moist mucous membranes, + posterior pharynx with mild erythema, tonsils without hypertrophy, no erythema, no exudates or petechiae, no post nasal drip seen, no trismus, voice clear.    Sinuses:  No sinus tenderness upon palpation of the frontal or maxillary sinuses  Nose:  Bilateral nares: no erythema, no edema, no drainage or congestion   Neck: supple without adenopathy  Respiratory: normal chest wall and respirations.  Normal effort.  Clear to auscultation bilaterally, no wheezing, crackles or rhonchi. No increased work of breathing.  No cough appreciated.  Cardiac: RRR with no murmurs  Psychological: normal affect, alert, oriented, and pleasant.     Results for orders placed or performed in visit on 11/07/21 "   Symptomatic COVID-19 Virus (Coronavirus) by PCR Nose     Status: Normal    Specimen: Nose; Swab   Result Value Ref Range    SARS CoV2 PCR Negative Negative    Narrative    Testing was performed using the Aptima SARS-CoV-2 Assay on the  [employer] Instrument System. Additional information about this  Emergency Use Authorization (EUA) assay can be found via the Lab  Guide. This test should be ordered for the detection of SARS-CoV-2 in  individuals who meet SARS-CoV-2 clinical and/or epidemiological  criteria. Test performance is unknown in asymptomatic patients. This  test is for in vitro diagnostic use under the FDA EUA for  laboratories certified under CLIA to perform high complexity testing.  This test has not been FDA cleared or approved. A negative result  does not rule out the presence of PCR inhibitors in the specimen or  target RNA in concentration below the limit of detection for the  assay. The possibility of a false negative should be considered if  the patient's recent exposure or clinical presentation suggests  COVID-19. This test was validated by the [facility]. This laboratory is certified under  the Clinical Laboratory Improvement Amendments of 1988 (CLIA-88) as  qualified to perform high complexity laboratory testing.   Group A Streptococcus PCR Throat Swab     Status: Normal    Specimen: Throat; Swab   Result Value Ref Range    Group A strep by PCR Not Detected Not Detected    Narrative    The Xpert Xpress Strep A test, performed on the [employer]  Instrument Systems, is a rapid, qualitative in vitro diagnostic test for the detection of Streptococcus pyogenes (Group A ß-hemolytic Streptococcus, Strep A) in throat swab specimens from patients with signs and symptoms of pharyngitis. The Xpert Xpress Strep A test can be used as an aid in the diagnosis of Group A Streptococcal pharyngitis. The assay is not intended to monitor treatment for Group A  Streptococcus infections. The Xpert Xpress Strep A test utilizes an automated real-time polymerase chain reaction (PCR) to detect Streptococcus pyogenes DNA.

## 2021-11-07 NOTE — PATIENT INSTRUCTIONS
Robitussin DM for cough.     Flonase nasal spray (steroid) OTC.     Symptomatic treatment - Encouraged fluids, salt water gargles, honey (only if greater than 1 year in age due to risk of botulism), elevation, humidifier, sinus rinse/netti pot, lozenges, tea, topical vapor rub, popsicles, rest, etc     Most likely viral illness, awaiting strep and covid. If strep is positive, will get antibiotics but if negative, no antibiotic is indicated at this time.

## 2021-11-07 NOTE — NURSING NOTE
Patient presents to the clinic for cough and sore throat x 5 days. Patient is concerned about strep and covid. She has taken dayquil and nyquil for treatment.        FOOD SECURITY SCREENING QUESTIONS  Hunger Vital Signs:  Within the past 12 months we worried whether our food would run out before we got money to buy more. Never  Within the past 12 months the food we bought just didn't last and we didn't have money to get more. Never  Medication Reconciliation: complete  Gabby Mcnally CMA 11/7/2021 12:21 PM

## 2021-11-08 LAB — SARS-COV-2 RNA RESP QL NAA+PROBE: NEGATIVE

## 2022-04-27 ENCOUNTER — OFFICE VISIT (OUTPATIENT)
Dept: FAMILY MEDICINE | Facility: OTHER | Age: 27
End: 2022-04-27
Attending: FAMILY MEDICINE
Payer: COMMERCIAL

## 2022-04-27 VITALS
HEART RATE: 98 BPM | TEMPERATURE: 97.4 F | SYSTOLIC BLOOD PRESSURE: 110 MMHG | BODY MASS INDEX: 34.76 KG/M2 | OXYGEN SATURATION: 96 % | WEIGHT: 228.6 LBS | RESPIRATION RATE: 20 BRPM | DIASTOLIC BLOOD PRESSURE: 68 MMHG

## 2022-04-27 DIAGNOSIS — L08.9 LOCAL INFECTION OF SKIN AND SUBCUTANEOUS TISSUE: Primary | ICD-10-CM

## 2022-04-27 PROCEDURE — G0463 HOSPITAL OUTPT CLINIC VISIT: HCPCS | Performed by: FAMILY MEDICINE

## 2022-04-27 PROCEDURE — 99213 OFFICE O/P EST LOW 20 MIN: CPT | Performed by: FAMILY MEDICINE

## 2022-04-27 RX ORDER — SULFAMETHOXAZOLE/TRIMETHOPRIM 800-160 MG
1 TABLET ORAL 2 TIMES DAILY
Qty: 20 TABLET | Refills: 0 | Status: SHIPPED | OUTPATIENT
Start: 2022-04-27 | End: 2022-05-06

## 2022-04-27 RX ORDER — MUPIROCIN CALCIUM 20 MG/G
CREAM TOPICAL 3 TIMES DAILY
Qty: 15 G | Refills: 0 | Status: SHIPPED | OUTPATIENT
Start: 2022-04-27 | End: 2024-02-14

## 2022-04-27 RX ORDER — LISDEXAMFETAMINE DIMESYLATE 50 MG
1 CAPSULE ORAL DAILY
COMMUNITY
Start: 2022-04-26 | End: 2022-10-07 | Stop reason: DRUGHIGH

## 2022-04-27 ASSESSMENT — PAIN SCALES - GENERAL: PAINLEVEL: NO PAIN (0)

## 2022-04-27 NOTE — NURSING NOTE
Patient here for left thumb infection irritation for the past 6 weeks. Medication Reconciliation: complete.    Emmy Nesbitt LPN  4/27/2022 9:48 AM

## 2022-04-28 NOTE — PROGRESS NOTES
"  Assessment & Plan     Local infection of skin and subcutaneous tissue  I suspect eczema with a secondary infection.  Start Bactrim and Bactroban.  Also recommended Kenalog cream twice a day.  - sulfamethoxazole-trimethoprim (BACTRIM DS) 800-160 MG tablet; Take 1 tablet by mouth 2 times daily for 10 days  - mupirocin (BACTROBAN) 2 % external cream; Apply topically 3 times daily             BMI:   Estimated body mass index is 34.76 kg/m  as calculated from the following:    Height as of 11/7/21: 1.727 m (5' 8\").    Weight as of this encounter: 103.7 kg (228 lb 9.6 oz).           No follow-ups on file.    Sarthak Quintaan MD  [facility] AND [facility]    Jazlyn Arrington is a 27 year old who presents for the following health issues right thumb irritation     Patient arrives here for thumb irritation.  Its been going on for about 6 weeks.  At times associated with a crusting discharge.  She is put multiple lotions on it without any improvement.    History of Present Illness       Reason for visit:  Left thumb infection  Symptoms include:  Dry skin, cracking, puss, painful, sensitive, motion/pressure limiting  Symptom intensity:  Severe  Symptom progression:  Worsening  Had these symptoms before:  No  What makes it worse:  Contact with most anything but room temp air  What makes it better:  Covering, extra caution with    She eats 2-3 servings of fruits and vegetables daily.She consumes 1 sweetened beverage(s) daily.She exercises with enough effort to increase her heart rate 20 to 29 minutes per day.  She exercises with enough effort to increase her heart rate 4 days per week. She is missing 1 dose(s) of medications per week.             Review of Systems         Objective    /68   Pulse 98   Temp 97.4  F (36.3  C)   Resp 20   Wt 103.7 kg (228 lb 9.6 oz)   SpO2 96%   BMI 34.76 kg/m    Body mass index is 34.76 kg/m .  Physical Exam  Skin:     Comments: Thumb shows some yellowish dried discharge " present.  Eczematous changes to   Neurological:      Mental Status: She is alert.

## 2022-04-29 ENCOUNTER — TELEPHONE (OUTPATIENT)
Dept: FAMILY MEDICINE | Facility: OTHER | Age: 27
End: 2022-04-29
Payer: COMMERCIAL

## 2022-04-29 DIAGNOSIS — L08.9 LOCAL INFECTION OF SKIN AND SUBCUTANEOUS TISSUE: Primary | ICD-10-CM

## 2022-04-29 NOTE — TELEPHONE ENCOUNTER
Medication Mupirocin 2% cream) is not covered by insurance but the ointment is covered.  Ointment teed up  Norma J. Gosselin, LPN .......  4/29/2022  9:46 AM

## 2022-05-03 RX ORDER — MUPIROCIN 20 MG/G
OINTMENT TOPICAL 3 TIMES DAILY
Qty: 15 G | Refills: 0 | Status: SHIPPED | OUTPATIENT
Start: 2022-05-03 | End: 2024-02-14

## 2022-05-06 ENCOUNTER — OFFICE VISIT (OUTPATIENT)
Dept: FAMILY MEDICINE | Facility: OTHER | Age: 27
End: 2022-05-06
Attending: PHYSICIAN ASSISTANT
Payer: COMMERCIAL

## 2022-05-06 ENCOUNTER — HOSPITAL ENCOUNTER (EMERGENCY)
Facility: OTHER | Age: 27
Discharge: HOME | End: 2022-05-06
Attending: PHYSICIAN ASSISTANT
Payer: COMMERCIAL

## 2022-05-06 VITALS
SYSTOLIC BLOOD PRESSURE: 121 MMHG | BODY MASS INDEX: 33.45 KG/M2 | WEIGHT: 220 LBS | HEART RATE: 87 BPM | OXYGEN SATURATION: 98 % | TEMPERATURE: 97.5 F | DIASTOLIC BLOOD PRESSURE: 77 MMHG | RESPIRATION RATE: 16 BRPM

## 2022-05-06 VITALS
BODY MASS INDEX: 34.44 KG/M2 | WEIGHT: 226.5 LBS | DIASTOLIC BLOOD PRESSURE: 78 MMHG | SYSTOLIC BLOOD PRESSURE: 122 MMHG | OXYGEN SATURATION: 98 % | TEMPERATURE: 98.2 F | HEART RATE: 86 BPM | RESPIRATION RATE: 16 BRPM

## 2022-05-06 DIAGNOSIS — L08.9 LOCAL INFECTION OF SKIN AND SUBCUTANEOUS TISSUE: Primary | ICD-10-CM

## 2022-05-06 PROCEDURE — G0463 HOSPITAL OUTPT CLINIC VISIT: HCPCS

## 2022-05-06 PROCEDURE — 99213 OFFICE O/P EST LOW 20 MIN: CPT | Performed by: PHYSICIAN ASSISTANT

## 2022-05-06 RX ORDER — CLINDAMYCIN HCL 150 MG
450 CAPSULE ORAL 3 TIMES DAILY
Qty: 63 CAPSULE | Refills: 0 | Status: SHIPPED | OUTPATIENT
Start: 2022-05-06 | End: 2022-05-13

## 2022-05-06 RX ORDER — DOXYCYCLINE 100 MG/1
100 CAPSULE ORAL 2 TIMES DAILY
Status: CANCELLED | OUTPATIENT
Start: 2022-05-06

## 2022-05-06 ASSESSMENT — PAIN SCALES - GENERAL: PAINLEVEL: SEVERE PAIN (7)

## 2022-05-06 NOTE — NURSING NOTE
Pt states that she has been seen for thumb problem, is on day 9 of antibiotic and is using cream.  Does not feel it is getting sergey, cracked and pussy

## 2022-05-06 NOTE — PROGRESS NOTES
Called and spoke to [facility] to see if they would be able to see this patient. NP accepted and pt was escorted over to [facility]

## 2022-05-06 NOTE — PATIENT INSTRUCTIONS
You were prescribed an antibiotic, please take into consideration the following information:  - Take entire course of antibiotic even if you start to feel better.  - Antibiotics can cause stomach upset including nausea and diarrhea. Read your bottle or ask the pharmacist if antibiotic can be taken with food to help prevent nausea. If you have symptoms of diarrhea you can take an over-the-counter probiotic and/or increase foods with probiotics such as yogurt, Fred, sauerkraut.  -Use caution in sunlight as can lead to increased risk of sunburn while on ABX (antibiotics).     Please refer to your AVS for follow up and pain/symptoms management recommendations (I.e.: medications, helpful conservative treatment modalities, appropriate follow up if need to a specialist or family practice, etc.). Please return to urgent care if your symptoms change or worsen.     Discharge instructions:  -If you were prescribed a medication(s), please take this as prescribed/directed  -Monitor your symptoms, if changing/worsening, return to UC/ER or PCP for follow up    Cellulitis/Skin Infection   -If you received a prescription for a topical or oral antibiotic, please take/use as directed.   -Keep the area clean and dry.   -If needed - for pain control - we recommend alternating Tylenol and Ibuprofen if you are able to take these medications. Alternate every 4 hours as needed. I.e.: Ibuprofen at 8am, Tylenol 12pm, Ibuprofen 4pm    -Daily maximum of Tylenol is 4000mg (recommend staying under 3000mg)   -Daily maximum of Ibuprofen is 3200 mg    Watch for worsening symptoms such as fevers, chills, worsening redness, abnormal drainage from site of cellulitis, etc.

## 2022-05-06 NOTE — ED TRIAGE NOTES
Pt had a hangnail on her thumb that started in march. Pt's thumb started cracking, so she used her steroid cream and it seemed to get worse. Pt was started on antibiotic 1.5 weeks ago with no relief      Triage Assessment     Row Name 05/06/22 [id number]       Triage Assessment (Adult)    Airway WDL WDL       Respiratory WDL    Respiratory WDL WDL       Skin Circulation/Temperature WDL    Skin Circulation/Temperature WDL WDL       Cardiac WDL    Cardiac WDL WDL       Peripheral/Neurovascular WDL    Peripheral Neurovascular WDL WDL       Cognitive/Neuro/Behavioral WDL    Cognitive/Neuro/Behavioral WDL WDL

## 2022-05-06 NOTE — PROGRESS NOTES
ASSESSMENT/PLAN:    I have reviewed the nursing notes.  I have reviewed the findings, diagnosis, plan and need for follow up with the patient.    1. Local infection of skin and subcutaneous tissue  - clindamycin (CLEOCIN) 150 MG capsule; Take 3 capsules (450 mg) by mouth 3 times daily for 7 days  Dispense: 63 capsule; Refill: 0  - Vital signs stable. PE consistent with cellulitis secondary to eczema of thumb. No drainage, therefore, no wound culture obtained Antibiotic was prescribed, Clindamyci PO TID x 7 days. Discontinue Bactrim and start Cleocin. Continue with Bactroban and warm soaks if needed. Side effect profile of antibiotic was discused with patient, recommend increasing yogurt/probiotic use while on medication, take entire course of medication, even if feeling better prior to this (unless adverse side effect occur, recommend follow up). If fevers, chills, abnormal drainage, worsening redness or other worrisome signs occur, patient is agreeable to follow up promptly for reevaluation. OK to alternate OTC analgesics such as tylenol and ibuprofen every 4-6 hours for pain control, as well as alternating heat and ice. Daily dressing changes with triple antibiotic recommended. Patient is in agreement and understanding of the above treatment plan. All questions and concerns were addressed and answered to patient's satisfaction. AVS reviewed with patient.     Keep follow up 5/9/22.     Discussed warning signs/symptoms indicative of need to f/u    Follow up if symptoms persist or worsen or concerns    I explained my diagnostic considerations and recommendations to the patient, who voiced understanding and agreement with the treatment plan. All questions were answered. We discussed potential side effects of any prescribed or recommended therapies, as well as expectations for response to treatments.    Adela Pickett PA-C  5/6/2022  6:15 PM    HPI:    Nury Gilbert is a 27 year old female  who presents to [facility] today for concerns of skin infection/cellulitis to left thumb x early March duration.     Causation/Event: hangnail   Symptoms: pain, skin erythema (reddened skin) and tenderness  Progression: worsening  Presence of other skin disorders: Yes: Eczema   Recent surgery/procedure: No  Recent infection(s): No    Presence of any of the following comorbid conditions:  Diabetic: No  Tobacco Use: No  HIV/AIDS: No  Chronic Renal Disease: No  Chronic Hepatic Disease: No    Prior history of similar symptoms: similar to [facility], but not this extent     Allergies: PCN, Phenergan     Last tetanus   Treatments tried: Bactroban and Bactrim    PCP: MD Tk    Past Medical History:   Diagnosis Date     Anxiety disorder     No Comments Provided     Attention-deficit hyperactivity disorder     No Comments Provided     Depressive disorder      Superficial foreign body of finger     2017     Past Surgical History:   Procedure Laterality Date     ADENOIDECTOMY      No Comments Provided     APPENDECTOMY OPEN           ARTHROSCOPY KNEE      2012,2nd as well at age 18      SECTION N/A 2018    Procedure:  SECTION;  External version attemted prior to Section;  Surgeon: Naye Johns MD;  Location:  OR      SECTION N/A 2020    Procedure:  SECTION;  Surgeon: Naye Johns MD;  Location:  OR     LAPAROSCOPY DIAGNOSTIC (GYN) N/A 2019    Procedure: LAPAROSCOPY DIAGNOSTIC;  Surgeon: Naye Johns MD;  Location: [facility] OR     OTHER SURGICAL HISTORY      1999,HDY424,BLADDER SURGERY     OTHER SURGICAL HISTORY      2017,17144.0,PA REMOVE FOREIGN BODY SIMPLE     Social History     Tobacco Use     Smoking status: Never Smoker     Smokeless tobacco: Never Used   Substance Use Topics     Alcohol use: Yes     Comment: rare     Current Outpatient Medications   Medication Sig Dispense Refill     acetaminophen (TYLENOL) 500 MG tablet Take 500-1,000 mg by mouth  every 6 hours as needed for mild pain       FLUoxetine (PROZAC) 20 MG capsule Take 1 capsule (20 mg) by mouth daily 90 capsule 0     HEMP OIL OR EXTRACT OR OTHER CBD CANNABINOID, NOT MEDICAL CANNABIS,        ibuprofen (ADVIL/MOTRIN) 200 MG tablet Take 3 tablets (600 mg) by mouth every 6 hours 100 tablet 0     levonorgestrel (MIRENA) 20 MCG/24HR IUD 1 each (20 mcg) by Intrauterine route once for 1 dose 1 each 0     LORazepam (ATIVAN) 1 MG tablet Take 1 mg by mouth every 6 hours as needed for anxiety       medical cannabis (Patient's own supply) (The purpose of this order is to document that the patient reports taking medical cannabis.  This is not a prescription, and is not used to certify that the patient has a qualifying medical condition.)    Takes for PTSD 0 Information only 0     mupirocin (BACTROBAN) 2 % external cream Apply topically 3 times daily 15 g 0     mupirocin (BACTROBAN) 2 % external ointment Apply topically 3 times daily 15 g 0     sulfamethoxazole-trimethoprim (BACTRIM DS) 800-160 MG tablet Take 1 tablet by mouth 2 times daily for 10 days 20 tablet 0     triamcinolone (KENALOG) 0.1 % external cream Apply topically 2 times daily 454 g 1     VYVANSE 50 MG capsule Take 1 capsule by mouth daily       Allergies   Allergen Reactions     Penicillins Swelling     Throat swelling and hives      Phenergan Dm [Promethazine-Dm] Other (See Comments)     Hallucinations and muscle twitching     Past medical history, past surgical history, current medications and allergies reviewed and accurate to the best of my knowledge.      ROS:  Refer to HPI    /78 (BP Location: Left arm, Patient Position: Sitting, Cuff Size: Adult Large)   Pulse 86   Temp 98.2  F (36.8  C) (Tympanic)   Resp 16   Wt 102.7 kg (226 lb 8 oz)   SpO2 98%   BMI 34.44 kg/m      EXAM:  General Appearance: Well appearing 27 year old female, appropriate appearance for age. No acute distress   Respiratory: normal chest wall and respirations.   Normal effort.  Clear to auscultation bilaterally, no wheezing, crackles or rhonchi.  No increased work of breathing.  No cough appreciated.  Cardiac: RRR with no murmurs  Musculoskeletal:   Right HAND PHYSICAL EXAMINATION:  Inspection: no signs of edema, bony deformity or skin abnormalities noted.    Palpation: Tenderness to palpation wound site. Tenderness to palpation over anatomical snuffbox/scaphoid: No.     ROM:    Thumb adduction/abduction/flexion/extension: ROM is full, fluid and intact   Finger flexion/extension (MCP, DIP, PIP): ROM is full, fluid and intact   Abduction/Adduction (2-5th MCP joint): ROM is full, fluid and intact   Opposition: intact     strength: intact    Special testing: Leonard exam normal    Neurovascular status: sensation and distal pulses intact.   Dermatological: secondary reaction to eczema, scaling present, mild erythema around open region of wound to medial thumb. No drainage or discharge.   Psychological: normal affect, alert, oriented, and pleasant.     Labs:  None     Xray:  None

## 2022-05-09 ENCOUNTER — OFFICE VISIT (OUTPATIENT)
Dept: FAMILY MEDICINE | Facility: OTHER | Age: 27
End: 2022-05-09
Attending: FAMILY MEDICINE
Payer: COMMERCIAL

## 2022-05-09 VITALS
OXYGEN SATURATION: 98 % | RESPIRATION RATE: 20 BRPM | TEMPERATURE: 97.6 F | SYSTOLIC BLOOD PRESSURE: 104 MMHG | DIASTOLIC BLOOD PRESSURE: 68 MMHG | HEART RATE: 71 BPM | BODY MASS INDEX: 34.12 KG/M2 | WEIGHT: 224.4 LBS

## 2022-05-09 DIAGNOSIS — F98.8 ATTENTION DEFICIT DISORDER, UNSPECIFIED HYPERACTIVITY PRESENCE: Primary | ICD-10-CM

## 2022-05-09 DIAGNOSIS — A49.01 STAPH AUREUS INFECTION: ICD-10-CM

## 2022-05-09 PROCEDURE — G0463 HOSPITAL OUTPT CLINIC VISIT: HCPCS

## 2022-05-09 PROCEDURE — 99214 OFFICE O/P EST MOD 30 MIN: CPT | Performed by: FAMILY MEDICINE

## 2022-05-09 ASSESSMENT — PAIN SCALES - GENERAL: PAINLEVEL: MODERATE PAIN (4)

## 2022-05-09 ASSESSMENT — ANXIETY QUESTIONNAIRES
4. TROUBLE RELAXING: MORE THAN HALF THE DAYS
6. BECOMING EASILY ANNOYED OR IRRITABLE: NEARLY EVERY DAY
GAD7 TOTAL SCORE: 19
3. WORRYING TOO MUCH ABOUT DIFFERENT THINGS: NEARLY EVERY DAY
1. FEELING NERVOUS, ANXIOUS, OR ON EDGE: NEARLY EVERY DAY
7. FEELING AFRAID AS IF SOMETHING AWFUL MIGHT HAPPEN: NEARLY EVERY DAY
GAD7 TOTAL SCORE: 19
8. IF YOU CHECKED OFF ANY PROBLEMS, HOW DIFFICULT HAVE THESE MADE IT FOR YOU TO DO YOUR WORK, TAKE CARE OF THINGS AT HOME, OR GET ALONG WITH OTHER PEOPLE?: VERY DIFFICULT
GAD7 TOTAL SCORE: 19
7. FEELING AFRAID AS IF SOMETHING AWFUL MIGHT HAPPEN: NEARLY EVERY DAY
2. NOT BEING ABLE TO STOP OR CONTROL WORRYING: NEARLY EVERY DAY
5. BEING SO RESTLESS THAT IT IS HARD TO SIT STILL: MORE THAN HALF THE DAYS

## 2022-05-09 ASSESSMENT — PATIENT HEALTH QUESTIONNAIRE - PHQ9
10. IF YOU CHECKED OFF ANY PROBLEMS, HOW DIFFICULT HAVE THESE PROBLEMS MADE IT FOR YOU TO DO YOUR WORK, TAKE CARE OF THINGS AT HOME, OR GET ALONG WITH OTHER PEOPLE: VERY DIFFICULT
SUM OF ALL RESPONSES TO PHQ QUESTIONS 1-9: 17
SUM OF ALL RESPONSES TO PHQ QUESTIONS 1-9: 17

## 2022-05-09 NOTE — PROGRESS NOTES
"  Assessment & Plan     Attention deficit disorder, unspecified hyperactivity presence  Her mental health    Staph aureus infection  She is currently on clindamycin.  I did discuss occlusive dressings and she will proceed with that.  We will follow-up in 1 week if needed.  We also adjusted her clindamycin to 300 mg 3 times a day.  Decrease to 154 times a day if unable to tolerate               BMI:   Estimated body mass index is 34.12 kg/m  as calculated from the following:    Height as of 11/7/21: 1.727 m (5' 8\").    Weight as of this encounter: 101.8 kg (224 lb 6.4 oz).       Depression Screening Follow Up    PHQ 5/9/2022   PHQ-9 Total Score 17   Q9: Thoughts of better off dead/self-harm past 2 weeks Several days   F/U: Thoughts of suicide or self-harm No   F/U: Self harm-plan -   F/U: Safety concerns No                 Follow Up    Follow Up Actions Taken      Discussed the following ways the patient can remain in a safe environment:        No follow-ups on file.    Sarthak Quintana MD  [facility]    Jazlyn Arrington is a 27 year old who presents for the following health issues finger     Patient arrives here for finger follow-up.  She was recently seen here in clinic and started on Bactroban.  This was subsequently [facility].  She reports little bit of side effects to her clindamycin orally feeling heartburn nausea.  Burning sensation after taking the meds.  She is currently on 450 mg 3 times a day.  Her PHQ and BRANDON is quite elevated.  Patient reports that she not suicidal.  She does have an appoint with Izabel Young in follow-up             Review of Systems         Objective    /68   Pulse 71   Temp 97.6  F (36.4  C)   Resp 20   Wt 101.8 kg (224 lb 6.4 oz)   SpO2 98%   BMI 34.12 kg/m    Body mass index is 34.12 kg/m .  Physical Exam  Constitutional:       Appearance: Normal appearance.   Skin:     General: Skin is warm.      Comments: Her " left thumb does show some dried exudate.  No weeping at the current time   Neurological:      Mental Status: She is alert.                        Answers for HPI/ROS submitted by the patient on 5/9/2022  If you checked off any problems, how difficult have these problems made it for you to do your work, take care of things at home, or get along with other people?: Very difficult  PHQ9 TOTAL SCORE: 17  BRANDON 7 TOTAL SCORE: 19

## 2022-05-09 NOTE — NURSING NOTE
Patient here for follow up to left thumb. She started clindamycin Saturday and has been extremely nauseated and has thrown up once from it. Medication Reconciliation: complete.    Emmy Nesbitt LPN  5/9/2022 8:52 AM

## 2022-05-10 ASSESSMENT — PATIENT HEALTH QUESTIONNAIRE - PHQ9: SUM OF ALL RESPONSES TO PHQ QUESTIONS 1-9: 17

## 2022-05-10 ASSESSMENT — ANXIETY QUESTIONNAIRES: GAD7 TOTAL SCORE: 19

## 2022-05-24 ENCOUNTER — HOSPITAL ENCOUNTER (EMERGENCY)
Facility: OTHER | Age: 27
Discharge: STILL ADMITTED OR ADMINISTRATIVE | End: 2022-05-24
Payer: COMMERCIAL

## 2022-08-08 ENCOUNTER — OFFICE VISIT (OUTPATIENT)
Dept: FAMILY MEDICINE | Facility: OTHER | Age: 27
End: 2022-08-08
Attending: FAMILY MEDICINE
Payer: COMMERCIAL

## 2022-08-08 DIAGNOSIS — R05.9 COUGH: Primary | ICD-10-CM

## 2022-08-08 PROCEDURE — C9803 HOPD COVID-19 SPEC COLLECT: HCPCS

## 2022-08-08 PROCEDURE — U0005 INFEC AGEN DETEC AMPLI PROBE: HCPCS | Mod: ZL

## 2022-08-08 NOTE — PROGRESS NOTES
Patient here today for Covid test. Having cold like symptoms.     Felicita Sánchez CNA .............................on 8/8/2022 at 8:46 AM

## 2022-08-09 LAB — SARS-COV-2 RNA RESP QL NAA+PROBE: NEGATIVE

## 2022-08-31 ENCOUNTER — PATIENT OUTREACH (OUTPATIENT)
Dept: FAMILY MEDICINE | Facility: OTHER | Age: 27
End: 2022-08-31

## 2022-09-20 ENCOUNTER — HOSPITAL ENCOUNTER (OUTPATIENT)
Dept: GENERAL RADIOLOGY | Facility: OTHER | Age: 27
Discharge: HOME | End: 2022-09-20
Attending: NURSE PRACTITIONER
Payer: COMMERCIAL

## 2022-09-20 ENCOUNTER — OFFICE VISIT (OUTPATIENT)
Dept: FAMILY MEDICINE | Facility: OTHER | Age: 27
End: 2022-09-20
Attending: PHYSICIAN ASSISTANT
Payer: COMMERCIAL

## 2022-09-20 VITALS
RESPIRATION RATE: 18 BRPM | SYSTOLIC BLOOD PRESSURE: 102 MMHG | WEIGHT: 219.1 LBS | OXYGEN SATURATION: 98 % | HEART RATE: 70 BPM | BODY MASS INDEX: 33.31 KG/M2 | TEMPERATURE: 97.5 F | DIASTOLIC BLOOD PRESSURE: 70 MMHG

## 2022-09-20 DIAGNOSIS — S93.402A SPRAIN OF LEFT ANKLE, UNSPECIFIED LIGAMENT, INITIAL ENCOUNTER: ICD-10-CM

## 2022-09-20 DIAGNOSIS — S99.912A ANKLE INJURY, LEFT, INITIAL ENCOUNTER: Primary | ICD-10-CM

## 2022-09-20 PROCEDURE — 99213 OFFICE O/P EST LOW 20 MIN: CPT | Performed by: NURSE PRACTITIONER

## 2022-09-20 PROCEDURE — 73610 X-RAY EXAM OF ANKLE: CPT | Mod: LT

## 2022-09-20 PROCEDURE — G0463 HOSPITAL OUTPT CLINIC VISIT: HCPCS

## 2022-09-20 PROCEDURE — G0463 HOSPITAL OUTPT CLINIC VISIT: HCPCS | Mod: 25

## 2022-09-20 RX ORDER — LAMOTRIGINE 25 MG/1
50 TABLET ORAL DAILY
COMMUNITY
Start: 2022-09-07 | End: 2024-04-12

## 2022-09-20 ASSESSMENT — PAIN SCALES - GENERAL: PAINLEVEL: SEVERE PAIN (6)

## 2022-09-20 NOTE — PROGRESS NOTES
ASSESSMENT/PLAN:    I have reviewed the nursing notes.  I have reviewed the findings, diagnosis, plan and need for follow up with the patient.      1. Ankle injury, left, initial encounter  - XR Ankle Left G/E 3 Views  Reviewed xr in office; no acute fractures. Injury consistent with ankle sprain. Treat with WBAT, ankle brace for support, ice, tylenol/ibuprofen, rest as able.     2. Sprain of left ankle, unspecified ligament, initial encounter  - Ankle/Foot Bracing Supplies Order for DME - ONLY FOR DME  -May use over-the-counter Tylenol or ibuprofen PRN    Follow up if symptoms persist or worsen or concerns    I explained my diagnostic considerations and recommendations to the patient, who voiced understanding and agreement with the treatment plan. All questions were answered. We discussed potential side effects of any prescribed or recommended therapies, as well as expectations for response to treatments.    Beatriz Barraza NP  2022  12:48 PM    HPI:  Nury Gilbert is a 27 year old female who presents to [facility] today for concerns of left ankle pain. Patient states she was doing laundry when she tripped over a box and twisted her ankle last night. It is swollen and painful. She is concerned of broken bones. No prior hx of fractures to the ankle. No numbness or tingling. Painful with weight bearing.     Past Medical History:   Diagnosis Date     Anxiety disorder     No Comments Provided     Attention-deficit hyperactivity disorder     No Comments Provided     Depressive disorder      Superficial foreign body of finger     2017     Past Surgical History:   Procedure Laterality Date     ADENOIDECTOMY      No Comments Provided     APPENDECTOMY OPEN           ARTHROSCOPY KNEE      2012,2nd as well at age 18      SECTION N/A 2018    Procedure:  SECTION;  External version attemted prior to Section;  Surgeon: Naey Johns MD;  Location: [facility] OR      SECTION N/A  2020    Procedure:  SECTION;  Surgeon: Naye Johns MD;  Location: [facility] OR     LAPAROSCOPY DIAGNOSTIC (GYN) N/A 2019    Procedure: LAPAROSCOPY DIAGNOSTIC;  Surgeon: Naye Johns MD;  Location: [facility] OR     OTHER SURGICAL HISTORY      1999,LEY568,BLADDER SURGERY     OTHER SURGICAL HISTORY      2017,64042.0,AR REMOVE FOREIGN BODY SIMPLE     Social History     Tobacco Use     Smoking status: Never Smoker     Smokeless tobacco: Never Used   Substance Use Topics     Alcohol use: Yes     Comment: rare     Current Outpatient Medications   Medication Sig Dispense Refill     acetaminophen (TYLENOL) 500 MG tablet Take 500-1,000 mg by mouth every 6 hours as needed for mild pain       FLUoxetine (PROZAC) 20 MG capsule Take 1 capsule (20 mg) by mouth daily 90 capsule 0     HEMP OIL OR EXTRACT OR OTHER CBD CANNABINOID, NOT MEDICAL CANNABIS,        ibuprofen (ADVIL/MOTRIN) 200 MG tablet Take 3 tablets (600 mg) by mouth every 6 hours 100 tablet 0     lamoTRIgine (LAMICTAL) 25 MG tablet Take 50 mg by mouth daily       levonorgestrel (MIRENA) 20 MCG/24HR IUD 1 each (20 mcg) by Intrauterine route once for 1 dose 1 each 0     LORazepam (ATIVAN) 1 MG tablet Take 1 mg by mouth every 6 hours as needed for anxiety       medical cannabis (Patient's own supply) (The purpose of this order is to document that the patient reports taking medical cannabis.  This is not a prescription, and is not used to certify that the patient has a qualifying medical condition.)    Takes for PTSD 0 Information only 0     mupirocin (BACTROBAN) 2 % external cream Apply topically 3 times daily 15 g 0     mupirocin (BACTROBAN) 2 % external ointment Apply topically 3 times daily 15 g 0     VYVANSE 50 MG capsule Take 1 capsule by mouth daily       triamcinolone (KENALOG) 0.1 % external cream Apply topically 2 times daily (Patient not taking: Reported on 2022) 454 g 1     Allergies   Allergen Reactions     Penicillins Swelling      Throat swelling and hives      Phenergan Dm [Promethazine-Dm] Other (See Comments)     Hallucinations and muscle twitching     Past medical history, past surgical history, current medications and allergies reviewed and accurate to the best of my knowledge.      ROS:  Refer to HPI    /70 (BP Location: Right arm, Patient Position: Sitting, Cuff Size: Adult Regular)   Pulse 70   Temp 97.5  F (36.4  C) (Tympanic)   Resp 18   Wt 99.4 kg (219 lb 1.6 oz)   SpO2 98%   BMI 33.31 kg/m      EXAM:  General Appearance: Well appearing 27 year old female, appropriate appearance for age. No acute distress   Respiratory: No increased work of breathing.  No cough appreciated.  Musculoskeletal:  Equal movement of bilateral upper extremities.  Equal movement of bilateral lower extremities.  Normal gait.    Left ANKLE PHYSICAL EXAMINATION:  Inspection: + slight lateral ankle edema, no bony deformity or skin abnormalities noted    Palpation: Tenderness to palpation over lateral malleolus.     ROM: plantarflexion full, dorsiflexion full, inversion painful, eversion painful.     Neurovascular Exam:   Pulses: Dorsalis pedis and Posterior tibial pulse 2+   Capillary refill intact < 3 seconds   Sensation intact, patient able to wiggle/move all toes    Psychological: normal affect, alert, oriented, and pleasant.     Results for orders placed or performed in visit on 09/20/22   XR Ankle Left G/E 3 Views     Status: None    Narrative    PROCEDURE:  XR ANKLE LEFT G/E 3 VIEWS    HISTORY: r/o acute fracture; ankle injury last night most likely  sprain the lateral malleolus very tender and painful; Ankle injury,  left, initial encounter.    COMPARISON:  None.    TECHNIQUE:  3 views left ankle.    FINDINGS:  No fracture or dislocation is identified. The joint spaces  are preserved. No soft tissue swelling or retained dense foreign body  is seen.       Impression    IMPRESSION: No acute fracture.      EMMIE ANGEL MD          SYSTEM ID:  [id number]

## 2022-09-20 NOTE — NURSING NOTE
Chief Complaint   Patient presents with     Ankle Pain     Patient presents to the clinic today for left ankle pain. Patient states she was doing laundry when she tripped over a box and twisted her ankle last night.     Medication Reconciliation: complete    Jaycee Ferguson LPN

## 2022-10-07 ENCOUNTER — OFFICE VISIT (OUTPATIENT)
Dept: FAMILY MEDICINE | Facility: OTHER | Age: 27
End: 2022-10-07
Attending: PHYSICIAN ASSISTANT
Payer: COMMERCIAL

## 2022-10-07 VITALS
SYSTOLIC BLOOD PRESSURE: 120 MMHG | RESPIRATION RATE: 16 BRPM | OXYGEN SATURATION: 97 % | WEIGHT: 217.9 LBS | TEMPERATURE: 98.4 F | DIASTOLIC BLOOD PRESSURE: 70 MMHG | HEART RATE: 72 BPM | BODY MASS INDEX: 33.02 KG/M2 | HEIGHT: 68 IN

## 2022-10-07 DIAGNOSIS — J02.9 VIRAL PHARYNGITIS: Primary | ICD-10-CM

## 2022-10-07 LAB — GROUP A STREP BY PCR: NOT DETECTED

## 2022-10-07 PROCEDURE — C9803 HOPD COVID-19 SPEC COLLECT: HCPCS | Performed by: PHYSICIAN ASSISTANT

## 2022-10-07 PROCEDURE — U0005 INFEC AGEN DETEC AMPLI PROBE: HCPCS | Mod: ZL | Performed by: PHYSICIAN ASSISTANT

## 2022-10-07 PROCEDURE — 99213 OFFICE O/P EST LOW 20 MIN: CPT | Mod: CS | Performed by: PHYSICIAN ASSISTANT

## 2022-10-07 PROCEDURE — 87651 STREP A DNA AMP PROBE: CPT | Mod: ZL | Performed by: PHYSICIAN ASSISTANT

## 2022-10-07 PROCEDURE — G0463 HOSPITAL OUTPT CLINIC VISIT: HCPCS

## 2022-10-07 RX ORDER — LISDEXAMFETAMINE DIMESYLATE 60 MG/1
60 CAPSULE ORAL DAILY
COMMUNITY
Start: 2022-10-05 | End: 2024-03-13

## 2022-10-07 ASSESSMENT — PAIN SCALES - GENERAL: PAINLEVEL: SEVERE PAIN (7)

## 2022-10-07 NOTE — PATIENT INSTRUCTIONS
"If a strep test was performed:   We will call you with the results of the strep test, in the meantime, information below on viral colds/upper respiratory tract infections were provided.    If the strep test returns \"POSITIVE\" for strep, you will be prescribed an antibiotic, if this is the case, please take the entire course of antibiotic, even if feeling better prior to this. Continue with symptomatic treatment below as needed. If positive, please change toothbrush on day 2.     If the strep test returns \"NEGATIVE\" you do not need antibiotics and are to continue with conservative treatment as outlined below. Continue to monitor symptoms.       If a COVID swab was performed:   Please quarantine until results return which takes 24-72 hours.     If COVID testing is negative, symptoms are improving (no need for antipyretics/fever reducers, etc.), that patient can return to normal activities of daily living.    If you test positive for COVID (regardless of vaccination status): stay home for 5 days. If you have no symptoms or symptoms are resolving after 5 days, you may leave the house per CDC guidelines. Continue to wear a mask around others for 5 days. You should also be fever free for 24 hours without the use of fever reducers (Tylenol/Ibuprofen).     If exposed to COVID to an individual with COVID (if you have received your booster OR if you have completed your primary series of Pfizer or Moderna in last 6 months OR completed the Andreas & Andreas (J&J) vaccine in last two months): Wear a mask around others for 10 days, test on day 5 if possible.  If you develop symptoms, take a test and stay home.     If you were exposed to an individual with COVID (if it has been greater than 6 months since your Pfizer or Moderna vaccine and you have not yet received a booster, completed the Andreas & Andreas  (J&J) vaccine greater than 2 months ago not received a booster or are not vaccinated): Stay home for 5 days, after this " continue to wear a mask for 5 days around others.  If you cannot quarantine per guidelines you should/must wear a mask for 10 days around others. Test on day 5 if possible. If you develop symptoms, take a test and stay home.     ** Must also be fever free for 24 hours without fever reducers (Tylenol or Ibuprofen).     Please refer to your AVS for follow up and pain/symptoms management recommendations (I.e.: medications, helpful conservative treatment modalities, appropriate follow up if need to a specialist or family practice, etc.). Please return to urgent care if your symptoms change or worsen.     Discharge instructions:  -If you were prescribed a medication(s), please take this as prescribed/directed  -Monitor your symptoms, if changing/worsening, return to UC/ER or PCP for follow up    Symptomatic treatments recommended.  - Antibiotics will not help with your symptoms, unless you were told otherwise today (strep throat, ear infection, etc. ). Education provided on symptoms of secondary bacterial infection such as new fever, chills, rigors, shortness of breath, increased work of breathing, that can occur with viral URI and need for further evaluation, if they occur.   - Ensure you are staying hydrated by drinking plenty of fluids and eating mild foods and advance diet as tolerated  - Honey can be soothing for sore throat (as long as above 12 months of age)  - Warm salt water gurgles can help soothe sore throat  - Humidifier can help with congestion and help keep mucus membranes such as throat and nose from drying out.  - Sleeping slightly propped up can help with congestion and postnasal drainage that can worsen cough at bedtime.  - As long as you have never been told to take Tylenol and/or Ibuprofen you can use them to manage fever and body aches per package instructions  Make sure you eat when you take ibuprofen to avoid stomach upset.  - OTC cough medications per package instructions to help with cough. Check  to see if the cough/cold medication already has acetaminophen (Tylenol) in it. If it does avoid taking additional Tylenol.  - If sudden onset of new fever, worsening symptoms return for further evaluation.  - OTC antihistamine such as Allegra, Zyrtec, Claritin (generic is okay) can help with nasal/sinus congestion and OTC nasal steroid such as Flonase can help decrease sinus inflammation to help with congestion.  - Education provided on symptoms of post-viral bacterial infections including ear infection and pneumonia. This would require re-evaluation for treatment.

## 2022-10-07 NOTE — PROGRESS NOTES
ASSESSMENT/PLAN:    I have reviewed the nursing notes.  I have reviewed the findings, diagnosis, plan and need for follow up with the patient.    1. Viral pharyngitis  - Group A Streptococcus PCR Throat Swab  - Symptomatic; Yes; 10/5/2022 COVID-19 Virus (Coronavirus) by PCR Nose  - Vital signs stable. PE consistent with URI. Testing results were as follows: strep negative. COVID test was performed, recommend that patient remain in quarantine until results return: 48-72 hours.     If COVID testing is negative, symptoms are improving (no need for antipyretics/fever reducing medications, etc.), that patient can return to normal ADLs (activities of daily living).    If you test positive for COVID (regardless of vaccination status): stay home for 5 days. If you have no symptoms or symptoms are resolving after 5 days, you may leave the house per CDC guidelines. Continue to wear a mask around others for 5 days. You should also be fever free for 24 hours without the use of fever reducers (Tylenol/Ibuprofen).     Recommend: increased fluids, rest, use of humidifier, antitussives (cough medication for adults), alternating tylenol and ibuprofen every 4-6 hours as needed (if able to take these medications), salt water gargles for sore throats or throat lozenges, honey, netti pots/saline rinses for sinus/congestion, as well as other home remedies. If worsening fevers, chills, uncontrollable nausea/vomiting, dehydration,etc., or other worsening signs occur, patient is agreeable to follow up for reevaluation.     Patient is in agreement and understanding of the above treatment plan. All questions and concerns were addressed and answered to patient's satisfaction. AVS reviewed with patient.     Discussed warning signs/symptoms indicative of need to f/u    Follow up if symptoms persist or worsen or concerns    I explained my diagnostic considerations and recommendations to the patient, who voiced understanding and agreement with the  treatment plan. All questions were answered. We discussed potential side effects of any prescribed or recommended therapies, as well as expectations for response to treatments.    Adela Pickett PA-C  10/7/2022  11:01 AM    HPI:    Nury Gilbert is a 27 year old female  who presents to [facility] today for concerns of sore throat, x 2 days duration. Sick children at home with cold symptoms. Cough drops for symptom relief. Would like to be tested for strep today.     Yes Redness no discharge noted.   Yes Difficulty swallowing, breathing or handling own secretions.   No fevers or chills.  No allergy/URI Symptoms.   Yes Otalgia - chronic  No Muffled Sounds/Change in Hearing.   No Sensation of Fullness in Ear(s).   No Ringing in Ears/Tinnitus.   No Headache.   No Congestion (head/nasal/chest).   No Cough/Productive Cough.   No Post Nasal Drip - .   No Sinus Pain/Pressure.   Yes Myalgias. - mild  No nausea, vomiting and/or diarrhea.   No rash.     No change to bowel or bladder habits. Fatigue/energy level changes: No. Change to appetite/fluid intake: No    Any prior HEENT surgery for removal of tonsils or adenoids: Yes  -   Recent antibiotic use: No  Exposure to sick contacts including: strep, influenza, COVID, other bacterial or viral illnesses - no  Exposure site: n/a  Treatments tried: cough drops    PCP: [facility] Criteria:   - Tonsillar Exudate: no  - Cough present: no  - Fever: no  - Tender anterior cervical adenopathy: no    Past Medical History:   Diagnosis Date     Anxiety disorder     No Comments Provided     Attention-deficit hyperactivity disorder     No Comments Provided     Depressive disorder      Superficial foreign body of finger     2017     Past Surgical History:   Procedure Laterality Date     ADENOIDECTOMY      No Comments Provided     APPENDECTOMY OPEN           ARTHROSCOPY KNEE      2012,2nd as well at age 18      SECTION N/A 2018    Procedure:   SECTION;  External version attemted prior to Section;  Surgeon: Naye Johns MD;  Location: [facility] OR      SECTION N/A 2020    Procedure:  SECTION;  Surgeon: Naye Johns MD;  Location: [facility] OR     LAPAROSCOPY DIAGNOSTIC (GYN) N/A 2019    Procedure: LAPAROSCOPY DIAGNOSTIC;  Surgeon: Naye Johns MD;  Location: [facility] OR     OTHER SURGICAL HISTORY      1999,WLD450,BLADDER SURGERY     OTHER SURGICAL HISTORY      2017,83794.0,AL REMOVE FOREIGN BODY SIMPLE     Social History     Tobacco Use     Smoking status: Never Smoker     Smokeless tobacco: Never Used   Substance Use Topics     Alcohol use: Yes     Comment: rare     Current Outpatient Medications   Medication Sig Dispense Refill     acetaminophen (TYLENOL) 500 MG tablet Take 500-1,000 mg by mouth every 6 hours as needed for mild pain       FLUoxetine (PROZAC) 20 MG capsule Take 1 capsule (20 mg) by mouth daily 90 capsule 0     HEMP OIL OR EXTRACT OR OTHER CBD CANNABINOID, NOT MEDICAL CANNABIS,        ibuprofen (ADVIL/MOTRIN) 200 MG tablet Take 3 tablets (600 mg) by mouth every 6 hours 100 tablet 0     lamoTRIgine (LAMICTAL) 25 MG tablet Take 50 mg by mouth daily       levonorgestrel (MIRENA) 20 MCG/24HR IUD 1 each (20 mcg) by Intrauterine route once for 1 dose 1 each 0     LORazepam (ATIVAN) 1 MG tablet Take 1 mg by mouth every 6 hours as needed for anxiety       medical cannabis (Patient's own supply) (The purpose of this order is to document that the patient reports taking medical cannabis.  This is not a prescription, and is not used to certify that the patient has a qualifying medical condition.)    Takes for PTSD 0 Information only 0     mupirocin (BACTROBAN) 2 % external cream Apply topically 3 times daily 15 g 0     mupirocin (BACTROBAN) 2 % external ointment Apply topically 3 times daily 15 g 0     triamcinolone (KENALOG) 0.1 % external cream Apply topically 2 times daily 454 g 1     VYVANSE 60 MG capsule Take 60  "mg by mouth daily       VYVANSE 50 MG capsule Take 1 capsule by mouth daily (Patient not taking: Reported on 10/7/2022)       Allergies   Allergen Reactions     Penicillins Swelling     Throat swelling and hives      Phenergan Dm [Promethazine-Dm] Other (See Comments)     Hallucinations and muscle twitching     Past medical history, past surgical history, current medications and allergies reviewed and accurate to the best of my knowledge.      ROS:  Refer to HPI    /70   Pulse 72   Temp 98.4  F (36.9  C) (Tympanic)   Resp 16   Ht 1.715 m (5' 7.5\")   Wt 98.8 kg (217 lb 14.4 oz)   LMP  (LMP Unknown)   SpO2 97%   Breastfeeding No   BMI 33.62 kg/m      EXAM:  General Appearance: Well appearing 27 year old female, appropriate appearance for age. No acute distress   Ears: Left TM intact, translucent with bony landmarks appreciated, no erythema, no effusion, no bulging, no purulence.  Right TM intact, translucent with bony landmarks appreciated, no erythema, no effusion, no bulging, no purulence.  Left auditory canal clear.  Right auditory canal clear.  Normal external ears, non tender.  Eyes: conjunctivae normal without erythema or irritation, corneas clear, no drainage or crusting, no eyelid swelling, pupils equal   Oropharynx: moist mucous membranes, posterior pharynx without erythema, tonsils 2+, mild erythema, no exudates or petechiae, no post nasal drip seen, no trismus, voice clear.    Sinuses:  Mild sinus tenderness upon palpation of maxillary sinuses, none to frontal.   Nose:  Bilateral nares: no erythema, no edema, no drainage or congestion   Neck: supple without adenopathy  Respiratory: normal chest wall and respirations.  Normal effort.  Clear to auscultation bilaterally, no wheezing, crackles or rhonchi.  No increased work of breathing.  No cough appreciated.  Cardiac: RRR with no murmurs  Abdomen: soft, nontender, no rigidity, no rebound tenderness or guarding, normal bowel sounds " present  Musculoskeletal:  Equal movement of bilateral upper extremities.  Equal movement of bilateral lower extremities.  Normal gait.    Dermatological: no rashes noted of exposed skin  Psychological: normal affect, alert, oriented, and pleasant.     Labs:  Results for orders placed or performed in visit on 10/07/22   Group A Streptococcus PCR Throat Swab     Status: Normal    Specimen: Throat; Swab   Result Value Ref Range    Group A strep by PCR Not Detected Not Detected    Narrative    The Xpert Xpress Strep A test, performed on the [employer], is a rapid, qualitative in vitro diagnostic test for the detection of Streptococcus pyogenes (Group A ß-hemolytic Streptococcus, Strep A) in throat swab specimens from patients with signs and symptoms of pharyngitis. The Xpert Xpress Strep A test can be used as an aid in the diagnosis of Group A Streptococcal pharyngitis. The assay is not intended to monitor treatment for Group A Streptococcus infections. The Xpert Xpress Strep A test utilizes an automated real-time polymerase chain reaction (PCR) to detect Streptococcus pyogenes DNA.     COVID testing in process     Xray:  None

## 2022-10-07 NOTE — NURSING NOTE
"Chief Complaint   Patient presents with     Pharyngitis     X 2 days         Initial /70   Pulse 72   Temp 98.4  F (36.9  C) (Tympanic)   Resp 16   Ht 1.715 m (5' 7.5\")   Wt 98.8 kg (217 lb 14.4 oz)   LMP  (LMP Unknown)   SpO2 97%   Breastfeeding No   BMI 33.62 kg/m   Estimated body mass index is 33.62 kg/m  as calculated from the following:    Height as of this encounter: 1.715 m (5' 7.5\").    Weight as of this encounter: 98.8 kg (217 lb 14.4 oz).         Norma J. Gosselin, STUART   "

## 2022-10-08 LAB — SARS-COV-2 RNA RESP QL NAA+PROBE: NEGATIVE

## 2022-12-02 ENCOUNTER — OFFICE VISIT (OUTPATIENT)
Dept: FAMILY MEDICINE | Facility: OTHER | Age: 27
End: 2022-12-02
Attending: NURSE PRACTITIONER
Payer: COMMERCIAL

## 2022-12-02 DIAGNOSIS — Z20.822 SUSPECTED 2019 NOVEL CORONAVIRUS INFECTION: ICD-10-CM

## 2022-12-02 PROCEDURE — U0005 INFEC AGEN DETEC AMPLI PROBE: HCPCS | Mod: ZL

## 2022-12-02 PROCEDURE — C9803 HOPD COVID-19 SPEC COLLECT: HCPCS

## 2022-12-02 NOTE — PROGRESS NOTES
"Chief Complaint   Patient presents with     Covid 19 Testing     Needed test to return to work       Initial LMP  (LMP Unknown)  Estimated body mass index is 33.62 kg/m  as calculated from the following:    Height as of 10/7/22: 1.715 m (5' 7.5\").    Weight as of 10/7/22: 98.8 kg (217 lb 14.4 oz).      Medication Reconciliation: complete    FOOD SECURITY SCREENING QUESTIONS:      The next two questions are to help us understand your food security.  If you are feeling you need any assistance in this area, we have resources available to support you today.    Hunger Vital Signs:  Within the past 12 months we worried whether our food would run out before we got money to buy more. Never  Within the past 12 months the food we bought just didn't last and we didn't have money to get more. Never    Kerry Dxion LPN....................  12/2/2022   2:58 PM    "

## 2022-12-02 NOTE — NURSING NOTE
Chief Complaint   Patient presents with     Covid 19 Testing     Needed test to return to work           Kerry Dixon LPN....................  12/2/2022   2:56 PM

## 2022-12-02 NOTE — NURSING NOTE
Chief Complaint   Patient presents with     Covid 19 Testing     Needed test to return to work         Kerry Dixon LPN....................  12/2/2022   2:52 PM

## 2022-12-04 ENCOUNTER — TELEPHONE (OUTPATIENT)
Dept: NURSING | Facility: CLINIC | Age: 27
End: 2022-12-04

## 2022-12-04 LAB — SARS-COV-2 RNA RESP QL NAA+PROBE: POSITIVE

## 2022-12-04 NOTE — TELEPHONE ENCOUNTER
Patient classified as COVID treatment eligible by Epic high risk algorithm:  No    Coronavirus (COVID-19) Notification    Reason for call  Notify of POSITIVE COVID-19 lab result, assess symptoms,  review [facility] recommendations    Lab Result   Lab test for 2019-nCoV rRt-PCR or SARS-COV-2 PCR  Oropharyngeal AND/OR nasopharyngeal swabs were POSITIVE for 2019-nCoV RNA [OR] SARS-COV-2 RNA (COVID-19) RNA     We have been unable to reach patient by phone at this time to notify of their Positive COVID-19 result.    Left voicemail message requesting a call back to [phone] [facility] for results.        A Positive COVID-19 letter will be sent via [employer] or the mail.    Sola Robles

## 2022-12-07 ENCOUNTER — OFFICE VISIT (OUTPATIENT)
Dept: INTERNAL MEDICINE | Facility: OTHER | Age: 27
End: 2022-12-07
Attending: INTERNAL MEDICINE
Payer: COMMERCIAL

## 2022-12-07 VITALS
RESPIRATION RATE: 20 BRPM | HEART RATE: 66 BPM | BODY MASS INDEX: 32.97 KG/M2 | DIASTOLIC BLOOD PRESSURE: 66 MMHG | OXYGEN SATURATION: 99 % | HEIGHT: 69 IN | WEIGHT: 222.6 LBS | SYSTOLIC BLOOD PRESSURE: 122 MMHG | TEMPERATURE: 98.1 F

## 2022-12-07 DIAGNOSIS — H60.00 FURUNCLE OF EAR CANAL: Primary | ICD-10-CM

## 2022-12-07 PROCEDURE — G0463 HOSPITAL OUTPT CLINIC VISIT: HCPCS

## 2022-12-07 PROCEDURE — 99213 OFFICE O/P EST LOW 20 MIN: CPT | Performed by: INTERNAL MEDICINE

## 2022-12-07 PROCEDURE — 87070 CULTURE OTHR SPECIMN AEROBIC: CPT | Mod: ZL | Performed by: INTERNAL MEDICINE

## 2022-12-07 RX ORDER — SULFAMETHOXAZOLE/TRIMETHOPRIM 800-160 MG
1 TABLET ORAL 2 TIMES DAILY
Qty: 14 TABLET | Refills: 0 | Status: CANCELLED | OUTPATIENT
Start: 2022-12-07 | End: 2022-12-14

## 2022-12-07 RX ORDER — BACITRACIN ZINC 500 [USP'U]/G
OINTMENT TOPICAL 2 TIMES DAILY
Qty: 425 G | Refills: 0 | Status: SHIPPED | OUTPATIENT
Start: 2022-12-07

## 2022-12-07 ASSESSMENT — ENCOUNTER SYMPTOMS
EYE ITCHING: 0
EYE REDNESS: 0
SINUS PAIN: 0
RHINORRHEA: 1
SORE THROAT: 0
FEVER: 0
EYE DISCHARGE: 0
SINUS PRESSURE: 0
SHORTNESS OF BREATH: 0
COUGH: 0
EYE PAIN: 0

## 2022-12-07 ASSESSMENT — PAIN SCALES - GENERAL: PAINLEVEL: SEVERE PAIN (7)

## 2022-12-07 NOTE — PROGRESS NOTES
"    Assessment & Plan   Nury Gilbert is a 27 year old, presenting for the following health issues:      ICD-10-CM    1. Furuncle of ear canal  H60.00 Abscess Aerobic Bacterial Culture Routine     bacitracin 500 UNIT/GM external ointment     Abscess Aerobic Bacterial Culture Routine        Will treat furuncle of the left ear canal with topical bacitracin ointment or OTC bacterial cream with pain reliever from [employer].    Scab removed + Drainage sent to lab for culture.    Encourage patient to clean her ear-pods regularly and use topical anti-bacterial soap or witch hazel topically to prevent future development of furuncles.    Return back to clinic if symptoms worsen or do not improve.      Return for Return as needed for new or worsening symptoms.    Ang Fagan MD  [facility] AND [facility]        BMI:   Estimated body mass index is 32.87 kg/m  as calculated from the following:    Height as of this encounter: 1.753 m (5' 9\").    Weight as of this encounter: 101 kg (222 lb 9.6 oz).       See Patient Instructions    Return for Return as needed for new or worsening symptoms.    Ang Fagan MD  [facility]    Jazlyn Arrington is a 27 year old, presenting for the following health issues:  left ear drainage and sore inside      History of Present Illness       Reason for visit:  Left ear pain and drainage  Symptom onset:  1-3 days ago  Symptoms include:  Left ear pain, drainage, sore inside the ear  Symptom intensity:  Severe  Symptom progression:  Worsening  Had these symptoms before:  No  Prior treatment description:  Tylenol   What makes it worse:  Pressure, laying on ear  What makes it better:  Unsure, nothing    She eats 2-3 servings of fruits and vegetables daily.She consumes 0 sweetened beverage(s) daily.She exercises with enough effort to increase her heart rate 60 or more minutes per day.  She exercises with enough effort to increase her heart rate 3 or less days per week. " "  She is taking medications regularly.           Review of Systems   Constitutional: Negative for fever.   HENT: Positive for ear discharge (Left ear-yellow discharge ), ear pain ( left), postnasal drip and rhinorrhea. Negative for congestion, dental problem, hearing loss, mouth sores, sinus pressure, sinus pain and sore throat.    Eyes: Negative for pain, discharge, redness and itching.   Respiratory: Negative for cough and shortness of breath.    Cardiovascular: Negative for chest pain.            Objective    /66 (BP Location: Right arm, Patient Position: Sitting, Cuff Size: Adult Regular)   Pulse 66   Temp 98.1  F (36.7  C) (Temporal)   Resp 20   Ht 1.753 m (5' 9\")   Wt 101 kg (222 lb 9.6 oz)   SpO2 99%   BMI 32.87 kg/m    Body mass index is 32.87 kg/m .  Physical Exam  Constitutional:       General: She is not in acute distress.     Appearance: Normal appearance. She is not ill-appearing.   HENT:      Head: Normocephalic and atraumatic.      Right Ear: Tympanic membrane, ear canal and external ear normal. There is no impacted cerumen. Tympanic membrane is not perforated or erythematous.      Left Ear: Tympanic membrane normal. Drainage and tenderness present. There is no impacted cerumen. There is mastoid tenderness. Tympanic membrane is not perforated, erythematous or bulging.      Ears:        Comments: Furuncle noted along inferior canal, slightly posterior. Just at the deep edge of reach with ear buds.     Nose: Nose normal. No congestion or rhinorrhea.      Mouth/Throat:      Mouth: Mucous membranes are moist.      Pharynx: Oropharynx is clear. No oropharyngeal exudate or posterior oropharyngeal erythema.   Eyes:      Extraocular Movements: Extraocular movements intact.      Conjunctiva/sclera: Conjunctivae normal.      Pupils: Pupils are equal, round, and reactive to light.   Pulmonary:      Effort: No respiratory distress.   Musculoskeletal:      Cervical back: Normal range of motion. "   Skin:     Comments: Left exterior ear canal from furuncle.  Small amount of yellow drainage present from for furuncle    Neurological:      Mental Status: She is alert.   Psychiatric:         Mood and Affect: Mood normal.         Behavior: Behavior normal.         Thought Content: Thought content normal.         Judgment: Judgment normal.            No results found for any visits on 12/07/22.

## 2022-12-07 NOTE — NURSING NOTE
"Chief Complaint   Patient presents with     left ear drainage and sore inside         Initial /66 (BP Location: Right arm, Patient Position: Sitting, Cuff Size: Adult Regular)   Pulse 66   Temp 98.1  F (36.7  C) (Temporal)   Resp 20   Ht 1.753 m (5' 9\")   Wt 101 kg (222 lb 9.6 oz)   SpO2 99%   BMI 32.87 kg/m   Estimated body mass index is 32.87 kg/m  as calculated from the following:    Height as of this encounter: 1.753 m (5' 9\").    Weight as of this encounter: 101 kg (222 lb 9.6 oz).       FOOD SECURITY SCREENING QUESTIONS:    The next two questions are to help us understand your food security.  If you are feeling you need any assistance in this area, we have resources available to support you today.    Hunger Vital Signs:  Within the past 12 months we worried whether our food would run out before we got money to buy more. Never  Within the past 12 months the food we bought just didn't last and we didn't have money to get more. Never    Advance Care Directive on file? no      Medication reconciliation complete.      Miguel Butler,on 12/7/2022 at 10:20 AM        "

## 2022-12-07 NOTE — PATIENT INSTRUCTIONS
Use bacitracin to treat external ear boil.  Return back to clinic if symptoms worsen or do not improve.  May use Tylenol or ibuprofen for pain control.  Use warm packs to let area continue to drain.  Avoid using ear buds until completely healed, disinfect earbuds regularly.

## 2022-12-09 LAB — BACTERIA ABSC ANAEROBE+AEROBE CULT: NORMAL

## 2023-02-08 ENCOUNTER — OFFICE VISIT (OUTPATIENT)
Dept: OBGYN | Facility: OTHER | Age: 28
End: 2023-02-08
Payer: COMMERCIAL

## 2023-02-08 VITALS
WEIGHT: 220.6 LBS | OXYGEN SATURATION: 99 % | RESPIRATION RATE: 14 BRPM | BODY MASS INDEX: 32.58 KG/M2 | DIASTOLIC BLOOD PRESSURE: 68 MMHG | HEART RATE: 80 BPM | SYSTOLIC BLOOD PRESSURE: 116 MMHG

## 2023-02-08 DIAGNOSIS — Z11.3 SCREENING FOR GONORRHEA: ICD-10-CM

## 2023-02-08 DIAGNOSIS — N93.9 VAGINAL BLEEDING: ICD-10-CM

## 2023-02-08 DIAGNOSIS — T83.32XA INTRAUTERINE CONTRACEPTIVE DEVICE THREADS LOST, INITIAL ENCOUNTER: ICD-10-CM

## 2023-02-08 DIAGNOSIS — N89.8 VAGINAL DISCHARGE: ICD-10-CM

## 2023-02-08 DIAGNOSIS — Z12.4 CERVICAL CANCER SCREENING: Primary | ICD-10-CM

## 2023-02-08 DIAGNOSIS — Z11.3 SCREEN FOR STD (SEXUALLY TRANSMITTED DISEASE): ICD-10-CM

## 2023-02-08 LAB
C TRACH DNA SPEC QL PROBE+SIG AMP: NEGATIVE
CLUE CELLS: ABNORMAL
N GONORRHOEA DNA SPEC QL NAA+PROBE: NEGATIVE
TRICHOMONAS, WET PREP: ABNORMAL
WBC'S/HIGH POWER FIELD, WET PREP: ABNORMAL
YEAST, WET PREP: ABNORMAL

## 2023-02-08 PROCEDURE — G0463 HOSPITAL OUTPT CLINIC VISIT: HCPCS

## 2023-02-08 PROCEDURE — G0123 SCREEN CERV/VAG THIN LAYER: HCPCS

## 2023-02-08 PROCEDURE — 87491 CHLMYD TRACH DNA AMP PROBE: CPT | Mod: ZL

## 2023-02-08 PROCEDURE — 87210 SMEAR WET MOUNT SALINE/INK: CPT | Mod: ZL

## 2023-02-08 PROCEDURE — 99213 OFFICE O/P EST LOW 20 MIN: CPT

## 2023-02-08 PROCEDURE — 87591 N.GONORRHOEAE DNA AMP PROB: CPT | Mod: ZL

## 2023-02-08 ASSESSMENT — PAIN SCALES - GENERAL: PAINLEVEL: NO PAIN (0)

## 2023-02-08 NOTE — NURSING NOTE
"Chief Complaint   Patient presents with     Follow Up     IUD Check     Patient here for IUD check, concern of periodic left sided stabbing pain. Has not had period since 07/2020, has not been able to locate strings. Denies pain currently. Desire for STI testing and updating Pap.  Initial /68 (BP Location: Right arm, Patient Position: Sitting, Cuff Size: Adult Regular)   Pulse 80   Resp 14   Wt 100.1 kg (220 lb 9.6 oz)   SpO2 99%   BMI 32.58 kg/m   Estimated body mass index is 32.58 kg/m  as calculated from the following:    Height as of 12/7/22: 1.753 m (5' 9\").    Weight as of this encounter: 100.1 kg (220 lb 9.6 oz).  Medication Reconciliation: complete    Sirisha Fairbanks RN    "

## 2023-02-08 NOTE — PROGRESS NOTES
Follow-Up Visit    S: Ms. Nury Gilbert is a 27 year old  here for IUD check. She had a mirena placed on 2020. She reports that she is having some cramping that is abnormal for her..     O:  /68 (BP Location: Right arm, Patient Position: Sitting, Cuff Size: Adult Regular)   Pulse 80   Resp 14   Wt 100.1 kg (220 lb 9.6 oz)   SpO2 99%   BMI 32.58 kg/m    Gen: Well-appearing, NAD  Pulm: nonlabored  Psych: appropriate mood and affect    Pelvic:  Normal appearing external female genitalia. Normal hair distribution. Vagina is without lesions but swollen today. Moderate white discharge. Cervix normal, IUD strings are missing today. She has pain with speculum insertion. Pap collected.     A/P:  Ms. Nury Gilbert is a 27 year old  here for IUD check. Strings are missing today, attempted multiple speculum repositions and also attempted to utilize cytobrush within cervical opening with no strings visualized. Discussed we should so US to assess placement of IUD. Collected GC/Chlam today as well as wet prep due to Moderate discharge. She is scheduled tomorrow for US. Will notify of findings.     CAM Chavez-C  2023 1:34 PM

## 2023-02-09 ENCOUNTER — HOSPITAL ENCOUNTER (OUTPATIENT)
Dept: ULTRASOUND IMAGING | Facility: OTHER | Age: 28
Discharge: HOME | End: 2023-02-09
Payer: COMMERCIAL

## 2023-02-09 PROCEDURE — 76830 TRANSVAGINAL US NON-OB: CPT

## 2023-02-10 LAB
BKR LAB AP GYN ADEQUACY: NORMAL
BKR LAB AP GYN INTERPRETATION: NORMAL
BKR LAB AP HPV REFLEX: NORMAL
BKR LAB AP PREVIOUS ABNORMAL: NORMAL
PATH REPORT.COMMENTS IMP SPEC: NORMAL
PATH REPORT.COMMENTS IMP SPEC: NORMAL
PATH REPORT.RELEVANT HX SPEC: NORMAL

## 2023-02-16 ENCOUNTER — OFFICE VISIT (OUTPATIENT)
Dept: OBGYN | Facility: OTHER | Age: 28
End: 2023-02-16
Attending: OBSTETRICS & GYNECOLOGY
Payer: COMMERCIAL

## 2023-02-16 VITALS
BODY MASS INDEX: 32.49 KG/M2 | OXYGEN SATURATION: 98 % | DIASTOLIC BLOOD PRESSURE: 76 MMHG | WEIGHT: 220 LBS | SYSTOLIC BLOOD PRESSURE: 132 MMHG | HEART RATE: 64 BPM

## 2023-02-16 DIAGNOSIS — Z30.018 ENCOUNTER FOR PRESCRIPTION FOR NUVARING: Primary | ICD-10-CM

## 2023-02-16 DIAGNOSIS — Z30.432 ENCOUNTER FOR IUD REMOVAL: ICD-10-CM

## 2023-02-16 PROCEDURE — 99213 OFFICE O/P EST LOW 20 MIN: CPT | Mod: 25 | Performed by: OBSTETRICS & GYNECOLOGY

## 2023-02-16 PROCEDURE — G0463 HOSPITAL OUTPT CLINIC VISIT: HCPCS | Mod: 25 | Performed by: OBSTETRICS & GYNECOLOGY

## 2023-02-16 PROCEDURE — 58301 REMOVE INTRAUTERINE DEVICE: CPT | Performed by: OBSTETRICS & GYNECOLOGY

## 2023-02-16 RX ORDER — ETONOGESTREL AND ETHINYL ESTRADIOL VAGINAL RING .015; .12 MG/D; MG/D
1 RING VAGINAL
Qty: 3 EACH | Refills: 3 | Status: SHIPPED | OUTPATIENT
Start: 2023-02-16 | End: 2024-01-29

## 2023-02-16 ASSESSMENT — PAIN SCALES - GENERAL: PAINLEVEL: MILD PAIN (3)

## 2023-02-16 NOTE — NURSING NOTE
Patient presents to clinic for Intra uterine device removal and possibly replacement  /76   Pulse 64   Wt 99.8 kg (220 lb)   SpO2 98%   BMI 32.49 kg/m    Giuliana Verdin LPN on 2/16/2023 at 2:57 PM

## 2023-02-16 NOTE — PROGRESS NOTES
Follow-Up Visit    S: Ms. Nury Gilbert is a 28 year old  here for malpositioned IUD removal, contraception counseling. She does not want her IUD replaced today after removal. She is wondering if there are other options that might help with her pelvic pain. She is not really interested in OCPs and doesn't like the idea of going back on Depo.    O:  /76   Pulse 64   Wt 99.8 kg (220 lb)   SpO2 98%   BMI 32.49 kg/m    Gen: Well-appearing, NAD  Pulm: nonlabored  Psych: appropriate mood and affect    Pelvic:  Normal appearing external female genitalia. Normal hair distribution. Vagina is without lesions. Small white discharge. Cervix normal, no lesions    Procedure Note  After obtaining informed consent, the speculum was placed in the vagina and the cervix visualized. The cervix was prepped with betadine. A cytobrush was used to attempt to sweep the strings in the os, which was not successful. A tenaculum was then placed on the anterior lip of the cervix and a vaginal packing forceps introduced through the os into the lower uterine segment. The IUD strings were then able to be grasped and the IUD removed with gentle but steady traction. The device was inspected and appeared intact.  The tenaculum was removed and the sites appeared hemostatic. The speculum was then removed. Patient tolerated procedure well    A/P:  Ms. Nury Gilbert is a 28 year old  here for malpositioned IUD removal. After discussion of contraception and menstrual management options, she elected to try the NuvaRing. Rx sent.  - RTC prkiet Johns MD  OB/GYN  2023 3:44 PM

## 2023-05-12 ENCOUNTER — OFFICE VISIT (OUTPATIENT)
Dept: FAMILY MEDICINE | Facility: OTHER | Age: 28
End: 2023-05-12
Payer: COMMERCIAL

## 2023-05-12 VITALS
RESPIRATION RATE: 20 BRPM | OXYGEN SATURATION: 96 % | HEART RATE: 68 BPM | WEIGHT: 204 LBS | BODY MASS INDEX: 30.92 KG/M2 | DIASTOLIC BLOOD PRESSURE: 70 MMHG | SYSTOLIC BLOOD PRESSURE: 102 MMHG | HEIGHT: 68 IN | TEMPERATURE: 97.4 F

## 2023-05-12 DIAGNOSIS — J01.90 ACUTE SINUSITIS WITH SYMPTOMS > 10 DAYS: Primary | ICD-10-CM

## 2023-05-12 DIAGNOSIS — H92.01 RIGHT EAR PAIN: ICD-10-CM

## 2023-05-12 PROCEDURE — 99213 OFFICE O/P EST LOW 20 MIN: CPT | Performed by: NURSE PRACTITIONER

## 2023-05-12 PROCEDURE — G0463 HOSPITAL OUTPT CLINIC VISIT: HCPCS | Mod: 25

## 2023-05-12 RX ORDER — AZITHROMYCIN 250 MG/1
TABLET, FILM COATED ORAL
Qty: 6 TABLET | Refills: 0 | Status: SHIPPED | OUTPATIENT
Start: 2023-05-12 | End: 2023-05-17

## 2023-05-12 RX ORDER — LISDEXAMFETAMINE DIMESYLATE 30 MG/1
30 CAPSULE ORAL 2 TIMES DAILY
COMMUNITY
Start: 2023-05-10 | End: 2024-04-12

## 2023-05-12 ASSESSMENT — ANXIETY QUESTIONNAIRES
5. BEING SO RESTLESS THAT IT IS HARD TO SIT STILL: NOT AT ALL
7. FEELING AFRAID AS IF SOMETHING AWFUL MIGHT HAPPEN: NOT AT ALL
8. IF YOU CHECKED OFF ANY PROBLEMS, HOW DIFFICULT HAVE THESE MADE IT FOR YOU TO DO YOUR WORK, TAKE CARE OF THINGS AT HOME, OR GET ALONG WITH OTHER PEOPLE?: NOT DIFFICULT AT ALL
1. FEELING NERVOUS, ANXIOUS, OR ON EDGE: NOT AT ALL
2. NOT BEING ABLE TO STOP OR CONTROL WORRYING: NOT AT ALL
IF YOU CHECKED OFF ANY PROBLEMS ON THIS QUESTIONNAIRE, HOW DIFFICULT HAVE THESE PROBLEMS MADE IT FOR YOU TO DO YOUR WORK, TAKE CARE OF THINGS AT HOME, OR GET ALONG WITH OTHER PEOPLE: NOT DIFFICULT AT ALL
4. TROUBLE RELAXING: NOT AT ALL
7. FEELING AFRAID AS IF SOMETHING AWFUL MIGHT HAPPEN: NOT AT ALL
3. WORRYING TOO MUCH ABOUT DIFFERENT THINGS: NOT AT ALL
6. BECOMING EASILY ANNOYED OR IRRITABLE: NOT AT ALL
GAD7 TOTAL SCORE: 0

## 2023-05-12 ASSESSMENT — ENCOUNTER SYMPTOMS
NEUROLOGICAL NEGATIVE: 1
FEVER: 0
HEADACHES: 0
TROUBLE SWALLOWING: 1
COUGH: 1
WHEEZING: 0
SORE THROAT: 1
CONSTITUTIONAL NEGATIVE: 1
APPETITE CHANGE: 0
RHINORRHEA: 1
SHORTNESS OF BREATH: 0
PSYCHIATRIC NEGATIVE: 1
FATIGUE: 0
CHILLS: 0
CARDIOVASCULAR NEGATIVE: 1
MUSCULOSKELETAL NEGATIVE: 1
GASTROINTESTINAL NEGATIVE: 1

## 2023-05-12 ASSESSMENT — PATIENT HEALTH QUESTIONNAIRE - PHQ9
SUM OF ALL RESPONSES TO PHQ QUESTIONS 1-9: 0
SUM OF ALL RESPONSES TO PHQ QUESTIONS 1-9: 0
10. IF YOU CHECKED OFF ANY PROBLEMS, HOW DIFFICULT HAVE THESE PROBLEMS MADE IT FOR YOU TO DO YOUR WORK, TAKE CARE OF THINGS AT HOME, OR GET ALONG WITH OTHER PEOPLE: NOT DIFFICULT AT ALL

## 2023-05-12 ASSESSMENT — PAIN SCALES - GENERAL: PAINLEVEL: SEVERE PAIN (6)

## 2023-05-12 NOTE — PROGRESS NOTES
Nury Gilbert  1995    ASSESSMENT/PLAN:   1. Acute sinusitis with symptoms > 10 days  She has been experiencing 2 weeks of nasal congestion and pressure which have seemed to improve however she now feels bilateral ear pressure, right ear pain and has a sore throat from the nasal drainage.  She is tender along frontal sinuses.  Ear exam within normal limits.  Throat slightly erythematous, no tonsillar swelling or exudate.  He does have allergies to penicillin.  Commend treating for likely acute bacterial rhinosinusitis with azithromycin once a day for 5 days.  Patient is agreeable.  We reviewed over-the-counter remedies to help manage symptoms as well.  - azithromycin (ZITHROMAX) 250 MG tablet; Take 2 tablets (500 mg) by mouth daily for 1 day, THEN 1 tablet (250 mg) daily for 4 days.  Dispense: 6 tablet; Refill: 0    2. Right ear pain  Reviewed with patient that bilateral TM are not erythematous, bulging and without effusion.  No signs of acute otitis media.  Review over-the-counter nondrowsy histamine and nasal sprays to help with congestion.    Patient agrees with plan of care and verbalizes understating. AVS printed. Patient education provided verbally and written instructions provided as requested. Patient made aware of emergent sings and symptoms to monitor for and when to seek additional care/follow up.     SUBJECTIVE:   CHIEF COMPLAINT/ REASON FOR VISIT  Patient presents with:  Ear Problem     HISTORY OF PRESENT ILLNESS  Nury Gilbert is a pleasant 28 year old female presents to [facility] today with concerns for persistent earache for about 2 weeks.  She states a few weeks ago she thought she was having allergies and had some sinus congestion and pressure in her cheeks.  Sinus pressure has seemed to improve.  She does have a sore throat.  Intermittent ear pain, right ear is worse than left ear.  Ear pain has become more constant over the past few days.  He states it feels wet at times, no visible  "drainage and it has been itchy.  She denies any cough.  No shortness breath or wheezing.  She has been afebrile.  No gastrointestinal symptoms.    I have reviewed the nursing notes.  I have reviewed allergies, medication list, problem list, and past medical history.    REVIEW OF SYSTEMS  Review of Systems   Constitutional: Negative.  Negative for appetite change, chills, fatigue and fever.   HENT: Positive for congestion, ear pain, rhinorrhea, sore throat and trouble swallowing. Negative for ear discharge.    Respiratory: Positive for cough. Negative for shortness of breath and wheezing.    Cardiovascular: Negative.  Negative for chest pain.   Gastrointestinal: Negative.    Genitourinary: Negative.    Musculoskeletal: Negative.    Skin: Negative for rash.   Neurological: Negative.  Negative for headaches.   Psychiatric/Behavioral: Negative.         VITAL SIGNS  Vitals:    05/12/23 1318   BP: 102/70   BP Location: Right arm   Patient Position: Sitting   Cuff Size: Adult Regular   Pulse: 68   Resp: 20   Temp: 97.4  F (36.3  C)   TempSrc: Temporal   SpO2: 96%   Weight: 92.5 kg (204 lb)   Height: 1.727 m (5' 8\")      Body mass index is 31.02 kg/m .    OBJECTIVE:   PHYSICAL EXAM  Physical Exam  Vitals reviewed.   Constitutional:       Appearance: Normal appearance. She is not ill-appearing or toxic-appearing.   HENT:      Head: Normocephalic and atraumatic.      Right Ear: Tympanic membrane, ear canal and external ear normal.      Left Ear: Tympanic membrane, ear canal and external ear normal.      Nose: Rhinorrhea present. No congestion.      Right Sinus: Frontal sinus tenderness present.      Left Sinus: Frontal sinus tenderness present.      Mouth/Throat:      Pharynx: No oropharyngeal exudate or posterior oropharyngeal erythema.   Eyes:      Conjunctiva/sclera: Conjunctivae normal.   Cardiovascular:      Rate and Rhythm: Normal rate and regular rhythm.      Pulses: Normal pulses.      Heart sounds: Normal heart " sounds. No murmur heard.  Pulmonary:      Effort: Pulmonary effort is normal.      Breath sounds: Normal breath sounds. No wheezing.   Musculoskeletal:      Cervical back: Neck supple. No tenderness.   Lymphadenopathy:      Cervical: No cervical adenopathy.   Skin:     Capillary Refill: Capillary refill takes less than 2 seconds.      Findings: No rash.   Neurological:      General: No focal deficit present.      Mental Status: She is alert and oriented to person, place, and time.   Psychiatric:         Mood and Affect: Mood normal.         Behavior: Behavior normal.         Thought Content: Thought content normal.         Judgment: Judgment normal.          DIAGNOSTICS  No results found for any visits on 05/12/23.     Marlene Wells NP  [facility]

## 2023-05-12 NOTE — NURSING NOTE
"Patient presents to the clinic for bilateral ear pain this pas week.    FOOD SECURITY SCREENING QUESTIONS:    The next two questions are to help us understand your food security.  If you are feeling you need any assistance in this area, we have resources available to support you today.    Hunger Vital Signs:  Within the past 12 months we worried whether our food would run out before we got money to buy more. Never  Within the past 12 months the food we bought just didn't last and we didn't have money to get more. Never    Advance Care Directive on file? no  Advance Care Directive provided to patient? Declined.    Chief Complaint   Patient presents with     Ear Problem       Initial /70 (BP Location: Right arm, Patient Position: Sitting, Cuff Size: Adult Regular)   Pulse 68   Temp 97.4  F (36.3  C) (Temporal)   Resp 20   Ht 1.727 m (5' 8\")   Wt 92.5 kg (204 lb)   LMP 05/08/2023 (Within Days)   SpO2 96%   BMI 31.02 kg/m   Estimated body mass index is 31.02 kg/m  as calculated from the following:    Height as of this encounter: 1.727 m (5' 8\").    Weight as of this encounter: 92.5 kg (204 lb).  Medication Reconciliation: complete        Richelle Bull LPN       "

## 2023-06-03 ENCOUNTER — HEALTH MAINTENANCE LETTER (OUTPATIENT)
Age: 28
End: 2023-06-03

## 2023-08-21 ENCOUNTER — APPOINTMENT (OUTPATIENT)
Dept: GENERAL RADIOLOGY | Facility: OTHER | Age: 28
End: 2023-08-21
Attending: INTERNAL MEDICINE
Payer: COMMERCIAL

## 2023-08-21 ENCOUNTER — HOSPITAL ENCOUNTER (EMERGENCY)
Facility: OTHER | Age: 28
Discharge: HOME | End: 2023-08-21
Attending: FAMILY MEDICINE | Admitting: FAMILY MEDICINE
Payer: COMMERCIAL

## 2023-08-21 VITALS
HEART RATE: 86 BPM | RESPIRATION RATE: 16 BRPM | DIASTOLIC BLOOD PRESSURE: 68 MMHG | HEIGHT: 69 IN | BODY MASS INDEX: 32.58 KG/M2 | WEIGHT: 220 LBS | SYSTOLIC BLOOD PRESSURE: 130 MMHG | OXYGEN SATURATION: 97 % | TEMPERATURE: 98.6 F

## 2023-08-21 DIAGNOSIS — S90.31XA CONTUSION OF RIGHT FOOT, INITIAL ENCOUNTER: ICD-10-CM

## 2023-08-21 DIAGNOSIS — S97.81XA CRUSHING INJURY OF RIGHT FOOT, INITIAL ENCOUNTER: ICD-10-CM

## 2023-08-21 PROCEDURE — 99283 EMERGENCY DEPT VISIT LOW MDM: CPT

## 2023-08-21 PROCEDURE — 99283 EMERGENCY DEPT VISIT LOW MDM: CPT | Performed by: INTERNAL MEDICINE

## 2023-08-21 PROCEDURE — 73630 X-RAY EXAM OF FOOT: CPT | Mod: RT

## 2023-08-21 ASSESSMENT — ACTIVITIES OF DAILY LIVING (ADL): ADLS_ACUITY_SCORE: 39

## 2023-08-21 NOTE — ED PROVIDER NOTES
Emergency Department Provider Note  : 1995 Age: 28 year old Sex: female MRN: [id number]    Chief Complaint   Patient presents with    Foot Pain       Medical Decision Making / Assessment / Plan   28 year old female presenting with right foot contusion/soft tissue injury    ED Course as of 23 1829   Mon Aug 21, 2023   1750 Patient evaluated.  Right foot pain after having a can of soup drop on her foot last evening approximately 1930.  Pain with palpation and localized bruising noted.  She would like x-rays.  She works at "[employer] and needs to be on her feet all day.  X-ray orders placed.    No obvious fracture on x-ray.  Patient discharged home with soft tissue injury.  Conservative management advised.  Activity as tolerated.  Ice packs, Tylenol, ibuprofen as needed.        A shared decision making model was used. Plan and all results were discussed  Time was taken to answer all questions. Patient and/or associated parties understood and were agreeable to treatment plan.  Strict return to Emergency Department precautions as well as appropriate follow up instructions were provided. The patient was discharged in stable condition.    New Prescriptions    No medications on file       Final diagnoses:   Contusion of right foot, initial encounter   Crushing injury of right foot, initial encounter       Ang Fagan MD  2023   Emergency Department    Subjective   Nury is a 28 year old female who presents at  5:11 PM with right foot pain after having a large family size can of soup dropped on her foot last evening around 7:30 PM.    Has localized pain and bruising.  Mostly overlying the fourth and fifth metatarsals.  Pain with palpation.  Was concerned about possible fracture presented for x-ray evaluation.    I have reviewed the Medications, Allergies, Past Medical and Surgical History, and Social History in the Epic System and with family.    Review of Systems:  Please see Subjective / HPI for  "pertinent positives and negatives. All other systems reviewed and found to be negative.      Objective   Patient Vitals for the past 24 hrs:   BP Temp Temp src Pulse Resp SpO2 Height Weight   23 1707 130/68 98.6  F (37  C) Tympanic 86 16 97 % 1.753 m (5' 9\") 99.8 kg (220 lb)       Physical Exam:     General: Awake, alert, in no acute respiratory distress.  Cardiovascular: Peripheral pulses present.  Extremities: No obvious bone deformity.  Neurological: GCS 15, extremities without gross deficit.  Skin: Warm, no rashes, lesions.  + dorsal right foot bruising -mostly overlying the fourth and fifth metatarsals.  No crepitus to palpation.  Tenderness overlying the fourth and fifth metatarsals.  Mild tenderness of third metatarsal.   Psychiatric: Appropriate affect.     Procedures / Critical Care   Procedures    Aggregate Critical Care Time: None.     Orders Placed This Encounter   Procedures    XR Foot Right G/E 3 Views       RESULTS: As noted above.          Medical/Surgical History:  Past Medical History:   Diagnosis Date    Anxiety disorder     No Comments Provided    Attention-deficit hyperactivity disorder     No Comments Provided    Depressive disorder     Superficial foreign body of finger     2017     Past Surgical History:   Procedure Laterality Date    ADENOIDECTOMY      No Comments Provided    APPENDECTOMY OPEN          ARTHROSCOPY KNEE      2012,2nd as well at age 18     SECTION N/A 2018    Procedure:  SECTION;  External version attemted prior to Section;  Surgeon: Naye Johns MD;  Location:  OR     SECTION N/A 2020    Procedure:  SECTION;  Surgeon: Naye Johns MD;  Location:  OR    LAPAROSCOPY DIAGNOSTIC (GYN) N/A 2019    Procedure: LAPAROSCOPY DIAGNOSTIC;  Surgeon: Naye Johns MD;  Location: [facility] OR    OTHER SURGICAL HISTORY      1999,[id number],BLADDER SURGERY    OTHER SURGICAL HISTORY      2017,21737.0,LA REMOVE " FOREIGN BODY SIMPLE       Medications:  No current facility-administered medications for this encounter.     Current Outpatient Medications   Medication    acetaminophen (TYLENOL) 500 MG tablet    FLUoxetine (PROZAC) 20 MG capsule    ibuprofen (ADVIL/MOTRIN) 200 MG tablet    lamoTRIgine (LAMICTAL) 25 MG tablet    LORazepam (ATIVAN) 1 MG tablet    VYVANSE 30 MG capsule    VYVANSE 60 MG capsule    bacitracin 500 UNIT/GM external ointment    etonogestrel-ethinyl estradiol (NUVARING) 0.12-0.015 MG/24HR vaginal ring    medical cannabis (Patient's own supply)    mupirocin (BACTROBAN) 2 % external cream    mupirocin (BACTROBAN) 2 % external ointment       Allergies:  Penicillins and Phenergan dm [[id number]-dm]    Relevant labs, images, EKGs, Epic and outside hospital (if applicable) charts were reviewed. The findings, diagnosis, plan, and need for follow up were discussed with the patient/family. Nursing notes were reviewed.      Ang Fagan MD  08/21/23 3152

## 2023-08-21 NOTE — DISCHARGE INSTRUCTIONS
No fractures noted on x-ray.    Continue with conservative management.  Tylenol, ibuprofen, ice packs as needed.    Activity as tolerated.    Follow-up as needed for new or worsening symptoms.

## 2023-08-21 NOTE — ED TRIAGE NOTES
Pt presents to ED after having a can of soup dropped on her right foot. Pt reports pain and bruising.    Coral Guo RN on 8/21/2023 at 5:08 PM         Triage Assessment       Row Name 08/21/23 1707       Skin Circulation/Temperature WDL    Skin Circulation/Temperature WDL X  bruising on right foot.

## 2023-10-06 ENCOUNTER — APPOINTMENT (OUTPATIENT)
Dept: GENERAL RADIOLOGY | Facility: OTHER | Age: 28
End: 2023-10-06
Attending: STUDENT IN AN ORGANIZED HEALTH CARE EDUCATION/TRAINING PROGRAM
Payer: COMMERCIAL

## 2023-10-06 ENCOUNTER — HOSPITAL ENCOUNTER (EMERGENCY)
Facility: OTHER | Age: 28
Discharge: HOME | End: 2023-10-06
Attending: STUDENT IN AN ORGANIZED HEALTH CARE EDUCATION/TRAINING PROGRAM | Admitting: STUDENT IN AN ORGANIZED HEALTH CARE EDUCATION/TRAINING PROGRAM
Payer: COMMERCIAL

## 2023-10-06 VITALS
BODY MASS INDEX: 28.14 KG/M2 | RESPIRATION RATE: 16 BRPM | OXYGEN SATURATION: 99 % | WEIGHT: 190 LBS | HEART RATE: 84 BPM | TEMPERATURE: 97.4 F | SYSTOLIC BLOOD PRESSURE: 119 MMHG | HEIGHT: 69 IN | DIASTOLIC BLOOD PRESSURE: 87 MMHG

## 2023-10-06 DIAGNOSIS — R50.9 FEVER AND CHILLS: ICD-10-CM

## 2023-10-06 DIAGNOSIS — R11.2 NAUSEA VOMITING AND DIARRHEA: ICD-10-CM

## 2023-10-06 DIAGNOSIS — R05.8 PRODUCTIVE COUGH: ICD-10-CM

## 2023-10-06 DIAGNOSIS — R19.7 NAUSEA VOMITING AND DIARRHEA: ICD-10-CM

## 2023-10-06 LAB
ALBUMIN UR-MCNC: 10 MG/DL
APPEARANCE UR: CLEAR
BACTERIA #/AREA URNS HPF: ABNORMAL /HPF
BILIRUB UR QL STRIP: NEGATIVE
COLOR UR AUTO: YELLOW
FLUAV RNA SPEC QL NAA+PROBE: NEGATIVE
FLUBV RNA RESP QL NAA+PROBE: NEGATIVE
GLUCOSE UR STRIP-MCNC: NEGATIVE MG/DL
HCG UR QL: NEGATIVE
HGB UR QL STRIP: NEGATIVE
HOLD SPECIMEN: NORMAL
HYALINE CASTS: 1 /LPF
KETONES UR STRIP-MCNC: NEGATIVE MG/DL
LEUKOCYTE ESTERASE UR QL STRIP: NEGATIVE
MUCOUS THREADS #/AREA URNS LPF: PRESENT /LPF
NITRATE UR QL: NEGATIVE
PH UR STRIP: 6 [PH] (ref 5–9)
RBC URINE: 1 /HPF
RSV RNA SPEC NAA+PROBE: NEGATIVE
SARS-COV-2 RNA RESP QL NAA+PROBE: NEGATIVE
SP GR UR STRIP: 1.02 (ref 1–1.03)
UROBILINOGEN UR STRIP-MCNC: NORMAL MG/DL
WBC URINE: 1 /HPF

## 2023-10-06 PROCEDURE — 99284 EMERGENCY DEPT VISIT MOD MDM: CPT | Performed by: STUDENT IN AN ORGANIZED HEALTH CARE EDUCATION/TRAINING PROGRAM

## 2023-10-06 PROCEDURE — 81025 URINE PREGNANCY TEST: CPT | Performed by: STUDENT IN AN ORGANIZED HEALTH CARE EDUCATION/TRAINING PROGRAM

## 2023-10-06 PROCEDURE — 250N000011 HC RX IP 250 OP 636: Mod: JZ | Performed by: STUDENT IN AN ORGANIZED HEALTH CARE EDUCATION/TRAINING PROGRAM

## 2023-10-06 PROCEDURE — 87637 SARSCOV2&INF A&B&RSV AMP PRB: CPT | Performed by: STUDENT IN AN ORGANIZED HEALTH CARE EDUCATION/TRAINING PROGRAM

## 2023-10-06 PROCEDURE — 71046 X-RAY EXAM CHEST 2 VIEWS: CPT | Mod: TC

## 2023-10-06 PROCEDURE — 81001 URINALYSIS AUTO W/SCOPE: CPT | Performed by: STUDENT IN AN ORGANIZED HEALTH CARE EDUCATION/TRAINING PROGRAM

## 2023-10-06 PROCEDURE — 99284 EMERGENCY DEPT VISIT MOD MDM: CPT | Mod: 25

## 2023-10-06 PROCEDURE — 81001 URINALYSIS AUTO W/SCOPE: CPT | Performed by: FAMILY MEDICINE

## 2023-10-06 PROCEDURE — 96374 THER/PROPH/DIAG INJ IV PUSH: CPT

## 2023-10-06 PROCEDURE — 81025 URINE PREGNANCY TEST: CPT | Performed by: FAMILY MEDICINE

## 2023-10-06 RX ORDER — ONDANSETRON 4 MG/1
4 TABLET, ORALLY DISINTEGRATING ORAL EVERY 8 HOURS PRN
Qty: 10 TABLET | Refills: 0 | Status: SHIPPED | OUTPATIENT
Start: 2023-10-06 | End: 2023-10-09

## 2023-10-06 RX ORDER — ONDANSETRON 4 MG/1
4 TABLET, ORALLY DISINTEGRATING ORAL ONCE
Status: DISCONTINUED | OUTPATIENT
Start: 2023-10-06 | End: 2023-10-06

## 2023-10-06 RX ORDER — AZITHROMYCIN 250 MG/1
TABLET, FILM COATED ORAL
Qty: 6 TABLET | Refills: 0 | Status: SHIPPED | OUTPATIENT
Start: 2023-10-06 | End: 2023-10-11

## 2023-10-06 RX ORDER — ONDANSETRON 2 MG/ML
4 INJECTION INTRAMUSCULAR; INTRAVENOUS ONCE
Status: COMPLETED | OUTPATIENT
Start: 2023-10-06 | End: 2023-10-06

## 2023-10-06 RX ADMIN — ONDANSETRON 4 MG: 2 INJECTION INTRAMUSCULAR; INTRAVENOUS at 21:26

## 2023-10-06 ASSESSMENT — ACTIVITIES OF DAILY LIVING (ADL)
ADLS_ACUITY_SCORE: 37
ADLS_ACUITY_SCORE: 35

## 2023-10-06 NOTE — ED TRIAGE NOTES
Pt presents to the ED with c/o cough for 2 months and vomiting starting this week.    Coral Guo RN on 10/6/2023 at 5:51 PM       Triage Assessment       Row Name 10/06/23 1750       Respiratory WDL    Respiratory WDL X;cough    Cough Frequency frequent    Cough Type productive

## 2023-10-06 NOTE — Clinical Note
Nury Gilbert was seen and treated in our emergency department on 10/6/2023.  She may return to work on 10/09/2023.       If you have any questions or concerns, please don't hesitate to call.      Oswaldo Song MD

## 2023-10-07 NOTE — ED PROVIDER NOTES
History     Chief Complaint   Patient presents with    Vomiting    Cough       Nury Gilbert is a 28 year old female who presents with nausea vomiting diarrhea and cough.  Nausea vomiting diarrhea has been present over the past week.  Associate with fever and chills.  She is also had 3 weeks of a cough that is worsening and can be productive with green sputum.  Denies any pain including abdominal pain, dysuria, dyspnea.  Abdominal surgical history includes appendectomy.  No postprandial discomfort.  No history of asthma.  No regular smoking.  No known sick contacts.  No regular sinus congestion.    Allergies   Allergen Reactions    Penicillins Swelling     Throat swelling and hives     Phenergan Dm [Promethazine-Dm] Other (See Comments)     Hallucinations and muscle twitching       Patient Active Problem List    Diagnosis Date Noted    Staph aureus infection 2022     Priority: Medium    H/O  section 2020     Priority: Medium    History of  section 2020     Priority: Medium     Added automatically from request for surgery [id number]      Pregnancy with history of  section, antepartum 2020     Priority: Medium    Rubella non-immune status, antepartum 2020     Priority: Medium    Nausea and vomiting in pregnancy 2019     Priority: Medium    S/P  section 2018     Priority: Medium    On stimulant medication - contract signed 3/31/17 2017     Priority: Medium     Scheduled Medication Use Agreement Signed 3/31/17 with Naye Villeda, [facility]  Pharmacy -  [facility], [place]  Ph.  [phone]  Needs to be seen every 3 months for refills per Agreement.        Attention deficit disorder 2011     Priority: Medium    Migraines 2011     Priority: Medium     Overview:   IMO Update 10/11  Overview:   IMO Update 10/11      Difficulty sleeping 2011     Priority: Medium       Past Medical History:   Diagnosis Date    Anxiety disorder      Attention-deficit hyperactivity disorder     Depressive disorder     Superficial foreign body of finger        Past Surgical History:   Procedure Laterality Date    ADENOIDECTOMY      No Comments Provided    APPENDECTOMY OPEN      2006    ARTHROSCOPY KNEE      2012,2nd as well at age 18     SECTION N/A 2018    Procedure:  SECTION;  External version attemted prior to Section;  Surgeon: Naye Johns MD;  Location: [facility] OR     SECTION N/A 2020    Procedure:  SECTION;  Surgeon: Naye Johns MD;  Location: [facility] OR    LAPAROSCOPY DIAGNOSTIC (GYN) N/A 2019    Procedure: LAPAROSCOPY DIAGNOSTIC;  Surgeon: Naye Johns MD;  Location: [facility] OR    OTHER SURGICAL HISTORY      1999,QYL526,BLADDER SURGERY    OTHER SURGICAL HISTORY      2017,92381.0,AR REMOVE FOREIGN BODY SIMPLE       Family History   Problem Relation Age of Onset    Dementia Maternal Grandmother     Heart Disease Maternal Grandfather     Cancer Mother         carcinoid cancer, 30s       Social History     Tobacco Use    Smoking status: Never    Smokeless tobacco: Never   Vaping Use    Vaping Use: Never used   Substance Use Topics    Alcohol use: Yes     Comment: rare    Drug use: Yes     Types: Marijuana     Comment: medical marijuana       Medications:    azithromycin (ZITHROMAX) 250 MG tablet  ondansetron (ZOFRAN ODT) 4 MG ODT tab  acetaminophen (TYLENOL) 500 MG tablet  bacitracin 500 UNIT/GM external ointment  etonogestrel-ethinyl estradiol (NUVARING) 0.12-0.015 MG/24HR vaginal ring  FLUoxetine (PROZAC) 20 MG capsule  ibuprofen (ADVIL/MOTRIN) 200 MG tablet  lamoTRIgine (LAMICTAL) 25 MG tablet  LORazepam (ATIVAN) 1 MG tablet  medical cannabis (Patient's own supply)  mupirocin (BACTROBAN) 2 % external cream  mupirocin (BACTROBAN) 2 % external ointment  VYVANSE 30 MG capsule  VYVANSE 60 MG capsule        Review of Systems: See HPI for pertinent negatives and positives. All other systems  "reviewed and found to be negative.    Physical Exam   /87   Pulse 84   Temp 97.4  F (36.3  C) (Tympanic)   Resp 16   Ht 1.753 m (5' 9\")   Wt 86.2 kg (190 lb)   LMP 09/29/2023   SpO2 99%   BMI 28.06 kg/m       General: awake, comfortable  HEENT: atraumatic  Respiratory: normal effort, clear to auscultation bilaterally  Cardiovascular: regular rate and rhythm, no murmurs  Abdomen: soft, nondistended, nontender  Extremities: no deformities, edema, or tenderness  Skin: warm, dry, no rashes  Neuro: alert, no focal deficits  Psych: appropriate mood and affect    ED Course           Results for orders placed or performed during the hospital encounter of 10/06/23 (from the past 24 hour(s))   UA with Microscopic reflex to Culture    Specimen: Urine, NOS   Result Value Ref Range    Color Urine Yellow Colorless, Straw, Light Yellow, Yellow    Appearance Urine Clear Clear    Glucose Urine Negative Negative mg/dL    Bilirubin Urine Negative Negative    Ketones Urine Negative Negative mg/dL    Specific Gravity Urine 1.018 1.000 - 1.030    Blood Urine Negative Negative    pH Urine 6.0 5.0 - 9.0    Protein Albumin Urine 10 (A) Negative mg/dL    Urobilinogen Urine Normal Normal, 2.0 mg/dL    Nitrite Urine Negative Negative    Leukocyte Esterase Urine Negative Negative    Bacteria Urine Few (A) None Seen /HPF    Mucus Urine Present (A) None Seen /LPF    RBC Urine 1 <=2 /HPF    WBC Urine 1 <=5 /HPF    Hyaline Casts Urine 1 <=2 /LPF    Narrative    Urine Culture not indicated   HCG qualitative urine   Result Value Ref Range    hCG Urine Qualitative Negative Negative   Extra Tube (Glady Draw)    Narrative    The following orders were created for panel order Extra Tube (Glady Draw).  Procedure                               Abnormality         Status                     ---------                               -----------         ------                     Extra Blue Top Tube[[id number]]                              Final " result               Extra Red Top Tube[[id number]]                               Final result               Extra Green Top (Lithium...[[id number]]                      Final result               Extra Purple Top Tube[[id number]]                            Final result               Extra Green Top (Lithium...[[id number]]                      Final result                 Please view results for these tests on the individual orders.   Extra Blue Top Tube   Result Value Ref Range    Hold Specimen JIC    Extra Red Top Tube   Result Value Ref Range    Hold Specimen JIC    Extra Green Top (Lithium Heparin) Tube   Result Value Ref Range    Hold Specimen JIC    Extra Purple Top Tube   Result Value Ref Range    Hold Specimen JIC    Extra Green Top (Lithium Heparin) ON ICE   Result Value Ref Range    Hold Specimen JIC    Symptomatic Influenza A/B, RSV, & SARS-CoV2 PCR (COVID-19) Nose    Specimen: Nose; Swab   Result Value Ref Range    Influenza A PCR Negative Negative    Influenza B PCR Negative Negative    RSV PCR Negative Negative    SARS CoV2 PCR Negative Negative    Narrative    Testing was performed using the Xpert Xpress CoV2/Flu/RSV Assay on the [email or web address] GeneXpert Instrument. This test should be ordered for the detection of SARS-CoV-2, influenza, and RSV viruses in individuals who meet clinical and/or epidemiological criteria. Test performance is unknown in asymptomatic patients. This test is for in vitro diagnostic use under the FDA EUA for laboratories certified under CLIA to perform high or moderate complexity testing. This test has not been FDA cleared or approved. A negative result does not rule out the presence of PCR inhibitors in the specimen or target RNA in concentration below the limit of detection for the assay. If only one viral target is positive but coinfection with multiple targets is suspected, the sample should be re-tested with another FDA cleared, approved, or authorized test, if coinfection would  change clinical management. This test was validated by the [facility]. These laboratories are certified under the Clinical Laboratory Improvement Amendments of 1988 (CLIA-88) as qualified to perform high complexity laboratory testing.   XR Chest 2 Views    Narrative    PROCEDURE INFORMATION:   Exam: XR Chest   Exam date and time: 10/6/2023 9:59 PM   Age: 28 years old   Clinical indication: Patient HX: PT presents to the ED with C/O cough for 2   months and vomiting starting this week. ; Additional info: Cough x2 months     TECHNIQUE:   Imaging protocol: Radiologic exam of the chest.   Views: 2 views.     COMPARISON:   No relevant prior studies available.     FINDINGS:   Lungs: Unremarkable. No consolidation.   Pleural spaces: Unremarkable. No pleural effusion. No pneumothorax.   Heart/Mediastinum: Unremarkable. No cardiomegaly.   Bones/joints: Unremarkable.       Impression    IMPRESSION:   No acute findings.     THIS DOCUMENT HAS BEEN ELECTRONICALLY SIGNED BY TERRELL BRIDGES MD       Medications   ondansetron (ZOFRAN) injection 4 mg (4 mg Intravenous $Given 10/6/23 2126)       Assessments & Plan (with Medical Decision Making)     I have reviewed the nursing notes.    28 year old female evaluated for 1 week of nausea vomiting diarrhea and fever and chills, along with 3 weeks of cough which is worsening and becoming productive.  Vitals and exam and labs chest x-ray unremarkable.  Past week symptoms are most consistent with a viral illness, suspect this is near peak symptoms.  For 3 weeks of cough that is worsening becoming productive, will treat with Z-Gurpreet for possible progression to bacterial infection.  Discharged home with below medications including instructions on viral illnesses and cough including ED return precautions.      I have reviewed the findings, diagnosis, plan, and need for any follow up with the patient.    Patient instructions:   Your current symptoms over the past week so most  consistent with a viral infection.  Your labs and chest x-ray here were reassuring.  Due to your cough has been going on for approximately 3 weeks, lets try treating this with a short course of antibiotics.    For nausea and vomiting, use Zofran as needed to help with controlling nausea.    Recommend following up with PCP if not improving over the next 1 week.    Please review attached instructions including reasons to return to the emergency department.     New Prescriptions    AZITHROMYCIN (ZITHROMAX) 250 MG TABLET    Take 2 tablets (500 mg) by mouth daily for 1 day, THEN 1 tablet (250 mg) daily for 4 days.    ONDANSETRON (ZOFRAN ODT) 4 MG ODT TAB    Take 1 tablet (4 mg) by mouth every 8 hours as needed for nausea       Final diagnoses:   Productive cough   Nausea vomiting and diarrhea   Fever and chills       10/6/2023   [facility]     Oswaldo Song MD  10/06/23 8585

## 2023-10-07 NOTE — DISCHARGE INSTRUCTIONS
Your current symptoms over the past week so most consistent with a viral infection.  Your labs and chest x-ray here were reassuring.  Due to your cough has been going on for approximately 3 weeks, lets try treating this with a short course of antibiotics.    For nausea and vomiting, use Zofran as needed to help with controlling nausea.    Recommend following up with PCP if not improving over the next 1 week.    Please review attached instructions including reasons to return to the emergency department.

## 2024-01-04 ENCOUNTER — HOSPITAL ENCOUNTER (EMERGENCY)
Facility: OTHER | Age: 29
Discharge: HOME | End: 2024-01-04
Attending: FAMILY MEDICINE | Admitting: FAMILY MEDICINE
Payer: COMMERCIAL

## 2024-01-04 VITALS
DIASTOLIC BLOOD PRESSURE: 87 MMHG | TEMPERATURE: 97.9 F | HEART RATE: 111 BPM | OXYGEN SATURATION: 98 % | BODY MASS INDEX: 28.14 KG/M2 | HEIGHT: 69 IN | WEIGHT: 190 LBS | SYSTOLIC BLOOD PRESSURE: 132 MMHG | RESPIRATION RATE: 17 BRPM

## 2024-01-04 DIAGNOSIS — U07.1 COVID-19: ICD-10-CM

## 2024-01-04 LAB
FLUAV RNA SPEC QL NAA+PROBE: NEGATIVE
FLUBV RNA RESP QL NAA+PROBE: NEGATIVE
RSV RNA SPEC NAA+PROBE: NEGATIVE
SARS-COV-2 RNA RESP QL NAA+PROBE: POSITIVE

## 2024-01-04 PROCEDURE — 99283 EMERGENCY DEPT VISIT LOW MDM: CPT | Performed by: FAMILY MEDICINE

## 2024-01-04 PROCEDURE — 250N000013 HC RX MED GY IP 250 OP 250 PS 637: Performed by: FAMILY MEDICINE

## 2024-01-04 PROCEDURE — 99282 EMERGENCY DEPT VISIT SF MDM: CPT | Performed by: FAMILY MEDICINE

## 2024-01-04 PROCEDURE — 87637 SARSCOV2&INF A&B&RSV AMP PRB: CPT | Performed by: FAMILY MEDICINE

## 2024-01-04 RX ORDER — ACETAMINOPHEN 500 MG
1000 TABLET ORAL ONCE
Status: COMPLETED | OUTPATIENT
Start: 2024-01-04 | End: 2024-01-04

## 2024-01-04 RX ADMIN — ACETAMINOPHEN 1000 MG: 500 TABLET, FILM COATED ORAL at 14:19

## 2024-01-04 RX ADMIN — IBUPROFEN 600 MG: 200 TABLET, FILM COATED ORAL at 14:18

## 2024-01-04 ASSESSMENT — ENCOUNTER SYMPTOMS
SHORTNESS OF BREATH: 1
HEADACHES: 1
CHEST TIGHTNESS: 1
FEVER: 1

## 2024-01-04 NOTE — ED TRIAGE NOTES
ED Nursing Triage Note (General)   ________________________________    Nury Gilbert is a 28 year old Female that presents to triage via private vehicle with complaints of recent illness for the past week.  Patient states she has had increased fatigue, cough, runny nose, and intermittent fevers.  Patient states she took multiple covid tests which were negative.  Patient states yesterday she noticed a reddened rash to her chin area.  Patient applied non scented lotion and continued with daily activities.  Today patient noted the redness/rash has spread throughout her face and is now migrating down bilateral arms.  Patient feels her cheeks are swollen today as well.  Patient denies environmental changes or new exposure in the past 48 hours. Patient states rash is sensitive to touch, denies itching, no hives noted.   Significant symptoms had onset 24 hour(s) ago.      PRE HOSPITAL PRIOR LIVING SITUATION Alone

## 2024-01-04 NOTE — ED PROVIDER NOTES
History     Chief Complaint   Patient presents with    Shortness of Breath    Rash     The history is provided by the patient.     Nury Gilbert is a 28 year old female here with concerns about fever, skin rash, headache, SOB and chest pressure. Her  had a positive COVID test on Saturday, five days ago.  She did five COVID tests but none of them registered the sample line or the test line.  She has been sick for about ten days. She works at [employer] and has a lot of exposure to sick kids.     Allergies:  Allergies   Allergen Reactions    Penicillins Swelling     Throat swelling and hives     Phenergan Dm [Promethazine-Dm] Other (See Comments)     Hallucinations and muscle twitching       Problem List:    Patient Active Problem List    Diagnosis Date Noted    Staph aureus infection 2022     Priority: Medium    H/O  section 2020     Priority: Medium    History of  section 2020     Priority: Medium     Added automatically from request for surgery [id number]      Pregnancy with history of  section, antepartum 2020     Priority: Medium    Rubella non-immune status, antepartum 2020     Priority: Medium    Nausea and vomiting in pregnancy 2019     Priority: Medium    S/P  section 2018     Priority: Medium    On stimulant medication - contract signed 3/31/17 2017     Priority: Medium     Scheduled Medication Use Agreement Signed 3/31/17 with Naye Villeda, [facility]  Pharmacy -  [facility], [place]  Ph.  [phone]  Needs to be seen every 3 months for refills per Agreement.        Attention deficit disorder 2011     Priority: Medium    Migraines 2011     Priority: Medium     Overview:   IMO Update 10/11  Overview:   IMO Update 10/11      Difficulty sleeping 2011     Priority: Medium        Past Medical History:    Past Medical History:   Diagnosis Date    Anxiety disorder     Attention-deficit hyperactivity  disorder     Depressive disorder     Superficial foreign body of finger        Past Surgical History:    Past Surgical History:   Procedure Laterality Date    ADENOIDECTOMY      No Comments Provided    APPENDECTOMY OPEN      2006    ARTHROSCOPY KNEE      2012,2nd as well at age 18     SECTION N/A 2018    Procedure:  SECTION;  External version attemted prior to Section;  Surgeon: Naye Johns MD;  Location: [facility] OR     SECTION N/A 2020    Procedure:  SECTION;  Surgeon: Naye Johns MD;  Location: [facility] OR    LAPAROSCOPY DIAGNOSTIC (GYN) N/A 2019    Procedure: LAPAROSCOPY DIAGNOSTIC;  Surgeon: Naye Johns MD;  Location: [facility] OR    OTHER SURGICAL HISTORY      1999,QFH615,BLADDER SURGERY    OTHER SURGICAL HISTORY      2017,06663.0,DE REMOVE FOREIGN BODY SIMPLE       Family History:    Family History   Problem Relation Age of Onset    Dementia Maternal Grandmother     Heart Disease Maternal Grandfather     Cancer Mother         carcinoid cancer, 30s       Social History:  Marital Status:  Single [1]  Social History     Tobacco Use    Smoking status: Never    Smokeless tobacco: Never   Vaping Use    Vaping Use: Never used   Substance Use Topics    Alcohol use: Yes     Comment: rare    Drug use: Yes     Types: Marijuana     Comment: medical marijuana        Medications:    acetaminophen (TYLENOL) 500 MG tablet  bacitracin 500 UNIT/GM external ointment  etonogestrel-ethinyl estradiol (NUVARING) 0.12-0.015 MG/24HR vaginal ring  FLUoxetine (PROZAC) 20 MG capsule  ibuprofen (ADVIL/MOTRIN) 200 MG tablet  lamoTRIgine (LAMICTAL) 25 MG tablet  LORazepam (ATIVAN) 1 MG tablet  medical cannabis (Patient's own supply)  mupirocin (BACTROBAN) 2 % external cream  mupirocin (BACTROBAN) 2 % external ointment  VYVANSE 30 MG capsule  VYVANSE 60 MG capsule      Review of Systems   Constitutional:  Positive for fever.   Respiratory:  Positive for chest tightness and  "shortness of breath.    Neurological:  Positive for headaches.   All other systems reviewed and are negative.      Physical Exam   BP: 132/87  Pulse: 111  Temp: 97.9  F (36.6  C)  Resp: 17  Height: 175.3 cm (5' 9\")  Weight: 86.2 kg (190 lb)  SpO2: 98 %      Physical Exam  Vitals and nursing note reviewed.   Constitutional:       General: She is not in acute distress.     Appearance: She is ill-appearing. She is not toxic-appearing or diaphoretic.   Cardiovascular:      Rate and Rhythm: Regular rhythm. Tachycardia present.      Pulses: Normal pulses.      Heart sounds: Normal heart sounds.   Pulmonary:      Effort: Pulmonary effort is normal. No respiratory distress.      Breath sounds: Normal breath sounds. No decreased breath sounds, wheezing, rhonchi or rales.   Skin:     General: Skin is warm and dry.      Findings: Erythema present.   Neurological:      General: No focal deficit present.      Mental Status: She is alert and oriented to person, place, and time.         Results for orders placed or performed during the hospital encounter of 01/04/24 (from the past 24 hour(s))   Symptomatic Influenza A/B, RSV, & SARS-CoV2 PCR (COVID-19) Nose    Specimen: Nose; Swab   Result Value Ref Range    Influenza A PCR Negative Negative    Influenza B PCR Negative Negative    RSV PCR Negative Negative    SARS CoV2 PCR Positive (A) Negative    Narrative    Testing was performed using the Xpert Xpress CoV2/Flu/RSV Assay on the [employer] GeneXpert Instrument. This test should be ordered for the detection of SARS-CoV-2, influenza, and RSV viruses in individuals who meet clinical and/or epidemiological criteria. Test performance is unknown in asymptomatic patients. This test is for in vitro diagnostic use under the FDA EUA for laboratories certified under CLIA to perform high or moderate complexity testing. This test has not been FDA cleared or approved. A negative result does not rule out the presence of PCR inhibitors in the " "specimen or target RNA in concentration below the limit of detection for the assay. If only one viral target is positive but coinfection with multiple targets is suspected, the sample should be re-tested with another FDA cleared, approved, or authorized test, if coinfection would change clinical management. This test was validated by the [facility] [employer]. These laboratories are certified under the Clinical Laboratory Improvement Amendments of 1988 (CLIA-88) as qualified to perform high complexity laboratory testing.       Medications   ibuprofen (ADVIL/MOTRIN) tablet 600 mg (600 mg Oral $Given 1/4/24 1411)   acetaminophen (TYLENOL) tablet 1,000 mg (1,000 mg Oral $Given 1/4/24 4224)       Assessments & Plan (with Medical Decision Making)  Nury Gilbert is a 28 year old female here with concerns about fever, skin rash, headache, SOB and chest pressure. Her  had a positive COVID test on Saturday, five days ago.  She did five COVID tests but none of them registered the sample line or the test line.  She has been sick for about ten days. She works at [employer] and has a lot of exposure to sick kids.   VS in the ED /87   Pulse 111   Temp 97.9  F (36.6  C) (Tympanic)   Resp 17   Ht 1.753 m (5' 9\")   Wt 86.2 kg (190 lb)   SpO2 98%   BMI 28.06 kg/m     She has normal heart and lung sounds. She has some erythema around her face. We gave Tylenol and ibuprofen.   3:32 PM  She has left the ED.  4 Plex positive for COVID.  I did call and tell her this.      I have reviewed the nursing notes.    I have reviewed the findings, diagnosis, plan and need for follow up with the patient.  Medical Decision Making  The patient's presentation was of moderate complexity (an acute illness with systemic symptoms).    The patient's evaluation involved:  an assessment requiring an independent historian (see separate area of note for details)  ordering and/or review of 1 test(s) in this encounter (see " separate area of note for details)    The patient's management necessitated only low risk treatment.        Discharge Medication List as of 1/4/2024  2:34 PM          Final diagnoses:   COVID-19       1/4/2024   [facility], Eron Chin MD  01/04/24 1539

## 2024-01-04 NOTE — DISCHARGE INSTRUCTIONS
Nury    I will call you with the 4 Plex results.    Thank you for choosing our Emergency Department for your care.     You may receive a phone call or letter for a survey about your care in our ED.  Please complete this as this is how we improve care for our patients.     If you have any questions after leaving the ED you can call or text me on my cell phone at [phone].  This does not mean that I am on call, but I will get back to you.  If you are not doing well please return to the ED.     Sincerely,    Dr Dhruv Gilbert M.D.

## 2024-01-04 NOTE — RESULT ENCOUNTER NOTE
Negative for Influenza A, Influenza B, and RSV.  Positive Covid19 result and the patient will be notified of their positive Covid19 result via PowerDMS (if active) or they will be contacted by via phone call if not active on PowerDMS.  A letter is sent to patient via PowerDMS or via mail (if no PowerDMS).

## 2024-01-05 ENCOUNTER — HOSPITAL ENCOUNTER (EMERGENCY)
Facility: OTHER | Age: 29
Discharge: HOME | End: 2024-01-05
Attending: STUDENT IN AN ORGANIZED HEALTH CARE EDUCATION/TRAINING PROGRAM | Admitting: STUDENT IN AN ORGANIZED HEALTH CARE EDUCATION/TRAINING PROGRAM
Payer: COMMERCIAL

## 2024-01-05 VITALS
DIASTOLIC BLOOD PRESSURE: 83 MMHG | WEIGHT: 190 LBS | SYSTOLIC BLOOD PRESSURE: 131 MMHG | TEMPERATURE: 97.1 F | BODY MASS INDEX: 28.14 KG/M2 | RESPIRATION RATE: 18 BRPM | HEART RATE: 83 BPM | OXYGEN SATURATION: 100 % | HEIGHT: 69 IN

## 2024-01-05 DIAGNOSIS — K29.01 ACUTE GASTRITIS WITH HEMORRHAGE, UNSPECIFIED GASTRITIS TYPE: ICD-10-CM

## 2024-01-05 PROCEDURE — 99283 EMERGENCY DEPT VISIT LOW MDM: CPT | Performed by: STUDENT IN AN ORGANIZED HEALTH CARE EDUCATION/TRAINING PROGRAM

## 2024-01-05 PROCEDURE — 99284 EMERGENCY DEPT VISIT MOD MDM: CPT | Performed by: STUDENT IN AN ORGANIZED HEALTH CARE EDUCATION/TRAINING PROGRAM

## 2024-01-05 PROCEDURE — 250N000013 HC RX MED GY IP 250 OP 250 PS 637: Performed by: STUDENT IN AN ORGANIZED HEALTH CARE EDUCATION/TRAINING PROGRAM

## 2024-01-05 RX ORDER — SUCRALFATE 1 G/1
1 TABLET ORAL 4 TIMES DAILY
Qty: 60 TABLET | Refills: 0 | Status: SHIPPED | OUTPATIENT
Start: 2024-01-05 | End: 2024-01-20

## 2024-01-05 RX ORDER — OMEPRAZOLE 40 MG/1
40 CAPSULE, DELAYED RELEASE ORAL DAILY
Qty: 30 CAPSULE | Refills: 0 | Status: SHIPPED | OUTPATIENT
Start: 2024-01-05 | End: 2024-02-04

## 2024-01-05 RX ORDER — PANTOPRAZOLE SODIUM 40 MG/1
40 TABLET, DELAYED RELEASE ORAL ONCE
Status: COMPLETED | OUTPATIENT
Start: 2024-01-05 | End: 2024-01-05

## 2024-01-05 RX ADMIN — PANTOPRAZOLE SODIUM 40 MG: 40 TABLET, DELAYED RELEASE ORAL at 20:02

## 2024-01-05 NOTE — ED TRIAGE NOTES
"Patient here due to vomiting up blood twice today at 1000 and 1300 today.  Patient is not on blood thinners, but has been taking motrin she reports.  Patient was seen here yesterday and tested positive for COVID.    /83   Pulse 83   Temp 97.1  F (36.2  C) (Temporal)   Resp 18   Ht 1.753 m (5' 9\")   Wt 86.2 kg (190 lb)   SpO2 100%   BMI 28.06 kg/m         Triage Assessment (Adult)       Row Name 01/05/24 1758          Triage Assessment    Airway WDL WDL        Respiratory WDL    Respiratory WDL WDL        Skin Circulation/Temperature WDL    Skin Circulation/Temperature WDL WDL        Cardiac WDL    Cardiac WDL WDL        Peripheral/Neurovascular WDL    Peripheral Neurovascular WDL WDL                     "

## 2024-01-06 NOTE — DISCHARGE INSTRUCTIONS
The small amount of blood that you vomited up this morning is likely from irritation of your stomach lining from using NSAIDs such as ibuprofen recently.  Please stop using NSAIDs and rely only on Tylenol.  You can use Tylenol up to 1000 mg every 6 hours as needed.    Complete short course of omeprazole antiacid medication and carafate to help with your heartburn type symptoms and help with recovery of your stomach.    Please review attached instructions including reasons to return to the emergency department.

## 2024-01-06 NOTE — ED PROVIDER NOTES
History     Chief Complaint   Patient presents with    Nausea & Vomiting       Nury Gilbert is a 28 year old female who presents with an episode of vomiting up blood.  This occurred this morning with a small amount of blood approximately a quarter size per picture shown.  Subsequently she coughed up some small amount of brownish emesis that she was unsure if it was food related or blood.  Last episode around midday approximately 7 hours ago.  No further episodes.  She has been using high amounts of NSAIDs over the past couple weeks due to body pain likely associated with COVID infection just officially diagnosed yesterday.  Endorses heartburn type symptoms but no lightheadedness, abdominal pain, blood in stool.    Allergies   Allergen Reactions    Penicillins Swelling     Throat swelling and hives     Phenergan Dm [Promethazine-Dm] Other (See Comments)     Hallucinations and muscle twitching       Patient Active Problem List    Diagnosis Date Noted    Staph aureus infection 2022     Priority: Medium    H/O  section 2020     Priority: Medium    History of  section 2020     Priority: Medium     Added automatically from request for surgery [id number]      Pregnancy with history of  section, antepartum 2020     Priority: Medium    Rubella non-immune status, antepartum 2020     Priority: Medium    Nausea and vomiting in pregnancy 2019     Priority: Medium    S/P  section 2018     Priority: Medium    On stimulant medication - contract signed 3/31/17 2017     Priority: Medium     Scheduled Medication Use Agreement Signed 3/31/17 with Naye Villeda, [facility]  [facility] -  [facility], [place]  Ph.  [phone]  Needs to be seen every 3 months for refills per Agreement.        Attention deficit disorder 2011     Priority: Medium    Migraines 2011     Priority: Medium     Overview:   IMO Update 10/11  Overview:   IMO Update 10/11       Difficulty sleeping 2011     Priority: Medium       Past Medical History:   Diagnosis Date    Anxiety disorder     Attention-deficit hyperactivity disorder     Depressive disorder     Superficial foreign body of finger        Past Surgical History:   Procedure Laterality Date    ADENOIDECTOMY      No Comments Provided    APPENDECTOMY OPEN      2006    ARTHROSCOPY KNEE      2012,2nd as well at age 18     SECTION N/A 2018    Procedure:  SECTION;  External version attemted prior to Section;  Surgeon: Naye Johns MD;  Location: [facility] OR     SECTION N/A 2020    Procedure:  SECTION;  Surgeon: Naye Johns MD;  Location: [facility] OR    LAPAROSCOPY DIAGNOSTIC (GYN) N/A 2019    Procedure: LAPAROSCOPY DIAGNOSTIC;  Surgeon: Naye Johns MD;  Location: [facility] OR    OTHER SURGICAL HISTORY      1999,FTC420,BLADDER SURGERY    OTHER SURGICAL HISTORY      2017,50792.0,HI REMOVE FOREIGN BODY SIMPLE       Family History   Problem Relation Age of Onset    Dementia Maternal Grandmother     Heart Disease Maternal Grandfather     Cancer Mother         carcinoid cancer, 30s       Social History     Tobacco Use    Smoking status: Never    Smokeless tobacco: Never   Vaping Use    Vaping Use: Never used   Substance Use Topics    Alcohol use: Yes     Comment: rare    Drug use: Yes     Types: Marijuana     Comment: medical marijuana       Medications:    omeprazole (PRILOSEC) 40 MG DR capsule  sucralfate (CARAFATE) 1 GM tablet  acetaminophen (TYLENOL) 500 MG tablet  bacitracin 500 UNIT/GM external ointment  etonogestrel-ethinyl estradiol (NUVARING) 0.12-0.015 MG/24HR vaginal ring  FLUoxetine (PROZAC) 20 MG capsule  ibuprofen (ADVIL/MOTRIN) 200 MG tablet  lamoTRIgine (LAMICTAL) 25 MG tablet  LORazepam (ATIVAN) 1 MG tablet  medical cannabis (Patient's own supply)  mupirocin (BACTROBAN) 2 % external cream  mupirocin (BACTROBAN) 2 % external ointment  VYVANSE 30 MG  "capsule  VYVANSE 60 MG capsule        Review of Systems: See HPI for pertinent negatives and positives. All other systems reviewed and found to be negative.    Physical Exam   /83   Pulse 83   Temp 97.1  F (36.2  C) (Temporal)   Resp 18   Ht 1.753 m (5' 9\")   Wt 86.2 kg (190 lb)   LMP 12/27/2023 (Approximate)   SpO2 100%   BMI 28.06 kg/m       General: awake, comfortable  HEENT: atraumatic  Respiratory: normal effort  Cardiovascular: appears well perfused  Abdomen: soft, nondistended, nontender  Extremities: no deformities, edema   Skin: warm, dry, no rashes  Neuro: alert, no focal deficits  Psych: appropriate mood and affect    ED Course      No results found for this or any previous visit (from the past 24 hour(s)).    Medications - No data to display    Assessments & Plan (with Medical Decision Making)     I have reviewed the nursing notes.          28 year old female evaluated for small amount of blood in emesis this morning with no further obvious episodes.  This is in context of using significant amount of NSAIDs over the past couple weeks.  Denies any pain but does have some reflux type symptoms.  Picture shown of small amount of blood in this morning's emesis.  Hemodynamically stable.  At this point do not feel CBC is necessary due to no further episodes and hemodynamically stable and overall well appearance.  Plan for PPI and Carafate.  Discharged home with instructions on gastritis along with return precautions.    I have reviewed the findings, diagnosis, plan, and need for any follow up with the patient.    Patient instructions:   The small amount of blood that you vomited up this morning is likely from irritation of your stomach lining from using NSAIDs such as ibuprofen recently.  Please stop using NSAIDs and rely only on Tylenol.  You can use Tylenol up to 1000 mg every 6 hours as needed.    Complete short course of omeprazole antiacid medication and carafate to help with your heartburn " type symptoms and help with recovery of your stomach.    Please review attached instructions including reasons to return to the emergency department.     New Prescriptions    OMEPRAZOLE (PRILOSEC) 40 MG DR CAPSULE    Take 1 capsule (40 mg) by mouth daily for 30 days    SUCRALFATE (CARAFATE) 1 GM TABLET    Take 1 tablet (1 g) by mouth 4 times daily for 15 days       Final diagnoses:   Acute gastritis with hemorrhage, unspecified gastritis type       1/5/2024   [facility]als[facility] MD  01/05/24 2000

## 2024-01-29 DIAGNOSIS — Z30.018 ENCOUNTER FOR PRESCRIPTION FOR NUVARING: ICD-10-CM

## 2024-01-29 RX ORDER — ETONOGESTREL AND ETHINYL ESTRADIOL .12; .015 MG/D; MG/D
RING VAGINAL
Qty: 3 EACH | Refills: 0 | Status: SHIPPED | OUTPATIENT
Start: 2024-01-29 | End: 2024-06-11

## 2024-01-29 NOTE — TELEPHONE ENCOUNTER
Tereza sent Rx request for the following:      Requested Prescriptions   Pending Prescriptions Disp Refills    ELURYNG 0.12-0.015 MG/24HR vaginal ring [Pharmacy Med Name: ELURYNG]       Sig: INSERT 1 RING VAGINALLY EVERY 28 DAYS       Contraceptives Protocol Passed - 1/29/2024 12:01 PM        Passed - Patient is not a current smoker if age is 35 or older        Passed - Recent (12 mo) or future (30 days) visit within the authorizing provider's specialty     The patient must have completed an in-person or virtual visit within the past 12 months or has a future visit scheduled within the next 90 days with the authorizing provider s specialty.  Urgent care and e-visits do not quality as an office visit for this protocol.          Passed - Medication is active on med list        Passed - No active pregnancy on record        Passed - No positive pregnancy test in past 12 months           Pt was contacted and routed to scheduling to scheduling to set up an annual follow up. Prescription sent to cover until appointment, per standing order.     Last Prescription Date:   2/16/2023  Last Fill Qty/Refills:         3, R-3    Last Office Visit:              2/16/2023   Future Office visit:           None    Charis Fitzgerald RN on 1/29/2024 at 3:05 PM         Orbicularis Oris Muscle Flap Text: The defect edges were debeveled with a #15 scalpel blade.  Given that the defect affected the competency of the oral sphincter an orbicularis oris muscle flap was deemed most appropriate to restore this competency and normal muscle function.  Using a sterile surgical marker, an appropriate flap was drawn incorporating the defect. The area thus outlined was incised with a #15 scalpel blade.

## 2024-02-14 ENCOUNTER — OFFICE VISIT (OUTPATIENT)
Dept: INTERNAL MEDICINE | Facility: OTHER | Age: 29
End: 2024-02-14
Payer: COMMERCIAL

## 2024-02-14 VITALS
WEIGHT: 189 LBS | TEMPERATURE: 97.9 F | HEIGHT: 68 IN | HEART RATE: 77 BPM | OXYGEN SATURATION: 99 % | SYSTOLIC BLOOD PRESSURE: 118 MMHG | DIASTOLIC BLOOD PRESSURE: 70 MMHG | BODY MASS INDEX: 28.64 KG/M2 | RESPIRATION RATE: 16 BRPM

## 2024-02-14 DIAGNOSIS — F90.9 ATTENTION DEFICIT HYPERACTIVITY DISORDER (ADHD), UNSPECIFIED ADHD TYPE: ICD-10-CM

## 2024-02-14 DIAGNOSIS — Z00.00 ROUTINE HISTORY AND PHYSICAL EXAMINATION OF ADULT: Primary | ICD-10-CM

## 2024-02-14 DIAGNOSIS — F33.2 SEVERE EPISODE OF RECURRENT MAJOR DEPRESSIVE DISORDER, WITHOUT PSYCHOTIC FEATURES (H): ICD-10-CM

## 2024-02-14 DIAGNOSIS — T14.8XXA BRUISING: ICD-10-CM

## 2024-02-14 DIAGNOSIS — F41.1 GENERALIZED ANXIETY DISORDER: ICD-10-CM

## 2024-02-14 DIAGNOSIS — Z71.85 VACCINE COUNSELING: ICD-10-CM

## 2024-02-14 DIAGNOSIS — R11.2 NAUSEA AND VOMITING, UNSPECIFIED VOMITING TYPE: ICD-10-CM

## 2024-02-14 LAB
ALBUMIN UR-MCNC: NEGATIVE MG/DL
ANION GAP SERPL CALCULATED.3IONS-SCNC: 11 MMOL/L (ref 7–15)
APPEARANCE UR: CLEAR
APTT PPP: 25 SECONDS (ref 22–38)
BACTERIA #/AREA URNS HPF: ABNORMAL /HPF
BASOPHILS # BLD AUTO: 0 10E3/UL (ref 0–0.2)
BASOPHILS NFR BLD AUTO: 1 %
BILIRUB UR QL STRIP: NEGATIVE
BUN SERPL-MCNC: 7.2 MG/DL (ref 6–20)
CALCIUM SERPL-MCNC: 9.5 MG/DL (ref 8.6–10)
CHLORIDE SERPL-SCNC: 101 MMOL/L (ref 98–107)
COLOR UR AUTO: ABNORMAL
CREAT SERPL-MCNC: 0.58 MG/DL (ref 0.51–0.95)
DEPRECATED HCO3 PLAS-SCNC: 25 MMOL/L (ref 22–29)
EGFRCR SERPLBLD CKD-EPI 2021: >90 ML/MIN/1.73M2
EOSINOPHIL # BLD AUTO: 0.2 10E3/UL (ref 0–0.7)
EOSINOPHIL NFR BLD AUTO: 4 %
ERYTHROCYTE [DISTWIDTH] IN BLOOD BY AUTOMATED COUNT: 13.2 % (ref 10–15)
GLUCOSE SERPL-MCNC: 92 MG/DL (ref 70–99)
GLUCOSE UR STRIP-MCNC: NEGATIVE MG/DL
HCG UR QL: NEGATIVE
HCT VFR BLD AUTO: 37.2 % (ref 35–47)
HCV AB SERPL QL IA: NONREACTIVE
HGB BLD-MCNC: 13 G/DL (ref 11.7–15.7)
HGB UR QL STRIP: NEGATIVE
HIV 1+2 AB+HIV1 P24 AG SERPL QL IA: NONREACTIVE
IMM GRANULOCYTES # BLD: 0 10E3/UL
IMM GRANULOCYTES NFR BLD: 0 %
INR PPP: 1.06 (ref 0.85–1.15)
KETONES UR STRIP-MCNC: NEGATIVE MG/DL
LEUKOCYTE ESTERASE UR QL STRIP: NEGATIVE
LYMPHOCYTES # BLD AUTO: 1.2 10E3/UL (ref 0.8–5.3)
LYMPHOCYTES NFR BLD AUTO: 21 %
MCH RBC QN AUTO: 34.9 PG (ref 26.5–33)
MCHC RBC AUTO-ENTMCNC: 34.9 G/DL (ref 31.5–36.5)
MCV RBC AUTO: 100 FL (ref 78–100)
MONOCYTES # BLD AUTO: 0.4 10E3/UL (ref 0–1.3)
MONOCYTES NFR BLD AUTO: 8 %
MUCOUS THREADS #/AREA URNS LPF: PRESENT /LPF
NEUTROPHILS # BLD AUTO: 3.8 10E3/UL (ref 1.6–8.3)
NEUTROPHILS NFR BLD AUTO: 66 %
NITRATE UR QL: NEGATIVE
NRBC # BLD AUTO: 0 10E3/UL
NRBC BLD AUTO-RTO: 0 /100
PH UR STRIP: 7 [PH] (ref 5–9)
PLATELET # BLD AUTO: 262 10E3/UL (ref 150–450)
POTASSIUM SERPL-SCNC: 3.8 MMOL/L (ref 3.4–5.3)
RBC # BLD AUTO: 3.73 10E6/UL (ref 3.8–5.2)
RBC URINE: <1 /HPF
SODIUM SERPL-SCNC: 137 MMOL/L (ref 135–145)
SP GR UR STRIP: 1.01 (ref 1–1.03)
SQUAMOUS EPITHELIAL: 1 /HPF
TSH SERPL DL<=0.005 MIU/L-ACNC: 0.8 UIU/ML (ref 0.3–4.2)
UROBILINOGEN UR STRIP-MCNC: NORMAL MG/DL
WBC # BLD AUTO: 5.7 10E3/UL (ref 4–11)
WBC URINE: <1 /HPF

## 2024-02-14 PROCEDURE — 81025 URINE PREGNANCY TEST: CPT | Mod: ZL

## 2024-02-14 PROCEDURE — 87389 HIV-1 AG W/HIV-1&-2 AB AG IA: CPT | Mod: ZL

## 2024-02-14 PROCEDURE — G0463 HOSPITAL OUTPT CLINIC VISIT: HCPCS

## 2024-02-14 PROCEDURE — 36415 COLL VENOUS BLD VENIPUNCTURE: CPT | Mod: ZL

## 2024-02-14 PROCEDURE — 85025 COMPLETE CBC W/AUTO DIFF WBC: CPT | Mod: ZL

## 2024-02-14 PROCEDURE — 99214 OFFICE O/P EST MOD 30 MIN: CPT | Mod: 25

## 2024-02-14 PROCEDURE — 85610 PROTHROMBIN TIME: CPT | Mod: ZL

## 2024-02-14 PROCEDURE — 84443 ASSAY THYROID STIM HORMONE: CPT | Mod: ZL

## 2024-02-14 PROCEDURE — 86803 HEPATITIS C AB TEST: CPT | Mod: ZL

## 2024-02-14 PROCEDURE — 99395 PREV VISIT EST AGE 18-39: CPT

## 2024-02-14 PROCEDURE — 85730 THROMBOPLASTIN TIME PARTIAL: CPT | Mod: ZL

## 2024-02-14 PROCEDURE — 81001 URINALYSIS AUTO W/SCOPE: CPT | Mod: ZL

## 2024-02-14 PROCEDURE — 80048 BASIC METABOLIC PNL TOTAL CA: CPT | Mod: ZL

## 2024-02-14 RX ORDER — ONDANSETRON 4 MG/1
4 TABLET, ORALLY DISINTEGRATING ORAL EVERY 8 HOURS PRN
Qty: 30 TABLET | Refills: 1 | Status: SHIPPED | OUTPATIENT
Start: 2024-02-14 | End: 2024-07-25

## 2024-02-14 SDOH — HEALTH STABILITY: PHYSICAL HEALTH: ON AVERAGE, HOW MANY DAYS PER WEEK DO YOU ENGAGE IN MODERATE TO STRENUOUS EXERCISE (LIKE A BRISK WALK)?: 5 DAYS

## 2024-02-14 ASSESSMENT — ANXIETY QUESTIONNAIRES
7. FEELING AFRAID AS IF SOMETHING AWFUL MIGHT HAPPEN: MORE THAN HALF THE DAYS
6. BECOMING EASILY ANNOYED OR IRRITABLE: NEARLY EVERY DAY
1. FEELING NERVOUS, ANXIOUS, OR ON EDGE: MORE THAN HALF THE DAYS
3. WORRYING TOO MUCH ABOUT DIFFERENT THINGS: MORE THAN HALF THE DAYS
GAD7 TOTAL SCORE: 16
IF YOU CHECKED OFF ANY PROBLEMS ON THIS QUESTIONNAIRE, HOW DIFFICULT HAVE THESE PROBLEMS MADE IT FOR YOU TO DO YOUR WORK, TAKE CARE OF THINGS AT HOME, OR GET ALONG WITH OTHER PEOPLE: VERY DIFFICULT
2. NOT BEING ABLE TO STOP OR CONTROL WORRYING: NEARLY EVERY DAY
GAD7 TOTAL SCORE: 16
8. IF YOU CHECKED OFF ANY PROBLEMS, HOW DIFFICULT HAVE THESE MADE IT FOR YOU TO DO YOUR WORK, TAKE CARE OF THINGS AT HOME, OR GET ALONG WITH OTHER PEOPLE?: VERY DIFFICULT
7. FEELING AFRAID AS IF SOMETHING AWFUL MIGHT HAPPEN: MORE THAN HALF THE DAYS
4. TROUBLE RELAXING: MORE THAN HALF THE DAYS
GAD7 TOTAL SCORE: 16
5. BEING SO RESTLESS THAT IT IS HARD TO SIT STILL: MORE THAN HALF THE DAYS

## 2024-02-14 ASSESSMENT — COLUMBIA-SUICIDE SEVERITY RATING SCALE - C-SSRS
6. HAVE YOU EVER DONE ANYTHING, STARTED TO DO ANYTHING, OR PREPARED TO DO ANYTHING TO END YOUR LIFE?: NO
2. IN THE PAST MONTH, HAVE YOU ACTUALLY HAD ANY THOUGHTS OF KILLING YOURSELF?: NO
1. WITHIN THE PAST MONTH, HAVE YOU WISHED YOU WERE DEAD OR WISHED YOU COULD GO TO SLEEP AND NOT WAKE UP?: NO

## 2024-02-14 ASSESSMENT — SOCIAL DETERMINANTS OF HEALTH (SDOH): HOW OFTEN DO YOU GET TOGETHER WITH FRIENDS OR RELATIVES?: ONCE A WEEK

## 2024-02-14 ASSESSMENT — PAIN SCALES - GENERAL: PAINLEVEL: MODERATE PAIN (5)

## 2024-02-14 NOTE — PROGRESS NOTES
"Preventive Care Visit  [facility]  Erica De La CruzSALLY CNP, Internal Medicine  Feb 14, 2024      BMI  Estimated body mass index is 28.48 kg/m  as calculated from the following:    Height as of this encounter: 1.735 m (5' 8.31\").    Weight as of this encounter: 85.7 kg (189 lb).   Weight management plan: Discussed healthy diet and exercise guidelines    Depression Screening Follow Up        2/14/2024     7:45 AM   Last PHQ-9   1.  Little interest or pleasure in doing things 2   2.  Feeling down, depressed, or hopeless 2   3.  Trouble falling or staying asleep, or sleeping too much 3   4.  Feeling tired or having little energy 3   5.  Poor appetite or overeating 3   6.  Feeling bad about yourself 1   7.  Trouble concentrating 1   8.  Moving slowly or restless 1   Q9: Thoughts of better off dead/self-harm past 2 weeks 1   PHQ-9 Total Score 17   In the past two weeks have you had thoughts of suicide or self harm? No   Do you have concerns about your personal safety or the safety of others? No       Follow Up        Follow Up Actions Taken  Crisis resource information provided in the After Visit Summary    Discussed the following ways the patient can remain in a safe environment:  remove alcohol, remove drugs, and be around others  Counseling  Appropriate preventive services were discussed with this patient, including applicable screening as appropriate for fall prevention, nutrition, physical activity, Tobacco-use cessation, weight loss and cognition.  Checklist reviewing preventive services available has been given to the patient.  Reviewed patient's diet, addressing concerns and/or questions.   The patient was instructed to see the dentist every 6 months.   The patient reports drinking more than one alcoholic drink per day and sometimes engages in binge or excessive drinking. The patient was counseled and given information about possible harmful effects of excessive alcohol intake as well as " where to get help for alcohol problems. The patient's PHQ-9 score is consistent with moderate depression. She was provided with information regarding depression.     Patient has been advised of split billing requirements and indicates understanding: Yes  MEDICATIONS:        - Continue other medications without change       - Discontinue use of medical cannabis for headaches. Utilize Tylenol and or ibuprofen.    Patient to follow-up in 2 weeks.      Jazlyn Arrington is a 29 year old, presenting for the following:  Physical        2/14/2024     8:10 AM   Additional Questions   Roomed by Beatrice MEJIA LPN   Accompanied by Self      Health Care Directive  Patient does not have a Health Care Directive or Living Will: Discussed advance care planning with patient; however, patient declined at this time.        2/14/2024   General Health   How would you rate your overall physical health? (!) FAIR   Feel stress (tense, anxious, or unable to sleep) Rather much   (!) STRESS CONCERN      2/14/2024   Nutrition   Three or more servings of calcium each day? (!) NO   Diet: Regular (no restrictions)   How many servings of fruit and vegetables per day? (!) 2-3   How many sweetened beverages each day? 0-1         2/14/2024   Exercise   Days per week of moderate/strenous exercise 5 days         2/14/2024   Social Factors   Frequency of gathering with friends or relatives Once a week   Worry food won't last until get money to buy more No   Food not last or not have enough money for food? No   Do you have housing?  Yes   Are you worried about losing your housing? Yes   Lack of transportation? No   Unable to get utilities (heat,electricity)? No   Want help with housing or utility concern? (!) YES   (!) HOUSING CONCERN PRESENT      2/14/2024   Dental   Dentist two times every year? (!) NO         2/14/2024   TB Screening   Were you born outside of [place]?  No       Today's PHQ-9 Score:       2/14/2024     7:45 AM   PHQ-9 SCORE   PHQ-9 Total  Score MyChart 17 (Moderately severe depression)   PHQ-9 Total Score 17         2/14/2024   Substance Use   Alcohol more than 3/day or more than 7/wk Yes   How often do you have a drink containing alcohol 2 to 3 times a week   How many alcohol drinks on typical day 3 or 4   How often do you have 5+ drinks at one occasion Less than monthly   Audit 2/3 Score 2   How often not able to stop drinking once started Monthly   How often failed to do what normally expected Monthly   How often needed first drink in am after a heavy drinking session Less than monthly   How often feeling of guilt or remorse after drinking Weekly   How often unable to remember what happened the night before Less than monthly   Have you or someone else been injured because of your drinking No   Has anyone been concerned or suggested you cut down on drinking Yes, during the last year   TOTAL SCORE - AUDIT 18   Do you use any other substances recreationally? (!) ALCOHOL    (!) CANNABIS PRODUCTS     Social History     Tobacco Use    Smoking status: Never    Smokeless tobacco: Never   Vaping Use    Vaping Use: Never used   Substance Use Topics    Alcohol use: Yes     Comment: rare    Drug use: Yes     Types: Marijuana     Comment: medical marijuana                  2/14/2024   STI Screening   New sexual partner(s) since last STI/HIV test? (!) YES  - testing done today for HIV and Hep C     History of abnormal Pap smear: NO - age 21-29 PAP every 3 years recommended        2/8/2023     1:22 PM 7/31/2020     1:58 PM   PAP / HPV   PAP Negative for Intraepithelial Lesion or Malignancy (NILM)     PAP (Historical)  NIL            2/14/2024   Contraception/Family Planning   Questions about contraception or family planning No        Reviewed and updated as needed this visit by Provider     Meds  Problems  Med Hx              Past Medical History:   Diagnosis Date    Anxiety disorder     No Comments Provided    Attention-deficit hyperactivity disorder     No  "Comments Provided    Depressive disorder     Superficial foreign body of finger     5/19/2017       Review of Systems  Constitutional, neuro, ENT, endocrine, pulmonary, cardiac, gastrointestinal, genitourinary, musculoskeletal, integument and psychiatric systems are negative, except as otherwise noted.     Objective    Exam  /70   Pulse 77   Temp 97.9  F (36.6  C) (Tympanic)   Resp 16   Ht 1.735 m (5' 8.31\")   Wt 85.7 kg (189 lb)   LMP 01/25/2024 (Approximate)   SpO2 99%   BMI 28.48 kg/m     Estimated body mass index is 28.48 kg/m  as calculated from the following:    Height as of this encounter: 1.735 m (5' 8.31\").    Weight as of this encounter: 85.7 kg (189 lb).    Physical Exam  Constitutional:       General: She is not in acute distress.     Appearance: Normal appearance. She is not ill-appearing.   HENT:      Head: Normocephalic.      Right Ear: Tympanic membrane, ear canal and external ear normal. There is no impacted cerumen.      Left Ear: Tympanic membrane, ear canal and external ear normal. There is no impacted cerumen.      Nose: Nose normal. No congestion.      Mouth/Throat:      Mouth: Mucous membranes are moist.   Eyes:      Pupils: Pupils are equal, round, and reactive to light.   Cardiovascular:      Rate and Rhythm: Normal rate and regular rhythm.      Pulses: Normal pulses.      Heart sounds: Normal heart sounds. No murmur heard.  Pulmonary:      Effort: Pulmonary effort is normal. No respiratory distress.   Abdominal:      General: Bowel sounds are normal.      Palpations: Abdomen is soft.      Tenderness: There is abdominal tenderness.   Musculoskeletal:         General: No swelling. Normal range of motion.   Skin:     General: Skin is warm and dry.      Capillary Refill: Capillary refill takes less than 2 seconds.      Findings: Bruising (throughout) present.   Neurological:      General: No focal deficit present.      Mental Status: She is alert.     Patient is here for an annual " physical.  Other concerns include:  -Daily nausea and vomiting since August.  In October she presented to our ER with cough, nausea, and vomiting. At that time it was suspected due to viral illness.  She received medications for nausea as well as a Z-pack.  In January she presented to ER again after having symptoms of viral illness as well as blood in her emesis.  She tested positive for COVID at that time.  The blood in her emesis was thought to be due to increased use of ibuprofen.  She is now throwing up 1-5 times daily and is not able to pin it to any triggers.  She no longer has any blood in her emesis.  She does endorse the use of medical cannabis for headaches on occasion as well as weekly alcohol use.  She does have loss of appetite.  No major weight loss.    - She has been sweating at night.    - Leg cramps during the day and at night    - Bruising more easily. Started this past winter. Mother also bruises easily.       ICD-10-CM    1. Routine history and physical examination of adult  Z00.00 Basic Metabolic Panel     TSH Reflex [facility]     CBC and Differential     Adult Mental Health  Referral      2. Bruising  T14.8XXA INR     Partial thromboplastin time      3. Nausea and vomiting, unspecified vomiting type  R11.2 UA with Microscopic reflex to Culture     HIV Antigen Antibody Combo     Hepatitis C Screen Reflex to HCV RNA Quant and Genotype     Pregnancy, Urine (HCG)     ondansetron (ZOFRAN ODT) 4 MG ODT tab      4. Vaccine counseling  Z71.85       5. Attention deficit hyperactivity disorder (ADHD), unspecified ADHD type  F90.9       6. Generalized anxiety disorder  F41.1       7. Severe episode of recurrent major depressive disorder, without psychotic features (H)  F33.2           Patient here for routine physical.  Routine labs ordered today.  Declined flu and COVID vaccines today.  Follows regularly with dental and vision exams.  Recent Pap normal on 2/8/23.     She states that she has been  bruising more.  This started this past winter.  Her mother also bruises easily.  PTT and INR ordered today.  Low suspicion for clotting disorder.  She does endorse alcohol use several times a week.    Continued nausea and vomiting with emesis up to 5 times daily.  No weight loss noted.  Labs all within normal range.  Urine pregnancy was negative.  UA was negative.  Awaiting results on HIV and hepatitis C screen.  Encouraged patient to stop drinking alcohol as well as stop using medical cannabis for headaches as this may be contributing to the nausea and vomiting.  Prescription for Zofran ODT given.  Will follow-up with patient in 2 weeks for symptom improvement.  Would consider EGD and imaging such as CT scan of abdomen at that time if no symptom improvement.    She has been seen at [facility] in the past but has lost access to this due to insurance purposes.  PHQ 9 score of 17 today.  She denied thoughts of harming herself or others.  She states that she actually feels she is doing better.  Currently taking Lamictal, Prozac, Ativan, and Vyvanse.  She has information for crisis numbers.  Referral placed for mental health.  She states that she has enough of her prescriptions at this time but would reach out if she needs refills.    Results for orders placed or performed in visit on 02/14/24   Basic Metabolic Panel     Status: Normal   Result Value Ref Range    Sodium 137 135 - 145 mmol/L    Potassium 3.8 3.4 - 5.3 mmol/L    Chloride 101 98 - 107 mmol/L    Carbon Dioxide (CO2) 25 22 - 29 mmol/L    Anion Gap 11 7 - 15 mmol/L    Urea Nitrogen 7.2 6.0 - 20.0 mg/dL    Creatinine 0.58 0.51 - 0.95 mg/dL    GFR Estimate >90 >60 mL/min/1.73m2    Calcium 9.5 8.6 - 10.0 mg/dL    Glucose 92 70 - 99 mg/dL   TSH Reflex [facility]     Status: Normal   Result Value Ref Range    TSH 0.80 0.30 - 4.20 uIU/mL   INR     Status: Normal   Result Value Ref Range    INR 1.06 0.85 - 1.15   Partial thromboplastin time     Status: Normal    Result Value Ref Range    aPTT 25 22 - 38 Seconds   UA with Microscopic reflex to Culture     Status: Abnormal    Specimen: Urine, Midstream   Result Value Ref Range    Color Urine Light Yellow Colorless, Straw, Light Yellow, Yellow    Appearance Urine Clear Clear    Glucose Urine Negative Negative mg/dL    Bilirubin Urine Negative Negative    Ketones Urine Negative Negative mg/dL    Specific Gravity Urine 1.013 1.000 - 1.030    Blood Urine Negative Negative    pH Urine 7.0 5.0 - 9.0    Protein Albumin Urine Negative Negative mg/dL    Urobilinogen Urine Normal Normal, 2.0 mg/dL    Nitrite Urine Negative Negative    Leukocyte Esterase Urine Negative Negative    Bacteria Urine Few (A) None Seen /HPF    Mucus Urine Present (A) None Seen /LPF    RBC Urine <1 <=2 /HPF    WBC Urine <1 <=5 /HPF    Squamous Epithelials Urine 1 <=1 /HPF    Narrative    Urine Culture not indicated   Pregnancy, Urine (HCG)     Status: Normal   Result Value Ref Range    hCG Urine Qualitative Negative Negative   CBC with platelets and differential     Status: Abnormal   Result Value Ref Range    WBC Count 5.7 4.0 - 11.0 10e3/uL    RBC Count 3.73 (L) 3.80 - 5.20 10e6/uL    Hemoglobin 13.0 11.7 - 15.7 g/dL    Hematocrit 37.2 35.0 - 47.0 %     78 - 100 fL    MCH 34.9 (H) 26.5 - 33.0 pg    MCHC 34.9 31.5 - 36.5 g/dL    RDW 13.2 10.0 - 15.0 %    Platelet Count 262 150 - 450 10e3/uL    % Neutrophils 66 %    % Lymphocytes 21 %    % Monocytes 8 %    % Eosinophils 4 %    % Basophils 1 %    % Immature Granulocytes 0 %    NRBCs per 100 WBC 0 <1 /100    Absolute Neutrophils 3.8 1.6 - 8.3 10e3/uL    Absolute Lymphocytes 1.2 0.8 - 5.3 10e3/uL    Absolute Monocytes 0.4 0.0 - 1.3 10e3/uL    Absolute Eosinophils 0.2 0.0 - 0.7 10e3/uL    Absolute Basophils 0.0 0.0 - 0.2 10e3/uL    Absolute Immature Granulocytes 0.0 <=0.4 10e3/uL    Absolute NRBCs 0.0 10e3/uL   CBC and Differential     Status: Abnormal    Narrative    The following orders were created for  panel order CBC and Differential.  Procedure                               Abnormality         Status                     ---------                               -----------         ------                     CBC with platelets and d...[[id number]]  Abnormal            Final result                 Please view results for these tests on the individual orders.       Signed Electronically by: SALLY Cool CNP

## 2024-02-14 NOTE — NURSING NOTE
"Chief Complaint   Patient presents with    Physical       Initial /70   Pulse 77   Temp 97.9  F (36.6  C) (Tympanic)   Resp 16   Ht 1.735 m (5' 8.31\")   Wt 85.7 kg (189 lb)   LMP 01/25/2024 (Approximate)   SpO2 99%   BMI 28.48 kg/m   Estimated body mass index is 28.48 kg/m  as calculated from the following:    Height as of this encounter: 1.735 m (5' 8.31\").    Weight as of this encounter: 85.7 kg (189 lb).  Medication Reconciliation: complete    Beatrice Akers LPN on 2/14/2024 at 8:14 AM     "

## 2024-02-28 ENCOUNTER — OFFICE VISIT (OUTPATIENT)
Dept: INTERNAL MEDICINE | Facility: OTHER | Age: 29
End: 2024-02-28
Payer: COMMERCIAL

## 2024-02-28 VITALS
OXYGEN SATURATION: 96 % | HEIGHT: 69 IN | SYSTOLIC BLOOD PRESSURE: 110 MMHG | TEMPERATURE: 97.8 F | DIASTOLIC BLOOD PRESSURE: 68 MMHG | BODY MASS INDEX: 27.76 KG/M2 | RESPIRATION RATE: 16 BRPM | WEIGHT: 187.4 LBS | HEART RATE: 76 BPM

## 2024-02-28 DIAGNOSIS — R11.2 NAUSEA AND VOMITING, UNSPECIFIED VOMITING TYPE: Primary | ICD-10-CM

## 2024-02-28 DIAGNOSIS — F98.8 ATTENTION DEFICIT DISORDER, UNSPECIFIED HYPERACTIVITY PRESENCE: ICD-10-CM

## 2024-02-28 DIAGNOSIS — J06.9 VIRAL UPPER RESPIRATORY ILLNESS: ICD-10-CM

## 2024-02-28 DIAGNOSIS — G43.909 MIGRAINE WITHOUT STATUS MIGRAINOSUS, NOT INTRACTABLE, UNSPECIFIED MIGRAINE TYPE: ICD-10-CM

## 2024-02-28 PROCEDURE — G0463 HOSPITAL OUTPT CLINIC VISIT: HCPCS

## 2024-02-28 PROCEDURE — 99213 OFFICE O/P EST LOW 20 MIN: CPT

## 2024-02-28 RX ORDER — TRAZODONE HYDROCHLORIDE 50 MG/1
1 TABLET, FILM COATED ORAL
COMMUNITY
Start: 2023-10-04 | End: 2024-04-12

## 2024-02-28 ASSESSMENT — PATIENT HEALTH QUESTIONNAIRE - PHQ9
SUM OF ALL RESPONSES TO PHQ QUESTIONS 1-9: 12
10. IF YOU CHECKED OFF ANY PROBLEMS, HOW DIFFICULT HAVE THESE PROBLEMS MADE IT FOR YOU TO DO YOUR WORK, TAKE CARE OF THINGS AT HOME, OR GET ALONG WITH OTHER PEOPLE: VERY DIFFICULT
SUM OF ALL RESPONSES TO PHQ QUESTIONS 1-9: 12

## 2024-02-28 ASSESSMENT — PAIN SCALES - GENERAL: PAINLEVEL: MODERATE PAIN (5)

## 2024-02-28 NOTE — NURSING NOTE
"Chief Complaint   Patient presents with    Follow Up     2 week follow up labs and vomiting        Initial /68   Pulse 76   Temp 97.8  F (36.6  C) (Tympanic)   Resp 16   Ht 1.753 m (5' 9\")   Wt 85 kg (187 lb 6.4 oz)   LMP 01/25/2024 (Approximate)   SpO2 96%   BMI 27.67 kg/m   Estimated body mass index is 27.67 kg/m  as calculated from the following:    Height as of this encounter: 1.753 m (5' 9\").    Weight as of this encounter: 85 kg (187 lb 6.4 oz).  Medication Reconciliation: complete    Beatrice Akers LPN on 2/28/2024 at 9:30 AM     "

## 2024-02-28 NOTE — PROGRESS NOTES
Jazlyn Arrington is a 29 year old, presenting for the following health issues:  Follow Up (2 week follow up labs and vomiting )      2/28/2024     9:26 AM   Additional Questions   Roomed by Beatrice MEJIA LPN   Accompanied by Self   (R11.2) Nausea and vomiting, unspecified vomiting type  (primary encounter diagnosis)  Comment: Patient feels that this is overall better since our last visit.  She is still occasionally having some nausea and utilizing Zofran as needed.  She has stopped using cannabis for her headaches which may have been a contributing factor to the nausea and vomiting which she has been worked up for several times.  Plan: Continue current treatment plan.  Continue abstinence of marijuana.  Patient instructed to follow-up with primary care provider if symptoms return.  I would then recommend referral to GI.    (G46.969) Migraine without status migrainosus, not intractable, unspecified migraine type  Comment: Chronic, stable.  Utilizing Tylenol or ibuprofen as needed.  Plan: Continue current treatment plan    (F98.8) Attention deficit disorder, unspecified hyperactivity presence  Comment: Patient had lost at [facility] due to insurance.  A referral was placed at previous visits for mental health.  She has an appointment that is scheduled for 4/12/24.  She states that she is currently doing well and has no concerns today.  Plan: Continue with current plan    (J06.9) Viral upper respiratory illness  Comment: Patient and family members with recent illness.  Symptoms include cough, sore throat, and chills.  Her chills have resolved.  Low suspicion for strep throat due to physical exam. She is afebrile today.  Plan: Patient given education on symptomatic management at home.  She was instructed to return to the clinic if symptoms do not improve over the next several days.  History of Present Illness       Reason for visit:  Follow up    She eats 2-3 servings of fruits and vegetables daily.She  "consumes 1 sweetened beverage(s) daily.She exercises with enough effort to increase her heart rate 30 to 60 minutes per day.  She exercises with enough effort to increase her heart rate 6 days per week. She is missing 2 dose(s) of medications per week.     Feeling overall better. Less vomiting and nausea.  Tries to eat within a short time of awakening in the morning which she feels has helped her.  Taking zofran as needed for the nausea.  She has stopped utilizing cannabis for headaches which may be contributing to the decreased frequency of nausea.    She has symptoms of a viral illness today. Family has also been going through illness at home. [place] that she had fever on Sunday and Monday but is better today.  She states that she has a sore throat.    Review of Systems  Constitutional, HEENT, cardiovascular, pulmonary, gi and gu systems are negative, except as otherwise noted.      Objective    /68   Pulse 76   Temp 97.8  F (36.6  C) (Tympanic)   Resp 16   Ht 1.753 m (5' 9\")   Wt 85 kg (187 lb 6.4 oz)   LMP 01/25/2024 (Approximate)   SpO2 96%   BMI 27.67 kg/m    Body mass index is 27.67 kg/m .  Physical Exam  Constitutional:       General: She is not in acute distress.     Appearance: Normal appearance. She is normal weight.   HENT:      Head: Normocephalic.      Right Ear: Tympanic membrane, ear canal and external ear normal.      Left Ear: Tympanic membrane, ear canal and external ear normal.      Mouth/Throat:      Mouth: Mucous membranes are moist.      Pharynx: No oropharyngeal exudate or posterior oropharyngeal erythema.   Eyes:      General: No scleral icterus.  Cardiovascular:      Rate and Rhythm: Normal rate and regular rhythm.      Pulses: Normal pulses.      Heart sounds: Normal heart sounds.   Pulmonary:      Effort: Pulmonary effort is normal.      Breath sounds: Normal breath sounds.   Abdominal:      General: Bowel sounds are normal. There is no distension.      Palpations: Abdomen is " soft.      Tenderness: There is no abdominal tenderness.   Musculoskeletal:         General: Normal range of motion.      Cervical back: Normal range of motion and neck supple.   Lymphadenopathy:      Cervical: No cervical adenopathy.   Skin:     General: Skin is warm and dry.      Capillary Refill: Capillary refill takes less than 2 seconds.   Neurological:      Mental Status: She is alert and oriented to person, place, and time.        Signed Electronically by: SALLY Cool CNP

## 2024-03-13 ENCOUNTER — OFFICE VISIT (OUTPATIENT)
Dept: FAMILY MEDICINE | Facility: OTHER | Age: 29
End: 2024-03-13
Attending: NURSE PRACTITIONER
Payer: COMMERCIAL

## 2024-03-13 VITALS
OXYGEN SATURATION: 98 % | TEMPERATURE: 97.7 F | WEIGHT: 185 LBS | SYSTOLIC BLOOD PRESSURE: 110 MMHG | HEIGHT: 69 IN | DIASTOLIC BLOOD PRESSURE: 74 MMHG | HEART RATE: 64 BPM | RESPIRATION RATE: 16 BRPM | BODY MASS INDEX: 27.4 KG/M2

## 2024-03-13 DIAGNOSIS — L60.0 INGROWN TOENAIL: Primary | ICD-10-CM

## 2024-03-13 PROCEDURE — 99213 OFFICE O/P EST LOW 20 MIN: CPT | Performed by: NURSE PRACTITIONER

## 2024-03-13 PROCEDURE — G0463 HOSPITAL OUTPT CLINIC VISIT: HCPCS | Performed by: NURSE PRACTITIONER

## 2024-03-13 ASSESSMENT — PAIN SCALES - GENERAL: PAINLEVEL: MODERATE PAIN (5)

## 2024-03-13 ASSESSMENT — PATIENT HEALTH QUESTIONNAIRE - PHQ9
SUM OF ALL RESPONSES TO PHQ QUESTIONS 1-9: 11
10. IF YOU CHECKED OFF ANY PROBLEMS, HOW DIFFICULT HAVE THESE PROBLEMS MADE IT FOR YOU TO DO YOUR WORK, TAKE CARE OF THINGS AT HOME, OR GET ALONG WITH OTHER PEOPLE: SOMEWHAT DIFFICULT
SUM OF ALL RESPONSES TO PHQ QUESTIONS 1-9: 11

## 2024-03-13 NOTE — NURSING NOTE
Patient presents today for ingrown toenail on right big toe.    Medication Reconciliation Complete    Marlene Dalal LPN  3/13/2024 9:19 AM

## 2024-03-13 NOTE — PROGRESS NOTES
"  Assessment & Plan   Problem List Items Addressed This Visit    None  Visit Diagnoses       Ingrown toenail    -  Primary    Relevant Orders    Orthopedic  Referral           Referral placed a new foot and ankle to have ingrown toenail the right great toe lateral aspect treated.  There is no active infection this time and reassurance was provided.  Recommendations also for 10-day and warm water as well as topical ointment in the event that the toe becomes erythematous, warm, or has purulent drainage.  She is receptive to the plan. No fevers. Feels overall well. It is painful to be on her feet for extended periods of time due to the ingrown toenail.        No follow-ups on file.      Subjective   Nury is a 29 year old, presenting for the following health issues:  Ingrown Toenail    History of Present Illness       Reason for visit:  Ingrown toenail    She eats 2-3 servings of fruits and vegetables daily.She consumes 1 sweetened beverage(s) daily.She exercises with enough effort to increase her heart rate 60 or more minutes per day.  She exercises with enough effort to increase her heart rate 5 days per week. She is missing 2 dose(s) of medications per week.       Review of Systems  Constitutional, HEENT, cardiovascular, pulmonary, gi and gu systems are negative, except as otherwise noted.      Objective    /74   Pulse 64   Temp 97.7  F (36.5  C)   Resp 16   Ht 1.753 m (5' 9\")   Wt 83.9 kg (185 lb)   LMP 02/29/2024 (Approximate)   SpO2 98%   BMI 27.32 kg/m    Body mass index is 27.32 kg/m .  Physical Exam  Constitutional:       Appearance: Normal appearance.   Pulmonary:      Effort: Pulmonary effort is normal. No respiratory distress.   Skin:     General: Skin is warm and dry.      Comments: Right great toe: lateral aspect of nail is ingrown without erythema, purulent drainage, or warmth. The area is tender.    Neurological:      Mental Status: She is alert.   Psychiatric:         Mood and " Affect: Mood normal.         Behavior: Behavior normal.            Signed Electronically by: Beatriz Barraza NP

## 2024-03-27 ENCOUNTER — TRANSFERRED RECORDS (OUTPATIENT)
Dept: HEALTH INFORMATION MANAGEMENT | Facility: OTHER | Age: 29
End: 2024-03-27
Payer: COMMERCIAL

## 2024-04-05 ENCOUNTER — MYC REFILL (OUTPATIENT)
Dept: FAMILY MEDICINE | Facility: OTHER | Age: 29
End: 2024-04-05
Payer: COMMERCIAL

## 2024-04-05 DIAGNOSIS — F98.8 ATTENTION DEFICIT DISORDER, UNSPECIFIED HYPERACTIVITY PRESENCE: Primary | ICD-10-CM

## 2024-04-05 RX ORDER — LISDEXAMFETAMINE DIMESYLATE 30 MG/1
30 CAPSULE ORAL 2 TIMES DAILY
Status: CANCELLED | OUTPATIENT
Start: 2024-04-05

## 2024-04-09 NOTE — TELEPHONE ENCOUNTER
Tereza GARCIA sent Rx request for the following:      Requested Prescriptions   Pending Prescriptions Disp Refills    VYVANSE 30 MG capsule       Sig: Take 1 capsule (30 mg) by mouth 2 times daily       There is no refill protocol information for this order          Last Prescription Date:   unsure noted as historical  Last Fill Qty/Refills:         , R-    Last Office Visit:              2/28/24   Future Office visit:           4/12/24 with Gloria Kirkpatrick    Routing refill request to provider for review/approval because:  Drug not on the [facility] refill protocol     Called and spoke with patient and patient states she sees Gloria Kirkpatrick on 4/12 and she will have her address then     Ellen Johns RN on 4/9/2024 at 3:05 PM

## 2024-04-12 ENCOUNTER — OFFICE VISIT (OUTPATIENT)
Dept: PSYCHIATRY | Facility: OTHER | Age: 29
End: 2024-04-12
Payer: COMMERCIAL

## 2024-04-12 VITALS
SYSTOLIC BLOOD PRESSURE: 102 MMHG | HEIGHT: 68 IN | TEMPERATURE: 97.1 F | OXYGEN SATURATION: 99 % | RESPIRATION RATE: 16 BRPM | WEIGHT: 186.4 LBS | DIASTOLIC BLOOD PRESSURE: 66 MMHG | BODY MASS INDEX: 28.25 KG/M2 | HEART RATE: 78 BPM

## 2024-04-12 DIAGNOSIS — F33.9 RECURRENT MAJOR DEPRESSIVE DISORDER, REMISSION STATUS UNSPECIFIED (H): ICD-10-CM

## 2024-04-12 DIAGNOSIS — F41.1 GENERALIZED ANXIETY DISORDER: ICD-10-CM

## 2024-04-12 DIAGNOSIS — F90.2 ATTENTION DEFICIT HYPERACTIVITY DISORDER (ADHD), COMBINED TYPE: Primary | ICD-10-CM

## 2024-04-12 PROCEDURE — G2211 COMPLEX E/M VISIT ADD ON: HCPCS

## 2024-04-12 PROCEDURE — G0463 HOSPITAL OUTPT CLINIC VISIT: HCPCS

## 2024-04-12 PROCEDURE — 99205 OFFICE O/P NEW HI 60 MIN: CPT

## 2024-04-12 RX ORDER — LAMOTRIGINE 25 MG/1
50 TABLET ORAL DAILY
Qty: 60 TABLET | Refills: 2 | Status: SHIPPED | OUTPATIENT
Start: 2024-04-12 | End: 2024-06-28

## 2024-04-12 RX ORDER — LISDEXAMFETAMINE DIMESYLATE 30 MG/1
CAPSULE ORAL
Qty: 60 CAPSULE | Refills: 0 | Status: SHIPPED | OUTPATIENT
Start: 2024-04-12 | End: 2024-05-17 | Stop reason: DRUGHIGH

## 2024-04-12 ASSESSMENT — ANXIETY QUESTIONNAIRES
3. WORRYING TOO MUCH ABOUT DIFFERENT THINGS: SEVERAL DAYS
4. TROUBLE RELAXING: SEVERAL DAYS
IF YOU CHECKED OFF ANY PROBLEMS ON THIS QUESTIONNAIRE, HOW DIFFICULT HAVE THESE PROBLEMS MADE IT FOR YOU TO DO YOUR WORK, TAKE CARE OF THINGS AT HOME, OR GET ALONG WITH OTHER PEOPLE: SOMEWHAT DIFFICULT
5. BEING SO RESTLESS THAT IT IS HARD TO SIT STILL: SEVERAL DAYS
GAD7 TOTAL SCORE: 8
7. FEELING AFRAID AS IF SOMETHING AWFUL MIGHT HAPPEN: SEVERAL DAYS
6. BECOMING EASILY ANNOYED OR IRRITABLE: MORE THAN HALF THE DAYS
GAD7 TOTAL SCORE: 8
8. IF YOU CHECKED OFF ANY PROBLEMS, HOW DIFFICULT HAVE THESE MADE IT FOR YOU TO DO YOUR WORK, TAKE CARE OF THINGS AT HOME, OR GET ALONG WITH OTHER PEOPLE?: SOMEWHAT DIFFICULT
GAD7 TOTAL SCORE: 8
7. FEELING AFRAID AS IF SOMETHING AWFUL MIGHT HAPPEN: SEVERAL DAYS
2. NOT BEING ABLE TO STOP OR CONTROL WORRYING: SEVERAL DAYS
1. FEELING NERVOUS, ANXIOUS, OR ON EDGE: SEVERAL DAYS

## 2024-04-12 ASSESSMENT — PATIENT HEALTH QUESTIONNAIRE - PHQ9
SUM OF ALL RESPONSES TO PHQ QUESTIONS 1-9: 9
SUM OF ALL RESPONSES TO PHQ QUESTIONS 1-9: 9
10. IF YOU CHECKED OFF ANY PROBLEMS, HOW DIFFICULT HAVE THESE PROBLEMS MADE IT FOR YOU TO DO YOUR WORK, TAKE CARE OF THINGS AT HOME, OR GET ALONG WITH OTHER PEOPLE: SOMEWHAT DIFFICULT

## 2024-04-12 ASSESSMENT — PAIN SCALES - GENERAL: PAINLEVEL: NO PAIN (0)

## 2024-04-12 NOTE — PATIENT INSTRUCTIONS
Nury, it was a pleasure seeing you today. As we discussed:    Discontinue Prozac.    Continue all other medications as prescribed - refills sent today for Vyvanse and lamictal.    [facility] has a lot of good resources for children suffering with emotional dysregulation.     Call the clinic with medication questions or concerns at [phone] or send a Phonitive - Touchalize message. TravelZeekyhart may be used to communicate with your provider, but this is not intended to be used for emergencies.     Mobile Crisis Line for [facility]: 211 (First Call for Help)
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
## 2024-04-12 NOTE — NURSING NOTE
"Chief Complaint   Patient presents with    Consult       Initial LMP 02/29/2024 (Approximate)  Estimated body mass index is 27.32 kg/m  as calculated from the following:    Height as of 3/13/24: 1.753 m (5' 9\").    Weight as of 3/13/24: 83.9 kg (185 lb).  Medication Review: complete    The next two questions are to help us understand your food security.  If you are feeling you need any assistance in this area, we have resources available to support you today.          2/28/2024   SDOH- Food Insecurity   Within the past 12 months, did you worry that your food would run out before you got money to buy more? Pt Declined   Within the past 12 months, did the food you bought just not last and you didn t have money to get more? Pt Declined         Health Care Directive:  Patient does not have a Health Care Directive or Living Will: Discussed advance care planning with patient; however, patient declined at this time.    Margarita Mari CNA      "

## 2024-04-16 ENCOUNTER — MYC MEDICAL ADVICE (OUTPATIENT)
Dept: PSYCHIATRY | Facility: OTHER | Age: 29
End: 2024-04-16
Payer: COMMERCIAL

## 2024-04-16 DIAGNOSIS — F90.2 ATTENTION DEFICIT HYPERACTIVITY DISORDER (ADHD), COMBINED TYPE: Primary | ICD-10-CM

## 2024-04-17 NOTE — TELEPHONE ENCOUNTER
Reached out to [facility] with info given from patient on Vyvanse dosing.     Patient update on MyChart.    Dennise Dawson RN on 4/17/2024 at 4:18 PM    Per [facility]:   I will submit to [facility] but per [employer] they won't split that med up.  And: its been submitted but already told me they won't split that up.     Patient update on MyChart.    Dennise Dawson RN on 4/17/2024 at 4:38 PM

## 2024-04-18 NOTE — TELEPHONE ENCOUNTER
Medical reviewer will not approve this.  60mg and 70's were on backorder for a while, which is why she could do the twice a day for 30.  But now he will not approve the 30's twice daily.     Patient update on MyChart.  Has already been forwarded to Gloria for review.      Dennise Dawson RN on 4/18/2024 at 8:28 AM

## 2024-04-19 RX ORDER — LISDEXAMFETAMINE DIMESYLATE 60 MG/1
60 CAPSULE ORAL EVERY MORNING
Qty: 30 CAPSULE | Refills: 0 | Status: SHIPPED | OUTPATIENT
Start: 2024-04-19 | End: 2024-05-17

## 2024-04-28 DIAGNOSIS — Z30.018 ENCOUNTER FOR PRESCRIPTION FOR NUVARING: ICD-10-CM

## 2024-04-29 RX ORDER — ETONOGESTREL AND ETHINYL ESTRADIOL .015; .12 MG/D; MG/D
INSERT, EXTENDED RELEASE VAGINAL
OUTPATIENT
Start: 2024-04-29

## 2024-04-29 NOTE — TELEPHONE ENCOUNTER
Tereza sent Rx request for the following:      Requested Prescriptions   Pending Prescriptions Disp Refills    HALOETTE 0.12-0.015 MG/24HR vaginal ring [Pharmacy Med Name: HALOETTE VAGINAL RING]       Sig: INSERT 1 RING VAGINALLY EVERY 28 DAYS       There is no refill protocol information for this order          Last Prescription Date:   1/29/2024  Last Fill Qty/Refills:         3 each, R-0    Last Office Visit:              2/16/23 Naye Johns MD   Future Office visit:           None    Patient was given 3 month mandi refill with need to schedule follow up annual exam. Patient cancelled appointment on 1/11/24 with Naye Johns MD and 3/11/24 appointment with SALLY Garnett CNP. Needs appointment for refills.    Sarah Herr RN on 4/29/2024 at 10:08 AM

## 2024-05-13 ENCOUNTER — TRANSFERRED RECORDS (OUTPATIENT)
Dept: HEALTH INFORMATION MANAGEMENT | Facility: OTHER | Age: 29
End: 2024-05-13
Payer: COMMERCIAL

## 2024-05-17 ENCOUNTER — OFFICE VISIT (OUTPATIENT)
Dept: PSYCHIATRY | Facility: OTHER | Age: 29
End: 2024-05-17
Payer: COMMERCIAL

## 2024-05-17 VITALS
SYSTOLIC BLOOD PRESSURE: 132 MMHG | HEIGHT: 68 IN | HEART RATE: 96 BPM | DIASTOLIC BLOOD PRESSURE: 88 MMHG | TEMPERATURE: 99.1 F | BODY MASS INDEX: 28.67 KG/M2 | RESPIRATION RATE: 16 BRPM | WEIGHT: 189.2 LBS | OXYGEN SATURATION: 98 %

## 2024-05-17 DIAGNOSIS — F33.9 RECURRENT MAJOR DEPRESSIVE DISORDER, REMISSION STATUS UNSPECIFIED (H): Primary | ICD-10-CM

## 2024-05-17 DIAGNOSIS — F90.2 ATTENTION DEFICIT HYPERACTIVITY DISORDER (ADHD), COMBINED TYPE: ICD-10-CM

## 2024-05-17 PROCEDURE — G2211 COMPLEX E/M VISIT ADD ON: HCPCS

## 2024-05-17 PROCEDURE — G0463 HOSPITAL OUTPT CLINIC VISIT: HCPCS

## 2024-05-17 PROCEDURE — 99213 OFFICE O/P EST LOW 20 MIN: CPT

## 2024-05-17 RX ORDER — LISDEXAMFETAMINE DIMESYLATE 60 MG/1
60 CAPSULE ORAL EVERY MORNING
Qty: 30 CAPSULE | Refills: 0 | Status: SHIPPED | OUTPATIENT
Start: 2024-05-17 | End: 2024-06-28

## 2024-05-17 RX ORDER — BUPROPION HYDROCHLORIDE 150 MG/1
150 TABLET ORAL EVERY MORNING
Qty: 30 TABLET | Refills: 1 | Status: SHIPPED | OUTPATIENT
Start: 2024-05-17 | End: 2024-06-28

## 2024-05-17 ASSESSMENT — ANXIETY QUESTIONNAIRES
GAD7 TOTAL SCORE: 11
5. BEING SO RESTLESS THAT IT IS HARD TO SIT STILL: SEVERAL DAYS
GAD7 TOTAL SCORE: 11
4. TROUBLE RELAXING: MORE THAN HALF THE DAYS
6. BECOMING EASILY ANNOYED OR IRRITABLE: MORE THAN HALF THE DAYS
8. IF YOU CHECKED OFF ANY PROBLEMS, HOW DIFFICULT HAVE THESE MADE IT FOR YOU TO DO YOUR WORK, TAKE CARE OF THINGS AT HOME, OR GET ALONG WITH OTHER PEOPLE?: VERY DIFFICULT
IF YOU CHECKED OFF ANY PROBLEMS ON THIS QUESTIONNAIRE, HOW DIFFICULT HAVE THESE PROBLEMS MADE IT FOR YOU TO DO YOUR WORK, TAKE CARE OF THINGS AT HOME, OR GET ALONG WITH OTHER PEOPLE: VERY DIFFICULT
GAD7 TOTAL SCORE: 11
7. FEELING AFRAID AS IF SOMETHING AWFUL MIGHT HAPPEN: MORE THAN HALF THE DAYS
3. WORRYING TOO MUCH ABOUT DIFFERENT THINGS: SEVERAL DAYS
1. FEELING NERVOUS, ANXIOUS, OR ON EDGE: MORE THAN HALF THE DAYS
7. FEELING AFRAID AS IF SOMETHING AWFUL MIGHT HAPPEN: MORE THAN HALF THE DAYS
2. NOT BEING ABLE TO STOP OR CONTROL WORRYING: SEVERAL DAYS

## 2024-05-17 ASSESSMENT — PATIENT HEALTH QUESTIONNAIRE - PHQ9
10. IF YOU CHECKED OFF ANY PROBLEMS, HOW DIFFICULT HAVE THESE PROBLEMS MADE IT FOR YOU TO DO YOUR WORK, TAKE CARE OF THINGS AT HOME, OR GET ALONG WITH OTHER PEOPLE: VERY DIFFICULT
SUM OF ALL RESPONSES TO PHQ QUESTIONS 1-9: 13
SUM OF ALL RESPONSES TO PHQ QUESTIONS 1-9: 13

## 2024-05-17 ASSESSMENT — PAIN SCALES - GENERAL: PAINLEVEL: NO PAIN (0)

## 2024-05-17 NOTE — PROGRESS NOTES
[facility] AND [facility] PSYCHIATRY   PROGRESS NOTE     ASSESSMENT & PLAN     This is a 29 year old female with a PMH of anxiety, depression, ADHD who presents for ongoing psychiatric care and medication management. PDMP reviewed and is appropriate. Was unable to have her insurance approve split dosing of Vyvanse, she is continuing to struggle with afternoon fatigue, irritability.  We discussed the option of Wellbutrin in the morning and pushing her Vyvanse dose back till about 10:00 to see if this helps give her enough coverage into the evening time where she needs to be functional and present with her children.  Also gave the option of taking her Vyvanse regularly in the morning and using Wellbutrin daily in the early afternoon.  She verbalized understanding of this and was agreeable this plan.        ICD-10-CM    1. Recurrent major depressive disorder, remission status unspecified (H24)  F33.9 buPROPion (WELLBUTRIN XL) 150 MG 24 hr tablet      2. Attention deficit hyperactivity disorder (ADHD), combined type  F90.2 lisdexamfetamine (VYVANSE) 60 MG capsule     buPROPion (WELLBUTRIN XL) 150 MG 24 hr tablet          Medication:   Start Wellbutrin 150 mg XL daily for ADHD/depression.  Continue Vyvanse 60 mg daily in the morning for ADHD.  Continue Lamictal 50 mg daily for mood.    Psychotherapy: Encouraged.  Labs: None ordered.  F/U: 4 to 6 weeks or sooner if symptoms occur.    The risks, benefits, alternatives and side effects have been discussed and are understood by the patient. The patient understands the risks of using street drugs or alcohol. The patient understands to call 911, 211 ([facility] Crisis Line) or come to the nearest ED if life threatening or urgent symptoms present.    The longitudinal plan of care for the diagnosis(es)/condition(s) as documented were addressed during this visit. Due to the added complexity in care, I will continue to support Nury in the subsequent management and with  ongoing continuity of care.       HISTORY OF PRESENT ILLNESS     Nury Gilbert was last seen in clinic on 4/12/2024.  At that time:  She feels well-managed on her current medication regimen and feels like she would like to continue to wean from Prozac.     Discussed today that she can stop her dose of Prozac and continue with Lamictal.  Also discussed that we can always look to further maximize Lamictal if needed.  Continue all other medications-refills provided today for both Lamictal and Vyvanse.     PDMP reviewed and is appropriate. Risk, benefit, intended purposes and side effect of medication discussed.  Prescribing practices for controlled substances discussed.     Discontinue Prozac.  Continue Lamictal 50 mg daily for mood.  Continue Vyvanse 30 mg daily in the morning and 30 mg as needed in the afternoon.  Continue Ativan 1 mg daily as needed.       Today:    Nury presents alone for ongoing medication management and psychiatric care.  She was somewhat frustrated as her insurance was not willing to cover split dosing of Vyvanse, so she has been taking 60 mg daily in the morning.  She feels as though even when she is trying to push back the dose it is wearing off before she is getting home in the evenings and finds herself overwhelmed and irritated easily with her children.  She has been at least a month without being on Prozac and feels as though she is handling this well.  She denies suicidal ideation and denies thoughts of self-harm.    Target symptoms: task-completion, fatigue     Symptoms improved: Task completion fatigue somewhat improved with Vyvanse    Sleep: Good    Past Med Trials:  Mirtazapine  Adderall  Prazosin  Citalopram     REVIEW OF SYSTEMS   The review of systems is negative other than noted in the HPI.    Past Medical History:   Diagnosis Date    Anxiety disorder     No Comments Provided    Attention-deficit hyperactivity disorder     No Comments Provided    Depressive disorder      "Superficial foreign body of finger     5/19/2017      Current Outpatient Medications   Medication Instructions    acetaminophen (TYLENOL) 500-1,000 mg, Oral, EVERY 6 HOURS PRN    bacitracin 500 UNIT/GM external ointment Topical, 2 TIMES DAILY    etonogestrel-ethinyl estradiol (ELURYNG) 0.12-0.015 MG/24HR vaginal ring Vaginal, EVERY 28 DAYS    FLUoxetine (PROZAC) 20 mg, Oral, DAILY    ibuprofen (ADVIL/MOTRIN) 600 mg, Oral, EVERY 6 HOURS    lamoTRIgine (LAMICTAL) 50 mg, Oral, DAILY    lisdexamfetamine (VYVANSE) 60 mg, Oral, EVERY MORNING    LORazepam (ATIVAN) 1 mg, Oral, EVERY 6 HOURS PRN    medical cannabis (Patient's own supply) (The purpose of this order is to document that the patient reports taking medical cannabis.  This is not a prescription, and is not used to certify that the patient has a qualifying medical condition.)<BR><BR>Takes for PTSD    ondansetron (ZOFRAN ODT) 4 mg, Oral, EVERY 8 HOURS PRN    VYVANSE 30 MG capsule Take 1 capsule in the AM, can use additional 30 mg capsule in afternoon for breakthrough symptoms as needed.         MENTAL STATUS EXAM    Vitals: /88 (BP Location: Left arm, Patient Position: Chair, Cuff Size: Adult Regular)   Pulse 96   Temp 99.1  F (37.3  C) (Tympanic)   Resp 16   Ht 1.727 m (5' 8\")   Wt 85.8 kg (189 lb 3.2 oz)   LMP 04/25/2024 (Approximate)   SpO2 98%   Breastfeeding No   BMI 28.77 kg/m      Appearance:  No apparent distress, Casually dressed, and Well groomed  Behavior/relationship to examiner/demeanor:  Cooperative, Engaged, and Pleasant  Motor activity/EPS:  Normal  Mood (subjective report):  \"good\"  Affect (objective appearance):  Appropriate/mood-congruent  Thought content:  no evidence of suicidal or homicidal ideation, no overt psychosis, and denies suicidal ideation, intent or thoughts  Insight:  Adequate  Judgment:  Good, Adequate for safety, and Appropriate for age        3/13/2024     9:04 AM 4/12/2024     8:17 AM 5/17/2024     3:27 PM   PHQ "   PHQ-9 Total Score 11 9 13   Q9: Thoughts of better off dead/self-harm past 2 weeks Not at all Not at all Not at all         2/14/2024     7:45 AM 4/12/2024     8:17 AM 5/17/2024     3:28 PM   RBANDON-7 SCORE   Total Score 16 (severe anxiety) 8 (mild anxiety) 11 (moderate anxiety)   Total Score 16 8 11        LABS     Most Recent 3 CBC's:  Recent Labs   Lab Test 02/14/24  0842 10/21/21  1138 06/20/20  0540 06/19/20  1020   WBC 5.7 7.9  --  10.5   HGB 13.0 14.5 11.3* 12.5    90  --  91    326  --  270      Most Recent 3 BMP's:  Recent Labs   Lab Test 02/14/24  0842 11/23/19  1208 03/17/19  2210    135 137   POTASSIUM 3.8 3.8 3.5   CHLORIDE 101 105 105   CO2 25 20* 23   BUN 7.2 6* 15   CR 0.58 0.51* 0.79   ANIONGAP 11 10 9   SUNNY 9.5 9.0 9.5   GLC 92 86 90     Most Recent TSH, T4 and A1c Labs:  Recent Labs   Lab Test 02/14/24  0842   TSH 0.80        Allergies   Allergen Reactions    Penicillins Swelling     Throat swelling and hives     Phenergan Dm [Promethazine-Dm] Other (See Comments)     Hallucinations and muscle twitching         ATTESTATION      Gloria Kirkpatrick, DNP, PMHNP-BC    22 minutes spent on the date of the encounter doing chart review, history and exam, documentation and further activities per the note.

## 2024-05-17 NOTE — PATIENT INSTRUCTIONS
Nury, it was a pleasure seeing you today. As we discussed:    Start Wellbutrin - take 150 mg XL daily for ADHD/depression.  Continue Vyvanse 60 mg daily for ADHD.  Continue Lamictal 50 mg daily.     Try taking Wellbutrin first thing in the AM, followed by Vyvanse around 10:00. If this does not work well for you, you can try to take Vyvanse in the AM and Wellbutrin in the early afternoon.    Call the clinic with medication questions or concerns at [phone] or send a [employer] message. [employer] may be used to communicate with your provider, but this is not intended to be used for emergencies.     Mobile Crisis Line for [facility]: 211 (First Call for Help)

## 2024-05-17 NOTE — NURSING NOTE
"Chief Complaint   Patient presents with    Recheck Medication     1 Month Follow Up Medication Management        Initial /88 (BP Location: Left arm, Patient Position: Chair, Cuff Size: Adult Regular)   Pulse 96   Temp 99.1  F (37.3  C) (Tympanic)   Resp 16   Ht 1.727 m (5' 8\")   Wt 85.8 kg (189 lb 3.2 oz)   LMP 04/25/2024 (Approximate)   SpO2 98%   Breastfeeding No   BMI 28.77 kg/m   Estimated body mass index is 28.77 kg/m  as calculated from the following:    Height as of this encounter: 1.727 m (5' 8\").    Weight as of this encounter: 85.8 kg (189 lb 3.2 oz).      Medication Reconciliation: Complete.       Nisha Velarde LPN on 5/17/2024 at 3:36 PM     "

## 2024-06-14 ENCOUNTER — OFFICE VISIT (OUTPATIENT)
Dept: OBGYN | Facility: OTHER | Age: 29
End: 2024-06-14
Payer: COMMERCIAL

## 2024-06-14 VITALS
DIASTOLIC BLOOD PRESSURE: 68 MMHG | WEIGHT: 188.3 LBS | HEIGHT: 68 IN | HEART RATE: 82 BPM | BODY MASS INDEX: 28.54 KG/M2 | SYSTOLIC BLOOD PRESSURE: 112 MMHG

## 2024-06-14 DIAGNOSIS — F33.9 RECURRENT MAJOR DEPRESSIVE DISORDER, REMISSION STATUS UNSPECIFIED (H): ICD-10-CM

## 2024-06-14 DIAGNOSIS — F41.1 GENERALIZED ANXIETY DISORDER: ICD-10-CM

## 2024-06-14 DIAGNOSIS — Z00.00 ANNUAL PHYSICAL EXAM: Primary | ICD-10-CM

## 2024-06-14 DIAGNOSIS — Z30.018 ENCOUNTER FOR PRESCRIPTION FOR NUVARING: ICD-10-CM

## 2024-06-14 DIAGNOSIS — F98.8 ATTENTION DEFICIT DISORDER, UNSPECIFIED HYPERACTIVITY PRESENCE: ICD-10-CM

## 2024-06-14 PROCEDURE — 99395 PREV VISIT EST AGE 18-39: CPT

## 2024-06-14 PROCEDURE — 99213 OFFICE O/P EST LOW 20 MIN: CPT | Mod: 25

## 2024-06-14 PROCEDURE — G0463 HOSPITAL OUTPT CLINIC VISIT: HCPCS

## 2024-06-14 RX ORDER — ETONOGESTREL AND ETHINYL ESTRADIOL VAGINAL RING .015; .12 MG/D; MG/D
1 RING VAGINAL
Qty: 3 EACH | Refills: 4 | Status: SHIPPED | OUTPATIENT
Start: 2024-06-14

## 2024-06-14 ASSESSMENT — PATIENT HEALTH QUESTIONNAIRE - PHQ9
SUM OF ALL RESPONSES TO PHQ QUESTIONS 1-9: 10
SUM OF ALL RESPONSES TO PHQ QUESTIONS 1-9: 10
10. IF YOU CHECKED OFF ANY PROBLEMS, HOW DIFFICULT HAVE THESE PROBLEMS MADE IT FOR YOU TO DO YOUR WORK, TAKE CARE OF THINGS AT HOME, OR GET ALONG WITH OTHER PEOPLE: SOMEWHAT DIFFICULT

## 2024-06-14 NOTE — PROGRESS NOTES
Preventive Care Visit  [facility] AND [facility]  SALLY Garnett CNP, Family Medicine  Jun 14, 2024      Assessment & Plan     Encounter for prescription for nuvarin  - etonogestrel-ethinyl estradiol (ELURYNG) 0.12-0.015 MG/24HR vaginal ring; Place 1 each vaginally every 28 days    Annual physical exam  Doing well overall.     (F98.8) Attention deficit disorder, unspecified hyperactivity presence    Plan: doing well managed by Gloria Kirkpatrick NP    (F33.9) Recurrent major depressive disorder, remission status unspecified (H24)  Plan: managed by Gloria Kirkpatrick NP doing well     (F41.1) Generalized anxiety disorder  Plan: doing well managed by Gloria Kirkpatrick NP    No follow-ups on file.    Jazlyn Arrington is a 29 year old, presenting for the following: Contraception and annual exam.  Has been utilizing NuvaRing and is quite happy with this method.  She voices that her pelvic pain is well-controlled with this as well.  She would like to continue this for the next year.     Health Care Directive  Patient does not have a Health Care Directive or Living Will: Discussed advance care planning with patient; however, patient declined at this time.    HPI  Doing well.  Voices no concerns.  Mental health is managed by Gloria.  Ni states she is doing well overall.  No other concerns          2/14/2024   General Health   How would you rate your overall physical health? (!) FAIR   Feel stress (tense, anxious, or unable to sleep) Rather much         2/14/2024   Nutrition   Three or more servings of calcium each day? (!) NO   Diet: Regular (no restrictions)   How many servings of fruit and vegetables per day? (!) 2-3   How many sweetened beverages each day? 0-1         2/14/2024   Exercise   Days per week of moderate/strenous exercise 5 days         2/28/2024   Social Factors   Worry food won't last until get money to buy more Patient declined   Food not last or not have enough money for food? Patient  declined   Do you have housing?  Yes   Are you worried about losing your housing? Yes   Lack of transportation? Patient declined   Unable to get utilities (heat,electricity)? Patient declined   Want help with housing or utility concern? No         2/14/2024   Dental   Dentist two times every year? (!) NO         2/14/2024   TB Screening   Were you born outside of the [place]? No       Today's PHQ-9 Score:       6/14/2024     8:53 AM   PHQ-9 SCORE   PHQ-9 Total Score MyChart 10 (Moderate depression)   PHQ-9 Total Score 10         2/14/2024   Substance Use   Alcohol more than 3/day or more than 7/wk Yes   How often do you have a drink containing alcohol 2 to 3 times a week   How many alcohol drinks on typical day 3 or 4   How often do you have 5+ drinks at one occasion Less than monthly   Audit 2/3 Score 2   How often not able to stop drinking once started Monthly   How often failed to do what normally expected Monthly   How often needed first drink in am after a heavy drinking session Less than monthly   How often feeling of guilt or remorse after drinking Weekly   How often unable to remember what happened the night before Less than monthly   Have you or someone else been injured because of your drinking No   Has anyone been concerned or suggested you cut down on drinking Yes, during the last year   TOTAL SCORE - AUDIT 18   Do you use any other substances recreationally? (!) ALCOHOL    (!) CANNABIS PRODUCTS     Social History     Tobacco Use    Smoking status: Never     Passive exposure: Past    Smokeless tobacco: Never   Vaping Use    Vaping status: Some Days    Substances: Nicotine    Devices: Disposable   Substance Use Topics    Alcohol use: Yes     Comment: 2-3 times a week    Drug use: Yes     Types: Marijuana     Comment: medical marijuana             6/14/2024   Breast Cancer Screening   Family history of breast, colon, or ovarian cancer? Yes         6/14/2024   LAST FHS-7 RESULTS   1st degree relative breast or  "ovarian cancer No   Any relative bilateral breast cancer No   Any male have breast cancer No   Any ONE woman have BOTH breast AND ovarian cancer No   Any woman with breast cancer before 50yrs No   2 or more relatives with breast AND/OR ovarian cancer No   2 or more relatives with breast AND/OR bowel cancer Yes        Mammogram Screening - Patient under 40 years of age: Routine Mammogram Screening not recommended.       History of abnormal Pap smear: No - age 21-29 PAP every 3 years recommended        2/8/2023     1:22 PM 7/31/2020     1:58 PM   PAP / HPV   PAP Negative for Intraepithelial Lesion or Malignancy (NILM)     PAP (Historical)  NIL            2/14/2024   Contraception/Family Planning   Questions about contraception or family planning No        Reviewed and updated as needed this visit by Provider   Tobacco  Allergies  Meds  Problems  Med Hx  Surg Hx  Fam Hx            Review of Systems  Constitutional, neuro, ENT, endocrine, pulmonary, cardiac, gastrointestinal, genitourinary, musculoskeletal, integument and psychiatric systems are negative, except as otherwise noted.     Objective    Exam  /68   Pulse 82   Ht 1.734 m (5' 8.25\")   Wt 85.4 kg (188 lb 4.8 oz)   LMP 06/09/2024 (Approximate)   BMI 28.42 kg/m     Estimated body mass index is 28.42 kg/m  as calculated from the following:    Height as of this encounter: 1.734 m (5' 8.25\").    Weight as of this encounter: 85.4 kg (188 lb 4.8 oz).    Physical Exam  GENERAL: alert and no distress  EYES: Eyes grossly normal to inspection, PERRL and conjunctivae and sclerae normal  NECK: no adenopathy, no asymmetry, masses, or scars  RESP: lungs clear to auscultation - no rales, rhonchi or wheezes  CV: regular rate and rhythm, normal S1 S2, no S3 or S4, no murmur, click or rub, no peripheral edema  ABDOMEN: soft, nontender, no hepatosplenomegaly, no masses and bowel sounds normal  MS: no gross musculoskeletal defects noted, no edema  SKIN: no " suspicious lesions or rashes  NEURO: Normal strength and tone, mentation intact and speech normal  PSYCH: mentation appears normal, affect normal/bright        Signed Electronically by: SALLY Garnett CNP

## 2024-06-14 NOTE — NURSING NOTE
Pt presents to clinic today for Medication refill.      Medication Reconciliation: complete  Noni Verdin LPN

## 2024-06-28 ENCOUNTER — OFFICE VISIT (OUTPATIENT)
Dept: PSYCHIATRY | Facility: OTHER | Age: 29
End: 2024-06-28
Payer: COMMERCIAL

## 2024-06-28 VITALS
RESPIRATION RATE: 16 BRPM | OXYGEN SATURATION: 97 % | HEART RATE: 78 BPM | SYSTOLIC BLOOD PRESSURE: 104 MMHG | TEMPERATURE: 97.9 F | WEIGHT: 191.3 LBS | HEIGHT: 68 IN | BODY MASS INDEX: 28.99 KG/M2 | DIASTOLIC BLOOD PRESSURE: 64 MMHG

## 2024-06-28 DIAGNOSIS — F90.2 ATTENTION DEFICIT HYPERACTIVITY DISORDER (ADHD), COMBINED TYPE: ICD-10-CM

## 2024-06-28 DIAGNOSIS — F33.1 MODERATE EPISODE OF RECURRENT MAJOR DEPRESSIVE DISORDER (H): ICD-10-CM

## 2024-06-28 PROCEDURE — G0463 HOSPITAL OUTPT CLINIC VISIT: HCPCS

## 2024-06-28 PROCEDURE — G2211 COMPLEX E/M VISIT ADD ON: HCPCS

## 2024-06-28 PROCEDURE — 99214 OFFICE O/P EST MOD 30 MIN: CPT

## 2024-06-28 RX ORDER — LISDEXAMFETAMINE DIMESYLATE 60 MG/1
60 CAPSULE ORAL EVERY MORNING
Qty: 30 CAPSULE | Refills: 0 | Status: SHIPPED | OUTPATIENT
Start: 2024-06-28 | End: 2024-07-25

## 2024-06-28 RX ORDER — BUPROPION HYDROCHLORIDE 150 MG/1
150 TABLET ORAL EVERY MORNING
Qty: 30 TABLET | Refills: 1 | Status: SHIPPED | OUTPATIENT
Start: 2024-06-28 | End: 2024-07-25 | Stop reason: SINTOL

## 2024-06-28 RX ORDER — LAMOTRIGINE 100 MG/1
100 TABLET ORAL DAILY
Qty: 30 TABLET | Refills: 1 | Status: SHIPPED | OUTPATIENT
Start: 2024-06-28 | End: 2024-08-29

## 2024-06-28 ASSESSMENT — ANXIETY QUESTIONNAIRES
1. FEELING NERVOUS, ANXIOUS, OR ON EDGE: MORE THAN HALF THE DAYS
7. FEELING AFRAID AS IF SOMETHING AWFUL MIGHT HAPPEN: NEARLY EVERY DAY
GAD7 TOTAL SCORE: 17
7. FEELING AFRAID AS IF SOMETHING AWFUL MIGHT HAPPEN: NEARLY EVERY DAY
GAD7 TOTAL SCORE: 17
IF YOU CHECKED OFF ANY PROBLEMS ON THIS QUESTIONNAIRE, HOW DIFFICULT HAVE THESE PROBLEMS MADE IT FOR YOU TO DO YOUR WORK, TAKE CARE OF THINGS AT HOME, OR GET ALONG WITH OTHER PEOPLE: EXTREMELY DIFFICULT
2. NOT BEING ABLE TO STOP OR CONTROL WORRYING: NEARLY EVERY DAY
8. IF YOU CHECKED OFF ANY PROBLEMS, HOW DIFFICULT HAVE THESE MADE IT FOR YOU TO DO YOUR WORK, TAKE CARE OF THINGS AT HOME, OR GET ALONG WITH OTHER PEOPLE?: EXTREMELY DIFFICULT
5. BEING SO RESTLESS THAT IT IS HARD TO SIT STILL: SEVERAL DAYS
6. BECOMING EASILY ANNOYED OR IRRITABLE: NEARLY EVERY DAY
GAD7 TOTAL SCORE: 17
4. TROUBLE RELAXING: MORE THAN HALF THE DAYS
3. WORRYING TOO MUCH ABOUT DIFFERENT THINGS: NEARLY EVERY DAY

## 2024-06-28 ASSESSMENT — PATIENT HEALTH QUESTIONNAIRE - PHQ9
SUM OF ALL RESPONSES TO PHQ QUESTIONS 1-9: 15
SUM OF ALL RESPONSES TO PHQ QUESTIONS 1-9: 15
10. IF YOU CHECKED OFF ANY PROBLEMS, HOW DIFFICULT HAVE THESE PROBLEMS MADE IT FOR YOU TO DO YOUR WORK, TAKE CARE OF THINGS AT HOME, OR GET ALONG WITH OTHER PEOPLE: VERY DIFFICULT

## 2024-06-28 ASSESSMENT — PAIN SCALES - GENERAL: PAINLEVEL: NO PAIN (0)

## 2024-06-28 NOTE — PATIENT INSTRUCTIONS
"Nury, it was a pleasure seeing you today. As we discussed:    Increase Lamictal to 100 mg daily for mood/depression.      Call the clinic with medication questions or concerns at [phone] or send a [employer] message. [employer] may be used to communicate with your provider, but this is not intended to be used for emergencies.     Crisis Resources for [facility]: First Call for Help: (211), [facility] Crisis Line available 24/7: ([phone])  [facility]: National Crisis Text Line: text HOME to 111993    Therapy Options in [place] and Surrounding Area:    Cristobal Larson, [facility] - ([phone])    [facility] - Syd Jalloh ([phone])    Joaquina Marmolejo, [facility], family and individual therapy- ([phone])    [facility] - specializes in women and adolescent therapy - ([phone])    [facility] - EMDR, trauma, etc. ([phone])    [facility] - spiritual based support groups ([phone])    [facility] - adults, adolescents and children ([phone])    [facility] - several different therapy options adults and children ([phone])    [facility] - several options, one of the largest mental health providers in the area - ([phone])    [facility] - ([phone])    [facility] - offers several therapy options including 8-week \"express\" therapy program - ([phone])    [facility] - ([phone])    [facility] - adult therapy ([phone])    [facility] - ([phone])    [facility] - ([phone])    [facility] - ([phone])    [facility] - ([phone])     [facility] [address], [place] [address] [phone]  "

## 2024-06-28 NOTE — PROGRESS NOTES
[facility] AND [facility] PSYCHIATRY   PROGRESS NOTE     ASSESSMENT & PLAN     This is a 29 year old female who presents for ongoing psychiatric care and medication management for the following:     Diagnosis Comments   1. Attention deficit hyperactivity disorder (ADHD), combined type  lisdexamfetamine (VYVANSE) 60 MG capsule, buPROPion (WELLBUTRIN XL) 150 MG 24 hr tablet     Managing okay - increased in life stressors/depression which exacerbate ADHD symptoms. PDMP reviewed, appropriate.        2. Moderate episode of recurrent major depressive disorder (H)  buPROPion (WELLBUTRIN XL) 150 MG 24 hr tablet, lamoTRIgine (LAMICTAL) 100 MG tablet     Increased depression/mood swings - discussed increasing Lamictal. Side effects including SJS discussed with patient. Encouraged to re-establish with therapy - resources given.              Medication:     Increase Lamictal to 100 mg daily for mood/depression.  Continue Vyvanse 60 mg daily in the AM for ADHD.  Continue Wellbutrin  mg daily for depression/ADHD.    Psychotherapy: Encouraged to establish - resources given.   Labs: None ordered.   F/U: 1 month or sooner if symptoms occur.     The risks, benefits, alternatives and side effects have been discussed and are understood by the patient. The patient understands the risks of using street drugs or alcohol. The patient understands to call 911, 211 ([facility] Crisis Line) or come to the nearest ED if life threatening or urgent symptoms present.       HISTORY OF PRESENT ILLNESS     Nury Gilbert was last seen in clinic on 5/17/2024.  At that time:    Was unable to have her insurance approve split dosing of Vyvanse, she is continuing to struggle with afternoon fatigue, irritability. We discussed the option of Wellbutrin in the morning and pushing her Vyvanse dose back till about 10:00 to see if this helps give her enough coverage into the evening time where she needs to be functional and present with her  children. Also gave the option of taking her Vyvanse regularly in the morning and using Wellbutrin daily in the early afternoon. She verbalized understanding of this and was agreeable this plan.     Start Wellbutrin 150 mg XL daily for ADHD/depression.  Continue Vyvanse 60 mg daily in the morning for ADHD.  Continue Lamictal 50 mg daily for mood.       Today:    [facility]. Reports increase in depression/moodiness - states she had a significant low 2 weeks ago with active thoughts of self-harm, however did not act on them (thoughts of running away). She does report that at that time, she was also in between renewing her birth control and forgot to take her medications for a couple of days. Feeling a bit better now - work has been stressful. Denies suicidal ideation today, denies thoughts of self harm today.     Target symptoms: anxiety, depression, fatigue    Sleep: waking up during the night, hard falling back asleep.     Past Med Trials:  Mirtazapine  Adderall  Prazosin  Citalopram       REVIEW OF SYSTEMS   The review of systems is negative other than noted in the HPI.    Past Medical History:   Diagnosis Date    Anxiety disorder     No Comments Provided    Attention-deficit hyperactivity disorder     No Comments Provided    Depressive disorder     Superficial foreign body of finger     5/19/2017      Current Outpatient Medications   Medication Instructions    acetaminophen (TYLENOL) 500-1,000 mg, Oral, EVERY 6 HOURS PRN    bacitracin 500 UNIT/GM external ointment Topical, 2 TIMES DAILY    buPROPion (WELLBUTRIN XL) 150 mg, Oral, EVERY MORNING    etonogestrel-ethinyl estradiol (ELURYNG) 0.12-0.015 MG/24HR vaginal ring 1 each, Vaginal, EVERY 28 DAYS    ibuprofen (ADVIL/MOTRIN) 600 mg, Oral, EVERY 6 HOURS    lamoTRIgine (LAMICTAL) 50 mg, Oral, DAILY    lisdexamfetamine (VYVANSE) 60 mg, Oral, EVERY MORNING    LORazepam (ATIVAN) 1 mg, Oral, EVERY 6 HOURS  "PRN    medical cannabis (Patient's own supply) (The purpose of this order is to document that the patient reports taking medical cannabis.  This is not a prescription, and is not used to certify that the patient has a qualifying medical condition.)<BR><BR>Takes for PTSD    ondansetron (ZOFRAN ODT) 4 mg, Oral, EVERY 8 HOURS PRN         MENTAL STATUS EXAM    Vitals: /64 (BP Location: Left arm, Patient Position: Sitting, Cuff Size: Adult Regular)   Pulse 78   Temp 97.9  F (36.6  C) (Tympanic)   Resp 16   Ht 1.721 m (5' 7.75\")   Wt 86.8 kg (191 lb 4.8 oz)   LMP 06/04/2024 (Exact Date)   SpO2 97%   BMI 29.30 kg/m      Appearance:  No apparent distress, Casually dressed, and Well groomed  Behavior/relationship to examiner/demeanor:  Cooperative, Engaged, and Pleasant  Motor activity/EPS:  Normal  Mood (subjective report):  \"okay\" and down  Affect (objective appearance):  Appropriate/mood-congruent  Thought content:  no evidence of suicidal or homicidal ideation, no overt psychosis, and denies suicidal ideation, intent or thoughts  Insight:  Good  Judgment:  Good, Adequate for safety, and Appropriate for age        5/17/2024     3:27 PM 6/14/2024     8:53 AM 6/28/2024     8:45 AM   PHQ   PHQ-9 Total Score 13 10 15   Q9: Thoughts of better off dead/self-harm past 2 weeks Not at all Not at all Several days   F/U: Thoughts of suicide or self-harm   Yes   F/U: Self harm-plan   Yes   F/U: Self-harm action   Yes   F/U: Safety concerns   No         4/12/2024     8:17 AM 5/17/2024     3:28 PM 6/28/2024     8:45 AM   BRANDON-7 SCORE   Total Score 8 (mild anxiety) 11 (moderate anxiety) 17 (severe anxiety)   Total Score 8 11 17       Suicide Risk Assessment:    Today Nury Gilbert reports increased depression/anxiety. In addition, she has notable risk factors for self-harm, including age and anxiety. However, risk is mitigated by commitment to family, sobriety, absence of past attempts, ability to volunteer a safety " plan, and history of seeking help when needed. Therefore, based on all available evidence including the factors cited above, she does not appear to be at imminent risk for self-harm, does not meet criteria for a 72-hr hold, and therefore remains appropriate for ongoing outpatient level of care.       LABS     Most Recent 3 CBC's:  Recent Labs   Lab Test 02/14/24  0842 10/21/21  1138 06/20/20  0540 06/19/20  1020   WBC 5.7 7.9  --  10.5   HGB 13.0 14.5 11.3* 12.5    90  --  91    326  --  270      Most Recent 3 BMP's:  Recent Labs   Lab Test 02/14/24  0842 11/23/19  1208 03/17/19  2210    135 137   POTASSIUM 3.8 3.8 3.5   CHLORIDE 101 105 105   CO2 25 20* 23   BUN 7.2 6* 15   CR 0.58 0.51* 0.79   ANIONGAP 11 10 9   SUNNY 9.5 9.0 9.5   GLC 92 86 90     Most Recent 2 LFT's:  Recent Labs   Lab Test 03/17/19  2210 06/24/17  1919   AST 14  --    ALT 5*  --    ALKPHOS 62 50   BILITOTAL 0.4 0.4     Most Recent TSH, T4 and A1c Labs:  Recent Labs   Lab Test 02/14/24  0842   TSH 0.80        Allergies   Allergen Reactions    Penicillins Swelling     Throat swelling and hives     Phenergan Dm [Promethazine-Dm] Other (See Comments)     Hallucinations and muscle twitching         ATTESTATION      Gloria Kirkpatrick, DNP, PMHNP-BC    31 minutes spent on the date of the encounter doing chart review, history and exam, documentation and further activities per the note.

## 2024-06-28 NOTE — NURSING NOTE
"Chief Complaint   Patient presents with    Medication Follow-up       Initial LMP 06/09/2024 (Approximate)  Estimated body mass index is 28.42 kg/m  as calculated from the following:    Height as of 6/14/24: 1.734 m (5' 8.25\").    Weight as of 6/14/24: 85.4 kg (188 lb 4.8 oz).  Medication Review: complete    The next two questions are to help us understand your food security.  If you are feeling you need any assistance in this area, we have resources available to support you today.          4/12/2024   SDOH- Food Insecurity   Within the past 12 months, did you worry that your food would run out before you got money to buy more? N   Within the past 12 months, did the food you bought just not last and you didn t have money to get more? N            Health Care Directive:  Patient does not have a Health Care Directive or Living Will: Discussed advance care planning with patient; however, patient declined at this time.    Margarita Mari CNA      "

## 2024-07-24 NOTE — PROGRESS NOTES
[facility] PSYCHIATRY   PROGRESS NOTE     VIRTUAL VISIT     Telemedicine Visit: The patient's condition can be safely assessed and treated via synchronous audio and visual telemedicine encounter.      Reason for Telemedicine Visit:  Provider schedule    Originating Site (Patient Location): Patient's home    Distant Location (provider location):  Off-site    Consent:  The patient/guardian has verbally consented to: the potential risks and benefits of telemedicine (video visit) versus in person care; bill my insurance or make self-payment for services provided; and responsibility for payment of non-covered services.     Mode of Communication:  Video Conference via [employer]    Start Time: 1021  End Time: 1035       ASSESSMENT & PLAN     This is a 29 year old female who presents virtually for ongoing psychiatric care and medication management for the following:     Diagnosis Comments   1. Moderate episode of recurrent major depressive disorder (H)  FLUoxetine (PROZAC) 20 MG capsule       2. Attention deficit hyperactivity disorder (ADHD), combined type  lisdexamfetamine (VYVANSE) 60 MG capsule       3. Nausea and vomiting, unspecified vomiting type  ondansetron (ZOFRAN ODT) 4 MG ODT tab       4. Generalized anxiety disorder  FLUoxetine (PROZAC) 20 MG capsule, LORazepam (ATIVAN) 1 MG tablet          Symptoms appear to have worsened since starting Wellbutrin - mood/depression/anxiety symptoms were better controlled on Prozac. Discussed re-starting Prozac - will cross-taper Wellbutrin to minimize any withdrawal effects. Short term Zofran ordered to minimize GI effects of medication changes as she has been sensitive to this in the past. PDMP reviewed, is appropriate.     Medication:   Restart Prozac - take 20 mg daily for anxiety/depression.  Stop Wellbutrin after 1 week of Prozac.   Continue Lamictal 100 mg daily for mood.   Continue Vyvanse 60 mg daily for ADHD.   Trial of Zofran 4 mg PRN for  "nausea/vomiting.     Psychotherapy: Encouraged.     Labs: None ordered.     F/U: 1 month or sooner if symptoms occur.     The risks, benefits, alternatives and side effects have been discussed and are understood by the patient. The patient understands the risks of using street drugs or alcohol. The patient understands to call 911, 211 ([facility] Crisis Line) or come to the nearest ED if life threatening or urgent symptoms present.       HISTORY OF PRESENT ILLNESS     Nury Gilbert was last seen in clinic/virtually on 6/28/2024.  At that time:    Increased depression/mood swings - discussed increasing Lamictal. Side effects including SJS discussed with patient. Encouraged to re-establish with therapy - resources given. Managing okay - increased in life stressors/depression which exacerbate ADHD symptoms. PDMP reviewed, appropriate.   Increase Lamictal to 100 mg daily for mood/depression.  Continue Vyvanse 60 mg daily in the AM for ADHD.  Continue Wellbutrin  mg daily for depression/ADHD.     Today:    Nury presents virtually for ongoing medication management and psychiatric care. Has been feeling increasingly irritable/anxious, recently has had relapse in nightmares and more depression \"feeling down on myself, a lot of negative thoughts\". Some passive suicidal ideation without intent or plan. Also slight increase in headaches, although these are not occurring every day.     Target symptoms: anxiety, depression, fatigue     Symptoms improved: None     Sleep: waking up during the night, hard falling back asleep.     Past Med Trials:  Mirtazapine  Adderall  Prazosin  Citalopram     REVIEW OF SYSTEMS   The review of systems is negative other than noted in the HPI.    Past Medical History:   Diagnosis Date    Anxiety disorder     No Comments Provided    Attention-deficit hyperactivity disorder     No Comments Provided    Depressive disorder     Superficial foreign body of finger     5/19/2017      Current " Outpatient Medications (Includes Only Antianxiety agents, Antidepressants, Antipsychotics, Misc. psychotherapeutic)   Medication Sig    LORazepam (ATIVAN) 1 MG tablet Take 1 mg by mouth every 6 hours as needed for anxiety    buPROPion (WELLBUTRIN XL) 150 MG 24 hr tablet Take 1 tablet (150 mg) by mouth every morning        MENTAL STATUS EXAM    Vitals: [facility] 06/04/2024 (Exact Date)     Appearance:  No apparent distress, Casually dressed, and Well groomed  Behavior/relationship to examiner/demeanor:  Cooperative, Engaged, and Pleasant  Motor activity/EPS:  Normal  Mood (subjective report):  overwhelmed and irritable  Affect (objective appearance):  Appropriate/mood-congruent  Thought content:  no evidence of suicidal or homicidal ideation, no overt psychosis, Suicidal ideation, and denies suicidal intent or plan  Attention/Concentration:  Fair  Insight:  Good  Judgment:  Good, Adequate for safety, and Appropriate for age        5/17/2024     3:27 PM 6/14/2024     8:53 AM 6/28/2024     8:45 AM   PHQ   PHQ-9 Total Score 13 10 15   Q9: Thoughts of better off dead/self-harm past 2 weeks Not at all Not at all Several days   F/U: Thoughts of suicide or self-harm   Yes   F/U: Self harm-plan   Yes   F/U: Self-harm action   Yes   F/U: Safety concerns   No         4/12/2024     8:17 AM 5/17/2024     3:28 PM 6/28/2024     8:45 AM   BRANDON-7 SCORE   Total Score 8 (mild anxiety) 11 (moderate anxiety) 17 (severe anxiety)   Total Score 8 11 17        LABS     Most Recent 3 CBC's:  Recent Labs   Lab Test 02/14/24  0842 10/21/21  1138 06/20/20  0540 06/19/20  1020   WBC 5.7 7.9  --  10.5   HGB 13.0 14.5 11.3* 12.5    90  --  91    326  --  270      Most Recent 3 BMP's:  Recent Labs   Lab Test 02/14/24  0842 11/23/19  1208 03/17/19  2210    135 137   POTASSIUM 3.8 3.8 3.5   CHLORIDE 101 105 105   CO2 25 20* 23   BUN 7.2 6* 15   CR 0.58 0.51* 0.79   ANIONGAP 11 10 9   SUNNY 9.5 9.0 9.5   GLC 92 86 90     Most Recent TSH, T4  and A1c Labs:  Recent Labs   Lab Test 02/14/24  0842   TSH 0.80     Allergies   Allergen Reactions    Penicillins Swelling     Throat swelling and hives     Phenergan Dm [Promethazine-Dm] Other (See Comments)     Hallucinations and muscle twitching         ATTESTATION      Gloria Kirkpatrick, PEG, PMHNP-BC    As the provider I attest to compliance with applicable laws and regulations related to telemedicine.     28 minutes spent on the date of the encounter doing chart review, history and exam, documentation and further activities per the note.    The longitudinal plan of care for the diagnosis(es)/condition(s) as documented were addressed during this visit. Due to the added complexity in care, I will continue to support Nury in the subsequent management and with ongoing continuity of care.

## 2024-07-25 ENCOUNTER — VIRTUAL VISIT (OUTPATIENT)
Dept: PSYCHIATRY | Facility: OTHER | Age: 29
End: 2024-07-25
Payer: MEDICAID

## 2024-07-25 DIAGNOSIS — R11.2 NAUSEA AND VOMITING, UNSPECIFIED VOMITING TYPE: ICD-10-CM

## 2024-07-25 DIAGNOSIS — F41.1 GENERALIZED ANXIETY DISORDER: ICD-10-CM

## 2024-07-25 DIAGNOSIS — F90.2 ATTENTION DEFICIT HYPERACTIVITY DISORDER (ADHD), COMBINED TYPE: ICD-10-CM

## 2024-07-25 DIAGNOSIS — F33.1 MODERATE EPISODE OF RECURRENT MAJOR DEPRESSIVE DISORDER (H): Primary | ICD-10-CM

## 2024-07-25 PROCEDURE — 99213 OFFICE O/P EST LOW 20 MIN: CPT | Mod: 95

## 2024-07-25 PROCEDURE — G2211 COMPLEX E/M VISIT ADD ON: HCPCS | Mod: 95

## 2024-07-25 RX ORDER — LORAZEPAM 1 MG/1
1 TABLET ORAL EVERY 6 HOURS PRN
Qty: 30 TABLET | Refills: 0 | Status: SHIPPED | OUTPATIENT
Start: 2024-07-25 | End: 2024-08-29

## 2024-07-25 RX ORDER — LISDEXAMFETAMINE DIMESYLATE 60 MG/1
60 CAPSULE ORAL EVERY MORNING
Qty: 30 CAPSULE | Refills: 0 | Status: SHIPPED | OUTPATIENT
Start: 2024-07-25 | End: 2024-08-29 | Stop reason: DRUGHIGH

## 2024-07-25 RX ORDER — ONDANSETRON 4 MG/1
4 TABLET, ORALLY DISINTEGRATING ORAL EVERY 8 HOURS PRN
Qty: 30 TABLET | Refills: 0 | Status: SHIPPED | OUTPATIENT
Start: 2024-07-25

## 2024-07-25 NOTE — PATIENT INSTRUCTIONS
Restart Prozac - take 20 mg daily.    Continue Wellbutrin for 1 week, then discontinue.    Continue Lamictal, Vyvanse as ordered.

## 2024-08-29 ENCOUNTER — MYC MEDICAL ADVICE (OUTPATIENT)
Dept: PSYCHIATRY | Facility: OTHER | Age: 29
End: 2024-08-29

## 2024-08-29 ENCOUNTER — VIRTUAL VISIT (OUTPATIENT)
Dept: PSYCHIATRY | Facility: OTHER | Age: 29
End: 2024-08-29
Payer: COMMERCIAL

## 2024-08-29 DIAGNOSIS — F90.2 ATTENTION DEFICIT HYPERACTIVITY DISORDER (ADHD), COMBINED TYPE: ICD-10-CM

## 2024-08-29 DIAGNOSIS — F41.1 GENERALIZED ANXIETY DISORDER: ICD-10-CM

## 2024-08-29 DIAGNOSIS — F33.1 MODERATE EPISODE OF RECURRENT MAJOR DEPRESSIVE DISORDER (H): ICD-10-CM

## 2024-08-29 PROCEDURE — 99213 OFFICE O/P EST LOW 20 MIN: CPT | Mod: 95

## 2024-08-29 PROCEDURE — G0463 HOSPITAL OUTPT CLINIC VISIT: HCPCS | Mod: GT

## 2024-08-29 PROCEDURE — G2211 COMPLEX E/M VISIT ADD ON: HCPCS | Mod: 95

## 2024-08-29 RX ORDER — LISDEXAMFETAMINE DIMESYLATE 30 MG/1
30 CAPSULE ORAL 2 TIMES DAILY
Qty: 60 CAPSULE | Refills: 0 | Status: SHIPPED | OUTPATIENT
Start: 2024-08-29

## 2024-08-29 RX ORDER — LORAZEPAM 1 MG/1
1 TABLET ORAL DAILY PRN
Qty: 30 TABLET | Refills: 0 | Status: SHIPPED | OUTPATIENT
Start: 2024-08-29

## 2024-08-29 RX ORDER — LAMOTRIGINE 100 MG/1
100 TABLET ORAL DAILY
Qty: 30 TABLET | Refills: 1 | Status: SHIPPED | OUTPATIENT
Start: 2024-08-29

## 2024-08-29 RX ORDER — LORAZEPAM 1 MG/1
1 TABLET ORAL EVERY 6 HOURS PRN
Qty: 30 TABLET | Refills: 0 | Status: SHIPPED | OUTPATIENT
Start: 2024-08-29 | End: 2024-08-29

## 2024-08-29 ASSESSMENT — PATIENT HEALTH QUESTIONNAIRE - PHQ9
10. IF YOU CHECKED OFF ANY PROBLEMS, HOW DIFFICULT HAVE THESE PROBLEMS MADE IT FOR YOU TO DO YOUR WORK, TAKE CARE OF THINGS AT HOME, OR GET ALONG WITH OTHER PEOPLE: VERY DIFFICULT
SUM OF ALL RESPONSES TO PHQ QUESTIONS 1-9: 19
SUM OF ALL RESPONSES TO PHQ QUESTIONS 1-9: 19

## 2024-08-29 ASSESSMENT — ANXIETY QUESTIONNAIRES
3. WORRYING TOO MUCH ABOUT DIFFERENT THINGS: MORE THAN HALF THE DAYS
6. BECOMING EASILY ANNOYED OR IRRITABLE: MORE THAN HALF THE DAYS
1. FEELING NERVOUS, ANXIOUS, OR ON EDGE: NEARLY EVERY DAY
7. FEELING AFRAID AS IF SOMETHING AWFUL MIGHT HAPPEN: NEARLY EVERY DAY
IF YOU CHECKED OFF ANY PROBLEMS ON THIS QUESTIONNAIRE, HOW DIFFICULT HAVE THESE PROBLEMS MADE IT FOR YOU TO DO YOUR WORK, TAKE CARE OF THINGS AT HOME, OR GET ALONG WITH OTHER PEOPLE: VERY DIFFICULT
5. BEING SO RESTLESS THAT IT IS HARD TO SIT STILL: MORE THAN HALF THE DAYS
GAD7 TOTAL SCORE: 18
GAD7 TOTAL SCORE: 18
4. TROUBLE RELAXING: NEARLY EVERY DAY
7. FEELING AFRAID AS IF SOMETHING AWFUL MIGHT HAPPEN: NEARLY EVERY DAY
2. NOT BEING ABLE TO STOP OR CONTROL WORRYING: NEARLY EVERY DAY
8. IF YOU CHECKED OFF ANY PROBLEMS, HOW DIFFICULT HAVE THESE MADE IT FOR YOU TO DO YOUR WORK, TAKE CARE OF THINGS AT HOME, OR GET ALONG WITH OTHER PEOPLE?: VERY DIFFICULT
GAD7 TOTAL SCORE: 18

## 2024-08-29 NOTE — PROGRESS NOTES
[facility] AND [facility] PSYCHIATRY   PROGRESS NOTE     VIRTUAL VISIT     Telemedicine Visit: The patient's condition can be safely assessed and treated via synchronous audio and visual telemedicine encounter.      Reason for Telemedicine Visit:  Provider schedule    Originating Site (Patient Location): Patient's home    Distant Location (provider location):  Off-site    Consent:  The patient/guardian has verbally consented to: the potential risks and benefits of telemedicine (video visit) versus in person care; bill my insurance or make self-payment for services provided; and responsibility for payment of non-covered services.     Mode of Communication:  Video Conference via [employer]    Start Time: 1030  End Time: 1045       ASSESSMENT & PLAN     This is a 29 year old female who presents virtually for ongoing psychiatric care and medication management for the following:     Diagnosis Comments   1. Attention deficit hyperactivity disorder (ADHD), combined type  lisdexamfetamine (VYVANSE) 30 MG capsule     BID dosing as AM dose wears off around 12/1 with noted irritability/anxiety in evening making it difficulty to care for children, maintain household.       2. Generalized anxiety disorder  FLUoxetine (PROZAC) 20 MG capsule, LORazepam (ATIVAN) 1 MG tablet       3. Moderate episode of recurrent major depressive disorder (H)  lamoTRIgine (LAMICTAL) 100 MG tablet, FLUoxetine (PROZAC) 20 MG capsule          Tolerating restart of Prozac well - ongoing irritability/anxiety, especially in the late afternoon/evening hours. I suspect this is due to her Vyvanse wearing off. In the past, she tolerated BID dosing of Vyvanse well and felt that this was the combination that maintained her ADHD symptoms the best. Will look to schedule this again, risks/benefits discussed.     Medication:   Change Vyvanse to 30 mg BID for ADHD.  Continue Prozac 20 mg daily for anxiety/depression.  Continue Lamictal 100 mg daily for  "mood.  Continue ativan 1 mg daily as needed for panic attacks.     Psychotherapy: Encouraged.   Labs: None ordered.   F/U: 1 month or sooner if symptoms occur.     The risks, benefits, alternatives and side effects have been discussed and are understood by the patient. The patient understands the risks of using street drugs or alcohol. The patient understands to call 911, 211 ([facility] Crisis Line) or come to the nearest ED if life threatening or urgent symptoms present.    The longitudinal plan of care for the diagnosis(es)/condition(s) as documented were addressed during this visit. Due to the added complexity in care, I will continue to support Nury in the subsequent management and with ongoing continuity of care.      HISTORY OF PRESENT ILLNESS     Nury Gilbert was last seen in clinic/virtually on 7/25/2024.  At that time:    Symptoms appear to have worsened since starting Wellbutrin - mood/depression/anxiety symptoms were better controlled on Prozac. Discussed re-starting Prozac - will cross-taper Wellbutrin to minimize any withdrawal effects. Short term Zofran ordered to minimize GI effects of medication changes as she has been sensitive to this in the past. PDMP reviewed, is appropriate.      Restart Prozac - take 20 mg daily for anxiety/depression.  Stop Wellbutrin after 1 week of Prozac.   Continue Lamictal 100 mg daily for mood.   Continue Vyvanse 60 mg daily for ADHD.   Trial of Zofran 4 mg PRN for nausea/vomiting.    Today:    Nury presents virtually for ongoing medication management and psychiatric care. Reports things are going \"okay\" - still struggling with anxiety/irritability in the late afternoon/evening hours. Does acknowledge a lot of life stressors contributing to this. Often feels overwhelmed/overstimulated, especially by her children. Denies suicidal ideation.     Target symptoms: anxiety, depression, fatigue     Symptoms improved: depression mildly improved     Sleep: " "variable    Past Med Trials:  Mirtazapine  Adderall  Prazosin  Citalopram     REVIEW OF SYSTEMS   The review of systems is negative other than noted in the HPI.    Past Medical History:   Diagnosis Date    Anxiety disorder     No Comments Provided    Attention-deficit hyperactivity disorder     No Comments Provided    Depressive disorder     Superficial foreign body of finger     5/19/2017      Current Outpatient Medications (Includes Only Antianxiety agents, Antidepressants, Antipsychotics, Misc. psychotherapeutic)   Medication Sig    FLUoxetine (PROZAC) 20 MG capsule Take 1 capsule (20 mg) by mouth daily    LORazepam (ATIVAN) 1 MG tablet Take 1 tablet (1 mg) by mouth every 6 hours as needed for anxiety        MENTAL STATUS EXAM    Vitals: LMP 08/20/2024 (Exact Date)   Breastfeeding No     Appearance:  No apparent distress, Casually dressed, and Well groomed  Behavior/relationship to examiner/demeanor:  Cooperative, Engaged, and Pleasant  Motor activity/EPS:  Normal  Mood (subjective report):  \"okay\"  Affect (objective appearance):  Appropriate/mood-congruent  Thought content:  no evidence of suicidal or homicidal ideation, no overt psychosis, and denies suicidal ideation, intent or thoughts  Insight:  Good  Judgment:  Good, Adequate for safety, and Appropriate for age        6/14/2024     8:53 AM 6/28/2024     8:45 AM 8/29/2024    10:25 AM   PHQ   PHQ-9 Total Score 10 15 19   Q9: Thoughts of better off dead/self-harm past 2 weeks Not at all Several days Several days   F/U: Thoughts of suicide or self-harm  Yes Yes   F/U: Self harm-plan  Yes No   F/U: Self-harm action  Yes No   F/U: Safety concerns  No No         5/17/2024     3:28 PM 6/28/2024     8:45 AM 8/29/2024    10:26 AM   BRANDON-7 SCORE   Total Score 11 (moderate anxiety) 17 (severe anxiety) 18 (severe anxiety)   Total Score 11 17 18        LABS     Most Recent 3 CBC's:  Recent Labs   Lab Test 02/14/24  0842 10/21/21  1138 06/20/20  0540 06/19/20  1020   WBC " 5.7 7.9  --  10.5   HGB 13.0 14.5 11.3* 12.5    90  --  91    326  --  270      Most Recent 3 BMP's:  Recent Labs   Lab Test 02/14/24  0842 11/23/19  1208 03/17/19  2210    135 137   POTASSIUM 3.8 3.8 3.5   CHLORIDE 101 105 105   CO2 25 20* 23   BUN 7.2 6* 15   CR 0.58 0.51* 0.79   ANIONGAP 11 10 9   SUNNY 9.5 9.0 9.5   GLC 92 86 90     Most Recent TSH, T4 and A1c Labs:  Recent Labs   Lab Test 02/14/24  0842   TSH 0.80     Allergies   Allergen Reactions    Penicillins Swelling     Throat swelling and hives     Phenergan Dm [Promethazine-Dm] Other (See Comments)     Hallucinations and muscle twitching    Wellbutrin [Bupropion] Other (See Comments)     Increased irritability/anxiety, headaches         ATTESTATION      Gloria Kirkpatrick, PEG, PMHNP-BC    As the provider I attest to compliance with applicable laws and regulations related to telemedicine.     25 minutes spent on the date of the encounter doing chart review, history and exam, documentation and further activities per the note.

## 2024-08-29 NOTE — NURSING NOTE
"Chief Complaint   Patient presents with    Recheck Medication       Initial LMP 08/20/2024 (Exact Date)   Breastfeeding No  Estimated body mass index is 29.3 kg/m  as calculated from the following:    Height as of 6/28/24: 1.721 m (5' 7.75\").    Weight as of 6/28/24: 86.8 kg (191 lb 4.8 oz).  Medication Review: complete    The next two questions are to help us understand your food security.  If you are feeling you need any assistance in this area, we have resources available to support you today.          6/28/2024   SDOH- Food Insecurity   Within the past 12 months, did you worry that your food would run out before you got money to buy more? N   Within the past 12 months, did the food you bought just not last and you didn t have money to get more? N            Health Care Directive:  Patient does not have a Health Care Directive or Living Will: Discussed advance care planning with patient; however, patient declined at this time.    Vianney Rosales      "

## 2024-09-05 DIAGNOSIS — F90.2 ATTENTION DEFICIT HYPERACTIVITY DISORDER (ADHD), COMBINED TYPE: ICD-10-CM

## 2024-09-05 NOTE — TELEPHONE ENCOUNTER
Patient had discussed prior with Provider Gloria Kirkpatrick about coverage on her prescriptions. Patient stated that insurance wouldn't cover supplying two a day of lisdexamfetamine 30mg  and that they did send a prior authorization to the provider. Nury wanted to possibly discuss the plan B that was discussed with the provider.  Rylie Ch on 9/5/2024 at 5:00 PM

## 2024-09-06 RX ORDER — LISDEXAMFETAMINE DIMESYLATE 30 MG/1
30 CAPSULE ORAL EVERY MORNING
Qty: 30 CAPSULE | Refills: 0 | OUTPATIENT
Start: 2024-09-06

## 2024-09-06 RX ORDER — LISDEXAMFETAMINE DIMESYLATE 60 MG/1
60 CAPSULE ORAL EVERY MORNING
Qty: 30 CAPSULE | Refills: 0 | Status: SHIPPED | OUTPATIENT
Start: 2024-10-06

## 2024-09-06 NOTE — TELEPHONE ENCOUNTER
Encounter routed to prior [facility] pool. Erica Valderrama RN on 9/6/2024 at 9:15 AM

## 2024-09-06 NOTE — TELEPHONE ENCOUNTER
SADI Kirkpatrick, HERNESTO -   [employer] will not cover split dosing for Vyvance 30 mg twice daily. Patient prefers to continue with the 60 mg once daily dosing. Her next appointment with you is 9/26/24. Refills pending for your review.   Erica Valderrama RN on 9/6/2024 at 10:29 AM

## 2024-09-12 ENCOUNTER — OFFICE VISIT (OUTPATIENT)
Dept: FAMILY MEDICINE | Facility: OTHER | Age: 29
End: 2024-09-12
Attending: STUDENT IN AN ORGANIZED HEALTH CARE EDUCATION/TRAINING PROGRAM
Payer: COMMERCIAL

## 2024-09-12 VITALS
RESPIRATION RATE: 16 BRPM | DIASTOLIC BLOOD PRESSURE: 82 MMHG | TEMPERATURE: 97.7 F | OXYGEN SATURATION: 97 % | HEART RATE: 98 BPM | WEIGHT: 190.6 LBS | HEIGHT: 68 IN | BODY MASS INDEX: 28.89 KG/M2 | SYSTOLIC BLOOD PRESSURE: 130 MMHG

## 2024-09-12 DIAGNOSIS — L08.9 FINGER INFECTION: Primary | ICD-10-CM

## 2024-09-12 PROCEDURE — 99213 OFFICE O/P EST LOW 20 MIN: CPT | Performed by: NURSE PRACTITIONER

## 2024-09-12 PROCEDURE — G0463 HOSPITAL OUTPT CLINIC VISIT: HCPCS

## 2024-09-12 PROCEDURE — 87205 SMEAR GRAM STAIN: CPT | Mod: ZL | Performed by: NURSE PRACTITIONER

## 2024-09-12 PROCEDURE — 87186 SC STD MICRODIL/AGAR DIL: CPT | Mod: ZL,91 | Performed by: NURSE PRACTITIONER

## 2024-09-12 RX ORDER — CEPHALEXIN 500 MG/1
500 CAPSULE ORAL 3 TIMES DAILY
Qty: 30 CAPSULE | Refills: 0 | Status: SHIPPED | OUTPATIENT
Start: 2024-09-12 | End: 2024-09-15

## 2024-09-12 RX ORDER — MUPIROCIN 20 MG/G
OINTMENT TOPICAL 3 TIMES DAILY
Qty: 30 G | Refills: 0 | Status: SHIPPED | OUTPATIENT
Start: 2024-09-12 | End: 2024-09-22

## 2024-09-12 RX ORDER — TRIAMCINOLONE ACETONIDE 5 MG/G
OINTMENT TOPICAL
Qty: 15 G | Refills: 0 | Status: SHIPPED | OUTPATIENT
Start: 2024-09-12

## 2024-09-12 ASSESSMENT — ENCOUNTER SYMPTOMS
HEMATOLOGIC/LYMPHATIC NEGATIVE: 1
NEUROLOGICAL NEGATIVE: 1
GASTROINTESTINAL NEGATIVE: 1
RESPIRATORY NEGATIVE: 1
CONSTITUTIONAL NEGATIVE: 1
EYES NEGATIVE: 1
PSYCHIATRIC NEGATIVE: 1
WOUND: 1
CARDIOVASCULAR NEGATIVE: 1
MUSCULOSKELETAL NEGATIVE: 1

## 2024-09-12 ASSESSMENT — PAIN SCALES - GENERAL: PAINLEVEL: SEVERE PAIN (7)

## 2024-09-12 NOTE — PROGRESS NOTES
Nury Gilbert  1995    ASSESSMENT/PLAN    1. Finger infection, left middle finger, cuticle edge    Presents to [facility] clinic with redness, infection , eczema of left middle finger, surrounding cuticle.  Wound culture sent for testing.  Will treat with Bactroban, triamcinolone and Eucerin cream first.  Prescription for Keflex called in for patient to use if not improving within 4 days.  Patient's vitals are stable and she appears nontoxic.      - mupirocin (BACTROBAN) 2 % external ointment; Apply topically 3 times daily for 10 days.  Dispense: 30 g; Refill: 0  - triamcinolone (KENALOG) 0.5 % external ointment; Apply at night to dry skin  Dispense: 15 g; Refill: 0  - cephALEXin (KEFLEX) 500 MG capsule; Take 1 capsule (500 mg) by mouth 3 times daily for 10 days.  Dispense: 30 capsule; Refill: 0  - Wound Aerobic Bacterial Culture Routine With Gram Stain   - May use over-the-counter Tylenol or ibuprofen PRN  - Follow up as needed for new or worsening symptoms      *Explanation of diagnosis, treatment options and risk and benefits of medications reviewed with patient. Patient agrees with plan of care.  *All questions were answered.    *Red flags symptoms were discussed and patient was advised when they should return for reevaluation or for prompt emergency evaluation.   *Patient was given verbal and written instructions on plan of care. Instructions were printed or are available on Mychart on electronic AVS.   *We discussed potential side effects of any prescribed or recommended therapies, as well as expectations for response to treatments.  *Patient discharged in stable condition    Agnes Adame CNP  [facility]    SUBJECTIVE  CHIEF COMPLAINT/ REASON FOR VISIT  Patient presents with:  Infection: L middle finger     HISTORY OF PRESENT ILLNESS  Nury Gilbert is a pleasant 29 year old female presents to rapid clinic today with redness, swelling and drainage on left middle finger next to  "cuticle of nail.  Patient has history of eczema and has dry skin, she has been using Eucerin without benefit.  Patient has had some yellow clear drainage from nail bed.    I have reviewed the nursing notes.  I have reviewed allergies, medication list, problem list, and past medical history.    REVIEW OF SYSTEMS  Review of Systems   Constitutional: Negative.    HENT: Negative.     Eyes: Negative.    Respiratory: Negative.     Cardiovascular: Negative.    Gastrointestinal: Negative.    Genitourinary: Negative.    Musculoskeletal: Negative.    Skin:  Positive for rash and wound.   Neurological: Negative.    Hematological: Negative.    Psychiatric/Behavioral: Negative.     All other systems reviewed and are negative.       VITAL SIGNS  Vitals:    09/12/24 1732   BP: 130/82   BP Location: Right arm   Patient Position: Sitting   Cuff Size: Adult Regular   Pulse: 98   Resp: 16   Temp: 97.7  F (36.5  C)   TempSrc: Temporal   SpO2: 97%   Weight: 86.5 kg (190 lb 9.6 oz)   Height: 1.727 m (5' 8\")      Body mass index is 28.98 kg/m .      OBJECTIVE  PHYSICAL EXAM  Physical Exam  Vitals and nursing note reviewed.   Constitutional:       Appearance: Normal appearance.   HENT:      Head: Normocephalic.      Nose: Nose normal.      Mouth/Throat:      Mouth: Mucous membranes are moist.   Eyes:      Pupils: Pupils are equal, round, and reactive to light.   Cardiovascular:      Rate and Rhythm: Normal rate and regular rhythm.      Pulses: Normal pulses.      Heart sounds: Normal heart sounds.   Pulmonary:      Effort: Pulmonary effort is normal.      Breath sounds: Normal breath sounds.   Abdominal:      General: Bowel sounds are normal.   Musculoskeletal:         General: Normal range of motion.      Cervical back: Normal range of motion.   Skin:     General: Skin is warm and dry.      Capillary Refill: Capillary refill takes less than 2 seconds.      Findings: Erythema present.      Comments: Erythema, scab, yellow drainage around " in the bottom edge of cuticle of left middle finger.   Neurological:      General: No focal deficit present.      Mental Status: She is alert.   Psychiatric:         Mood and Affect: Mood normal.          9/15/24  Culture returned and growing two different bacteria with strep group C G and Morganella.     Discussed results with patient, after review of options we will use Levaquin to cover both.    Levofloxacin (LEVAQUIN) 500 MG tablet; Take 1 tablet (500 mg) by mouth daily for 7 days.  Dispense: 7 tablet; Refill: 0     Agnes Adame CNP on 9/15/2024 at 4:44 PM       Results for orders placed or performed in visit on 09/12/24   Wound Aerobic Bacterial Culture Routine With Gram Stain     Status: Abnormal    Specimen: Finger, Left; Wound   Result Value Ref Range    Culture (A)      4+ Streptococcus dysgalactiae (Group C/G Streptococcus)    Culture Isolated in broth only Morganella morganii (A)     Gram Stain Result 1+ PMNs/low power field (A)     Gram Stain Result 1+ Squamous epithelial cells/low power field (A)     Gram Stain Result 4+ Gram positive cocci (A)        Susceptibility    Morganella morganii - RAGHU     Amoxicillin/Clavulanate >16 Resistant      Ampicillin* >16 Resistant       * Intrinsically Resistant     Ampicillin/ Sulbactam >16 Resistant      Cefazolin* >16 Resistant       * Intrinsically Resistant     Ciprofloxacin*         * Ciprofloxacin not resistant.  Due to test limitations, lab cannot provide RAGHU to determine susceptibility.     Ertapenem <=0.5 Susceptible      Gentamicin <=4 Susceptible      Imipenem 4 Resistant      Levofloxacin*         * Levofloxacin not resistant.  Due to test limitations, lab cannot provide RAGHU to determine susceptibility.     Tetracycline >8 Resistant      Tobramycin <=4 Susceptible      Trimethoprim/Sulfamethoxazole <=2/38 Susceptible      Cefpodoxime >4 Resistant      Cefuroxime >16 Resistant     Streptococcus dysgalactiae (Group C/G Streptococcus) - RAGHU      Ampicillin <=0.06 Susceptible      Ceftriaxone (meningitis) <=0.25 Susceptible      Ceftriaxone (non-meningitis) <=0.25 Susceptible      Erythromycin <=0.06 Susceptible      Levofloxacin 0.5 Susceptible      Tetracycline 4 Intermediate      Vancomycin 0.5 Susceptible      Azithromycin <=0.25 Susceptible      Cefepime <=0.25 Susceptible      Penicillin <=0.03 Susceptible      Clindamycin <=0.06 Susceptible

## 2024-09-12 NOTE — NURSING NOTE
"Pt presents to  for infection on L middle finger. Pt states it started hurting 2 wks ago. She has been doing epsom salts and trying to keep clean. There has been green infection draining.    Chief Complaint   Patient presents with    Infection     L middle finger       FOOD SECURITY SCREENING QUESTIONS  Hunger Vital Signs:  Within the past 12 months we worried whether our food would run out before we got money to buy more. Never  Within the past 12 months the food we bought just didn't last and we didn't have money to get more. Never  Gabriella Dearmon 9/12/2024 5:33 PM      Initial /82 (BP Location: Right arm, Patient Position: Sitting, Cuff Size: Adult Regular)   Pulse 98   Temp 97.7  F (36.5  C) (Temporal)   Resp 16   Ht 1.727 m (5' 8\")   Wt 86.5 kg (190 lb 9.6 oz)   LMP 09/10/2024 (Exact Date)   SpO2 97%   BMI 28.98 kg/m   Estimated body mass index is 28.98 kg/m  as calculated from the following:    Height as of this encounter: 1.727 m (5' 8\").    Weight as of this encounter: 86.5 kg (190 lb 9.6 oz).  Medication Reconciliation: complete    Gabriella Deaduc  "

## 2024-09-15 LAB
BACTERIA WND CULT: ABNORMAL
BACTERIA WND CULT: ABNORMAL
GRAM STAIN RESULT: ABNORMAL

## 2024-09-15 RX ORDER — LEVOFLOXACIN 500 MG/1
500 TABLET, FILM COATED ORAL DAILY
Qty: 7 TABLET | Refills: 0 | Status: SHIPPED | OUTPATIENT
Start: 2024-09-15 | End: 2024-09-22

## 2024-09-21 ENCOUNTER — NURSE TRIAGE (OUTPATIENT)
Dept: NURSING | Facility: CLINIC | Age: 29
End: 2024-09-21
Payer: COMMERCIAL

## 2024-09-21 NOTE — TELEPHONE ENCOUNTER
"Nurse Triage SBAR    Is this a 2nd Level Triage? NO    Situation: Finger Wound Infection on Antibiotic Follow-up Call.     Background: Patient was initially seen in [facility] clinic regarding possible animal bite and middle finger infection, with the wound culture resulting morganella morganii. She was started on Levaquin for the wound infection 5 days ago and for several days had a resolution of symptoms including redness/pain. About a day and a half ago patient reports having a return of redness and significant pain.     Assessment: Patient reports middle finger pain at 5-6/10, and this has increased today with increasing redness in her finger, and a return of swelling. Patient notes a deep red \"almost purple\" color to the tip of her finger and reports a small hole or \"tunnel\" near the wound that she did not notice initially with her finger infection.   Temperature is 99.3    Protocol Recommended Disposition:   Go to ED Now (Or PCP Triage)    Recommendation: Patient planning to follow care advice and go to an Emergency Department at [facility], call back information was provided.      Does the patient meet one of the following criteria for ADS visit consideration? No    Reason for Disposition   Black (necrotic), dark purple, or blisters develop in area of wound    Additional Information   Negative: Shock suspected (e.g., cold/pale/clammy skin, too weak to stand, low BP, rapid pulse)   Negative: [1] Widespread rash AND [2] bright red, sunburn-like AND [3] too weak to stand   Negative: Sounds like a life-threatening emergency to the triager   Negative: SEVERE pain in the wound   Negative: [1] SEVERE pain with bending of finger (or toe) AND [2] wound on hand (or foot)   Negative: [1] Widespread rash AND [2] bright red, sunburn-like   Negative: Fever > 103 F (39.4 C)    Protocols used: Wound Infection on Antibiotic Follow-up Call-A-AH    "

## 2024-09-22 ENCOUNTER — HOSPITAL ENCOUNTER (EMERGENCY)
Facility: OTHER | Age: 29
Discharge: HOME | End: 2024-09-22
Payer: COMMERCIAL

## 2024-09-22 VITALS
HEART RATE: 103 BPM | BODY MASS INDEX: 28.79 KG/M2 | OXYGEN SATURATION: 99 % | HEIGHT: 68 IN | DIASTOLIC BLOOD PRESSURE: 95 MMHG | WEIGHT: 190 LBS | TEMPERATURE: 98.8 F | SYSTOLIC BLOOD PRESSURE: 134 MMHG | RESPIRATION RATE: 18 BRPM

## 2024-09-22 DIAGNOSIS — B35.1 ONYCHOMYCOSIS: ICD-10-CM

## 2024-09-22 PROCEDURE — 99284 EMERGENCY DEPT VISIT MOD MDM: CPT

## 2024-09-22 PROCEDURE — 99283 EMERGENCY DEPT VISIT LOW MDM: CPT

## 2024-09-22 RX ORDER — TERBINAFINE HYDROCHLORIDE 250 MG/1
250 TABLET ORAL DAILY
Qty: 42 TABLET | Refills: 0 | Status: SHIPPED | OUTPATIENT
Start: 2024-09-22 | End: 2024-11-03

## 2024-09-22 ASSESSMENT — COLUMBIA-SUICIDE SEVERITY RATING SCALE - C-SSRS
1. IN THE PAST MONTH, HAVE YOU WISHED YOU WERE DEAD OR WISHED YOU COULD GO TO SLEEP AND NOT WAKE UP?: NO
6. HAVE YOU EVER DONE ANYTHING, STARTED TO DO ANYTHING, OR PREPARED TO DO ANYTHING TO END YOUR LIFE?: NO
2. HAVE YOU ACTUALLY HAD ANY THOUGHTS OF KILLING YOURSELF IN THE PAST MONTH?: NO

## 2024-09-22 ASSESSMENT — ACTIVITIES OF DAILY LIVING (ADL): ADLS_ACUITY_SCORE: 37

## 2024-09-22 NOTE — ED PROVIDER NOTES
History     Chief Complaint   Patient presents with    Wound Check    Finger     The history is provided by the patient and medical records.     Nury Gilbert is a 29 year old female who presents to the ER today reporting increased pain to her left middle finger. She had a dog bite about 1 month ago that got infected. She was seen on 24 in the [facility] clinic and was prescribed triamcinolone, mupirocin, and levaquin. She has 1 day left of her antibiotic. She is concerned because the swelling and is starting to return. She has not had fevers. She is a .  She is otherwise healthy.     Allergies:  Allergies   Allergen Reactions    Penicillins Swelling     Throat swelling and hives     Phenergan Dm [Promethazine-Dm] Other (See Comments)     Hallucinations and muscle twitching    Wellbutrin [Bupropion] Other (See Comments)     Increased irritability/anxiety, headaches       Problem List:    Patient Active Problem List    Diagnosis Date Noted    Recurrent major depressive disorder, remission status unspecified (H24) 2024     Priority: Medium    Generalized anxiety disorder 2024     Priority: Medium    Staph aureus infection 2022     Priority: Medium    H/O  section 2020     Priority: Medium    History of  section 2020     Priority: Medium     Added automatically from request for surgery [id number]      Pregnancy with history of  section, antepartum 2020     Priority: Medium    Rubella non-immune status, antepartum 2020     Priority: Medium    Nausea and vomiting in pregnancy 2019     Priority: Medium    S/P  section 2018     Priority: Medium    On stimulant medication - contract signed 3/31/17 2017     Priority: Medium     Scheduled Medication Use Agreement Signed 3/31/17 with Naye Villeda, [facility]  Pharmacy -  [facility], [place]  Ph.  [phone]  Needs to be seen every 3 months for refills per Agreement.         Attention deficit disorder 2011     Priority: Medium    Migraines 2011     Priority: Medium     Overview:   IMO Update 10/11  Overview:   IMO Update 10/11      Difficulty sleeping 2011     Priority: Medium        Past Medical History:    Past Medical History:   Diagnosis Date    Anxiety disorder     Attention-deficit hyperactivity disorder     Depressive disorder     Superficial foreign body of finger        Past Surgical History:    Past Surgical History:   Procedure Laterality Date    ADENOIDECTOMY      No Comments Provided    APPENDECTOMY OPEN      2006    ARTHROSCOPY KNEE      2012,2nd as well at age 18     SECTION N/A 2018    Procedure:  SECTION;  External version attemted prior to Section;  Surgeon: Naye Johns MD;  Location:  OR     SECTION N/A 2020    Procedure:  SECTION;  Surgeon: Naye Johns MD;  Location:  OR    LAPAROSCOPY DIAGNOSTIC (GYN) N/A 2019    Procedure: LAPAROSCOPY DIAGNOSTIC;  Surgeon: Naye Johns MD;  Location: [facility] OR    OTHER SURGICAL HISTORY      1999,GUU145,BLADDER SURGERY    OTHER SURGICAL HISTORY      2017,28051.0,MA REMOVE FOREIGN BODY SIMPLE       Family History:    Family History   Problem Relation Age of Onset    Dementia Maternal Grandmother     Heart Disease Maternal Grandfather     Cancer Mother         carcinoid cancer, 30s       Social History:  Marital Status:  Single [1]  Social History     Tobacco Use    Smoking status: Never     Passive exposure: Past    Smokeless tobacco: Never   Vaping Use    Vaping status: Former    Substances: Nicotine    Devices: Disposable   Substance Use Topics    Alcohol use: Yes     Comment: 2-3 times a week    Drug use: Not Currently     Types: Marijuana     Comment: medical marijuana        Medications:    terbinafine (LAMISIL) 250 MG tablet  acetaminophen (TYLENOL) 500 MG tablet  bacitracin 500 UNIT/GM external ointment  etonogestrel-ethinyl  "estradiol (ELURYNG) 0.12-0.015 MG/24HR vaginal ring  FLUoxetine (PROZAC) 20 MG capsule  ibuprofen (ADVIL/MOTRIN) 200 MG tablet  lamoTRIgine (LAMICTAL) 100 MG tablet  levofloxacin (LEVAQUIN) 500 MG tablet  lisdexamfetamine (VYVANSE) 30 MG capsule  [START ON 10/6/2024] lisdexamfetamine (VYVANSE) 60 MG capsule  LORazepam (ATIVAN) 1 MG tablet  medical cannabis (Patient's own supply)  mupirocin (BACTROBAN) 2 % external ointment  ondansetron (ZOFRAN ODT) 4 MG ODT tab  triamcinolone (KENALOG) 0.5 % external ointment          Review of Systems   Skin:         Wound to left middle finger   All other systems reviewed and are negative.  See HPI    Physical Exam   BP: (!) 134/95  Pulse: 103  Temp: 98.8  F (37.1  C)  Resp: 18  Height: 172.7 cm (5' 8\")  Weight: 86.2 kg (190 lb)  SpO2: 99 %      Physical Exam  Vitals and nursing note reviewed.   Constitutional:       General: She is not in acute distress.     Appearance: She is not ill-appearing or toxic-appearing.   Cardiovascular:      Rate and Rhythm: Normal rate and regular rhythm.      Pulses: Normal pulses.   Pulmonary:      Effort: Pulmonary effort is normal.   Abdominal:      General: Abdomen is flat.   Musculoskeletal:      Cervical back: Neck supple.   Skin:     General: Skin is warm.      Capillary Refill: Capillary refill takes less than 2 seconds.      Comments: Redness to distal right middle finger. No drainage. Yellowish discoloration to nail. No drainage. Area is firm   Neurological:      General: No focal deficit present.      Mental Status: She is alert.   Psychiatric:         Mood and Affect: Mood normal.         ED Course         No results found for this or any previous visit (from the past 24 hour(s)).    Medications - No data to display    Assessments & Plan (with Medical Decision Making)  Patient is alert, orienated, non-toxic. 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 Area is slightly swollen, indurated near nail bed, no drainage. It is not tender. She has full ROM. Picture in media.  Will treat for a fungal infection today, terbinafine sent to pharmacy, she is advised to return to the clinic for revaluation in the next couple of weeks for a recheck, may need to have nail removed if not improving.   She denies pregnancy,she is not breast feeding.      I have reviewed the nursing notes.    I have reviewed the findings, diagnosis, plan and need for follow up with the patient.    Medical Decision Making  The patient's presentation was of low complexity (an acute and uncomplicated illness or injury).    The patient's evaluation involved:  review of external note(s) from 1 sources (see separate area of note for details)  review of 1 test result(s) ordered prior to this encounter (see separate area of note for details)    The patient's management necessitated moderate risk (prescription drug management including medications given in the ED).      Discharge Medication List as of 9/22/2024  6:42 PM        START taking these medications    Details   terbinafine (LAMISIL) 250 MG tablet Take 1 tablet (250 mg) by mouth daily., Disp-42 tablet, R-0, E-Prescribe             Final diagnoses:   Onychomycosis       9/22/2024   [facility] Hospital[facility]il[facility] SALLY JON  09/22/24 [id number]

## 2024-09-22 NOTE — ED TRIAGE NOTES
"Pt presents to ED via private car with c/o infection on her Lt middle finger tip. Pt states in infection started about a month ago now after being bit by a dog at work. Pt is on her last dose of antibiotics and states it's not getting any better. BP (!) 134/95   Pulse 103   Temp 98.8  F (37.1  C) (Tympanic)   Resp 18   Ht 1.727 m (5' 8\")   Wt 86.2 kg (190 lb)   LMP 09/10/2024 (Exact Date)   SpO2 99%   BMI 28.89 kg/m         Triage Assessment (Adult)       Row Name 09/22/24 1492          Triage Assessment    Airway WDL WDL        Respiratory WDL    Respiratory WDL WDL        Skin Circulation/Temperature WDL    Skin Circulation/Temperature WDL X  infected Lt middle finger        Cardiac WDL    Cardiac WDL WDL        Peripheral/Neurovascular WDL    Peripheral Neurovascular WDL WDL        Cognitive/Neuro/Behavioral WDL    Cognitive/Neuro/Behavioral WDL WDL                     "

## 2024-09-22 NOTE — DISCHARGE INSTRUCTIONS
Follow up in clinic in the next couple of weeks for a recheck. Return to be seen sooner if worsening symptoms.   Hand out with side effects of medication given.

## 2024-09-27 ENCOUNTER — HOSPITAL ENCOUNTER (EMERGENCY)
Facility: OTHER | Age: 29
Discharge: HOME | End: 2024-09-27
Attending: FAMILY MEDICINE
Payer: COMMERCIAL

## 2024-09-27 VITALS
TEMPERATURE: 98.3 F | WEIGHT: 190 LBS | SYSTOLIC BLOOD PRESSURE: 125 MMHG | RESPIRATION RATE: 16 BRPM | OXYGEN SATURATION: 98 % | HEART RATE: 119 BPM | HEIGHT: 68 IN | BODY MASS INDEX: 28.79 KG/M2 | DIASTOLIC BLOOD PRESSURE: 87 MMHG

## 2024-09-27 DIAGNOSIS — L08.9 FINGER INFECTION: ICD-10-CM

## 2024-09-27 LAB
KOH PREPARATION: NORMAL
KOH PREPARATION: NORMAL

## 2024-09-27 PROCEDURE — 99283 EMERGENCY DEPT VISIT LOW MDM: CPT | Performed by: FAMILY MEDICINE

## 2024-09-27 PROCEDURE — 87220 TISSUE EXAM FOR FUNGI: CPT | Performed by: FAMILY MEDICINE

## 2024-09-27 RX ORDER — SULFAMETHOXAZOLE/TRIMETHOPRIM 800-160 MG
1 TABLET ORAL 2 TIMES DAILY
Qty: 20 TABLET | Refills: 0 | Status: SHIPPED | OUTPATIENT
Start: 2024-09-27 | End: 2024-10-07

## 2024-09-27 ASSESSMENT — COLUMBIA-SUICIDE SEVERITY RATING SCALE - C-SSRS
1. IN THE PAST MONTH, HAVE YOU WISHED YOU WERE DEAD OR WISHED YOU COULD GO TO SLEEP AND NOT WAKE UP?: NO
2. HAVE YOU ACTUALLY HAD ANY THOUGHTS OF KILLING YOURSELF IN THE PAST MONTH?: NO
6. HAVE YOU EVER DONE ANYTHING, STARTED TO DO ANYTHING, OR PREPARED TO DO ANYTHING TO END YOUR LIFE?: NO

## 2024-09-27 ASSESSMENT — ENCOUNTER SYMPTOMS: WOUND: 1

## 2024-09-27 ASSESSMENT — ACTIVITIES OF DAILY LIVING (ADL): ADLS_ACUITY_SCORE: 37

## 2024-09-27 NOTE — DISCHARGE INSTRUCTIONS
Nury    I will call you with the lab results.    Thank you for choosing our Emergency Department for your care.     You may receive a phone call or letter for a survey about your care in our ED.  Please complete this as this is how we improve care for our patients.     If you have any questions after leaving the ED you can call or text me on my cell phone at [phone].  I am not on call so if I do not answer my phone please leave a message- I will get back to you.  If you are not doing well please return to the ED.     Sincerely,    Dr Dhruv Gilbert M.D.  Medical Director  [facility] Emergency Department

## 2024-09-27 NOTE — ED PROVIDER NOTES
History     Chief Complaint   Patient presents with    Finger     Pain and possible infection    Fever     99.6 at home today    Cough     The history is provided by the patient and medical records.     Nury Gilbert is a 29 year old female here with infection of the left middle finger. She was seen  in [facility] and started on Bactroban, triamcinolone and Keflex. After the culture came back she was switched to Levaquin. She took all of the seven day course, last dose on  five days ago. She was seen in the ED by Dilcia Almazan on  and started on antifungal medicines.  She has not picked those up yet due to insurance problems. In the past five days her finger has continued to be red, feels like it is hot and it has minimal tenderness. She had temperature to 99.6  F at home. She has a cough and some diarrhea now as well. Her diarrhea started with the Levaquin and has improved in the past few days.  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 No history of this type of infection.    Allergies:  Allergies   Allergen Reactions    Penicillins Swelling     Throat swelling and hives     Phenergan Dm [Promethazine-Dm] Other (See Comments)     Hallucinations and muscle twitching    Wellbutrin [Bupropion] Other (See Comments)     Increased irritability/anxiety, headaches       Problem List:    Patient Active Problem List    Diagnosis Date Noted    Recurrent major depressive disorder, remission status unspecified (H) 2024     Priority: Medium    Generalized anxiety disorder 2024     Priority: Medium    Staph aureus infection 2022     Priority: Medium    H/O  section 2020     Priority: Medium    History of  section 2020     Priority: Medium     Added automatically from request for surgery [id number]      Pregnancy with history of  section, antepartum 2020     Priority: Medium    Rubella non-immune status, antepartum  2020     Priority: Medium    Nausea and vomiting in pregnancy 2019     Priority: Medium    S/P  section 2018     Priority: Medium    On stimulant medication - contract signed 3/31/17 2017     Priority: Medium     Scheduled Medication Use Agreement Signed 3/31/17 with Naye Villeda, [facility]  Pharmacy -  [facility], [place]  Ph.  [phone]  Needs to be seen every 3 months for refills per Agreement.        Attention deficit disorder 2011     Priority: Medium    Migraines 2011     Priority: Medium     Overview:   IMO Update 10/11  Overview:   IMO Update 10/11      Difficulty sleeping 2011     Priority: Medium        Past Medical History:    Past Medical History:   Diagnosis Date    Anxiety disorder     Attention-deficit hyperactivity disorder     Depressive disorder     Superficial foreign body of finger        Past Surgical History:    Past Surgical History:   Procedure Laterality Date    ADENOIDECTOMY      No Comments Provided    APPENDECTOMY OPEN          ARTHROSCOPY KNEE      2012,2nd as well at age 18     SECTION N/A 2018    Procedure:  SECTION;  External version attemted prior to Section;  Surgeon: Naye Johns MD;  Location:  OR     SECTION N/A 2020    Procedure:  SECTION;  Surgeon: Naye Johns MD;  Location:  OR    LAPAROSCOPY DIAGNOSTIC (GYN) N/A 2019    Procedure: LAPAROSCOPY DIAGNOSTIC;  Surgeon: Naye Johns MD;  Location: [facility] OR    OTHER SURGICAL HISTORY      1999,YLL548,BLADDER SURGERY    OTHER SURGICAL HISTORY      2017,13854.0,[place] REMOVE FOREIGN BODY SIMPLE       Family History:    Family History   Problem Relation Age of Onset    Dementia Maternal Grandmother     Heart Disease Maternal Grandfather     Cancer Mother         carcinoid cancer, 30s       Social History:  Marital Status:  Single [1]  Social History     Tobacco Use    Smoking status: Never     Passive  "exposure: Past    Smokeless tobacco: Never   Vaping Use    Vaping status: Former    Substances: Nicotine    Devices: Disposable   Substance Use Topics    Alcohol use: Yes     Comment: 2-3 times a week    Drug use: Not Currently     Types: Marijuana     Comment: medical marijuana        Medications:    acetaminophen (TYLENOL) 500 MG tablet  bacitracin 500 UNIT/GM external ointment  etonogestrel-ethinyl estradiol (ELURYNG) 0.12-0.015 MG/24HR vaginal ring  FLUoxetine (PROZAC) 20 MG capsule  ibuprofen (ADVIL/MOTRIN) 200 MG tablet  lamoTRIgine (LAMICTAL) 100 MG tablet  lisdexamfetamine (VYVANSE) 30 MG capsule  [START ON 10/6/2024] lisdexamfetamine (VYVANSE) 60 MG capsule  LORazepam (ATIVAN) 1 MG tablet  medical cannabis (Patient's own supply)  ondansetron (ZOFRAN ODT) 4 MG ODT tab  sulfamethoxazole-trimethoprim (BACTRIM DS) 800-160 MG tablet  terbinafine (LAMISIL) 250 MG tablet  triamcinolone (KENALOG) 0.5 % external ointment          Review of Systems   Skin:  Positive for wound.   All other systems reviewed and are negative.      Physical Exam   BP: 125/87  Pulse: 119  Temp: 98.3  F (36.8  C)  Resp: 16  Height: 172.7 cm (5' 8\")  Weight: 86.2 kg (190 lb)  SpO2: 98 %      Physical Exam  Vitals and nursing note reviewed.   Constitutional:       General: She is not in acute distress.     Appearance: Normal appearance. She is not ill-appearing.   Skin:     Comments: Exam of the left middle finger: the cuticle is red, has open skin and is weeping but there is no redness spreading up the finger. The base of the nail has an open area with some discoloration at the apex of the nail. She has no tenderness of the finger except right over the  open skin.   Neurological:      Mental Status: She is alert.         Results for orders placed or performed during the hospital encounter of 09/27/24 (from the past 24 hour(s))   KOH Preparation    Specimen: Finger, Left; Skin   Result Value Ref Range    KOH Preparation No fungal elements " "seen     KOH Preparation Reference Range: No fungal elements seen.        I did get a KOH scraping from the left middle finger: using a #10 blade I was able to scrape up some of the fingernail at the apex of the nail and placed this on a slide, covered it with another slide, labeled it and the lab staff walked it down to lab.    Assessments & Plan (with Medical Decision Making)  Nury Gilbert is a 29 year old female here with infection of the left middle finger. She was seen Sept 12 in [facility] and started on Bactroban, triamcinolone and Keflex. After the culture came back she was switched to Levaquin. She took all of the seven day course, last dose on Sunday five days ago. She was seen in the ED by Dilcia Almazan on Sept 22 and started on antifungal medicines.  She has not picked those up yet due to insurance problems.  In the past five days her finger has continued to be red, feels like it is hot and it has minimal tenderness. She had temperature to 99.6  F at home. She has a cough and some diarrhea now as well. Her diarrhea started with the Levaquin and has improved in the past few days.  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 No history of this type of infection.  VS in the ED /87   Pulse 119   Temp 98.3  F (36.8  C) (Tympanic)   Resp 16   Ht 1.727 m (5' 8\")   Wt 86.2 kg (190 lb)   LMP 09/10/2024 (Approximate)   SpO2 98%   BMI 28.89 kg/m    Exam shows finger infection but I do not think this is bacterial.    KOH prep negative.  2:10 PM  I called and spoke with Nury about this. We will try two antibiotics: she has Keflex and I just sent a Rx for Bactrim to [facility] in [place].       I have reviewed the nursing notes.    I have reviewed the findings, diagnosis, plan and need for follow up with the patient.  Medical Decision Making  The patient's presentation was of low complexity (an acute and uncomplicated illness or injury).    The patient's evaluation " involved:  an assessment requiring an independent historian (see separate area of note for details)  review of external note(s) from 2 sources (see separate area of note for details)  review of 1 test result(s) ordered prior to this encounter (see separate area of note for details)  ordering and/or review of 1 test(s) in this encounter (see separate area of note for details)    The patient's management necessitated moderate risk (prescription drug management including medications given in the ED).        Discharge Medication List as of 9/27/2024  1:28 PM          Final diagnoses:   Finger infection - left middle finger around fingernail       9/27/2024   [facility] AND [facility], Eron Chin MD  09/27/24 4714

## 2024-09-27 NOTE — ED TRIAGE NOTES
Patient presents with concerns of an infection on her left middle finger.  This all started 1 1/2 months ago when she was bitten by at dog at work.  Patient has been to  and on antibiotics with the last dose on last Saturday.  Patient now states she has low grade fever, cough and diarrhea.  Patient is wondering if we can do further testing on her for possible finger infection.     Triage Assessment (Adult)       Row Name 09/27/24 8572          Triage Assessment    Airway WDL WDL        Respiratory WDL    Respiratory WDL WDL        Skin Circulation/Temperature WDL    Skin Circulation/Temperature WDL X        Cardiac WDL    Cardiac WDL WDL        Peripheral/Neurovascular WDL    Peripheral Neurovascular WDL WDL        Cognitive/Neuro/Behavioral WDL    Cognitive/Neuro/Behavioral WDL WDL

## 2024-10-10 ENCOUNTER — TRANSFERRED RECORDS (OUTPATIENT)
Dept: HEALTH INFORMATION MANAGEMENT | Facility: OTHER | Age: 29
End: 2024-10-10
Payer: COMMERCIAL

## 2024-10-16 ENCOUNTER — OFFICE VISIT (OUTPATIENT)
Dept: PSYCHIATRY | Facility: OTHER | Age: 29
End: 2024-10-16
Payer: COMMERCIAL

## 2024-10-16 VITALS
OXYGEN SATURATION: 97 % | WEIGHT: 196 LBS | SYSTOLIC BLOOD PRESSURE: 118 MMHG | RESPIRATION RATE: 12 BRPM | HEIGHT: 68 IN | BODY MASS INDEX: 29.7 KG/M2 | TEMPERATURE: 97.7 F | HEART RATE: 90 BPM | DIASTOLIC BLOOD PRESSURE: 79 MMHG

## 2024-10-16 DIAGNOSIS — F90.2 ATTENTION DEFICIT HYPERACTIVITY DISORDER (ADHD), COMBINED TYPE: ICD-10-CM

## 2024-10-16 DIAGNOSIS — F33.1 MODERATE EPISODE OF RECURRENT MAJOR DEPRESSIVE DISORDER (H): ICD-10-CM

## 2024-10-16 DIAGNOSIS — F41.1 GENERALIZED ANXIETY DISORDER: Primary | ICD-10-CM

## 2024-10-16 PROCEDURE — G2211 COMPLEX E/M VISIT ADD ON: HCPCS

## 2024-10-16 PROCEDURE — 99213 OFFICE O/P EST LOW 20 MIN: CPT

## 2024-10-16 PROCEDURE — G0463 HOSPITAL OUTPT CLINIC VISIT: HCPCS

## 2024-10-16 RX ORDER — HYDROXYZINE HYDROCHLORIDE 10 MG/1
10 TABLET, FILM COATED ORAL EVERY 4 HOURS PRN
Qty: 90 TABLET | Refills: 0 | Status: SHIPPED | OUTPATIENT
Start: 2024-10-16

## 2024-10-16 RX ORDER — LAMOTRIGINE 100 MG/1
100 TABLET ORAL DAILY
Qty: 90 TABLET | Refills: 0 | Status: SHIPPED | OUTPATIENT
Start: 2024-10-16

## 2024-10-16 RX ORDER — LISDEXAMFETAMINE DIMESYLATE 30 MG/1
30 CAPSULE ORAL 2 TIMES DAILY
Qty: 60 CAPSULE | Refills: 0 | Status: SHIPPED | OUTPATIENT
Start: 2024-10-16 | End: 2024-11-15

## 2024-10-16 RX ORDER — LISDEXAMFETAMINE DIMESYLATE 30 MG/1
30 CAPSULE ORAL 2 TIMES DAILY
Qty: 60 CAPSULE | Refills: 0 | Status: SHIPPED | OUTPATIENT
Start: 2024-12-15 | End: 2025-01-14

## 2024-10-16 RX ORDER — LISDEXAMFETAMINE DIMESYLATE 30 MG/1
30 CAPSULE ORAL 2 TIMES DAILY
Qty: 60 CAPSULE | Refills: 0 | Status: SHIPPED | OUTPATIENT
Start: 2024-11-15 | End: 2024-12-15

## 2024-10-16 ASSESSMENT — PAIN SCALES - GENERAL: PAINLEVEL: MODERATE PAIN (5)

## 2024-10-16 ASSESSMENT — ANXIETY QUESTIONNAIRES
7. FEELING AFRAID AS IF SOMETHING AWFUL MIGHT HAPPEN: MORE THAN HALF THE DAYS
IF YOU CHECKED OFF ANY PROBLEMS ON THIS QUESTIONNAIRE, HOW DIFFICULT HAVE THESE PROBLEMS MADE IT FOR YOU TO DO YOUR WORK, TAKE CARE OF THINGS AT HOME, OR GET ALONG WITH OTHER PEOPLE: VERY DIFFICULT
8. IF YOU CHECKED OFF ANY PROBLEMS, HOW DIFFICULT HAVE THESE MADE IT FOR YOU TO DO YOUR WORK, TAKE CARE OF THINGS AT HOME, OR GET ALONG WITH OTHER PEOPLE?: VERY DIFFICULT
GAD7 TOTAL SCORE: 12
GAD7 TOTAL SCORE: 12
7. FEELING AFRAID AS IF SOMETHING AWFUL MIGHT HAPPEN: MORE THAN HALF THE DAYS
6. BECOMING EASILY ANNOYED OR IRRITABLE: MORE THAN HALF THE DAYS
4. TROUBLE RELAXING: MORE THAN HALF THE DAYS
2. NOT BEING ABLE TO STOP OR CONTROL WORRYING: MORE THAN HALF THE DAYS
5. BEING SO RESTLESS THAT IT IS HARD TO SIT STILL: SEVERAL DAYS
3. WORRYING TOO MUCH ABOUT DIFFERENT THINGS: SEVERAL DAYS
1. FEELING NERVOUS, ANXIOUS, OR ON EDGE: MORE THAN HALF THE DAYS
GAD7 TOTAL SCORE: 12

## 2024-10-16 ASSESSMENT — PATIENT HEALTH QUESTIONNAIRE - PHQ9
10. IF YOU CHECKED OFF ANY PROBLEMS, HOW DIFFICULT HAVE THESE PROBLEMS MADE IT FOR YOU TO DO YOUR WORK, TAKE CARE OF THINGS AT HOME, OR GET ALONG WITH OTHER PEOPLE: VERY DIFFICULT
SUM OF ALL RESPONSES TO PHQ QUESTIONS 1-9: 10
SUM OF ALL RESPONSES TO PHQ QUESTIONS 1-9: 10

## 2024-10-16 NOTE — NURSING NOTE
"Chief Complaint   Patient presents with    Medication Therapy Management       Initial /79 (BP Location: Left arm, Patient Position: Sitting, Cuff Size: Adult Regular)   Pulse 90   Temp 97.7  F (36.5  C) (Temporal)   Resp 12   Ht 1.727 m (5' 8\")   Wt 88.9 kg (196 lb)   LMP 10/07/2024 (Approximate)   SpO2 97%   BMI 29.80 kg/m   Estimated body mass index is 29.8 kg/m  as calculated from the following:    Height as of this encounter: 1.727 m (5' 8\").    Weight as of this encounter: 88.9 kg (196 lb).  Medication Review: complete    The next two questions are to help us understand your food security.  If you are feeling you need any assistance in this area, we have resources available to support you today.          6/28/2024   SDOH- Food Insecurity   Within the past 12 months, did you worry that your food would run out before you got money to buy more? N   Within the past 12 months, did the food you bought just not last and you didn t have money to get more? N                Davidson Galaviz      "

## 2024-10-16 NOTE — PROGRESS NOTES
[facility] AND [facility] PSYCHIATRY   PROGRESS NOTE     ASSESSMENT & PLAN     This is a 29 year old female who presents for ongoing psychiatric care and medication management for the following:     Diagnosis Comments   1. Generalized anxiety disorder  hydrOXYzine HCl (ATARAX) 10 MG tablet, FLUoxetine (PROZAC) 20 MG capsule       2. Attention deficit hyperactivity disorder (ADHD), combined type  lisdexamfetamine (VYVANSE) 30 MG capsule, lisdexamfetamine (VYVANSE) 30 MG capsule, lisdexamfetamine (VYVANSE) 30 MG capsule       3. Moderate episode of recurrent major depressive disorder (H)  lamoTRIgine (LAMICTAL) 100 MG tablet, FLUoxetine (PROZAC) 20 MG capsule          Doing well on current regimen - anxiety/depression improved. Refills provided x3 months - discussed trial of hydroxyzine PRN for both anxiety/panic and itching.     Medication:   Start hydroxyzine 10 mg every 4-6 hours as needed for anxiety/itching.  Continue Vyvanse to 30 mg BID for ADHD.  Continue Prozac 20 mg daily for anxiety/depression.  Continue Lamictal 100 mg daily for mood.  Continue ativan 1 mg daily as needed for panic attacks.     Goals: healthy diet and exercise, good sleep hygiene, encouraged therapy.    Labs: None ordered.     F/U: 3 months or sooner if symptoms occur.     The risks, benefits, alternatives and side effects have been discussed and are understood by the patient. The patient understands the risks of using street drugs or alcohol. The patient understands to call 911, 211 ([facility] Crisis Line) or come to the nearest ED if life threatening or urgent symptoms present.       HISTORY OF PRESENT ILLNESS     Nury Gilbert was last seen virtually on 8/29/2024.  At that time:    Tolerating restart of Prozac well - ongoing irritability/anxiety, especially in the late afternoon/evening hours. I suspect this is due to her Vyvanse wearing off. In the past, she tolerated BID dosing of Vyvanse well and felt that this was the  "combination that maintained her ADHD symptoms the best. Will look to schedule this again, risks/benefits discussed.      Medication:   Change Vyvanse to 30 mg BID for ADHD.  Continue Prozac 20 mg daily for anxiety/depression.  Continue Lamictal 100 mg daily for mood.  Continue ativan 1 mg daily as needed for panic attacks.      Today:    Nury Gilbert presents alone for ongoing medication management and psychiatric care. Reports things are going well being back on fluoxetine and BID dosing of Vyvanse - anxiety better managed, able to take care of things better at home without increased anxiety/panic/overwhelm. Despite increase in life stressors (physical health, family health struggles), has managed well.     Target symptoms: anxiety, depression, fatigue     Medications: taking as prescribed    Sleep: okay     Past Med Trials:  Mirtazapine  Adderall  Prazosin  Citalopram     REVIEW OF SYSTEMS   The review of systems is negative other than noted in the HPI.    Past Medical History:   Diagnosis Date    Anxiety disorder     No Comments Provided    Attention-deficit hyperactivity disorder     No Comments Provided    Depressive disorder     Superficial foreign body of finger     5/19/2017      Current Outpatient Medications (Includes Only Antianxiety agents, Antidepressants, Antipsychotics, Misc. psychotherapeutic)   Medication Sig    FLUoxetine (PROZAC) 20 MG capsule Take 1 capsule (20 mg) by mouth daily.    LORazepam (ATIVAN) 1 MG tablet Take 1 tablet (1 mg) by mouth daily as needed for anxiety.        MENTAL STATUS EXAM      Vitals: /79 (BP Location: Left arm, Patient Position: Sitting, Cuff Size: Adult Regular)   Pulse 90   Temp 97.7  F (36.5  C) (Temporal)   Resp 12   Ht 1.727 m (5' 8\")   Wt 88.9 kg (196 lb)   LMP 10/07/2024 (Approximate)   SpO2 97%   BMI 29.80 kg/m      Wt Readings from Last 4 Encounters:   10/16/24 88.9 kg (196 lb)   09/27/24 86.2 kg (190 lb)   09/22/24 86.2 kg (190 lb)   09/12/24 " "86.5 kg (190 lb 9.6 oz)      Appearance:  No apparent distress, Casually dressed, and Adequately groomed  Behavior/relationship to examiner/demeanor:  Cooperative, Engaged, and Pleasant  Motor activity/EPS:  Normal  Mood (subjective report):  \"good\"  Affect (objective appearance):  Appropriate/mood-congruent and Euthymic  Thought content:  no evidence of suicidal or homicidal ideation, no overt psychosis, and denies suicidal ideation, intent or thoughts  Insight:  Good  Judgment:  Good, Adequate for safety, and Appropriate for age        6/28/2024     8:45 AM 8/29/2024    10:25 AM 10/16/2024     3:04 PM   PHQ   PHQ-9 Total Score 15 19 10   Q9: Thoughts of better off dead/self-harm past 2 weeks Several days Several days Not at all   F/U: Thoughts of suicide or self-harm Yes Yes    F/U: Self harm-plan Yes No    F/U: Self-harm action Yes No    F/U: Safety concerns No No          6/28/2024     8:45 AM 8/29/2024    10:26 AM 10/16/2024     3:05 PM   BRANDON-7 SCORE   Total Score 17 (severe anxiety) 18 (severe anxiety) 12 (moderate anxiety)   Total Score 17 18 12        LABS     Most Recent 3 CBC's:  Recent Labs   Lab Test 02/14/24  0842 10/21/21  1138 06/20/20  0540 06/19/20  1020   WBC 5.7 7.9  --  10.5   HGB 13.0 14.5 11.3* 12.5    90  --  91    326  --  270      Most Recent 3 BMP's:  Recent Labs   Lab Test 02/14/24  0842 11/23/19  1208 03/17/19  2210    135 137   POTASSIUM 3.8 3.8 3.5   CHLORIDE 101 105 105   CO2 25 20* 23   BUN 7.2 6* 15   CR 0.58 0.51* 0.79   ANIONGAP 11 10 9   SUNNY 9.5 9.0 9.5   GLC 92 86 90     Most Recent TSH, T4 and A1c Labs:  Recent Labs   Lab Test 02/14/24  0842   TSH 0.80     Allergies   Allergen Reactions    Penicillins Swelling     Throat swelling and hives     Phenergan Dm [Promethazine-Dm] Other (See Comments)     Hallucinations and muscle twitching    Wellbutrin [Bupropion] Other (See Comments)     Increased irritability/anxiety, headaches         ATTESTATION      Gloria " PEG Kirkpatrick, PMHNP-BC    26 minutes spent on the date of the encounter doing chart review, history and exam, documentation and further activities per the note.    The longitudinal plan of care for the diagnosis(es)/condition(s) as documented were addressed during this visit. Due to the added complexity in care, I will continue to support Nury in the subsequent management and with ongoing continuity of care.

## 2024-10-16 NOTE — PATIENT INSTRUCTIONS
Start hydroxyzine 10 mg every 4-6 hours as needed for anxiety/itching - do not take together with benadryl. Can also use this for sleep.

## 2024-11-27 ENCOUNTER — HOSPITAL ENCOUNTER (EMERGENCY)
Facility: OTHER | Age: 29
Discharge: HOME | End: 2024-11-27
Attending: FAMILY MEDICINE
Payer: COMMERCIAL

## 2024-11-27 VITALS
DIASTOLIC BLOOD PRESSURE: 70 MMHG | BODY MASS INDEX: 28.79 KG/M2 | HEIGHT: 68 IN | RESPIRATION RATE: 18 BRPM | HEART RATE: 61 BPM | TEMPERATURE: 97.1 F | WEIGHT: 190 LBS | SYSTOLIC BLOOD PRESSURE: 120 MMHG | OXYGEN SATURATION: 100 %

## 2024-11-27 DIAGNOSIS — N10 PYELONEPHRITIS, ACUTE: ICD-10-CM

## 2024-11-27 DIAGNOSIS — R10.9 RIGHT FLANK PAIN: ICD-10-CM

## 2024-11-27 LAB
ALBUMIN SERPL BCG-MCNC: 4.4 G/DL (ref 3.5–5.2)
ALBUMIN UR-MCNC: 300 MG/DL
ALP SERPL-CCNC: 75 U/L (ref 40–150)
ALT SERPL W P-5'-P-CCNC: 8 U/L (ref 0–50)
ANION GAP SERPL CALCULATED.3IONS-SCNC: 10 MMOL/L (ref 7–15)
APPEARANCE UR: ABNORMAL
AST SERPL W P-5'-P-CCNC: 15 U/L (ref 0–45)
BASOPHILS # BLD AUTO: 0 10E3/UL (ref 0–0.2)
BASOPHILS NFR BLD AUTO: 0 %
BILIRUB SERPL-MCNC: 0.3 MG/DL
BILIRUB UR QL STRIP: NEGATIVE
BUN SERPL-MCNC: 9 MG/DL (ref 6–20)
CALCIUM SERPL-MCNC: 8.8 MG/DL (ref 8.8–10.4)
CHLORIDE SERPL-SCNC: 101 MMOL/L (ref 98–107)
COLOR UR AUTO: YELLOW
CREAT SERPL-MCNC: 0.68 MG/DL (ref 0.51–0.95)
CRP SERPL-MCNC: 30.08 MG/L
EGFRCR SERPLBLD CKD-EPI 2021: >90 ML/MIN/1.73M2
EOSINOPHIL # BLD AUTO: 0 10E3/UL (ref 0–0.7)
EOSINOPHIL NFR BLD AUTO: 0 %
ERYTHROCYTE [DISTWIDTH] IN BLOOD BY AUTOMATED COUNT: 12 % (ref 10–15)
GLUCOSE SERPL-MCNC: 111 MG/DL (ref 70–99)
GLUCOSE UR STRIP-MCNC: NEGATIVE MG/DL
HCG UR QL: NEGATIVE
HCO3 SERPL-SCNC: 22 MMOL/L (ref 22–29)
HCT VFR BLD AUTO: 37.6 % (ref 35–47)
HGB BLD-MCNC: 12.6 G/DL (ref 11.7–15.7)
HGB UR QL STRIP: ABNORMAL
HOLD SPECIMEN: NORMAL
IMM GRANULOCYTES # BLD: 0.1 10E3/UL
IMM GRANULOCYTES NFR BLD: 0 %
KETONES UR STRIP-MCNC: NEGATIVE MG/DL
LACTATE SERPL-SCNC: 1.3 MMOL/L (ref 0.7–2)
LEUKOCYTE ESTERASE UR QL STRIP: ABNORMAL
LYMPHOCYTES # BLD AUTO: 1 10E3/UL (ref 0.8–5.3)
LYMPHOCYTES NFR BLD AUTO: 8 %
MCH RBC QN AUTO: 31.3 PG (ref 26.5–33)
MCHC RBC AUTO-ENTMCNC: 33.5 G/DL (ref 31.5–36.5)
MCV RBC AUTO: 94 FL (ref 78–100)
MONOCYTES # BLD AUTO: 0.8 10E3/UL (ref 0–1.3)
MONOCYTES NFR BLD AUTO: 6 %
MUCOUS THREADS #/AREA URNS LPF: PRESENT /LPF
NEUTROPHILS # BLD AUTO: 11.6 10E3/UL (ref 1.6–8.3)
NEUTROPHILS NFR BLD AUTO: 86 %
NITRATE UR QL: POSITIVE
NRBC # BLD AUTO: 0 10E3/UL
NRBC BLD AUTO-RTO: 0 /100
PH UR STRIP: 6 [PH] (ref 5–9)
PLATELET # BLD AUTO: 332 10E3/UL (ref 150–450)
POTASSIUM SERPL-SCNC: 4.2 MMOL/L (ref 3.4–5.3)
PROT SERPL-MCNC: 7.4 G/DL (ref 6.4–8.3)
RBC # BLD AUTO: 4.02 10E6/UL (ref 3.8–5.2)
RBC URINE: >182 /HPF
SODIUM SERPL-SCNC: 133 MMOL/L (ref 135–145)
SP GR UR STRIP: 1.02 (ref 1–1.03)
SQUAMOUS EPITHELIAL: 1 /HPF
UROBILINOGEN UR STRIP-MCNC: NORMAL MG/DL
WBC # BLD AUTO: 13.5 10E3/UL (ref 4–11)
WBC URINE: >182 /HPF

## 2024-11-27 PROCEDURE — 83605 ASSAY OF LACTIC ACID: CPT | Performed by: FAMILY MEDICINE

## 2024-11-27 PROCEDURE — 99284 EMERGENCY DEPT VISIT MOD MDM: CPT | Performed by: FAMILY MEDICINE

## 2024-11-27 PROCEDURE — 96365 THER/PROPH/DIAG IV INF INIT: CPT | Performed by: FAMILY MEDICINE

## 2024-11-27 PROCEDURE — 258N000003 HC RX IP 258 OP 636: Performed by: FAMILY MEDICINE

## 2024-11-27 PROCEDURE — 36415 COLL VENOUS BLD VENIPUNCTURE: CPT | Performed by: FAMILY MEDICINE

## 2024-11-27 PROCEDURE — 96375 TX/PRO/DX INJ NEW DRUG ADDON: CPT | Performed by: FAMILY MEDICINE

## 2024-11-27 PROCEDURE — 87086 URINE CULTURE/COLONY COUNT: CPT | Performed by: FAMILY MEDICINE

## 2024-11-27 PROCEDURE — 85048 AUTOMATED LEUKOCYTE COUNT: CPT | Performed by: FAMILY MEDICINE

## 2024-11-27 PROCEDURE — 99284 EMERGENCY DEPT VISIT MOD MDM: CPT | Mod: 25 | Performed by: FAMILY MEDICINE

## 2024-11-27 PROCEDURE — 80053 COMPREHEN METABOLIC PANEL: CPT | Performed by: FAMILY MEDICINE

## 2024-11-27 PROCEDURE — 86140 C-REACTIVE PROTEIN: CPT | Performed by: FAMILY MEDICINE

## 2024-11-27 PROCEDURE — 87186 SC STD MICRODIL/AGAR DIL: CPT | Performed by: FAMILY MEDICINE

## 2024-11-27 PROCEDURE — 81003 URINALYSIS AUTO W/O SCOPE: CPT | Performed by: FAMILY MEDICINE

## 2024-11-27 PROCEDURE — 81025 URINE PREGNANCY TEST: CPT | Performed by: FAMILY MEDICINE

## 2024-11-27 PROCEDURE — 96361 HYDRATE IV INFUSION ADD-ON: CPT | Performed by: FAMILY MEDICINE

## 2024-11-27 PROCEDURE — 85004 AUTOMATED DIFF WBC COUNT: CPT | Performed by: FAMILY MEDICINE

## 2024-11-27 PROCEDURE — 250N000011 HC RX IP 250 OP 636: Performed by: FAMILY MEDICINE

## 2024-11-27 RX ORDER — KETOROLAC TROMETHAMINE 15 MG/ML
15 INJECTION, SOLUTION INTRAMUSCULAR; INTRAVENOUS ONCE
Status: COMPLETED | OUTPATIENT
Start: 2024-11-27 | End: 2024-11-27

## 2024-11-27 RX ORDER — HYDROMORPHONE HYDROCHLORIDE 1 MG/ML
0.5 INJECTION, SOLUTION INTRAMUSCULAR; INTRAVENOUS; SUBCUTANEOUS
Status: DISCONTINUED | OUTPATIENT
Start: 2024-11-27 | End: 2024-11-27 | Stop reason: HOSPADM

## 2024-11-27 RX ORDER — ONDANSETRON 4 MG/1
4 TABLET, ORALLY DISINTEGRATING ORAL EVERY 6 HOURS PRN
Qty: 10 TABLET | Refills: 0 | Status: SHIPPED | OUTPATIENT
Start: 2024-11-27 | End: 2024-11-30

## 2024-11-27 RX ORDER — CEFTRIAXONE 2 G/1
2 INJECTION, POWDER, FOR SOLUTION INTRAMUSCULAR; INTRAVENOUS ONCE
Status: COMPLETED | OUTPATIENT
Start: 2024-11-27 | End: 2024-11-27

## 2024-11-27 RX ORDER — CIPROFLOXACIN 500 MG/1
500 TABLET, FILM COATED ORAL 2 TIMES DAILY
Qty: 10 TABLET | Refills: 0 | Status: SHIPPED | OUTPATIENT
Start: 2024-11-27 | End: 2024-12-02

## 2024-11-27 RX ORDER — ONDANSETRON 2 MG/ML
4 INJECTION INTRAMUSCULAR; INTRAVENOUS EVERY 30 MIN PRN
Status: DISCONTINUED | OUTPATIENT
Start: 2024-11-27 | End: 2024-11-27 | Stop reason: HOSPADM

## 2024-11-27 RX ADMIN — KETOROLAC TROMETHAMINE 15 MG: 15 INJECTION, SOLUTION INTRAMUSCULAR; INTRAVENOUS at 13:31

## 2024-11-27 RX ADMIN — SODIUM CHLORIDE 2586 ML: 9 INJECTION, SOLUTION INTRAVENOUS at 13:32

## 2024-11-27 RX ADMIN — CEFTRIAXONE 2 G: 2 INJECTION, POWDER, FOR SOLUTION INTRAMUSCULAR; INTRAVENOUS at 14:27

## 2024-11-27 ASSESSMENT — ACTIVITIES OF DAILY LIVING (ADL)
ADLS_ACUITY_SCORE: 44

## 2024-11-27 ASSESSMENT — COLUMBIA-SUICIDE SEVERITY RATING SCALE - C-SSRS
2. HAVE YOU ACTUALLY HAD ANY THOUGHTS OF KILLING YOURSELF IN THE PAST MONTH?: NO
1. IN THE PAST MONTH, HAVE YOU WISHED YOU WERE DEAD OR WISHED YOU COULD GO TO SLEEP AND NOT WAKE UP?: NO
6. HAVE YOU EVER DONE ANYTHING, STARTED TO DO ANYTHING, OR PREPARED TO DO ANYTHING TO END YOUR LIFE?: NO

## 2024-11-27 NOTE — ED PROVIDER NOTES
"[facility]  Emergency Department Visit Note    Back Pain      History of Present Illness     HPI:  Nury Gilbert is a 29 year old female presenting with right sided flank pain.  She has had some urinary urgency for at least 3 days and may have seen some blood in her urine today.  The pain developed over night in the flank area and has worsened with nausea and vomiting and anorexia.  No change bowel habits.  History of appendectomy.  She has felt feverish. She tried tylenol with minimal relief.  She overall is feeling unwell.  She denies any vaginal symptoms.      Review of Systems:  10 point review of systems obtained and pertinent positive and negative findings noted in HPI. Review of systems otherwise negative.  Medications:  Reviewed in Epic Allergies:  Reviewed in Epic Problem List:  Reviewed in Epic   Past Medical History:  Reviewed in Epic Past Surgical History:  Reviewed in Epic Social History:  Reviewed in Epic       Physical Exam   Vital signs: /72   Pulse 60   Temp (!) 95.5  F (35.3  C)   Resp 18   Ht 1.727 m (5' 8\")   Wt 86.2 kg (190 lb)   LMP 11/06/2024 (Approximate)   SpO2 100%   BMI 28.89 kg/m    Physical Exam  Constitutional:       General: She is in acute distress.      Appearance: Normal appearance. She is ill-appearing. She is not diaphoretic.   HENT:      Head: Atraumatic.      Mouth/Throat:      Mouth: Mucous membranes are moist.   Eyes:      General: No scleral icterus.     Conjunctiva/sclera: Conjunctivae normal.   Cardiovascular:      Rate and Rhythm: Normal rate and regular rhythm.      Heart sounds: Normal heart sounds.   Pulmonary:      Effort: No respiratory distress.      Breath sounds: Normal breath sounds.   Abdominal:      General: Abdomen is flat. There is no distension.      Palpations: Abdomen is soft.      Tenderness: There is abdominal tenderness. There is right CVA tenderness. There is no left CVA tenderness or rebound.   Musculoskeletal:    "   Cervical back: Neck supple.   Skin:     General: Skin is warm.      Capillary Refill: Capillary refill takes less than 2 seconds.      Coloration: Skin is not jaundiced.      Findings: No rash.   Neurological:      Mental Status: She is alert and oriented to person, place, and time.           ED Course     ED Course as of 11/27/24 1432   Wed Nov 27, 2024   1326 No urine sample yet, the patient is getting IV fluids.   1342 Penicillin allergy is severe but crossover with ceftriaxone is unlikely.     Procedures                  Results for orders placed or performed during the hospital encounter of 11/27/24 (from the past 24 hours)   CBC with platelets differential    Narrative    The following orders were created for panel order CBC with platelets differential.  Procedure                               Abnormality         Status                     ---------                               -----------         ------                     CBC with platelets and d...[[id number]]  Abnormal            Final result                 Please view results for these tests on the individual orders.   Comprehensive metabolic panel   Result Value Ref Range    Sodium 133 (L) 135 - 145 mmol/L    Potassium 4.2 3.4 - 5.3 mmol/L    Carbon Dioxide (CO2) 22 22 - 29 mmol/L    Anion Gap 10 7 - 15 mmol/L    Urea Nitrogen 9.0 6.0 - 20.0 mg/dL    Creatinine 0.68 0.51 - 0.95 mg/dL    GFR Estimate >90 >60 mL/min/1.73m2    Calcium 8.8 8.8 - 10.4 mg/dL    Chloride 101 98 - 107 mmol/L    Glucose 111 (H) 70 - 99 mg/dL    Alkaline Phosphatase 75 40 - 150 U/L    AST 15 0 - 45 U/L    ALT 8 0 - 50 U/L    Protein Total 7.4 6.4 - 8.3 g/dL    Albumin 4.4 3.5 - 5.2 g/dL    Bilirubin Total 0.3 <=1.2 mg/dL   Lactic acid whole blood with 1x repeat in 2 hr when >2   Result Value Ref Range    Lactic Acid, Initial 1.3 0.7 - 2.0 mmol/L   CRP inflammation   Result Value Ref Range    CRP Inflammation 30.08 (H) <5.00 mg/L   CBC with platelets and differential   Result  Value Ref Range    WBC Count 13.5 (H) 4.0 - 11.0 10e3/uL    RBC Count 4.02 3.80 - 5.20 10e6/uL    Hemoglobin 12.6 11.7 - 15.7 g/dL    Hematocrit 37.6 35.0 - 47.0 %    MCV 94 78 - 100 fL    MCH 31.3 26.5 - 33.0 pg    MCHC 33.5 31.5 - 36.5 g/dL    RDW 12.0 10.0 - 15.0 %    Platelet Count 332 150 - 450 10e3/uL    % Neutrophils 86 %    % Lymphocytes 8 %    % Monocytes 6 %    % Eosinophils 0 %    % Basophils 0 %    % Immature Granulocytes 0 %    NRBCs per 100 WBC 0 <1 /100    Absolute Neutrophils 11.6 (H) 1.6 - 8.3 10e3/uL    Absolute Lymphocytes 1.0 0.8 - 5.3 10e3/uL    Absolute Monocytes 0.8 0.0 - 1.3 10e3/uL    Absolute Eosinophils 0.0 0.0 - 0.7 10e3/uL    Absolute Basophils 0.0 0.0 - 0.2 10e3/uL    Absolute Immature Granulocytes 0.1 <=0.4 10e3/uL    Absolute NRBCs 0.0 10e3/uL   Extra Tube    Narrative    The following orders were created for panel order Extra Tube.  Procedure                               Abnormality         Status                     ---------                               -----------         ------                     Extra Blood Culture Bottle[[id number]]                       Final result               Extra Blue Top Tube[[id number]]                              Final result               Extra Red Top Tube[[id number]]                               Final result                 Please view results for these tests on the individual orders.   Extra Blood Culture Bottle   Result Value Ref Range    Hold Specimen JIC    Extra Blue Top Tube   Result Value Ref Range    Hold Specimen JIC    Extra Red Top Tube   Result Value Ref Range    Hold Specimen JIC    UA with Microscopic reflex to Culture    Specimen: Urine, Midstream   Result Value Ref Range    Color Urine Yellow Colorless, Straw, Light Yellow, Yellow    Appearance Urine Cloudy (A) Clear    Glucose Urine Negative Negative mg/dL    Bilirubin Urine Negative Negative    Ketones Urine Negative Negative mg/dL    Specific Gravity Urine 1.023 1.000 - 1.030     Blood Urine Large (A) Negative    pH Urine 6.0 5.0 - 9.0    Protein Albumin Urine 300 (A) Negative mg/dL    Urobilinogen Urine Normal Normal, 2.0 mg/dL    Nitrite Urine Positive (A) Negative    Leukocyte Esterase Urine Large (A) Negative    Mucus Urine Present (A) None Seen /LPF    RBC Urine >182 (H) <=2 /HPF    WBC Urine >182 (H) <=5 /HPF    Squamous Epithelials Urine 1 <=1 /HPF    Narrative    Urine Culture ordered based on laboratory criteria   HCG qualitative urine (UPT)   Result Value Ref Range    hCG Urine Qualitative Negative Negative       Medications   ondansetron (ZOFRAN) injection 4 mg (has no administration in time range)   HYDROmorphone (PF) (DILAUDID) injection 0.5 mg (has no administration in time range)   cefTRIAXone (ROCEPHIN) 2 g vial to attach to  ml bag for ADULTS or NS 50 ml bag for PEDS (2 g Intravenous $New Bag 11/27/24 1422)   sodium chloride 0.9% BOLUS 2,586 mL (2,586 mLs Intravenous $New Bag 11/27/24 1332)   ketorolac (TORADOL) injection 15 mg (15 mg Intravenous $Given 11/27/24 1331)       Medical Decision Making     Nury Gilbert is a 29 year old female presenting with right flank pain, nausea, vomiting and dysuria.  Differential includes but is not limited to: psoas spasm, pyelonephritis, other UTI, nephrolithiasis, gall bladder pathololgy or stone, gastroenteritis.  Labs and history most consistent with pyelonephritis.  She was given a dosage of ceftriaxone in the ER and IV fluids.  She passed an oral challenge. Uncomplicated pyelonephritis without meeting criteria for hospitalization.  She was sent home on ciprofloxacin for 5 more days.     Patient given instructions on follow-up and warning signs for which to return to ED. All questions were answered and the patient is comfortable with plan for discharge. The patient was discharged in stable condition or transferred in as stable condition as possible.    I have reviewed the patient's Medical Imaging and Medical Records.  I  have reviewed the nursing notes.  I have reviewed the findings, diagnosis, plan and need for follow up with the patient.    Diagnosis & Disposition     Diagnosis:  1. Right flank pain    2. Pyelonephritis, acute        Discharge disposition:  Discharged to home       Follow-up: Follow up with primary care provider in 7 days       [facility] Emergency Department  Amy Mcdaniels MD  Family Medicine     Amy Mcdaniels MD  11/27/24 0755

## 2024-11-27 NOTE — ED TRIAGE NOTES
Pt states she had Rt sided pain that started at 0400 and has progressed to low Rt back.  She is having trouble standing up straight.  Pain is stabbing in nature.  She has had nauseous with it and lose stool. Might have blood in urine.       Triage Assessment (Adult)       Row Name 11/27/24 1211          Triage Assessment    Airway WDL WDL        Respiratory WDL    Respiratory WDL WDL        Skin Circulation/Temperature WDL    Skin Circulation/Temperature WDL WDL        Cardiac WDL    Cardiac WDL WDL        Peripheral/Neurovascular WDL    Peripheral Neurovascular WDL WDL        Cognitive/Neuro/Behavioral WDL    Cognitive/Neuro/Behavioral WDL WDL

## 2024-11-28 LAB — BACTERIA UR CULT: ABNORMAL

## 2024-11-29 LAB — BACTERIA UR CULT: ABNORMAL

## 2025-01-13 ENCOUNTER — APPOINTMENT (OUTPATIENT)
Dept: GENERAL RADIOLOGY | Facility: OTHER | Age: 30
End: 2025-01-13
Attending: PHYSICIAN ASSISTANT
Payer: COMMERCIAL

## 2025-01-13 ENCOUNTER — HOSPITAL ENCOUNTER (EMERGENCY)
Facility: OTHER | Age: 30
Discharge: HOME | End: 2025-01-13
Attending: PHYSICIAN ASSISTANT
Payer: COMMERCIAL

## 2025-01-13 VITALS
BODY MASS INDEX: 28.79 KG/M2 | RESPIRATION RATE: 18 BRPM | HEIGHT: 68 IN | DIASTOLIC BLOOD PRESSURE: 84 MMHG | OXYGEN SATURATION: 99 % | WEIGHT: 190 LBS | SYSTOLIC BLOOD PRESSURE: 130 MMHG | TEMPERATURE: 97.7 F | HEART RATE: 97 BPM
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
## 2025-01-13 DIAGNOSIS — T14.8XXA CONTUSION: ICD-10-CM

## 2025-01-13 DIAGNOSIS — M54.50 LUMBAR SPINE PAIN: ICD-10-CM

## 2025-01-13 PROCEDURE — 72100 X-RAY EXAM L-S SPINE 2/3 VWS: CPT

## 2025-01-13 PROCEDURE — 99283 EMERGENCY DEPT VISIT LOW MDM: CPT | Performed by: PHYSICIAN ASSISTANT

## 2025-01-13 PROCEDURE — 99284 EMERGENCY DEPT VISIT MOD MDM: CPT | Performed by: PHYSICIAN ASSISTANT

## 2025-01-13 PROCEDURE — 250N000013 HC RX MED GY IP 250 OP 250 PS 637: Performed by: PHYSICIAN ASSISTANT

## 2025-01-13 PROCEDURE — 72170 X-RAY EXAM OF PELVIS: CPT

## 2025-01-13 RX ADMIN — IBUPROFEN 600 MG: 200 TABLET, FILM COATED ORAL at 13:16

## 2025-01-13 ASSESSMENT — ENCOUNTER SYMPTOMS
FLANK PAIN: 0
SHORTNESS OF BREATH: 0
BACK PAIN: 1
ABDOMINAL PAIN: 0
FEVER: 0

## 2025-01-13 ASSESSMENT — COLUMBIA-SUICIDE SEVERITY RATING SCALE - C-SSRS
6. HAVE YOU EVER DONE ANYTHING, STARTED TO DO ANYTHING, OR PREPARED TO DO ANYTHING TO END YOUR LIFE?: YES
2. HAVE YOU ACTUALLY HAD ANY THOUGHTS OF KILLING YOURSELF IN THE PAST MONTH?: NO
1. IN THE PAST MONTH, HAVE YOU WISHED YOU WERE DEAD OR WISHED YOU COULD GO TO SLEEP AND NOT WAKE UP?: NO

## 2025-01-13 ASSESSMENT — ACTIVITIES OF DAILY LIVING (ADL)
ADLS_ACUITY_SCORE: 44
ADLS_ACUITY_SCORE: 44

## 2025-01-13 NOTE — ED PROVIDER NOTES
History     Chief Complaint   Patient presents with    Fall    Hip Pain     HPI  Nury Gilbert is a 29 year old female who presents to the ED for evaluation of fall/hip pain. Pt presents to ED via private car with c/o Rt hip pain after falling down 3-4 stairs on Saturday. Pt had ibuprofen at 0600 and tylenol at 1000 with no relief.     Allergies:  Allergies   Allergen Reactions    Penicillins Swelling     Throat swelling and hives     Phenergan Dm [Promethazine-Dm] Other (See Comments)     Hallucinations and muscle twitching    Wellbutrin [Bupropion] Other (See Comments)     Increased irritability/anxiety, headaches       Problem List:    Patient Active Problem List    Diagnosis Date Noted    Recurrent major depressive disorder, remission status unspecified 2024     Priority: Medium    Generalized anxiety disorder 2024     Priority: Medium    Staph aureus infection 2022     Priority: Medium    H/O  section 2020     Priority: Medium    History of  section 2020     Priority: Medium     Added automatically from request for surgery [id number]      Pregnancy with history of  section, antepartum 2020     Priority: Medium    Rubella non-immune status, antepartum 2020     Priority: Medium    Nausea and vomiting in pregnancy 2019     Priority: Medium    S/P  section 2018     Priority: Medium    On stimulant medication - contract signed 3/31/17 2017     Priority: Medium     Scheduled Medication Use Agreement Signed 3/31/17 with Naye Villeda, [facility] -  [facility], [place]  Ph.  [phone]  Needs to be seen every 3 months for refills per Agreement.        Attention deficit disorder 2011     Priority: Medium    Migraines 2011     Priority: Medium     Overview:   IMO Update 10/11  Overview:   IMO Update 10/11      Difficulty sleeping 2011     Priority: Medium        Past Medical History:    Past Medical  History:   Diagnosis Date    Anxiety disorder     Attention-deficit hyperactivity disorder     Depressive disorder     Superficial foreign body of finger        Past Surgical History:    Past Surgical History:   Procedure Laterality Date    ADENOIDECTOMY      No Comments Provided    APPENDECTOMY OPEN      2006    ARTHROSCOPY KNEE      2012,2nd as well at age 18     SECTION N/A 2018    Procedure:  SECTION;  External version attemted prior to Section;  Surgeon: Naye Johns MD;  Location: [facility] OR     SECTION N/A 2020    Procedure:  SECTION;  Surgeon: Naye Johns MD;  Location: [facility] OR    LAPAROSCOPY DIAGNOSTIC (GYN) N/A 2019    Procedure: LAPAROSCOPY DIAGNOSTIC;  Surgeon: Naye Johns MD;  Location: [facility] OR    OTHER SURGICAL HISTORY      1999,FRG601,BLADDER SURGERY    OTHER SURGICAL HISTORY      2017,92219.0,ID REMOVE FOREIGN BODY SIMPLE       Family History:    Family History   Problem Relation Age of Onset    Dementia Maternal Grandmother     Heart Disease Maternal Grandfather     Cancer Mother         carcinoid cancer, 30s       Social History:  Marital Status:  Single [1]  Social History     Tobacco Use    Smoking status: Never     Passive exposure: Past    Smokeless tobacco: Never   Vaping Use    Vaping status: Former    Substances: Nicotine    Devices: Disposable   Substance Use Topics    Alcohol use: Yes     Comment: 2-3 times a week    Drug use: Not Currently     Types: Marijuana     Comment: medical marijuana        Medications:    FLUoxetine (PROZAC) 20 MG capsule  lamoTRIgine (LAMICTAL) 100 MG tablet  lisdexamfetamine (VYVANSE) 30 MG capsule  acetaminophen (TYLENOL) 500 MG tablet  bacitracin 500 UNIT/GM external ointment  etonogestrel-ethinyl estradiol (ELURYNG) 0.12-0.015 MG/24HR vaginal ring  hydrOXYzine HCl (ATARAX) 10 MG tablet  ibuprofen (ADVIL/MOTRIN) 200 MG tablet  LORazepam (ATIVAN) 1 MG tablet  medical cannabis (Patient's own  "supply)  triamcinolone (KENALOG) 0.5 % external ointment          Review of Systems   Constitutional:  Negative for fever.   Respiratory:  Negative for shortness of breath.    Cardiovascular:  Negative for chest pain.   Gastrointestinal:  Negative for abdominal pain.   Genitourinary:  Negative for flank pain.   Musculoskeletal:  Positive for back pain.        Right hip pain     Neurological:  Negative for syncope.       Physical Exam   BP: 130/84  Pulse: (!) 122  Temp: 97.7  F (36.5  C)  Resp: 18  Height: 172.7 cm (5' 8\")  Weight: 86.2 kg (190 lb)  SpO2: 99 %      Physical Exam  Constitutional:       General: She is not in acute distress.     Appearance: She is well-developed. She is not diaphoretic.   HENT:      Head: Normocephalic and atraumatic.   Cardiovascular:      Rate and Rhythm: Normal rate and regular rhythm.   Pulmonary:      Effort: Pulmonary effort is normal.      Breath sounds: Normal breath sounds.   Abdominal:      Palpations: Abdomen is soft.      Tenderness: There is no abdominal tenderness. There is no right CVA tenderness or left CVA tenderness.   Musculoskeletal:         General: Tenderness present.      Comments: Lspine and right hip TTP. Slight ecchymosis just to the right/lateral area of Lspine.   Skin:     General: Skin is warm and dry.      Coloration: Skin is not jaundiced.      Findings: No rash.   Neurological:      Mental Status: She is alert. Mental status is at baseline.   Psychiatric:         Mood and Affect: Mood normal.         Behavior: Behavior normal.         ED Course        Procedures              Critical Care time:  none              No results found for this or any previous visit (from the past 24 hours).    Medications   ibuprofen (ADVIL/MOTRIN) tablet 600 mg (600 mg Oral $Given 1/13/25 1316)       Assessments & Plan (with Medical Decision Making)   Pt nontoxic in NAD. Heart, lung, bowel sounds normal, abd soft, nontender to palpation, nondistended. VSS, afebrile.     She " does have Lspine and right hip TTP. Slight ecchymosis just to the right/lateral area of Lspine. She has no flank ttp. Full rom of legs, specifically right hip joint.     Imaging of spine and pelvis read as normal.     I considered obtaining further intra-abdominal retroperitoneal imaging however without any flank pain or flank ecchymosis I have low suspicion for internal organ injury.    From discharge:  As discussed, x-rays were negative for acute findings.  Therefore, I think you are suffering from contusions.  Alternate Tylenol and ibuprofen every 4 hours you can use ice and heat as well.  I would expect gradual improvement of symptoms over the next 1 to 2 weeks.  Try to avoid any aggravating activity.  Return if there are worsening or concerning symptoms, follow-up with PCP as needed.    Strict return precautions are given to the pt, they will return if symptoms are worsening or concerning. The pt understands and agrees with the plan and they are discharged.     Robel Quarles PA-C              I have reviewed the nursing notes.    I have reviewed the findings, diagnosis, plan and need for follow up with the patient.          Discharge Medication List as of 1/13/2025  2:22 PM          Final diagnoses:   Fall   Contusion   Lumbar spine pain       1/13/2025   [facility]       Robel Quarles PA  01/15/25 4203

## 2025-01-13 NOTE — LETTER
January 13, 2025      To Whom It May Concern:      Nury Gilbert was seen in our Emergency Department today, 01/13/25.  I expect her condition to improve over the next 1-5 days.  She may return to work and perform activity as tolerated when improved.    Sincerely,        AZALIA Melo  Electronically signed

## 2025-01-13 NOTE — DISCHARGE INSTRUCTIONS
Get plenty of fluids and rest.  As discussed, x-rays were negative for acute findings.  Therefore, I think you are suffering from contusions.  Alternate Tylenol and ibuprofen every 4 hours you can use ice and heat as well.  I would expect gradual improvement of symptoms over the next 1 to 2 weeks.  Try to avoid any aggravating activity.  Return if there are worsening or concerning symptoms, follow-up with PCP as needed.

## 2025-01-13 NOTE — ED TRIAGE NOTES
"Pt presents to ED via private car with c/o Rt hip pain after falling down 3-4 stairs on Saturday. Pt had ibuprofen at 0600 and tylenol at 1000 with no relief. /84   Pulse (!) 122   Temp 97.7  F (36.5  C) (Tympanic)   Resp 18   Ht 1.727 m (5' 8\")   Wt 86.2 kg (190 lb)   LMP 11/06/2024 (Approximate)   SpO2 99%   BMI 28.89 kg/m         Triage Assessment (Adult)       Row Name 01/13/25 1233          Triage Assessment    Airway WDL WDL        Respiratory WDL    Respiratory WDL WDL        Skin Circulation/Temperature WDL    Skin Circulation/Temperature WDL WDL        Cardiac WDL    Cardiac WDL WDL        Peripheral/Neurovascular WDL    Peripheral Neurovascular WDL WDL        Cognitive/Neuro/Behavioral WDL    Cognitive/Neuro/Behavioral WDL WDL                     " No

## 2025-02-26 ENCOUNTER — OFFICE VISIT (OUTPATIENT)
Dept: FAMILY MEDICINE | Facility: OTHER | Age: 30
End: 2025-02-26
Attending: FAMILY MEDICINE
Payer: COMMERCIAL

## 2025-02-26 VITALS
DIASTOLIC BLOOD PRESSURE: 74 MMHG | SYSTOLIC BLOOD PRESSURE: 122 MMHG | HEART RATE: 85 BPM | WEIGHT: 200.8 LBS | OXYGEN SATURATION: 98 % | BODY MASS INDEX: 30.53 KG/M2 | RESPIRATION RATE: 14 BRPM | TEMPERATURE: 98.1 F

## 2025-02-26 DIAGNOSIS — Z00.00 HEALTH CARE MAINTENANCE: ICD-10-CM

## 2025-02-26 DIAGNOSIS — F33.1 MODERATE EPISODE OF RECURRENT MAJOR DEPRESSIVE DISORDER (H): ICD-10-CM

## 2025-02-26 DIAGNOSIS — Z01.812 PRE-PROCEDURAL LABORATORY EXAMINATION: Primary | ICD-10-CM

## 2025-02-26 DIAGNOSIS — Z30.9 ENCOUNTER FOR CONTRACEPTIVE MANAGEMENT, UNSPECIFIED TYPE: ICD-10-CM

## 2025-02-26 DIAGNOSIS — F41.1 GENERALIZED ANXIETY DISORDER: ICD-10-CM

## 2025-02-26 DIAGNOSIS — Z30.430 ENCOUNTER FOR INTRAUTERINE DEVICE PLACEMENT: ICD-10-CM

## 2025-02-26 LAB — HCG UR QL: NEGATIVE

## 2025-02-26 PROCEDURE — 250N000011 HC RX IP 250 OP 636: Performed by: FAMILY MEDICINE

## 2025-02-26 PROCEDURE — G0463 HOSPITAL OUTPT CLINIC VISIT: HCPCS

## 2025-02-26 PROCEDURE — G0123 SCREEN CERV/VAG THIN LAYER: HCPCS | Performed by: FAMILY MEDICINE

## 2025-02-26 PROCEDURE — 58300 INSERT INTRAUTERINE DEVICE: CPT | Performed by: FAMILY MEDICINE

## 2025-02-26 PROCEDURE — 81025 URINE PREGNANCY TEST: CPT | Mod: ZL | Performed by: FAMILY MEDICINE

## 2025-02-26 RX ADMIN — LEVONORGESTREL 1 EACH: 52 INTRAUTERINE DEVICE INTRAUTERINE at 13:14

## 2025-02-26 ASSESSMENT — ANXIETY QUESTIONNAIRES
4. TROUBLE RELAXING: MORE THAN HALF THE DAYS
IF YOU CHECKED OFF ANY PROBLEMS ON THIS QUESTIONNAIRE, HOW DIFFICULT HAVE THESE PROBLEMS MADE IT FOR YOU TO DO YOUR WORK, TAKE CARE OF THINGS AT HOME, OR GET ALONG WITH OTHER PEOPLE: VERY DIFFICULT
5. BEING SO RESTLESS THAT IT IS HARD TO SIT STILL: MORE THAN HALF THE DAYS
3. WORRYING TOO MUCH ABOUT DIFFERENT THINGS: SEVERAL DAYS
7. FEELING AFRAID AS IF SOMETHING AWFUL MIGHT HAPPEN: MORE THAN HALF THE DAYS
2. NOT BEING ABLE TO STOP OR CONTROL WORRYING: MORE THAN HALF THE DAYS
GAD7 TOTAL SCORE: 13
GAD7 TOTAL SCORE: 13
7. FEELING AFRAID AS IF SOMETHING AWFUL MIGHT HAPPEN: MORE THAN HALF THE DAYS
8. IF YOU CHECKED OFF ANY PROBLEMS, HOW DIFFICULT HAVE THESE MADE IT FOR YOU TO DO YOUR WORK, TAKE CARE OF THINGS AT HOME, OR GET ALONG WITH OTHER PEOPLE?: VERY DIFFICULT
1. FEELING NERVOUS, ANXIOUS, OR ON EDGE: MORE THAN HALF THE DAYS
GAD7 TOTAL SCORE: 13
6. BECOMING EASILY ANNOYED OR IRRITABLE: MORE THAN HALF THE DAYS

## 2025-02-26 ASSESSMENT — PATIENT HEALTH QUESTIONNAIRE - PHQ9
10. IF YOU CHECKED OFF ANY PROBLEMS, HOW DIFFICULT HAVE THESE PROBLEMS MADE IT FOR YOU TO DO YOUR WORK, TAKE CARE OF THINGS AT HOME, OR GET ALONG WITH OTHER PEOPLE: VERY DIFFICULT
SUM OF ALL RESPONSES TO PHQ QUESTIONS 1-9: 16
SUM OF ALL RESPONSES TO PHQ QUESTIONS 1-9: 16

## 2025-02-26 ASSESSMENT — PAIN SCALES - GENERAL: PAINLEVEL_OUTOF10: NO PAIN (0)

## 2025-02-26 NOTE — PROGRESS NOTES
IUD Insertion:  CONSULT:    Is a pregnancy test required: Yes.  Was it positive or negative?  Negative  Was a consent obtained?  Yes    Subjective: Nury Gilbert is a 30 year old  presents for IUD and desires Mirena type IUD.    Patient has been given the opportunity to ask questions about all forms of birth control, including all options appropriate for Nury Gilbert. Discussed that no method of birth control, except abstinence is 100% effective against pregnancy or sexually transmitted infection.     Nury Gilbert understands she may have the IUD removed at any time. IUD should be removed by a health care provider.    The entire insertion procedure was reviewed with the patient, including care after placement.    Patient's last menstrual period was 2025 (exact date). Has current contraception. No allergy to betadine or shellfish. Patient declines STD screening  HCG Qual Urine   Date Value Ref Range Status   2020 Negative NEG^Negative Final     Comment:     This test is for screening purposes.  Results should be interpreted along with   the clinical picture.  Confirmation testing is available if warranted by   ordering [id number], HCG Quantitative Pregnancy.       hCG Urine Qualitative   Date Value Ref Range Status   2025 Negative Negative Final     Comment:     This test is for screening purposes.  Results should be interpreted along with the clinical picture.  Confirmation testing is available if warranted by ordering [id number], HCG Quantitative Pregnancy.         /74 (BP Location: Right arm, Patient Position: Sitting, Cuff Size: Adult Regular)   Pulse 85   Temp 98.1  F (36.7  C) (Tympanic)   Resp 14   Wt 91.1 kg (200 lb 12.8 oz)   LMP 2025 (Exact Date)   SpO2 98%   BMI 30.53 kg/m      Pelvic Exam:   EG/BUS: normal genital architecture without lesions, erythema or abnormal secretions.   Vagina: moist, pink, rugae with physiologic discharge and secretions  Cervix:  parous no lesions and pink, moist, closed, without lesion or CMT  Uterus: position, mobile, no pain  Adnexa: within normal limits and no masses, nodularity, tenderness    PROCEDURE NOTE: -- IUD Insertion    Reason for Insertion: contraception    Premedicated with ibuprofen.  Under sterile technique, cervix was visualized with speculum and prepped with Betadine solution swab x 3. Tenaculum was placed for stability. The uterus was gently straightened and sounded to 7.0 cm. IUD prepared for placement, and IUD inserted according to 's instructions without difficulty or significant resitance, and deployed at the fundus. The strings were visualized and trimmed to 5.0 cm from the external os. Tenaculum was removed and hemostasis noted. Speculum removed.  Patient tolerated procedure well.    EBL: minimal    Complications: none    ASSESSMENT:     ICD-10-CM    1. Pre-procedural laboratory examination  Z01.812 Pregnancy, Urine (HCG)     Pregnancy, Urine (HCG)      2. Moderate episode of recurrent major depressive disorder (H)  F33.1       3. Generalized anxiety disorder  F41.1       4. Contraceptive management  Z30.9            PLAN:    Given 's handouts, including when to have IUD removed, list of danger s/sx, side effects and follow up recommended. Encouraged condom use for prevention of STD. Back up contraception advised for 7 days if progestin method. Advised to call for any fever, for prolonged or severe pain or bleeding, abnormal vaginal discharge, or unable to palpate strings. She was advised to use pain medications (ibuprofen) as needed for mild to moderate pain. Advised to follow-up in clinic in 4-6 weeks for IUD string check if unable to find strings or as directed by provider.     MD Bridger Izaguirre MD  [facility] Resident, PGY2  12:38 PM  2/26/2025    Above reviewed and discussed with 2nd year  Resident, Bridger Machado MD. Patient interviewed and  examined by myself. Agree with above plan.         Answers submitted by the patient for this visit:  Patient Health Questionnaire (Submitted on 2/26/2025)  If you checked off any problems, how difficult have these problems made it for you to do your work, take care of things at home, or get along with other people?: Very difficult  PHQ9 TOTAL SCORE: 16  Patient Health Questionnaire (G7) (Submitted on 2/26/2025)  BRANDON 7 TOTAL SCORE: 13  General Questionnaire (Submitted on 2/26/2025)  Chief Complaint: Chronic problems general questions HPI Form  How many days per week do you miss taking your medication?: 2  What makes it hard for you to take your medication every day?: remembering to take  General Concern (Submitted on 2/26/2025)  Chief Complaint: Chronic problems general questions HPI Form  What is the reason for your visit today?: Irregular bleeding lasting multiple days. birth control options.  When did your symptoms begin?: More than a month  What are your symptoms?: Had regular period for the last year since iud removal- recently have been getting period like bleeding multiple times a month lasting several days each time. Severe fatigue recently.  How would you describe these symptoms?: Severe  Are your symptoms:: Worsening  Have you had these symptoms before?: No  Questionnaire about: Chronic problems general questions HPI Form (Submitted on 2/26/2025)  Chief Complaint: Chronic problems general questions HPI Form

## 2025-02-26 NOTE — PROGRESS NOTES
"  Assessment & Plan   Menstrual Irregularities  S: Patient reports irregular menstrual cycles for the past four months, with weekly bleeding for the last two months. Periods last 5-6 days with varying intensity. Patient has a history of heavy menstrual bleeding and severe cramping, diagnosed with pelvic congestion syndrome via laparoscopy. Currently using an estradiol vaginal ring for birth control, initiated approximately 18 months ago after IUD removal.  Has history of using various birth control methods including oral contraceptives, patch, Nexplanon, copper IUD, and Depo-Provera. Previous complications with IUD, which tipped and grew into the uterus, requiring removal. Currently using estradiol vaginal ring but experiencing frequent periods.  A/P:    a. Mirena IUD insertion performed 2/26/2025 after discussion of all options  Refer to separate note for procedure.     Headaches  S: Patient reports increased headaches, potentially related to menstrual irregularities and possible iron deficiency. History of migraines, more prevalent in late teens and early twenties. Current headaches may be exacerbated by hormonal fluctuations associated with irregular menstrual cycles.  A/P:    a. Continue Tylenol as needed for headache management.    b. Consider iron level testing to rule out iron deficiency anemia.    c. Recommend headache diary to track frequency, intensity, and potential triggers.    d. Follow up to reassess headache status after addressing menstrual irregularities.    BMI  Estimated body mass index is 30.53 kg/m  as calculated from the following:    Height as of 1/13/25: 1.727 m (5' 8\").    Weight as of this encounter: 91.1 kg (200 lb 12.8 oz).     No follow-ups on file.    Subjective   Menstrual Irregularities and Associated Symptoms:  - Patient presents with irregular periods for the past four months, experiencing bleeding every week for the last two months. Periods last 5-6 days, with varying intensity and " a particularly severe week recently. The patient believes these irregularities are contributing to fatigue and headaches, possibly due to low iron levels.  - Increased headaches are reported.  - Hard lumps under the skin and in the armpit area were present but have mostly resolved.    Menstrual and Reproductive History:  - Periods started at age 14, characterized by 5-6 day duration with severe cramps and heavy bleeding.  - After first child, continued heavy bleeding and severe pain led to ER visits.  - Diagnosed with pelvic congestion syndrome via laparoscopy, involving a dilated pelvic vein causing blood backflow and pooling, resulting in cramps and pain.  - Various birth control methods tried: oral contraceptives, patch, Nexplanon, copper IUD, and Depo-Provera.  - Complications with copper IUD, which tipped over and grew into the uterus, requiring removal.  - Currently using an estradiol vaginal ring for about a year and a half, but still experiencing frequent periods.    Medical History:  - Migraines (more common in late teens and early twenties)  - ADHD  - Depression-anxiety  - Pelvic congestion syndrome  - History of transient tachypnea in second child at birth    Current and Past Medications and Supplements:  - Tylenol as needed  - Estradiol vaginal ring  - History of various birth control methods (as mentioned above)  - Ibuprofen (taken after IUD insertion)    Social History:  - Has two children    Review of Systems:  - General: Fatigue  - Skin: Hard lumps under skin and in armpit area (mostly resolved)  - Neurological: Increased headaches, migraines  - Endocrine: Menstrual irregularities, periods stopping for three days then suddenly returning  - Hematologic: Possible low iron levels  - Reproductive: Frequent periods, irregular periods, bleeding every week for past two months, periods lasting 5-6 days, some weeks lighter bleeding, last week particularly bad bleeding  - Musculoskeletal: Severe cramps, pelvic  pain  - Patient presents with irregular periods for the past four months, experiencing bleeding every week for the last two months. Periods last 5-6 days, with varying intensity and a particularly severe week recently. The patient believes these irregularities are contributing to fatigue and headaches, possibly due to low iron levels.  - Increased headaches are reported.  - Hard lumps under the skin and in the armpit area were present but have mostly resolved.  Nury is a 30 year old, presenting for the following health issues:  Derm Problem (Hard lumps after pimple like sores in armpit. Has gotten better. It started almost like a pimple feeling thing. Noticed it about 2 months ago. Has gotten smaller since she made appt. /) and Abnormal Bleeding Problem (Irregular bleeding, headaches, severe fatigue.  This has been happening for about 2 months. )      2/26/2025    10:22 AM   Additional Questions   Roomed by Mariann   Accompanied by self     Abnormal Bleeding Problem    History of Present Illness       Reason for visit:  Irregular bleeding lasting multiple days. birth control options.  Symptom onset:  More than a month  Symptoms include:  Had regular period for the last year since iud removal- recently have been getting period like bleeding multiple times a month lasting several days each time. Severe fatigue recently.  Symptom intensity:  Severe  Symptom progression:  Worsening  Had these symptoms before:  No She is missing 2 dose(s) of medications per week.  She is not taking prescribed medications regularly due to remembering to take.             Objective    /74 (BP Location: Right arm, Patient Position: Sitting, Cuff Size: Adult Regular)   Pulse 85   Temp 98.1  F (36.7  C) (Tympanic)   Resp 14   Wt 91.1 kg (200 lb 12.8 oz)   LMP 02/18/2025 (Exact Date)   SpO2 98%   BMI 30.53 kg/m    Body mass index is 30.53 kg/m .  Physical Exam   Gen: Well appearing. No acute distress. Alert. Oriented.   Head and  Neck: Normocephalic. Atraumatic. Conjunctivae clear. Extra ocular movement grossly intact.   Cardiovascular: Regular rate and rhythm. No significant peripheral edema.   Respiratory: Lungs clear to auscultation bilaterally.   Abdomen: Soft, non-tender  : Normal external vaginal tissue. Cervical os without lesions.   Skin: no visible rashes or lesions.   Psychiatric: normal mood and affect.           Bridger Machado MD  [facility] Medicine Resident, PGY2  12:33 PM  2/26/2025      Above reviewed and discussed with 2nd year FM Resident, Bridger Machado MD. Patient interviewed and examined by myself. Agree with above plan.       Signed Electronically by: Shruthi Frey MD

## 2025-02-26 NOTE — NURSING NOTE
"Chief Complaint   Patient presents with    Derm Problem     Hard lumps after pimple like sores in armpit. Has gotten better. It started almost like a pimple feeling thing. Noticed it about 2 months ago. Has gotten smaller since she made appt.       Abnormal Bleeding Problem     Irregular bleeding, headaches, severe fatigue.  This has been happening for about 2 months.        Initial /74 (BP Location: Right arm, Patient Position: Sitting, Cuff Size: Adult Regular)   Pulse 85   Temp 98.1  F (36.7  C) (Tympanic)   Resp 14   Wt 91.1 kg (200 lb 12.8 oz)   LMP 02/18/2025 (Exact Date)   SpO2 98%   BMI 30.53 kg/m   Estimated body mass index is 30.53 kg/m  as calculated from the following:    Height as of 1/13/25: 1.727 m (5' 8\").    Weight as of this encounter: 91.1 kg (200 lb 12.8 oz).  Medication Reconciliation: complete    Mariann Sharma LPN    Advance Care Directive reviewed    "

## 2025-02-27 LAB
BKR LAB AP GYN ADEQUACY: NORMAL
BKR LAB AP GYN INTERPRETATION: NORMAL
BKR LAB AP HPV REFLEX: NORMAL
BKR LAB AP LMP: NORMAL
BKR LAB AP PREVIOUS ABNORMAL: NORMAL
PATH REPORT.COMMENTS IMP SPEC: NORMAL
PATH REPORT.COMMENTS IMP SPEC: NORMAL
PATH REPORT.RELEVANT HX SPEC: NORMAL

## 2025-04-11 PROBLEM — Z79.899 ON STIMULANT MEDICATION: Status: RESOLVED | Noted: 2017-03-31 | Resolved: 2025-04-11

## 2025-04-11 PROBLEM — F90.2 ATTENTION DEFICIT HYPERACTIVITY DISORDER (ADHD), COMBINED TYPE: Status: ACTIVE | Noted: 2025-04-11

## 2025-04-11 PROBLEM — F33.1 MODERATE EPISODE OF RECURRENT MAJOR DEPRESSIVE DISORDER (H): Status: ACTIVE | Noted: 2024-04-12

## 2025-04-14 ENCOUNTER — MYC MEDICAL ADVICE (OUTPATIENT)
Dept: PSYCHIATRY | Facility: OTHER | Age: 30
End: 2025-04-14
Payer: COMMERCIAL

## 2025-04-14 NOTE — LETTER
4/14/2025      Nury Gilbert  [address]  [facility] [address]        To Whom It May Concern:       I am SALLY Ernandez CNP. I am board certified to practice in Psychiatry/Mental Health.    Nury Gilbert is under my care and I am currently treating her for a psychological disability recognized by the DSM-IV-TR.      It is my professional opinion that the presence of an emotional support animal is necessary to help treat and alleviate her General anxiety and Major Depressive Disorder. I have prescribed Nury to be able to keep her cat to serve as her emotional support animal as the primary treatment to address her disability.       Sincerely,        SALLY Mederos CNP    Electronically signed

## 2025-04-15 NOTE — TELEPHONE ENCOUNTER
Pt is wondering if you would do an ESHA letter for her cat. Found an apartment but needs the letter.     Are you willing to sign? If so, route back and we will get one typed up for you.     Dale Seymour RN on 4/15/2025 at 10:32 AM

## 2025-05-09 ASSESSMENT — ANXIETY QUESTIONNAIRES
8. IF YOU CHECKED OFF ANY PROBLEMS, HOW DIFFICULT HAVE THESE MADE IT FOR YOU TO DO YOUR WORK, TAKE CARE OF THINGS AT HOME, OR GET ALONG WITH OTHER PEOPLE?: VERY DIFFICULT
4. TROUBLE RELAXING: NEARLY EVERY DAY
IF YOU CHECKED OFF ANY PROBLEMS ON THIS QUESTIONNAIRE, HOW DIFFICULT HAVE THESE PROBLEMS MADE IT FOR YOU TO DO YOUR WORK, TAKE CARE OF THINGS AT HOME, OR GET ALONG WITH OTHER PEOPLE: VERY DIFFICULT
7. FEELING AFRAID AS IF SOMETHING AWFUL MIGHT HAPPEN: NEARLY EVERY DAY
GAD7 TOTAL SCORE: 21
1. FEELING NERVOUS, ANXIOUS, OR ON EDGE: NEARLY EVERY DAY
3. WORRYING TOO MUCH ABOUT DIFFERENT THINGS: NEARLY EVERY DAY
GAD7 TOTAL SCORE: 21
6. BECOMING EASILY ANNOYED OR IRRITABLE: NEARLY EVERY DAY
5. BEING SO RESTLESS THAT IT IS HARD TO SIT STILL: NEARLY EVERY DAY
2. NOT BEING ABLE TO STOP OR CONTROL WORRYING: NEARLY EVERY DAY
7. FEELING AFRAID AS IF SOMETHING AWFUL MIGHT HAPPEN: NEARLY EVERY DAY

## 2025-05-09 ASSESSMENT — PATIENT HEALTH QUESTIONNAIRE - PHQ9: SUM OF ALL RESPONSES TO PHQ QUESTIONS 1-9: 16

## 2025-05-12 ENCOUNTER — RESULTS FOLLOW-UP (OUTPATIENT)
Dept: FAMILY MEDICINE | Facility: OTHER | Age: 30
End: 2025-05-12
Payer: COMMERCIAL

## 2025-05-12 DIAGNOSIS — J34.0 NOSE CELLULITIS: Primary | ICD-10-CM

## 2025-05-12 RX ORDER — MUPIROCIN 20 MG/G
OINTMENT TOPICAL 2 TIMES DAILY
Qty: 30 G | Refills: 1 | Status: SHIPPED | OUTPATIENT
Start: 2025-05-12 | End: 2025-05-17

## 2025-05-12 RX ORDER — SULFAMETHOXAZOLE AND TRIMETHOPRIM 800; 160 MG/1; MG/1
1 TABLET ORAL 2 TIMES DAILY
Qty: 20 TABLET | Refills: 0 | Status: SHIPPED | OUTPATIENT
Start: 2025-05-12 | End: 2025-05-22

## 2025-05-12 NOTE — TELEPHONE ENCOUNTER
Agree. May depend on policy at work.   But contact precautions are likely sufficient.     Should be able to get ultrasound in [place] but may need to talk with a  opposed to through MyChart.     Shruthi Frey MD

## 2025-05-12 NOTE — TELEPHONE ENCOUNTER
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     Provided info on MRSA.     My Thoughts:   If you feel you need to take time off, you should take time off. Otherwise, you can continue to work using \"Contact precautions\". 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     Routing to provider to review and respond.  Dale Seymour RN on 5/12/2025 at 9:47 AM    "

## 2025-05-13 ENCOUNTER — VIRTUAL VISIT (OUTPATIENT)
Dept: PSYCHIATRY | Facility: OTHER | Age: 30
End: 2025-05-13
Payer: COMMERCIAL

## 2025-05-13 DIAGNOSIS — F41.1 GENERALIZED ANXIETY DISORDER: ICD-10-CM

## 2025-05-13 RX ORDER — LORAZEPAM 1 MG/1
1 TABLET ORAL DAILY PRN
Qty: 30 TABLET | Refills: 1 | Status: SHIPPED | OUTPATIENT
Start: 2025-05-13

## 2025-05-13 ASSESSMENT — PATIENT HEALTH QUESTIONNAIRE - PHQ9
SUM OF ALL RESPONSES TO PHQ QUESTIONS 1-9: 16
SUM OF ALL RESPONSES TO PHQ QUESTIONS 1-9: 16
10. IF YOU CHECKED OFF ANY PROBLEMS, HOW DIFFICULT HAVE THESE PROBLEMS MADE IT FOR YOU TO DO YOUR WORK, TAKE CARE OF THINGS AT HOME, OR GET ALONG WITH OTHER PEOPLE: EXTREMELY DIFFICULT

## 2025-05-13 ASSESSMENT — ANXIETY QUESTIONNAIRES: GAD7 TOTAL SCORE: 21

## 2025-05-13 NOTE — PROGRESS NOTES
[facility] PSYCHIATRY   PROGRESS NOTE     VIRTUAL VISIT     Telemedicine Visit: The patient's condition can be safely assessed and treated via synchronous audio and visual telemedicine encounter.      Reason for Telemedicine Visit: Patient has requested telehealth visit    Originating Site (Patient Location): Patient's home    Distant Location (provider location):  On-site    Consent:  The patient/guardian has verbally consented to: the potential risks and benefits of telemedicine (video visit) versus in person care; bill my insurance or make self-payment for services provided; and responsibility for payment of non-covered services.     Mode of Communication:  Video Conference via [employer]    Start Time: 1404  End Time: 1419       ASSESSMENT & PLAN     Nury presents with improvements in overall mood, anxiety with increase in fluoxetine. No further changes made today, refills provided.     Moderate episode of recurrent major depressive disorder (H)  Attention deficit hyperactivity disorder (ADHD), combined type  Generalized anxiety disorder     Medication:   Continue fluoxetine to 40 mg daily.  Continue lamotrigine 100 mg daily.  Continue Vyvanse 30 mg BID.  Continue lorazepam 1 mg daily as needed for panic attacks - PDMP reviewed, appropriate.    F/U: 3 months    The risks, benefits, alternatives and side effects have been discussed and are understood by the patient. The patient understands the risks of using street drugs or alcohol. The patient understands to call 911, 211 ([facility] Crisis Line) or come to the nearest ED if life threatening or urgent symptoms present.       HISTORY OF PRESENT ILLNESS     Nury Gilbert was last seen in clinic/virtually on 4/11/2025.  At that time:    Worsened anxiety/depression over the last several months, likely situational given significant life stressors including divorce, family health issues, filing bankruptcy and shared custody of children.  "Discussed further increase of fluoxetine, resources given for lifestyle/behavioral modifications.     Increase fluoxetine to 40 mg daily.  Continue lamotrigine 100 mg daily.  Continue Vyvanse 30 mg BID.  Continue lorazepam 1 mg daily as needed for panic attacks - PDMP reviewed, appropriate.      Today:    Nury presents virtually for ongoing medication management and psychiatric care. Reports improvements in mood and anxiety - reports feeling \"internally a lot better\", able to manage stressors more easily without increased irritability. She reports ongoing life stressors, however having decreased panic episodes.     Sleep: variable    Past Med Trials:  Mirtazapine  Adderall  Prazosin  Citalopram       REVIEW OF SYSTEMS   The review of systems is negative other than noted in the HPI.    Past Medical History:   Diagnosis Date    Anxiety disorder     No Comments Provided    Attention-deficit hyperactivity disorder     No Comments Provided    Depressive disorder     Superficial foreign body of finger     5/19/2017      Allergies   Allergen Reactions    Penicillins Swelling     Throat swelling and hives     Phenergan Dm [Promethazine-Dm] Other (See Comments)     Hallucinations and muscle twitching    Wellbutrin [Bupropion] Other (See Comments)     Increased irritability/anxiety, headaches         MENTAL STATUS EXAM    Vitals: LMP 05/10/2025 (Exact Date)     Appearance:  No apparent distress, Casually dressed, and Adequately groomed  Behavior/relationship to examiner/demeanor:  Cooperative, Engaged, and Pleasant  Motor activity/EPS:  Normal  Mood (subjective report):  \"I'm alive\"  Affect (objective appearance):  Appropriate/mood-congruent, tearful  Thought content:  no evidence of suicidal or homicidal ideation, no overt psychosis, and denies suicidal ideation, intent or thoughts  Insight:  Good  Judgment:  Good, Adequate for safety, and Appropriate for age        4/11/2025     2:50 PM 5/9/2025    11:24 AM 5/13/2025     " 1:57 PM   PHQ   PHQ-9 Total Score 10  16  16    Q9: Thoughts of better off dead/self-harm past 2 weeks Not at all Not at all Not at all        Patient-reported    Proxy-reported         2/26/2025     9:15 AM 4/11/2025     2:50 PM 5/9/2025    11:24 AM   BRANDON-7 SCORE   Total Score 13 (moderate anxiety) 17 (severe anxiety) 21 (severe anxiety)   Total Score 13  17  21        Patient-reported        ATTESTATION      Gloria Kirkpatrick DNP, PMHNP-BC    As the provider I attest to compliance with applicable laws and regulations related to telemedicine.     24 minutes spent on the date of the encounter doing chart review, history and exam, documentation and further activities per the note.

## 2025-05-13 NOTE — NURSING NOTE
"Chief Complaint   Patient presents with    Medication Follow-up       Initial There were no vitals taken for this visit. Estimated body mass index is 31.17 kg/m  as calculated from the following:    Height as of 4/11/25: 1.727 m (5' 8\").    Weight as of 5/9/25: 93 kg (205 lb).  Medication Review: complete    The next two questions are to help us understand your food security.  If you are feeling you need any assistance in this area, we have resources available to support you today.          4/11/2025   SDOH- Food Insecurity   Within the past 12 months, did you worry that your food would run out before you got money to buy more? N   Within the past 12 months, did the food you bought just not last and you didn t have money to get more? N         Health Care Directive:  Patient does not have a Health Care Directive: Discussed advance care planning with patient; however, patient declined at this time.    Margarita Mari CNA      "

## 2025-05-14 ENCOUNTER — RESULTS FOLLOW-UP (OUTPATIENT)
Dept: FAMILY MEDICINE | Facility: OTHER | Age: 30
End: 2025-05-14

## 2025-05-14 ENCOUNTER — HOSPITAL ENCOUNTER (OUTPATIENT)
Dept: ULTRASOUND IMAGING | Facility: OTHER | Age: 30
Discharge: HOME | End: 2025-05-14
Attending: FAMILY MEDICINE
Payer: COMMERCIAL

## 2025-05-14 DIAGNOSIS — N92.1 BREAKTHROUGH BLEEDING WITH IUD: Primary | ICD-10-CM

## 2025-05-14 DIAGNOSIS — Z97.5 BREAKTHROUGH BLEEDING WITH IUD: Primary | ICD-10-CM

## 2025-05-14 DIAGNOSIS — Z97.5 BREAKTHROUGH BLEEDING WITH IUD: ICD-10-CM

## 2025-05-14 DIAGNOSIS — N92.1 BREAKTHROUGH BLEEDING WITH IUD: ICD-10-CM

## 2025-05-14 PROCEDURE — 76830 TRANSVAGINAL US NON-OB: CPT | Mod: 26 | Performed by: RADIOLOGY

## 2025-05-14 PROCEDURE — 76830 TRANSVAGINAL US NON-OB: CPT

## 2025-05-14 PROCEDURE — 76856 US EXAM PELVIC COMPLETE: CPT | Mod: 26 | Performed by: RADIOLOGY

## 2025-05-15 ENCOUNTER — PATIENT OUTREACH (OUTPATIENT)
Dept: CARE COORDINATION | Facility: CLINIC | Age: 30
End: 2025-05-15
Payer: COMMERCIAL

## 2025-05-16 ENCOUNTER — TELEPHONE (OUTPATIENT)
Dept: OBGYN | Facility: OTHER | Age: 30
End: 2025-05-16
Payer: COMMERCIAL

## 2025-05-16 NOTE — TELEPHONE ENCOUNTER
After verifying pt last name and date of birth, pt states that Dr. Johns said pt had pelvic congestion syndrome and she was referred to a specialist in the [facility]. When she met with them her insurance did not cover diagnostic testing so she never completed them. Pt has new insurance and would like try the referrals again. Will route to Dr. Johns for approval. Pt knows provider is out of office until Tuesday     Charis Fitzgerald RN on 5/16/2025 at 3:10 PM

## 2025-05-16 NOTE — TELEPHONE ENCOUNTER
Nury left a message that she would like a call back about a previous diagnosis she was given by Dr. Mervat Johns.  Jane Weller on 5/16/2025 at 3:00 PM

## 2025-05-19 DIAGNOSIS — Z97.5 BREAKTHROUGH BLEEDING WITH IUD: ICD-10-CM

## 2025-05-19 DIAGNOSIS — N92.1 BREAKTHROUGH BLEEDING WITH IUD: ICD-10-CM

## 2025-05-19 RX ORDER — NORGESTIMATE AND ETHINYL ESTRADIOL 0.25-0.035
1 KIT ORAL DAILY
Qty: 28 TABLET | Refills: 0 | Status: SHIPPED | OUTPATIENT
Start: 2025-05-19

## 2025-05-20 NOTE — TELEPHONE ENCOUNTER
She is going to need to be seen to get updated symptoms before a referral can be placed.  Naye Johns MD FACOG  OB/GYN  5/20/2025 9:57 AM

## 2025-05-20 NOTE — TELEPHONE ENCOUNTER
Called and spoke to Patient after verifying last name and date of birth. Patient was given the below information. She verbalized understating and asked to be sent to scheduling to make an appointment. Pt sent to scheduling.     Tanya Nesbitt RN on 5/20/2025 at 10:09 AM

## 2025-05-22 RX ORDER — NORGESTIMATE AND ETHINYL ESTRADIOL 0.25-0.035
1 KIT ORAL DAILY
Qty: 84 TABLET | OUTPATIENT
Start: 2025-05-22

## 2025-05-22 NOTE — TELEPHONE ENCOUNTER
Disp Refills Start End SHANDA   norgestimate-ethinyl estradiol (ORTHO-CYCLEN) 0.25-35 MG-MCG tablet 28 tablet 0 5/19/2025 -- No   Sig - Route: Take 1 tablet by mouth daily. -   To Tereza Johns RN on 5/22/2025 at 6:46 AM

## 2025-06-02 ENCOUNTER — TELEPHONE (OUTPATIENT)
Dept: OBGYN | Facility: OTHER | Age: 30
End: 2025-06-02
Payer: COMMERCIAL

## 2025-06-02 NOTE — TELEPHONE ENCOUNTER
After verifying pt last name and date of birth, pt states she has a consult with Naye Johns MD at the end of July. Is on a waiting list to see someone sooner if availible. Pt states she has an IUD and taking a oral birth control option on top of it and is still bleeding and feeling exhausted. Pt states she is on a daily iron supplement for the last 2 weeks. Pt was given information regarding setting up an appointment with an NP in the meantime to do labs and go over med options at this point until she can see Naye Johns MD.     Charis Fitzgerald RN on 6/2/2025 at 10:22 AM

## 2025-06-04 ENCOUNTER — OFFICE VISIT (OUTPATIENT)
Dept: OBGYN | Facility: OTHER | Age: 30
End: 2025-06-04
Payer: COMMERCIAL

## 2025-06-04 ENCOUNTER — RESULTS FOLLOW-UP (OUTPATIENT)
Dept: OBGYN | Facility: OTHER | Age: 30
End: 2025-06-04

## 2025-06-04 VITALS
HEART RATE: 77 BPM | SYSTOLIC BLOOD PRESSURE: 120 MMHG | WEIGHT: 207.6 LBS | DIASTOLIC BLOOD PRESSURE: 86 MMHG | BODY MASS INDEX: 31.57 KG/M2

## 2025-06-04 DIAGNOSIS — Z97.5 BREAKTHROUGH BLEEDING WITH IUD: ICD-10-CM

## 2025-06-04 DIAGNOSIS — N92.0 HEAVY MENSES DUE TO IUD: Primary | ICD-10-CM

## 2025-06-04 DIAGNOSIS — T83.89XA HEAVY MENSES DUE TO IUD: Primary | ICD-10-CM

## 2025-06-04 DIAGNOSIS — N92.1 BREAKTHROUGH BLEEDING WITH IUD: ICD-10-CM

## 2025-06-04 LAB
APTT PPP: 25 SECONDS (ref 22–38)
ERYTHROCYTE [DISTWIDTH] IN BLOOD BY AUTOMATED COUNT: 11.9 % (ref 10–15)
FERRITIN SERPL-MCNC: 89 NG/ML (ref 6–175)
HCT VFR BLD AUTO: 40.7 % (ref 35–47)
HGB BLD-MCNC: 14.2 G/DL (ref 11.7–15.7)
MCH RBC QN AUTO: 32.8 PG (ref 26.5–33)
MCHC RBC AUTO-ENTMCNC: 34.9 G/DL (ref 31.5–36.5)
MCV RBC AUTO: 94 FL (ref 78–100)
PLATELET # BLD AUTO: 371 10E3/UL (ref 150–450)
RBC # BLD AUTO: 4.33 10E6/UL (ref 3.8–5.2)
TSH SERPL DL<=0.005 MIU/L-ACNC: 1.3 UIU/ML (ref 0.3–4.2)
WBC # BLD AUTO: 6.2 10E3/UL (ref 4–11)

## 2025-06-04 PROCEDURE — 85014 HEMATOCRIT: CPT | Mod: ZL

## 2025-06-04 PROCEDURE — 85730 THROMBOPLASTIN TIME PARTIAL: CPT | Mod: ZL

## 2025-06-04 PROCEDURE — 36415 COLL VENOUS BLD VENIPUNCTURE: CPT | Mod: ZL

## 2025-06-04 PROCEDURE — G0463 HOSPITAL OUTPT CLINIC VISIT: HCPCS

## 2025-06-04 PROCEDURE — 84443 ASSAY THYROID STIM HORMONE: CPT | Mod: ZL

## 2025-06-04 PROCEDURE — 82728 ASSAY OF FERRITIN: CPT | Mod: ZL

## 2025-06-04 RX ORDER — NORGESTIMATE AND ETHINYL ESTRADIOL 0.25-0.035
KIT ORAL
Qty: 45 TABLET | Refills: 0 | Status: SHIPPED | OUTPATIENT
Start: 2025-06-04 | End: 2025-07-10

## 2025-06-04 ASSESSMENT — PAIN SCALES - GENERAL: PAINLEVEL_OUTOF10: NO PAIN (0)

## 2025-06-04 NOTE — NURSING NOTE
"Chief Complaint   Patient presents with    Abnormal Bleeding Problem     Patient presents to the clinic for abnormal bleeding. She states that since November she has been bleeding everyday. She got an IUD on 02/26/25. She gave that some time to correct the bleeding but had no luck. She started taking a birth control pill with the IUD to try to get it under control, with no luck there either. She feels tired and \"bleh\" all the time and states she has been having acne breakouts.     Initial /86 (BP Location: Right arm, Patient Position: Sitting, Cuff Size: Adult Regular)   Pulse 77   Wt 94.2 kg (207 lb 9.6 oz)   LMP 05/10/2025 (Exact Date)   BMI 31.57 kg/m   Estimated body mass index is 31.57 kg/m  as calculated from the following:    Height as of 4/11/25: 1.727 m (5' 8\").    Weight as of this encounter: 94.2 kg (207 lb 9.6 oz).  Medication Review: complete    The next two questions are to help us understand your food security.  If you are feeling you need any assistance in this area, we have resources available to support you today.          5/13/2025   SDOH- Food Insecurity   Within the past 12 months, did you worry that your food would run out before you got money to buy more? N   Within the past 12 months, did the food you bought just not last and you didn t have money to get more? N             Sarita oCnnolly, STUART      "

## 2025-06-04 NOTE — PROGRESS NOTES
CC:abnormal  menses with IUD   HPI:  Nury is a 30 year old female who presents for patient for breakthrough bleeding with IUD that is quite irregular. She reports it will be gone for  few hours then restart again- not heavy enough for tampons. Abnormal bleeding started  in November when she was on Nuvaring.  She has been previously placed on birth control pills daily to help this bleeding.  This has not been beneficial.  She continues to have some weakness and some dizziness with this excessive time for bleeding as well.  She has had a Mirena IUD in place since 2025.  She is a 31.57 BMI multi- parous, as well as non-smoker.  Patient does have history of pelvic congestion syndrome but has not gone through full diagnostic testing due to insurance issues.  She has been on multiple different types of birth control for her irregular periods.  She is unsure if she wants to be done childbearing at this time.    Patient's last menstrual period was 05/10/2025 (exact date).    STI history: History of chlamydia    Pap Smears:[facility] [place]   Mammograms:NA    OB History    Para Term  AB Living   2 2 2 0 0 2   SAB IAB Ectopic Multiple Live Births   0 0 0 0 2      # Outcome Date GA Lbr Marky/2nd Weight Sex Type Anes PTL Lv   2 Term 20 39w0d  3.498 kg (7 lb 11.4 oz) F CS-LTranv   KARENA      Name: Edy      Apgar1: 9  Apgar5: 7   1 Term 18 40w1d  3.756 kg (8 lb 4.5 oz) F CS-LTranv Spinal N KARENA      Birth Comments: none      Name: Kristal      Apgar1: 8  Apgar5: 9     Past Medical History:   Diagnosis Date    Anxiety disorder     No Comments Provided    Attention-deficit hyperactivity disorder     No Comments Provided    Depressive disorder     Superficial foreign body of finger     2017       Current Outpatient Medications   Medication Sig Dispense Refill    acetaminophen (TYLENOL) 500 MG tablet Take 500-1,000 mg by mouth every 6 hours as needed for mild pain      FLUoxetine (PROZAC) 40 MG capsule Take  1 capsule (40 mg) by mouth daily. 30 capsule 2    ibuprofen (ADVIL/MOTRIN) 200 MG tablet Take 3 tablets (600 mg) by mouth every 6 hours 100 tablet 0    lamoTRIgine (LAMICTAL) 100 MG tablet Take 1 tablet (100 mg) by mouth daily. 90 tablet 0    lisdexamfetamine (VYVANSE) 30 MG capsule Take 1 capsule (30 mg) by mouth 2 times daily. 60 capsule 0    [START ON 6/10/2025] lisdexamfetamine (VYVANSE) 30 MG capsule Take 1 capsule (30 mg) by mouth 2 times daily. 60 capsule 0    lisdexamfetamine (VYVANSE) 30 MG capsule Take 1 capsule (30 mg) by mouth 2 times daily. Take 2nd dose no later than 2:00 PM 60 capsule 0    LORazepam (ATIVAN) 1 MG tablet Take 1 tablet (1 mg) by mouth daily as needed for anxiety. 30 tablet 1    medical cannabis (Patient's own supply) (The purpose of this order is to document that the patient reports taking medical cannabis.  This is not a prescription, and is not used to certify that the patient has a qualifying medical condition.)    Takes for PTSD 0 Information only 0    norgestimate-ethinyl estradiol (ORTHO-CYCLEN) 0.25-35 MG-MCG tablet Take 1 tablet by mouth daily. 28 tablet 0    triamcinolone (KENALOG) 0.5 % external ointment Apply at night to dry skin 15 g 0    etonogestrel-ethinyl estradiol (ELURYNG) 0.12-0.015 MG/24HR vaginal ring Place 1 each vaginally every 28 days (Patient not taking: Reported on 6/4/2025) 3 each 4    hydrOXYzine HCl (ATARAX) 10 MG tablet Take 1 tablet (10 mg) by mouth every 4 hours as needed for itching or anxiety. (Patient not taking: Reported on 6/4/2025) 90 tablet 0     Current Facility-Administered Medications   Medication Dose Route Frequency Provider Last Rate Last Admin    levonorgestrel (MIRENA) 52 MG (20 mcg/day) IUD 1 each  1 each Intrauterine See Admin Instructions    1 each at 02/26/25 1314     Allergies   Allergen Reactions    Penicillins Swelling     Throat swelling and hives     Phenergan Dm [Promethazine-Dm] Other (See Comments)     Hallucinations and muscle  twitching    Wellbutrin [Bupropion] Other (See Comments)     Increased irritability/anxiety, headaches     /86 (BP Location: Right arm, Patient Position: Sitting, Cuff Size: Adult Regular)   Pulse 77   Wt 94.2 kg (207 lb 9.6 oz)   LMP 05/10/2025 (Exact Date)   BMI 31.57 kg/m      REVIEW OF SYSTEMS  As Per HPI, Otherwise negative.     Exam:  Constitutional: healthy, alert, and no distress  Psych: normal affect   Pulm: nonlabored, easily talks in full sentences   Pelvic: declined         Lab: No results found for any visits on 06/04/25.    ASSESSMENT/PLAN :  (T83.89XA,  N92.0) Heavy menses due to IUD  (primary encounter diagnosis)  Plan: US Pelvic Complete with Transvaginal, CBC W PLT        No Diff, TSH Reflex GH, APTT, Ferritin  (N92.1,  Z97.5) Breakthrough bleeding with IUD  Plan: Discussed with patient that with ultrasound being normal we do not need further pelvic exam unless she desires this- has been examined and no noted polyps were seen. If OCP doesn't help bleeding would recommend pelvic for infection testing.   Endometrium appears thin on ultrasound and obscured by IUD.  Recommend workup today for abnormalities that could be causing this bleeding.  We discussed today that I would also like to try a birth control taper as I suspect that her uterine lining is getting too thin.  I would like her to take Ortho-Cyclen 3 tablets daily x 3 days 2 tablets daily x 3 days then daily for a month to see if this is beneficial for patient.  She would then follow-up with Dr. Johns for further evaluation of her pelvic congestion syndrome which we can assess whether this has been helpful for her abnormal bleeding as well.  Patient is in agreement this plan.     Yesenia Munguia, SALLY CNP  12:52 PM 6/4/2025       Dragon dictation is used to format and dictate this note. Any errors are unintentional.

## 2025-06-04 NOTE — TELEPHONE ENCOUNTER
Spoke with pharmacist at [facility] and they were having computer issues. They will give me a call back to verify RX when able.  Noni Verdin RN

## 2025-06-23 ENCOUNTER — MYC REFILL (OUTPATIENT)
Dept: PSYCHIATRY | Facility: OTHER | Age: 30
End: 2025-06-23
Payer: COMMERCIAL

## 2025-06-23 DIAGNOSIS — F90.2 ATTENTION DEFICIT HYPERACTIVITY DISORDER (ADHD), COMBINED TYPE: ICD-10-CM

## 2025-06-23 RX ORDER — LISDEXAMFETAMINE DIMESYLATE 30 MG/1
30 CAPSULE ORAL 2 TIMES DAILY
Qty: 60 CAPSULE | Refills: 0 | Status: SHIPPED | OUTPATIENT
Start: 2025-06-23

## 2025-07-20 DIAGNOSIS — F33.1 MODERATE EPISODE OF RECURRENT MAJOR DEPRESSIVE DISORDER (H): ICD-10-CM

## 2025-07-22 ENCOUNTER — OFFICE VISIT (OUTPATIENT)
Dept: OBGYN | Facility: OTHER | Age: 30
End: 2025-07-22
Attending: OBSTETRICS & GYNECOLOGY
Payer: COMMERCIAL

## 2025-07-22 VITALS
HEART RATE: 88 BPM | BODY MASS INDEX: 31.47 KG/M2 | DIASTOLIC BLOOD PRESSURE: 80 MMHG | WEIGHT: 207 LBS | SYSTOLIC BLOOD PRESSURE: 122 MMHG

## 2025-07-22 DIAGNOSIS — N94.89 PELVIC CONGESTION SYNDROME: Primary | ICD-10-CM

## 2025-07-22 PROCEDURE — G0463 HOSPITAL OUTPT CLINIC VISIT: HCPCS

## 2025-07-22 ASSESSMENT — PATIENT HEALTH QUESTIONNAIRE - PHQ9
SUM OF ALL RESPONSES TO PHQ QUESTIONS 1-9: 17
SUM OF ALL RESPONSES TO PHQ QUESTIONS 1-9: 17
10. IF YOU CHECKED OFF ANY PROBLEMS, HOW DIFFICULT HAVE THESE PROBLEMS MADE IT FOR YOU TO DO YOUR WORK, TAKE CARE OF THINGS AT HOME, OR GET ALONG WITH OTHER PEOPLE: VERY DIFFICULT

## 2025-07-22 ASSESSMENT — PAIN SCALES - GENERAL: PAINLEVEL_OUTOF10: NO PAIN (0)

## 2025-07-22 NOTE — NURSING NOTE
Chief Complaint   Patient presents with    Consult     Irregular Bleeding - Pelvic Congestion Syndrome        Medication Reconciliation: complete    Mariann Kolb LPN........................7/22/2025  9:23 AM

## 2025-07-22 NOTE — PROGRESS NOTES
Follow-Up Visit    S: Ms. Nury Gilbert is a 30 year old  here for AUB, pelvic congestion syndrome follow-up. She had her last visit 25 with Yesenia Munguia NP for breakthrough bleeding with her IUD. She tried an OCP taper, which helped when she was on TID and BID pills, but ultimately returned with once daily. She continues to have almost daily light bleeding but worsening cramping and fatigue. She is interested in a referral to Interventional Radiology to consider an ovarian vein ablation- had previously been worked up for it but then became pregnant with her second child.     O:  /80 (BP Location: Right arm, Patient Position: Sitting, Cuff Size: Adult Large)   Pulse 88   Wt 93.9 kg (207 lb)   Breastfeeding No   BMI 31.47 kg/m    Gen: Well-appearing, NAD  Pulm: nonlabored  Psych: appropriate mood and affect    A/P:  Ms. Nury Gilbert is a 30 year old  here for pelvic congestion syndrome, AUB follow-up. She has tried numerous medical therapies for PCS- including Depo (weight gain), Mirena (breakthrough bleeding, persistent cramping), OCPs (persistent bleeding). She is not ready for a hysterectomy as she still wants to maintain fertility potential. Referral placed for IR consideration of pelvic vein ablation.     Naye Johns MD  OB/GYN  2025 10:00 AM

## 2025-07-23 RX ORDER — LAMOTRIGINE 100 MG/1
100 TABLET ORAL DAILY
Qty: 90 TABLET | Refills: 0 | Status: SHIPPED | OUTPATIENT
Start: 2025-07-23

## 2025-07-25 ENCOUNTER — MYC REFILL (OUTPATIENT)
Dept: PSYCHIATRY | Facility: OTHER | Age: 30
End: 2025-07-25
Payer: COMMERCIAL

## 2025-07-25 DIAGNOSIS — F90.2 ATTENTION DEFICIT HYPERACTIVITY DISORDER (ADHD), COMBINED TYPE: ICD-10-CM

## 2025-07-28 ENCOUNTER — MYC MEDICAL ADVICE (OUTPATIENT)
Dept: OBGYN | Facility: OTHER | Age: 30
End: 2025-07-28
Payer: COMMERCIAL

## 2025-07-28 RX ORDER — LISDEXAMFETAMINE DIMESYLATE 30 MG/1
30 CAPSULE ORAL 2 TIMES DAILY
Qty: 60 CAPSULE | Refills: 0 | Status: SHIPPED | OUTPATIENT
Start: 2025-07-28

## 2025-07-29 NOTE — TELEPHONE ENCOUNTER
Referral was faxed on 7/22 by our radiology department.  The fax failed and was never resent.  Referral was re-faxed today.  Jane Weller on 7/29/2025 at 1:29 PM

## 2025-08-03 ENCOUNTER — HEALTH MAINTENANCE LETTER (OUTPATIENT)
Age: 30
End: 2025-08-03

## 2025-08-12 ENCOUNTER — TELEPHONE (OUTPATIENT)
Dept: OBGYN | Facility: OTHER | Age: 30
End: 2025-08-12
Payer: COMMERCIAL

## 2025-08-14 DIAGNOSIS — F41.1 GENERALIZED ANXIETY DISORDER: ICD-10-CM

## 2025-08-14 DIAGNOSIS — F90.2 ATTENTION DEFICIT HYPERACTIVITY DISORDER (ADHD), COMBINED TYPE: ICD-10-CM

## 2025-08-14 DIAGNOSIS — F33.1 MODERATE EPISODE OF RECURRENT MAJOR DEPRESSIVE DISORDER (H): ICD-10-CM

## 2025-08-14 RX ORDER — LISDEXAMFETAMINE DIMESYLATE 30 MG/1
30 CAPSULE ORAL 2 TIMES DAILY
Qty: 60 CAPSULE | Refills: 0 | Status: SHIPPED | OUTPATIENT
Start: 2025-08-27

## 2025-08-14 RX ORDER — FLUOXETINE HYDROCHLORIDE 40 MG/1
40 CAPSULE ORAL DAILY
Qty: 30 CAPSULE | Refills: 2 | Status: SHIPPED | OUTPATIENT
Start: 2025-08-14

## 2025-08-26 ENCOUNTER — VIRTUAL VISIT (OUTPATIENT)
Dept: PSYCHIATRY | Facility: OTHER | Age: 30
End: 2025-08-26
Payer: COMMERCIAL

## 2025-08-26 DIAGNOSIS — F33.1 MODERATE EPISODE OF RECURRENT MAJOR DEPRESSIVE DISORDER (H): ICD-10-CM

## 2025-08-26 DIAGNOSIS — F41.1 GENERALIZED ANXIETY DISORDER: ICD-10-CM

## 2025-08-26 DIAGNOSIS — F90.2 ATTENTION DEFICIT HYPERACTIVITY DISORDER (ADHD), COMBINED TYPE: Primary | ICD-10-CM

## 2025-08-26 RX ORDER — LAMOTRIGINE 100 MG/1
100 TABLET ORAL DAILY
Qty: 90 TABLET | Refills: 1 | Status: SHIPPED | OUTPATIENT
Start: 2025-08-26

## 2025-08-26 ASSESSMENT — ANXIETY QUESTIONNAIRES
GAD7 TOTAL SCORE: 17
6. BECOMING EASILY ANNOYED OR IRRITABLE: MORE THAN HALF THE DAYS
7. FEELING AFRAID AS IF SOMETHING AWFUL MIGHT HAPPEN: MORE THAN HALF THE DAYS
5. BEING SO RESTLESS THAT IT IS HARD TO SIT STILL: NEARLY EVERY DAY
7. FEELING AFRAID AS IF SOMETHING AWFUL MIGHT HAPPEN: MORE THAN HALF THE DAYS
8. IF YOU CHECKED OFF ANY PROBLEMS, HOW DIFFICULT HAVE THESE MADE IT FOR YOU TO DO YOUR WORK, TAKE CARE OF THINGS AT HOME, OR GET ALONG WITH OTHER PEOPLE?: VERY DIFFICULT
1. FEELING NERVOUS, ANXIOUS, OR ON EDGE: NEARLY EVERY DAY
2. NOT BEING ABLE TO STOP OR CONTROL WORRYING: MORE THAN HALF THE DAYS
4. TROUBLE RELAXING: NEARLY EVERY DAY
3. WORRYING TOO MUCH ABOUT DIFFERENT THINGS: MORE THAN HALF THE DAYS
GAD7 TOTAL SCORE: 17
IF YOU CHECKED OFF ANY PROBLEMS ON THIS QUESTIONNAIRE, HOW DIFFICULT HAVE THESE PROBLEMS MADE IT FOR YOU TO DO YOUR WORK, TAKE CARE OF THINGS AT HOME, OR GET ALONG WITH OTHER PEOPLE: VERY DIFFICULT
GAD7 TOTAL SCORE: 17

## 2025-08-27 RX ORDER — LISDEXAMFETAMINE DIMESYLATE 30 MG/1
30 CAPSULE ORAL 2 TIMES DAILY
Qty: 60 CAPSULE | Refills: 0 | Status: SHIPPED | OUTPATIENT
Start: 2025-10-26 | End: 2025-11-25

## 2025-08-27 RX ORDER — LISDEXAMFETAMINE DIMESYLATE 30 MG/1
30 CAPSULE ORAL 2 TIMES DAILY
Qty: 60 CAPSULE | Refills: 0 | Status: SHIPPED | OUTPATIENT
Start: 2025-08-27 | End: 2025-09-26

## 2025-08-27 RX ORDER — LISDEXAMFETAMINE DIMESYLATE 30 MG/1
30 CAPSULE ORAL 2 TIMES DAILY
Qty: 60 CAPSULE | Refills: 0 | Status: SHIPPED | OUTPATIENT
Start: 2025-09-26 | End: 2025-10-26

## (undated) DEVICE — SLEEVE COMPRESSION SCD KNEE MED 74022

## (undated) DEVICE — LIGHT HANDLES PLASTIC

## (undated) DEVICE — ESU GROUND PAD ADULT W/CORD E7507

## (undated) DEVICE — COVER LIGHT HANDLE LT-F02

## (undated) DEVICE — TROCAR KII SLEEVE 5MM X 100MM 12/BX

## (undated) DEVICE — PAD ABD 5X9" STERILE 9190A

## (undated) DEVICE — DRSG ABDOMINAL PAD UNSTERILE 5X9" 9190

## (undated) DEVICE — BLADE CLIPPER 4406

## (undated) DEVICE — Device

## (undated) DEVICE — DRSG MEDIPORE 2X2 3/4" 3562

## (undated) DEVICE — TUBING INSUFFLATOR W/FILTER OLYMPUS WA95005A

## (undated) DEVICE — PREP CHLORAPREP 26ML TINTED ORANGE  260815

## (undated) DEVICE — GLOVE PROTEXIS BLUE W/NEU-THERA 6.5  2D73EB65

## (undated) DEVICE — SU VICRYL 4-0 KS 27" UND J662H

## (undated) DEVICE — DRSG MEDIPORE 3 1/2X8" 3570

## (undated) DEVICE — SOL WATER 1500ML

## (undated) DEVICE — PACK LAPAROSCOPY LF SBA15LPFCA

## (undated) DEVICE — SUTURE PLAIN GUT 3-0 CT-1 842H

## (undated) DEVICE — CATH SELF FEMALE 14FR 6" 214

## (undated) DEVICE — PACK C SECTION SMA15CSFCA

## (undated) DEVICE — DRSG STERI STRIP 1/2X4" R1547

## (undated) DEVICE — SU MONOCRYL 0 CT-1 36" UND Y946H

## (undated) DEVICE — PAD CHUX UNDERPAD 30X36" P3036C

## (undated) DEVICE — SYR 10ML FINGER CONTROL W/O NDL 309695

## (undated) DEVICE — GLOVE PROTEXIS POWDER FREE SMT 6.5  2D72PT65X

## (undated) DEVICE — SU VICRYL 0 CT-1 36" J346H

## (undated) DEVICE — ENDO TROCAR FIRST ENTRY KII FIOS ADV FIX 05X100MM CFF03

## (undated) DEVICE — NDL-INSUFFLATION 120MM

## (undated) DEVICE — ENDO TROCAR FIRST ENTRY KII FIOS ADV FIX 12X100MM CFF73

## (undated) DEVICE — SU MONOCRYL 3-0 PS-2 18" UND Y497G

## (undated) RX ORDER — PROPOFOL 10 MG/ML
INJECTION, EMULSION INTRAVENOUS
Status: DISPENSED
Start: 2019-04-25

## (undated) RX ORDER — BUPIVACAINE HYDROCHLORIDE AND EPINEPHRINE 5; 5 MG/ML; UG/ML
INJECTION, SOLUTION EPIDURAL; INTRACAUDAL; PERINEURAL
Status: DISPENSED
Start: 2019-04-25

## (undated) RX ORDER — IBUPROFEN 400 MG/1
TABLET, FILM COATED ORAL
Status: DISPENSED
Start: 2024-01-04

## (undated) RX ORDER — OXYCODONE AND ACETAMINOPHEN 5; 325 MG/1; MG/1
TABLET ORAL
Status: DISPENSED
Start: 2019-04-25

## (undated) RX ORDER — SODIUM CHLORIDE 9 MG/ML
INJECTION, SOLUTION INTRAVENOUS
Status: DISPENSED
Start: 2019-11-23

## (undated) RX ORDER — SODIUM CHLORIDE 9 MG/ML
INJECTION, SOLUTION INTRAVENOUS
Status: DISPENSED
Start: 2019-03-17

## (undated) RX ORDER — IBUPROFEN 200 MG
TABLET ORAL
Status: DISPENSED
Start: 2025-01-13

## (undated) RX ORDER — FENTANYL CITRATE 50 UG/ML
INJECTION, SOLUTION INTRAMUSCULAR; INTRAVENOUS
Status: DISPENSED
Start: 2019-04-25

## (undated) RX ORDER — BUPIVACAINE HYDROCHLORIDE 5 MG/ML
INJECTION, SOLUTION PERINEURAL
Status: DISPENSED
Start: 2018-05-02

## (undated) RX ORDER — NEOSTIGMINE METHYLSULFATE 0.5 MG/ML
INJECTION INTRAVENOUS
Status: DISPENSED
Start: 2019-04-25

## (undated) RX ORDER — SODIUM CHLORIDE, SODIUM LACTATE, POTASSIUM CHLORIDE, CALCIUM CHLORIDE 600; 310; 30; 20 MG/100ML; MG/100ML; MG/100ML; MG/100ML
INJECTION, SOLUTION INTRAVENOUS
Status: DISPENSED
Start: 2018-05-02

## (undated) RX ORDER — OXYTOCIN 10 [USP'U]/ML
INJECTION, SOLUTION INTRAMUSCULAR; INTRAVENOUS
Status: DISPENSED
Start: 2018-05-02

## (undated) RX ORDER — BUPIVACAINE HYDROCHLORIDE 7.5 MG/ML
INJECTION, SOLUTION INTRASPINAL
Status: DISPENSED
Start: 2018-05-02

## (undated) RX ORDER — HYDROMORPHONE HYDROCHLORIDE 2 MG/ML
INJECTION, SOLUTION INTRAMUSCULAR; INTRAVENOUS; SUBCUTANEOUS
Status: DISPENSED
Start: 2018-05-02

## (undated) RX ORDER — KETOROLAC TROMETHAMINE 30 MG/ML
INJECTION, SOLUTION INTRAMUSCULAR; INTRAVENOUS
Status: DISPENSED
Start: 2019-04-25

## (undated) RX ORDER — KETAMINE HYDROCHLORIDE 50 MG/ML
INJECTION, SOLUTION INTRAMUSCULAR; INTRAVENOUS
Status: DISPENSED
Start: 2019-04-25

## (undated) RX ORDER — DEXAMETHASONE SODIUM PHOSPHATE 4 MG/ML
INJECTION, SOLUTION INTRA-ARTICULAR; INTRALESIONAL; INTRAMUSCULAR; INTRAVENOUS; SOFT TISSUE
Status: DISPENSED
Start: 2019-04-25

## (undated) RX ORDER — METOCLOPRAMIDE HYDROCHLORIDE 5 MG/ML
INJECTION INTRAMUSCULAR; INTRAVENOUS
Status: DISPENSED
Start: 2019-11-23

## (undated) RX ORDER — KETOROLAC TROMETHAMINE 15 MG/ML
INJECTION, SOLUTION INTRAMUSCULAR; INTRAVENOUS
Status: DISPENSED
Start: 2024-11-27

## (undated) RX ORDER — IBUPROFEN 200 MG
TABLET ORAL
Status: DISPENSED
Start: 2018-10-26

## (undated) RX ORDER — ACETAMINOPHEN 10 MG/ML
INJECTION, SOLUTION INTRAVENOUS
Status: DISPENSED
Start: 2019-04-25

## (undated) RX ORDER — MEDROXYPROGESTERONE ACETATE 150 MG/ML
INJECTION, SUSPENSION INTRAMUSCULAR
Status: DISPENSED
Start: 2018-11-27

## (undated) RX ORDER — ACETAMINOPHEN 500 MG
TABLET ORAL
Status: DISPENSED
Start: 2024-01-04

## (undated) RX ORDER — IBUPROFEN 400 MG/1
TABLET, FILM COATED ORAL
Status: DISPENSED
Start: 2025-01-13

## (undated) RX ORDER — LIDOCAINE HYDROCHLORIDE 10 MG/ML
INJECTION, SOLUTION EPIDURAL; INFILTRATION; INTRACAUDAL; PERINEURAL
Status: DISPENSED
Start: 2019-04-25

## (undated) RX ORDER — PROPOFOL 10 MG/ML
INJECTION, EMULSION INTRAVENOUS
Status: DISPENSED
Start: 2018-05-02

## (undated) RX ORDER — SODIUM CHLORIDE 9 MG/ML
INJECTION, SOLUTION INTRAVENOUS
Status: DISPENSED
Start: 2018-05-02

## (undated) RX ORDER — ONDANSETRON 2 MG/ML
INJECTION INTRAMUSCULAR; INTRAVENOUS
Status: DISPENSED
Start: 2019-03-17

## (undated) RX ORDER — MEDROXYPROGESTERONE ACETATE 150 MG/ML
INJECTION, SUSPENSION INTRAMUSCULAR
Status: DISPENSED
Start: 2019-02-18

## (undated) RX ORDER — MEDROXYPROGESTERONE ACETATE 150 MG/ML
INJECTION, SUSPENSION INTRAMUSCULAR
Status: DISCONTINUED
Start: 2018-09-06 | End: 2018-09-06

## (undated) RX ORDER — ONDANSETRON 2 MG/ML
INJECTION INTRAMUSCULAR; INTRAVENOUS
Status: DISPENSED
Start: 2019-04-25

## (undated) RX ORDER — FENTANYL CITRATE 50 UG/ML
INJECTION, SOLUTION INTRAMUSCULAR; INTRAVENOUS
Status: DISPENSED
Start: 2018-05-02

## (undated) RX ORDER — IBUPROFEN 200 MG
TABLET ORAL
Status: DISPENSED
Start: 2024-01-04

## (undated) RX ORDER — MEDROXYPROGESTERONE ACETATE 150 MG/ML
INJECTION, SUSPENSION INTRAMUSCULAR
Status: DISPENSED
Start: 2018-06-13

## (undated) RX ORDER — CLINDAMYCIN PHOSPHATE 900 MG/50ML
INJECTION, SOLUTION INTRAVENOUS
Status: DISPENSED
Start: 2018-05-02

## (undated) RX ORDER — IBUPROFEN 400 MG/1
TABLET, FILM COATED ORAL
Status: DISPENSED
Start: 2018-10-26

## (undated) RX ORDER — LORAZEPAM 2 MG/ML
INJECTION INTRAMUSCULAR
Status: DISPENSED
Start: 2019-03-17

## (undated) RX ORDER — MORPHINE SULFATE 4 MG/ML
INJECTION, SOLUTION INTRAMUSCULAR; INTRAVENOUS
Status: DISPENSED
Start: 2019-03-18

## (undated) RX ORDER — CEFTRIAXONE 2 G/1
INJECTION, POWDER, FOR SOLUTION INTRAMUSCULAR; INTRAVENOUS
Status: DISPENSED
Start: 2024-11-27

## (undated) RX ORDER — ONDANSETRON 2 MG/ML
INJECTION INTRAMUSCULAR; INTRAVENOUS
Status: DISPENSED
Start: 2018-05-02

## (undated) RX ORDER — PANTOPRAZOLE SODIUM 40 MG/1
TABLET, DELAYED RELEASE ORAL
Status: DISPENSED
Start: 2024-01-05

## (undated) RX ORDER — PHENYLEPHRINE HCL IN 0.9% NACL 1 MG/10 ML
SYRINGE (ML) INTRAVENOUS
Status: DISPENSED
Start: 2018-05-02

## (undated) RX ORDER — MORPHINE SULFATE 1 MG/ML
INJECTION, SOLUTION EPIDURAL; INTRATHECAL; INTRAVENOUS
Status: DISPENSED
Start: 2018-05-02

## (undated) RX ORDER — ONDANSETRON 2 MG/ML
INJECTION INTRAMUSCULAR; INTRAVENOUS
Status: DISPENSED
Start: 2023-10-06

## (undated) RX ORDER — KETOROLAC TROMETHAMINE 15 MG/ML
INJECTION, SOLUTION INTRAMUSCULAR; INTRAVENOUS
Status: DISPENSED
Start: 2019-03-17

## (undated) RX ORDER — LIDOCAINE HYDROCHLORIDE 20 MG/ML
INJECTION, SOLUTION EPIDURAL; INFILTRATION; INTRACAUDAL; PERINEURAL
Status: DISPENSED
Start: 2018-05-02

## (undated) RX ORDER — LIDOCAINE HYDROCHLORIDE 20 MG/ML
INJECTION, SOLUTION EPIDURAL; INFILTRATION; INTRACAUDAL; PERINEURAL
Status: DISPENSED
Start: 2019-04-25

## (undated) RX ORDER — GLYCOPYRROLATE 0.2 MG/ML
INJECTION, SOLUTION INTRAMUSCULAR; INTRAVENOUS
Status: DISPENSED
Start: 2019-04-25